# Patient Record
Sex: MALE | Race: BLACK OR AFRICAN AMERICAN | NOT HISPANIC OR LATINO | Employment: OTHER | URBAN - METROPOLITAN AREA
[De-identification: names, ages, dates, MRNs, and addresses within clinical notes are randomized per-mention and may not be internally consistent; named-entity substitution may affect disease eponyms.]

---

## 2017-01-03 ENCOUNTER — TELEPHONE (OUTPATIENT)
Dept: PULMONOLOGY | Facility: CLINIC | Age: 82
End: 2017-01-03

## 2017-01-03 NOTE — TELEPHONE ENCOUNTER
Spoke with patient's daughter Luz Maria and spoke with pharmacy.  Form to be filled out being faxed from [facility].  Will have filled out and faxed back today.

## 2017-01-03 NOTE — TELEPHONE ENCOUNTER
----- Message from Marianna Sanchez sent at 12/30/2016  9:56 AM CST -----  Contact: daughter-Luz Maria  Pt daughter Luz Maria would like the nurse to call regarding pt Rx-nebulizer,the pharmacy is waiting on the office to call them cause the insurance is not going to pay,prior authorization was needed.....[phone]

## 2017-01-04 ENCOUNTER — ANTI-COAG VISIT (OUTPATIENT)
Dept: CARDIOLOGY | Facility: CLINIC | Age: 82
End: 2017-01-04
Payer: MEDICARE

## 2017-01-04 DIAGNOSIS — I48.91 ATRIAL FIBRILLATION, UNSPECIFIED TYPE: ICD-10-CM

## 2017-01-04 DIAGNOSIS — Z79.01 LONG TERM (CURRENT) USE OF ANTICOAGULANTS: Primary | ICD-10-CM

## 2017-01-04 LAB
CTP QC/QA: ABNORMAL
INR PPP: 3.7 (ref 2–3)

## 2017-01-04 PROCEDURE — 99999 PR PBB SHADOW E&M-EST. PATIENT-LVL I: CPT | Mod: PBBFAC,,,

## 2017-01-04 PROCEDURE — 85610 PROTHROMBIN TIME: CPT | Mod: PBBFAC,PO

## 2017-01-04 PROCEDURE — 99211 OFF/OP EST MAY X REQ PHY/QHP: CPT | Mod: PBBFAC,PO

## 2017-01-04 NOTE — PROGRESS NOTES
Patient confirms dose and compliance. Reports that he had greens over the holiday. Was also taking azithromycin and on a prednisone taper which started on 12/22. He has 2 days left of low dose prednisone. Will have patient hold warfarin for 2 days and resume. Recheck INR in 3 weeks.

## 2017-01-04 NOTE — MR AVS SNAPSHOT
[facility]  [address] [place] [address]  Phone: [phone]  Fax: [phone]                  Bola Figueroa .   2017 9:15 AM   Anti-coag visit    Description:  Male : 10/6/1934   Provider:  Floridalma Almodovar PharmD   Department:  [facility] - Coumadin           Diagnoses this Visit        Comments    Long term (current) use of anticoagulants    -  Primary     Atrial fibrillation, unspecified type                To Do List           Future Appointments        Provider Department Dept Phone    2017 9:15 AM Floridalma Almodovar PharmD [facility] - Coumadin [phone]    2017 9:00 AM Hedy Corona PharmD [facility] - Coumadin [phone]    3/23/2017 11:40 AM PULMONARY LAB, [facility]- Pulmonary Function [facility] [phone]    3/23/2017 12:00 PM SPIROMETRY, [facility]- Pulmonary Function [facility] [phone]    3/23/2017 1:00 PM Juanjo Wallace MD [facility] Pulmonary Services [phone]      Goals (5 Years of Data)     None      [facility]     [facility] -  Assistance  Registered nurses in the [facility] provide clinical advisement, health education, appointment booking, and other advisory services.  Call for this free service at [phone].             Medications           Message regarding Medications     Verify the changes and/or additions to your medication regime listed below are the same as discussed with your clinician today.  If any of these changes or additions are incorrect, please notify your healthcare provider.             Verify that the below list of medications is an accurate representation of the medications you are currently taking.  If none reported, the list may be blank. If incorrect, please contact your healthcare provider. Carry this list with you in case of emergency.           Current Medications     albuterol (PROAIR HFA) 90 mcg/actuation inhaler Inhale 2 puffs into the lungs every 6 (six) hours.    albuterol-ipratropium  2.5mg-0.5mg/3mL (DUO-NEB) 0.5 mg-3 mg(2.5 mg base)/3 mL nebulizer solution Take 3 mLs by nebulization every 6 (six) hours.    AMITIZA 8 mcg Cap     amlodipine-benazepril (LOTREL) 10-40 mg per capsule Take 1 capsule by mouth every morning.    aspirin (ECOTRIN) 81 MG EC tablet Take 81 mg by mouth once daily.    atenolol (TENORMIN) 50 MG tablet Take 50 mg by mouth once daily.    azithromycin (ZITHROMAX Z-ROGELIO) 250 MG tablet Take 1 tablet (250 mg total) by mouth once daily. 500 mg on day 1 (two tablets) followed by 250 mg once daily on days 2-5    budesonide (PULMICORT) 0.5 mg/2 mL nebulizer solution Take 2 mLs (0.5 mg total) by nebulization 2 (two) times daily.    cloNIDine (CATAPRES) 0.2 MG tablet Take 0.2 mg by mouth 2 (two) times daily.    dexlansoprazole (DEXILANT) 60 mg capsule 60 mg.    FLUZONE HIGH-DOSE 2016-17, PF, 180 mcg/0.5 mL Syrg inject 0.5 milliliter intramuscularly    furosemide (LASIX) 40 MG tablet Take 40 mg by mouth 2 (two) times daily.     gabapentin (NEURONTIN) 100 MG capsule Take 100 mg by mouth 3 (three) times daily.    hydrALAZINE (APRESOLINE) 50 MG tablet take 1 tablet by mouth every 12 hours (TAKE WITH ISOSORBIDE)    hydrocodone-acetaminophen 5-325mg (NORCO) 5-325 mg per tablet Take 1 tablet by mouth every 4 (four) hours as needed.    ipratropium (ATROVENT) 0.06 % nasal spray 2 sprays by Nasal route 3 (three) times daily.    isosorbide dinitrate (ISORDIL) 20 MG tablet take 1 tablet by mouth every 12 hours **TAKE WITH HYDRALAZINE    levetiracetam (KEPPRA) 500 MG Tab Take 1 tablet by mouth Twice daily.    nitroGLYCERIN (NITROBID) 6.5 MG CpSR take 1 capsule by mouth twice a day    predniSONE (DELTASONE) 20 MG tablet Prednisone 60 mg/ day for 3 days, 40 mg/day for 3 days,20 mg/ day for 3 days, (1/2 tablet )10 mg a day for 3 days.    simvastatin (ZOCOR) 40 MG tablet Take 1 tablet by mouth Daily.    tamsulosin (FLOMAX) 0.4 mg Cp24 0.4 mg.    warfarin (COUMADIN) 5 MG tablet Take 1 1/2 on Saturday and  Sunday and 1 tablet all remaining days as directed by the Coumadin Clinic.           Clinical Reference Information           Allergies as of 1/4/2017     No Known Drug Allergies      Immunizations Administered on Date of Encounter - 1/4/2017     None      Orders Placed During Today's Visit      Normal Orders This Visit    ISTAT INR          1/4/2017  8:39 AM - Floridalma Almodovar PharmD      Component Results     Component Value Flag Ref Range Units Status    INR 3.7 (A) 2.0 - 3.0  Final     Acceptable           December 2016 Details    Sun Mon Tue Wed Thu Fri Sat         1               2               3                 4               5      5 mg         6      5 mg         7   2.3   5 mg   See details      8      5 mg         9      5 mg         10      7.5 mg           11      7.5 mg         12      5 mg         13      5 mg         14      5 mg         15      5 mg         16      5 mg         17      7.5 mg           18      7.5 mg         19      5 mg         20      5 mg         21      5 mg         22      5 mg         23      5 mg         24      7.5 mg           25      7.5 mg         26      5 mg         27      5 mg         28      5 mg         29      5 mg         30      5 mg         31      7.5 mg          Date Details   12/07 Last INR check   INR: 2.3                 January 2017 Details    Sun Mon Tue Wed Thu Fri Sat     1      7.5 mg         2      5 mg         3      5 mg         4   3.7   Hold   See details      5      Hold         6      5 mg         7      7.5 mg           8      7.5 mg         9      5 mg         10      5 mg         11      5 mg         12      5 mg         13      5 mg         14      7.5 mg           15      7.5 mg         16      5 mg         17      5 mg         18      5 mg         19      5 mg         20      5 mg         21      7.5 mg           22      7.5 mg         23      5 mg         24      5 mg         25      5 mg         26      5 mg         27       5 mg         28      7.5 mg           29      7.5 mg         30      5 mg         31      5 mg              Date Details   01/04 This INR check   INR: 3.7      Date of next INR: No date specified               How to take your warfarin dose     To take:  5 mg Take 1 of the 5 mg tablets.    To take:  7.5 mg Take 1.5 of the 5 mg tablets.    Hold Do not take your warfarin dose. See the Details table to the right for additional instructions.                Anticoagulation Summary as of 1/4/2017     Maintenance plan 7.5 mg (5 mg x 1.5) on Sun, Sat; 5 mg (5 mg x 1) all other days    Full instructions 1/4: Hold; 1/5: Hold; Otherwise 7.5 mg on Sun, Sat; 5 mg all other days    Next INR check       Anticoagulation Episode Summary     Comments       [facility] Sign-Up     Activating your [facility] account is as easy as 1-2-3!     1) Visit [email or web address], select Sign Up Now, enter this activation code and your date of birth, then select Next.  [email or web address]  Expires: 2/5/2017  3:41 PM      2) Create a username and password to use when you visit [facility] in the future and select a security question in case you lose your password and select Next.    3) Enter your e-mail address and click Sign Up!    Additional Information  If you have questions, please e-mail [email or web address] or call [phone] to talk to our [facility] staff. Remember, [facility] is NOT to be used for urgent needs. For medical emergencies, dial 911.

## 2017-01-09 ENCOUNTER — TELEPHONE (OUTPATIENT)
Dept: PULMONOLOGY | Facility: CLINIC | Age: 82
End: 2017-01-09

## 2017-01-09 NOTE — TELEPHONE ENCOUNTER
----- Message from Brianna Oliveira sent at 1/9/2017 11:01 AM CST -----  Contact: pt  States she's returning Soledad's call. Please call Luz Maria Alexis at [phone]. Thank you

## 2017-01-09 NOTE — TELEPHONE ENCOUNTER
----- Message from Comfort Looney sent at 1/9/2017  2:11 PM CST -----  Contact: daughter -Luz Maria Alexis  [phone]  Soledad - Ms Loera is returning your missed call and wants you to go ahead and call [facility] on [address] if you have the authorization.

## 2017-01-09 NOTE — TELEPHONE ENCOUNTER
Spoke with patient's Daughter, Luz Maria,  Paperwork has been faxed to pharmacy for nebulizer medication.

## 2017-01-25 ENCOUNTER — ANTI-COAG VISIT (OUTPATIENT)
Dept: CARDIOLOGY | Facility: CLINIC | Age: 82
End: 2017-01-25
Payer: MEDICARE

## 2017-01-25 DIAGNOSIS — Z79.01 LONG TERM (CURRENT) USE OF ANTICOAGULANTS: Primary | ICD-10-CM

## 2017-01-25 LAB
CTP QC/QA: YES
INR PPP: 3.5 (ref 2–3)

## 2017-01-25 PROCEDURE — 85610 PROTHROMBIN TIME: CPT | Mod: PBBFAC,PO

## 2017-01-25 PROCEDURE — 99999 PR PBB SHADOW E&M-EST. PATIENT-LVL I: CPT | Mod: PBBFAC,,,

## 2017-01-25 PROCEDURE — 99211 OFF/OP EST MAY X REQ PHY/QHP: CPT | Mod: PBBFAC,PO

## 2017-01-25 NOTE — PROGRESS NOTES
INR remains supra-therapeutic. No bleeding noted. Patient completed a z-pack regimen since last visit but no other changes. Will hold x1 dose then lower total weekly dose to 5mg daily.

## 2017-01-25 NOTE — MR AVS SNAPSHOT
[facility]  [address] [place] [address]  Phone: [phone]  Fax: [phone]                  Bola Figueroa .   2017 9:00 AM   Anti-coag visit    Description:  Male : 10/6/1934   Provider:  Hedy Corona PharmD   Department:  [facility] - Coumadin           Diagnoses this Visit        Comments    Long term (current) use of anticoagulants    -  Primary            To Do List           Future Appointments        Provider Department Dept Phone    2017 9:00 AM Hedy Corona PharmD [facility] - Coumadin [phone]    2/3/2017 2:30 PM Caroline Mcdonough MD [facility] Cardiology [phone]    2/15/2017 9:00 AM Hedy Corona PharmD [facility] - Coumadin [phone]    3/23/2017 11:40 AM PULMONARY LAB, [facility]- Pulmonary Function [facility] [phone]    3/23/2017 12:00 PM SPIROMETRY, [facility] Pulmonary Function [facility] [phone]      Goals (5 Years of Data)     None      [facility]     [facility] On Call Nurse Care Line -  Assistance  Registered nurses in the [facility] On Call Center provide clinical advisement, health education, appointment booking, and other advisory services.  Call for this free service at [phone].             Medications           Message regarding Medications     Verify the changes and/or additions to your medication regime listed below are the same as discussed with your clinician today.  If any of these changes or additions are incorrect, please notify your healthcare provider.             Verify that the below list of medications is an accurate representation of the medications you are currently taking.  If none reported, the list may be blank. If incorrect, please contact your healthcare provider. Carry this list with you in case of emergency.           Current Medications     albuterol (PROAIR HFA) 90 mcg/actuation inhaler Inhale 2 puffs into the lungs every 6 (six) hours.    albuterol-ipratropium 2.5mg-0.5mg/3mL (DUO-NEB) 0.5 mg-3 mg(2.5 mg  base)/3 mL nebulizer solution Take 3 mLs by nebulization every 6 (six) hours.    AMITIZA 8 mcg Cap     amlodipine-benazepril (LOTREL) 10-40 mg per capsule Take 1 capsule by mouth every morning.    aspirin (ECOTRIN) 81 MG EC tablet Take 81 mg by mouth once daily.    atenolol (TENORMIN) 50 MG tablet Take 50 mg by mouth once daily.    azithromycin (ZITHROMAX Z-ROGELIO) 250 MG tablet Take 1 tablet (250 mg total) by mouth once daily. 500 mg on day 1 (two tablets) followed by 250 mg once daily on days 2-5    budesonide (PULMICORT) 0.5 mg/2 mL nebulizer solution Take 2 mLs (0.5 mg total) by nebulization 2 (two) times daily.    cloNIDine (CATAPRES) 0.2 MG tablet Take 0.2 mg by mouth 2 (two) times daily.    dexlansoprazole (DEXILANT) 60 mg capsule 60 mg.    FLUZONE HIGH-DOSE 2016-17, PF, 180 mcg/0.5 mL Syrg inject 0.5 milliliter intramuscularly    furosemide (LASIX) 40 MG tablet Take 40 mg by mouth 2 (two) times daily.     gabapentin (NEURONTIN) 100 MG capsule Take 100 mg by mouth 3 (three) times daily.    hydrALAZINE (APRESOLINE) 50 MG tablet take 1 tablet by mouth every 12 hours (TAKE WITH ISOSORBIDE)    hydrocodone-acetaminophen 5-325mg (NORCO) 5-325 mg per tablet Take 1 tablet by mouth every 4 (four) hours as needed.    ipratropium (ATROVENT) 0.06 % nasal spray 2 sprays by Nasal route 3 (three) times daily.    isosorbide dinitrate (ISORDIL) 20 MG tablet take 1 tablet by mouth every 12 hours **TAKE WITH HYDRALAZINE    levetiracetam (KEPPRA) 500 MG Tab Take 1 tablet by mouth Twice daily.    nitroGLYCERIN (NITROBID) 6.5 MG CpSR take 1 capsule by mouth twice a day    predniSONE (DELTASONE) 20 MG tablet Prednisone 60 mg/ day for 3 days, 40 mg/day for 3 days,20 mg/ day for 3 days, (1/2 tablet )10 mg a day for 3 days.    simvastatin (ZOCOR) 40 MG tablet Take 1 tablet by mouth Daily.    tamsulosin (FLOMAX) 0.4 mg Cp24 0.4 mg.    warfarin (COUMADIN) 5 MG tablet Take 1 1/2 on Saturday and Sunday and 1 tablet all remaining days as  directed by the Coumadin Clinic.           Clinical Reference Information           Allergies as of 1/25/2017     No Known Drug Allergies      Immunizations Administered on Date of Encounter - 1/25/2017     None      Orders Placed During Today's Visit      Normal Orders This Visit    POCT INR          1/25/2017  8:57 AM - Hedy Corona, PharmD      Component Results     Component Value Flag Ref Range Units Status    INR 3.5 (A) 2.0 - 3.0  Final     Acceptable Yes    Final      December 2016 Details    Sun Mon Tue Wed Thu Fri Sat         1               2               3                 4               5               6               7               8               9               10                 11               12               13               14               15               16               17                 18               19               20               21               22               23               24                 25               26      5 mg         27      5 mg         28      5 mg         29      5 mg         30      5 mg         31      7.5 mg          Date Details   No additional details              January 2017 Details    Sun Mon Tue Wed Thu Fri Sat     1      7.5 mg         2      5 mg         3      5 mg         4   3.7   Hold   See details      5      Hold         6      5 mg         7      7.5 mg           8      7.5 mg         9      5 mg         10      5 mg         11      5 mg         12      5 mg         13      5 mg         14      7.5 mg           15      7.5 mg         16      5 mg         17      5 mg         18      5 mg         19      5 mg         20      5 mg         21      7.5 mg           22      7.5 mg         23      5 mg         24      5 mg         25   3.5   Hold   See details      26      5 mg         27      5 mg         28      5 mg           29      5 mg         30      5 mg         31      5 mg              Date Details   01/04 Last INR  check   INR: 3.7      01/25 This INR check   INR: 3.5                     How to take your warfarin dose     To take:  5 mg Take 1 of the 5 mg tablets.    Hold Do not take your warfarin dose. See the Details table to the right for additional instructions.                February 2017 Details    Sun Mon Tue Wed Thu Fri Sat        1      5 mg         2      5 mg         3      5 mg         4      5 mg           5      5 mg         6      5 mg         7      5 mg         8      5 mg         9      5 mg         10      5 mg         11      5 mg           12      5 mg         13      5 mg         14      5 mg         15            16               17               18                 19               20               21               22               23               24               25                 26               27               28                    Date Details   No additional details    Date of next INR:  2/15/2017         How to take your warfarin dose     To take:  5 mg Take 1 of the 5 mg tablets.           Anticoagulation Summary as of 1/25/2017     Maintenance plan 5 mg (5 mg x 1) every day    Full instructions 1/25: Hold; Otherwise 5 mg every day    Next INR check 2/15/2017      Anticoagulation Episode Summary     Comments       Patient Findings     Negatives Signs/symptoms of thrombosis, Signs/symptoms of bleeding, Laboratory test error suspected, Change in health, Change in alcohol use, Change in activity, Upcoming invasive procedure, Emergency department visit, Upcoming dental procedure, Missed doses, Extra doses, Change in medications, Change in diet/appetite, Hospital admission, Bruising, Other complaints      [facility] Sign-Up     Activating your [facility] account is as easy as 1-2-3!     1) Visit [email or web address], select Sign Up Now, enter this activation code and your date of birth, then select Next.  [email or web address]  Expires: 2/5/2017  3:41 PM      2) Create a username and password to use when you  visit [facility] in the future and select a security question in case you lose your password and select Next.    3) Enter your e-mail address and click Sign Up!    Additional Information  If you have questions, please e-mail [email or web address] or call [phone] to talk to our [facility] staff. Remember, [facility] is NOT to be used for urgent needs. For medical emergencies, dial 911.

## 2017-01-27 DIAGNOSIS — I25.10 CORONARY ARTERY DISEASE INVOLVING NATIVE CORONARY ARTERY WITHOUT ANGINA PECTORIS, UNSPECIFIED WHETHER NATIVE OR TRANSPLANTED HEART: ICD-10-CM

## 2017-01-27 DIAGNOSIS — I10 ESSENTIAL HYPERTENSION: ICD-10-CM

## 2017-01-27 RX ORDER — AMLODIPINE AND BENAZEPRIL HYDROCHLORIDE 10; 40 MG/1; MG/1
1 CAPSULE ORAL EVERY MORNING
Qty: 90 CAPSULE | Refills: 3 | Status: SHIPPED | OUTPATIENT
Start: 2017-01-27 | End: 2017-08-02 | Stop reason: ALTCHOICE

## 2017-02-03 ENCOUNTER — OFFICE VISIT (OUTPATIENT)
Dept: CARDIOLOGY | Facility: CLINIC | Age: 82
End: 2017-02-03
Payer: MEDICARE

## 2017-02-03 VITALS
SYSTOLIC BLOOD PRESSURE: 150 MMHG | WEIGHT: 202.19 LBS | HEIGHT: 71 IN | DIASTOLIC BLOOD PRESSURE: 76 MMHG | BODY MASS INDEX: 28.31 KG/M2 | HEART RATE: 75 BPM

## 2017-02-03 DIAGNOSIS — Z98.61 S/P PTCA (PERCUTANEOUS TRANSLUMINAL CORONARY ANGIOPLASTY): ICD-10-CM

## 2017-02-03 DIAGNOSIS — I48.91 ATRIAL FIBRILLATION, UNSPECIFIED TYPE: ICD-10-CM

## 2017-02-03 DIAGNOSIS — Z79.01 LONG-TERM (CURRENT) USE OF ANTICOAGULANTS: ICD-10-CM

## 2017-02-03 DIAGNOSIS — I73.9 PVD (PERIPHERAL VASCULAR DISEASE): ICD-10-CM

## 2017-02-03 DIAGNOSIS — Z95.0 CARDIAC PACEMAKER: ICD-10-CM

## 2017-02-03 DIAGNOSIS — I10 ESSENTIAL HYPERTENSION: ICD-10-CM

## 2017-02-03 DIAGNOSIS — J98.6 ELEVATED DIAPHRAGM: ICD-10-CM

## 2017-02-03 DIAGNOSIS — I25.10 CORONARY ARTERY DISEASE INVOLVING NATIVE CORONARY ARTERY OF NATIVE HEART WITHOUT ANGINA PECTORIS: ICD-10-CM

## 2017-02-03 DIAGNOSIS — I65.23 BILATERAL CAROTID ARTERY STENOSIS: ICD-10-CM

## 2017-02-03 DIAGNOSIS — J44.9 CHRONIC OBSTRUCTIVE PULMONARY DISEASE, UNSPECIFIED COPD TYPE: Primary | ICD-10-CM

## 2017-02-03 DIAGNOSIS — G47.30 SLEEP APNEA, UNSPECIFIED TYPE: ICD-10-CM

## 2017-02-03 PROCEDURE — 99999 PR PBB SHADOW E&M-EST. PATIENT-LVL III: CPT | Mod: PBBFAC,,, | Performed by: INTERNAL MEDICINE

## 2017-02-03 PROCEDURE — 99213 OFFICE O/P EST LOW 20 MIN: CPT | Mod: PBBFAC,PO | Performed by: INTERNAL MEDICINE

## 2017-02-03 PROCEDURE — 99212 OFFICE O/P EST SF 10 MIN: CPT | Mod: S$PBB,,, | Performed by: INTERNAL MEDICINE

## 2017-02-03 NOTE — MR AVS SNAPSHOT
[facility] Cardiology  [address] [place] [address]  Phone: [phone]  Fax: [phone]                  [facility].   2/3/2017 2:30 PM   Office Visit    Description:  Male : 10/6/1934   Provider:  Caroline Mcdonough MD   Department:  [facility] - Cardiology           Diagnoses this Visit        Comments    Chronic obstructive pulmonary disease, unspecified COPD type    -  Primary     S/P PTCA (percutaneous transluminal coronary angioplasty)         Sleep apnea, unspecified type         Cardiac pacemaker         Elevated diaphragm         Long-term (current) use of anticoagulants         Essential hypertension         PVD (peripheral vascular disease)         Coronary artery disease involving native coronary artery of native heart without angina pectoris         Bilateral carotid artery stenosis         Atrial fibrillation, unspecified type                To Do List           Future Appointments        Provider Department Dept Phone    2/3/2017 2:30 PM Caroline Mcdonough MD [facility] Cardiology [phone]    2/15/2017 9:00 AM Hedy Corona, PharmD [facility] Coumadin [phone]    3/23/2017 11:40 AM PULMONARY LAB, [facility]- Pulmonary Function [facility] [phone]    3/23/2017 12:00 PM SPIROMETRY, [facility] Pulmonary Function [facility] [phone]    3/23/2017 1:00 PM Juanjo Wallace MD [facility] Pulmonary Services [phone]      Goals (5 Years of Data)     None      Follow-Up and Disposition     Return in about 6 months (around 8/3/2017).      [facility]     [facility] -  Assistance  Registered nurses in the [facility] provide clinical advisement, health education, appointment booking, and other advisory services.  Call for this free service at [phone].             Medications           Message regarding Medications     Verify the changes and/or additions to your medication regime listed below are the same as discussed with your clinician today.  If  any of these changes or additions are incorrect, please notify your healthcare provider.        STOP taking these medications     predniSONE (DELTASONE) 20 MG tablet Prednisone 60 mg/ day for 3 days, 40 mg/day for 3 days,20 mg/ day for 3 days, (1/2 tablet )10 mg a day for 3 days.           Verify that the below list of medications is an accurate representation of the medications you are currently taking.  If none reported, the list may be blank. If incorrect, please contact your healthcare provider. Carry this list with you in case of emergency.           Current Medications     albuterol (PROAIR HFA) 90 mcg/actuation inhaler Inhale 2 puffs into the lungs every 6 (six) hours.    albuterol-ipratropium 2.5mg-0.5mg/3mL (DUO-NEB) 0.5 mg-3 mg(2.5 mg base)/3 mL nebulizer solution Take 3 mLs by nebulization every 6 (six) hours.    AMITIZA 8 mcg Cap     amlodipine-benazepril (LOTREL) 10-40 mg per capsule Take 1 capsule by mouth every morning.    aspirin (ECOTRIN) 81 MG EC tablet Take 81 mg by mouth once daily.    atenolol (TENORMIN) 50 MG tablet Take 50 mg by mouth once daily.    azithromycin (ZITHROMAX Z-ROGELIO) 250 MG tablet Take 1 tablet (250 mg total) by mouth once daily. 500 mg on day 1 (two tablets) followed by 250 mg once daily on days 2-5    budesonide (PULMICORT) 0.5 mg/2 mL nebulizer solution Take 2 mLs (0.5 mg total) by nebulization 2 (two) times daily.    cloNIDine (CATAPRES) 0.2 MG tablet Take 0.2 mg by mouth 2 (two) times daily.    dexlansoprazole (DEXILANT) 60 mg capsule 60 mg.    FLUZONE HIGH-DOSE 2016-17, PF, 180 mcg/0.5 mL Syrg inject 0.5 milliliter intramuscularly    furosemide (LASIX) 40 MG tablet Take 40 mg by mouth 2 (two) times daily.     gabapentin (NEURONTIN) 100 MG capsule Take 100 mg by mouth 3 (three) times daily.    hydrALAZINE (APRESOLINE) 50 MG tablet take 1 tablet by mouth every 12 hours (TAKE WITH ISOSORBIDE)    hydrocodone-acetaminophen 5-325mg (NORCO) 5-325 mg per tablet Take 1 tablet by  "mouth every 4 (four) hours as needed.    ipratropium (ATROVENT) 0.06 % nasal spray 2 sprays by Nasal route 3 (three) times daily.    isosorbide dinitrate (ISORDIL) 20 MG tablet take 1 tablet by mouth every 12 hours **TAKE WITH HYDRALAZINE    levetiracetam (KEPPRA) 500 MG Tab Take 1 tablet by mouth Twice daily.    nitroGLYCERIN (NITROBID) 6.5 MG CpSR take 1 capsule by mouth twice a day    simvastatin (ZOCOR) 40 MG tablet Take 1 tablet by mouth Daily.    tamsulosin (FLOMAX) 0.4 mg Cp24 0.4 mg.    warfarin (COUMADIN) 5 MG tablet Take 1 1/2 on Saturday and Sunday and 1 tablet all remaining days as directed by the Coumadin Clinic.           Clinical Reference Information           Your Vitals Were     BP Pulse Height Weight BMI    150/76 (BP Location: Right arm, Patient Position: Sitting, BP Method: Manual) 75 5' 11" (1.803 m) 91.7 kg (202 lb 2.6 oz) 28.2 kg/m2      Blood Pressure          Most Recent Value    Right Arm BP - Sitting  150/76    Left Arm BP - Sitting  152/72    BP  (!)  150/76      Allergies as of 2/3/2017     No Known Drug Allergies      Immunizations Administered on Date of Encounter - 2/3/2017     None      [facility] Sign-Up     Activating your [facility] account is as easy as 1-2-3!     1) Visit [email or web address], select Sign Up Now, enter this activation code and your date of birth, then select Next.  [email or web address]  Expires: 2/5/2017  3:41 PM      2) Create a username and password to use when you visit [facility] in the future and select a security question in case you lose your password and select Next.    3) Enter your e-mail address and click Sign Up!    Additional Information  If you have questions, please e-mail [email or web address] or call [phone] to talk to our [facility] staff. Remember, [facility] is NOT to be used for urgent needs. For medical emergencies, dial 911.         Language Assistance Services     ATTENTION: Language assistance services are available, free of charge. Please call " [phone].      ATENCIÓN: Si habla español, tiene a paz disposición servicios gratuitos de asistencia lingüística. Llame al [phone].     CHÚ Ý: N?u b?n nói Ti?ng Vi?t, có các d?ch v? h? tr? ngôn ng? mi?n phí dành cho b?n. G?i s? [phone].         [facility] - Cardiology complies with applicable Federal civil rights laws and does not discriminate on the basis of race, color, national origin, age, disability, or sex.

## 2017-02-03 NOTE — PROGRESS NOTES
Subjective:   Patient ID:  Bola Figueroa Sr. is a 82 y.o. male who presents for follow up of No chief complaint on file.      HPI  A 81 yo male with pacer afib carotid disease pvd carotid disease is here for f/u he had cea w/o any issues. Clinically no change in symptoms no change in ros since last visit,  Past Medical History   Diagnosis Date    *Atrial fibrillation     Atrial fibrillation     Bilateral carotid artery stenosis 1/15/2016    Cardiac pacemaker 8/7/2013    Congestive heart failure 4/6/2015    COPD (chronic obstructive pulmonary disease) 8/7/2013    Coronary artery disease     Hyperlipidemia     Hypertension     Long-term (current) use of anticoagulants 8/7/2013    PVD (peripheral vascular disease)     S/P PTCA (percutaneous transluminal coronary angioplasty) 8/7/2013    Sleep apnea 8/7/2013    Stroke        Past Surgical History   Procedure Laterality Date    Insert / replace / remove pacemaker  2005    Cataract extraction w/  intraocular lens implant         Social History   Substance Use Topics    Smoking status: Former Smoker     Packs/day: 1.50     Years: 35.00     Types: Cigarettes     Quit date: 11/13/1979    Smokeless tobacco: Never Used    Alcohol use No      Comment: QUIT 08/13/77       Family History   Problem Relation Age of Onset    Diabetes Sister     Fainting Sister     Heart disease Paternal Uncle     Heart attack Paternal Uncle     Hypertension Maternal Grandmother     Diabetes Maternal Grandfather        Current Outpatient Prescriptions   Medication Sig    albuterol (PROAIR HFA) 90 mcg/actuation inhaler Inhale 2 puffs into the lungs every 6 (six) hours.    albuterol-ipratropium 2.5mg-0.5mg/3mL (DUO-NEB) 0.5 mg-3 mg(2.5 mg base)/3 mL nebulizer solution Take 3 mLs by nebulization every 6 (six) hours.    AMITIZA 8 mcg Cap     amlodipine-benazepril (LOTREL) 10-40 mg per capsule Take 1 capsule by mouth every morning.    aspirin (ECOTRIN) 81 MG EC tablet Take 81  mg by mouth once daily.    atenolol (TENORMIN) 50 MG tablet Take 50 mg by mouth once daily.    azithromycin (ZITHROMAX Z-ROGELIO) 250 MG tablet Take 1 tablet (250 mg total) by mouth once daily. 500 mg on day 1 (two tablets) followed by 250 mg once daily on days 2-5    budesonide (PULMICORT) 0.5 mg/2 mL nebulizer solution Take 2 mLs (0.5 mg total) by nebulization 2 (two) times daily.    cloNIDine (CATAPRES) 0.2 MG tablet Take 0.2 mg by mouth 2 (two) times daily.    dexlansoprazole (DEXILANT) 60 mg capsule 60 mg.    FLUZONE HIGH-DOSE 2016-17, PF, 180 mcg/0.5 mL Syrg inject 0.5 milliliter intramuscularly    furosemide (LASIX) 40 MG tablet Take 40 mg by mouth 2 (two) times daily.     gabapentin (NEURONTIN) 100 MG capsule Take 100 mg by mouth 3 (three) times daily.    hydrALAZINE (APRESOLINE) 50 MG tablet take 1 tablet by mouth every 12 hours (TAKE WITH ISOSORBIDE)    hydrocodone-acetaminophen 5-325mg (NORCO) 5-325 mg per tablet Take 1 tablet by mouth every 4 (four) hours as needed.    ipratropium (ATROVENT) 0.06 % nasal spray 2 sprays by Nasal route 3 (three) times daily.    isosorbide dinitrate (ISORDIL) 20 MG tablet take 1 tablet by mouth every 12 hours **TAKE WITH HYDRALAZINE    levetiracetam (KEPPRA) 500 MG Tab Take 1 tablet by mouth Twice daily.    nitroGLYCERIN (NITROBID) 6.5 MG CpSR take 1 capsule by mouth twice a day    simvastatin (ZOCOR) 40 MG tablet Take 1 tablet by mouth Daily.    tamsulosin (FLOMAX) 0.4 mg Cp24 0.4 mg.    warfarin (COUMADIN) 5 MG tablet Take 1 1/2 on Saturday and Sunday and 1 tablet all remaining days as directed by the Coumadin Clinic.     No current facility-administered medications for this visit.      Current Outpatient Prescriptions on File Prior to Visit   Medication Sig    albuterol (PROAIR HFA) 90 mcg/actuation inhaler Inhale 2 puffs into the lungs every 6 (six) hours.    albuterol-ipratropium 2.5mg-0.5mg/3mL (DUO-NEB) 0.5 mg-3 mg(2.5 mg base)/3 mL nebulizer solution  Take 3 mLs by nebulization every 6 (six) hours.    AMITIZA 8 mcg Cap     amlodipine-benazepril (LOTREL) 10-40 mg per capsule Take 1 capsule by mouth every morning.    aspirin (ECOTRIN) 81 MG EC tablet Take 81 mg by mouth once daily.    atenolol (TENORMIN) 50 MG tablet Take 50 mg by mouth once daily.    azithromycin (ZITHROMAX Z-ROGELIO) 250 MG tablet Take 1 tablet (250 mg total) by mouth once daily. 500 mg on day 1 (two tablets) followed by 250 mg once daily on days 2-5    budesonide (PULMICORT) 0.5 mg/2 mL nebulizer solution Take 2 mLs (0.5 mg total) by nebulization 2 (two) times daily.    cloNIDine (CATAPRES) 0.2 MG tablet Take 0.2 mg by mouth 2 (two) times daily.    dexlansoprazole (DEXILANT) 60 mg capsule 60 mg.    FLUZONE HIGH-DOSE 2016-17, PF, 180 mcg/0.5 mL Syrg inject 0.5 milliliter intramuscularly    furosemide (LASIX) 40 MG tablet Take 40 mg by mouth 2 (two) times daily.     gabapentin (NEURONTIN) 100 MG capsule Take 100 mg by mouth 3 (three) times daily.    hydrALAZINE (APRESOLINE) 50 MG tablet take 1 tablet by mouth every 12 hours (TAKE WITH ISOSORBIDE)    hydrocodone-acetaminophen 5-325mg (NORCO) 5-325 mg per tablet Take 1 tablet by mouth every 4 (four) hours as needed.    ipratropium (ATROVENT) 0.06 % nasal spray 2 sprays by Nasal route 3 (three) times daily.    isosorbide dinitrate (ISORDIL) 20 MG tablet take 1 tablet by mouth every 12 hours **TAKE WITH HYDRALAZINE    levetiracetam (KEPPRA) 500 MG Tab Take 1 tablet by mouth Twice daily.    nitroGLYCERIN (NITROBID) 6.5 MG CpSR take 1 capsule by mouth twice a day    simvastatin (ZOCOR) 40 MG tablet Take 1 tablet by mouth Daily.    tamsulosin (FLOMAX) 0.4 mg Cp24 0.4 mg.    warfarin (COUMADIN) 5 MG tablet Take 1 1/2 on Saturday and Sunday and 1 tablet all remaining days as directed by the Coumadin Clinic.    [DISCONTINUED] predniSONE (DELTASONE) 20 MG tablet Prednisone 60 mg/ day for 3 days, 40 mg/day for 3 days,20 mg/ day for 3 days,  "(1/2 tablet )10 mg a day for 3 days.     No current facility-administered medications on file prior to visit.      Review of patient's allergies indicates:   Allergen Reactions    No known drug allergies        ROS  Constitution: Positive for malaise/fatigue. Negative for decreased appetite, weakness, weight gain and weight loss.    HENT: Negative for headaches.    Eyes: Negative for blurred vision.    Cardiovascular: Positive for dyspnea on exertion, leg swelling RESOLVED . Negative for chest pain, claudication, cyanosis, irregular heartbeat, near-syncope, palpitations, paroxysmal nocturnal dyspnia and syncope.    Respiratory: Positive for cough, snoring and wheezing. Negative for chest tightness, hemoptysis, shortness of breath and sleep disturbances due to breathing.    Endocrine: Negative for heat intolerance.    Hematologic/Lymphatic: Negative for bleeding problem. Does not bruise/bleed easily.    Skin: Negative for dry skin and itching.    Musculoskeletal: Negative for falls, muscle weakness and myalgias.    Gastrointestinal: Negative for abdominal pain, diarrhea, heartburn, hematemesis, hematochezia, melena, nausea and vomiting.    Genitourinary: Negative for flank pain, hematuria and nocturia.    Neurological: Positive for excessive daytime sleepiness. Negative for dizziness, focal weakness, light-headedness, numbness, paresthesias and seizures.    Psychiatric/Behavioral: Positive for memory loss. Negative for altered mental status, depression and substance abuse. The patient does not have insomnia and is not nervous/anxious.    Allergic/Immunologic: Negative for hives  Objective:   Physical Exam  Vitals:    02/03/17 1409   BP: (!) 150/76   BP Location: Right arm   Patient Position: Sitting   BP Method: Manual   Pulse: 75   Weight: 91.7 kg (202 lb 2.6 oz)   Height: 5' 11" (1.803 m)   Constitutional: He is oriented to person, place, and time. He appears well-developed and well-nourished. No distress.   HENT: "   Head: Normocephalic and atraumatic.   Eyes: EOM are normal. Pupils are equal, round, and reactive to light. Right eye exhibits no discharge. Left eye exhibits no discharge.   Neck: Neck supple. No JVD present. No thyromegaly present.   Cardiovascular: Normal rate, regular rhythm and intact distal pulses. Exam reveals no gallop and no friction rub.   Murmur heard.  Early systolic murmur is present with a grade of 2/6 at the lower left sternal border Abdominal bruits noted   abdominal aorta is not enlarged,  Pulses:  Carotid pulses are 3+ on the right side with bruit, and 3+ on the left side with bruit.  Radial pulses are 3+ on the right side, and 3+ on the left side.   Femoral pulses are 2+ on the right side with bruit, and 3+ on the left side with bruit.  Popliteal pulses are 2+ on the right side, and 3+ on the left side.   Dorsalis pedis pulses are 0 on the right side, and 0 on the left side.   Posterior tibial pulses are 1+ on the right side, and 3+ on the left side.   Pulmonary/Chest: Effort normal and breath sounds normal. No respiratory distress. He has no wheezes. He has no rales. He exhibits no tenderness.   Abdominal: Soft. Bowel sounds are normal. He exhibits no distension. There is no tenderness.   Musculoskeletal: Normal range of motion. He exhibits no edema.   Scar of distal bypass surgery well healed.   Neurological: He is alert and oriented to person, place, and time. No cranial nerve deficit.   Skin: Skin is warm and dry. No rash noted. He is not diaphoretic. No erythema.   Psychiatric: He has a normal mood and affect. His behavior is normal.   Nursing note and vitals reviewed.  Lab Results   Component Value Date    CHOL 182 08/28/2015    CHOL 157 02/13/2015    CHOL 174 08/08/2014     Lab Results   Component Value Date    HDL 50 08/28/2015    HDL 45 02/13/2015    HDL 43 08/08/2014     Lab Results   Component Value Date    LDLCALC 115.6 08/28/2015    LDLCALC 99.0 02/13/2015    LDLCALC 113.4  08/08/2014     Lab Results   Component Value Date    TRIG 82 08/28/2015    TRIG 65 02/13/2015    TRIG 88 08/08/2014     Lab Results   Component Value Date    CHOLHDL 27.5 08/28/2015    CHOLHDL 28.7 02/13/2015    CHOLHDL 24.7 08/08/2014       Chemistry        Component Value Date/Time     10/25/2016 0900    K 4.5 10/25/2016 0900     10/25/2016 0900    CO2 25 10/25/2016 0900    BUN 16 10/25/2016 0900    CREATININE 1.2 10/25/2016 0900     10/25/2016 0900        Component Value Date/Time    CALCIUM 9.1 10/25/2016 0900    ALKPHOS 46 (L) 10/25/2016 0900    AST 33 10/25/2016 0900    ALT 16 10/25/2016 0900    BILITOT 1.2 (H) 10/25/2016 0900          Lab Results   Component Value Date    TSH 2.9 10/18/2007     Lab Results   Component Value Date    INR 3.5 (A) 01/25/2017    INR 3.7 (A) 01/04/2017    INR 2.3 12/07/2016     Lab Results   Component Value Date    WBC 5.74 10/25/2016    HGB 12.3 (L) 10/25/2016    HCT 37.7 (L) 10/25/2016    MCV 91 10/25/2016     10/25/2016     BMP  Sodium   Date Value Ref Range Status   10/25/2016 140 136 - 145 mmol/L Final     Potassium   Date Value Ref Range Status   10/25/2016 4.5 3.5 - 5.1 mmol/L Final     Chloride   Date Value Ref Range Status   10/25/2016 107 95 - 110 mmol/L Final     CO2   Date Value Ref Range Status   10/25/2016 25 23 - 29 mmol/L Final     BUN, Bld   Date Value Ref Range Status   10/25/2016 16 8 - 23 mg/dL Final     Creatinine   Date Value Ref Range Status   10/25/2016 1.2 0.5 - 1.4 mg/dL Final     Calcium   Date Value Ref Range Status   10/25/2016 9.1 8.7 - 10.5 mg/dL Final     Anion Gap   Date Value Ref Range Status   10/25/2016 8 8 - 16 mmol/L Final     eGFR if    Date Value Ref Range Status   10/25/2016 >60 >60 mL/min/1.73 m^2 Final     eGFR if non    Date Value Ref Range Status   10/25/2016 56 (A) >60 mL/min/1.73 m^2 Final     Comment:     Calculation used to obtain the estimated glomerular filtration  rate  (eGFR) is the CKD-EPI equation. Since race is unknown   in our information system, the eGFR values for   -American and Non--American patients are given   for each creatinine result.       CrCl cannot be calculated (Patient has no serum creatinine result on file.).    Assessment:     1. Chronic obstructive pulmonary disease, unspecified COPD type    2. S/P PTCA (percutaneous transluminal coronary angioplasty)    3. Sleep apnea, unspecified type    4. Cardiac pacemaker    5. Elevated diaphragm    6. Long-term (current) use of anticoagulants    7. Essential hypertension    8. PVD (peripheral vascular disease)    9. Coronary artery disease involving native coronary artery of native heart without angina pectoris    10. Bilateral carotid artery stenosis    11. Atrial fibrillation, unspecified type      No new issues .stable clinically pcp monitoring lab work  Plan:     Continue current therapy  Cardiac low salt diet.  Risk factor modification and excercise program.  F/u in 6 months

## 2017-02-15 ENCOUNTER — ANTI-COAG VISIT (OUTPATIENT)
Dept: CARDIOLOGY | Facility: CLINIC | Age: 82
End: 2017-02-15
Payer: MEDICARE

## 2017-02-15 DIAGNOSIS — Z79.01 LONG TERM (CURRENT) USE OF ANTICOAGULANTS: Primary | ICD-10-CM

## 2017-02-15 DIAGNOSIS — I48.91 ATRIAL FIBRILLATION, UNSPECIFIED TYPE: ICD-10-CM

## 2017-02-15 LAB — INR PPP: 2 (ref 2–3)

## 2017-02-15 PROCEDURE — 85610 PROTHROMBIN TIME: CPT | Mod: PBBFAC,PO

## 2017-02-15 PROCEDURE — 99999 PR PBB SHADOW E&M-EST. PATIENT-LVL I: CPT | Mod: PBBFAC,,,

## 2017-02-15 PROCEDURE — 99211 OFF/OP EST MAY X REQ PHY/QHP: CPT | Mod: PBBFAC,PO

## 2017-02-15 RX ORDER — WARFARIN SODIUM 5 MG/1
TABLET ORAL
Qty: 30 TABLET | Refills: 3 | Status: SHIPPED | OUTPATIENT
Start: 2017-02-15 | End: 2017-05-25 | Stop reason: SDUPTHER

## 2017-02-15 NOTE — MR AVS SNAPSHOT
[facility]  [address] [facility] [place] [address]  Phone: [phone]  Fax: [phone]                  Bola Figueroa .   2/15/2017 9:00 AM   Anti-coag visit    Description:  Male : 10/6/1934   Provider:  Hedy Corona PharmD   Department:  [facility]           Diagnoses this Visit        Comments    Long term (current) use of anticoagulants    -  Primary     Atrial fibrillation, unspecified type                To Do List           Future Appointments        Provider Department Dept Phone    3/15/2017 9:15 AM Hedy Corona, [facility] Cou[facility]in [phone]    3/23/2017 11:40 AM PULMONARY LAB, [facility]- Pulmonary Function [facility] [phone]    3/23/2017 12:00 PM SPIROMETRY, [facility] Pulmonary Function [facility] [phone]    3/23/2017 1:00 PM Juanjo Wallace MD [facility] - Pulmonary Services [phone]    2017 9:15 AM [facility] XR2 [facility] [phone]      Goals (5 Years of Data)     None      [facility]     [facility] -  Assistance  Registered nurses in the [facility] provide clinical advisement, health education, appointment booking, and other advisory services.  Call for this free service at [phone].             Medications           Message regarding Medications     Verify the changes and/or additions to your medication regime listed below are the same as discussed with your clinician today.  If any of these changes or additions are incorrect, please notify your healthcare provider.             Verify that the below list of medications is an accurate representation of the medications you are currently taking.  If none reported, the list may be blank. If incorrect, please contact your healthcare provider. Carry this list with you in case of emergency.           Current Medications     albuterol (PROAIR HFA) 90 mcg/actuation inhaler Inhale 2 puffs into the lungs every 6 (six) hours.    albuterol-ipratropium  2.5mg-0.5mg/3mL (DUO-NEB) 0.5 mg-3 mg(2.5 mg base)/3 mL nebulizer solution Take 3 mLs by nebulization every 6 (six) hours.    AMITIZA 8 mcg Cap     amlodipine-benazepril (LOTREL) 10-40 mg per capsule Take 1 capsule by mouth every morning.    aspirin (ECOTRIN) 81 MG EC tablet Take 81 mg by mouth once daily.    atenolol (TENORMIN) 50 MG tablet Take 50 mg by mouth once daily.    azithromycin (ZITHROMAX Z-ROGELIO) 250 MG tablet Take 1 tablet (250 mg total) by mouth once daily. 500 mg on day 1 (two tablets) followed by 250 mg once daily on days 2-5    budesonide (PULMICORT) 0.5 mg/2 mL nebulizer solution Take 2 mLs (0.5 mg total) by nebulization 2 (two) times daily.    cloNIDine (CATAPRES) 0.2 MG tablet Take 0.2 mg by mouth 2 (two) times daily.    dexlansoprazole (DEXILANT) 60 mg capsule 60 mg.    FLUZONE HIGH-DOSE 2016-17, PF, 180 mcg/0.5 mL Syrg inject 0.5 milliliter intramuscularly    furosemide (LASIX) 40 MG tablet Take 40 mg by mouth 2 (two) times daily.     gabapentin (NEURONTIN) 100 MG capsule Take 100 mg by mouth 3 (three) times daily.    hydrALAZINE (APRESOLINE) 50 MG tablet take 1 tablet by mouth every 12 hours (TAKE WITH ISOSORBIDE)    hydrocodone-acetaminophen 5-325mg (NORCO) 5-325 mg per tablet Take 1 tablet by mouth every 4 (four) hours as needed.    ipratropium (ATROVENT) 0.06 % nasal spray 2 sprays by Nasal route 3 (three) times daily.    isosorbide dinitrate (ISORDIL) 20 MG tablet take 1 tablet by mouth every 12 hours **TAKE WITH HYDRALAZINE    levetiracetam (KEPPRA) 500 MG Tab Take 1 tablet by mouth Twice daily.    nitroGLYCERIN (NITROBID) 6.5 MG CpSR take 1 capsule by mouth twice a day    simvastatin (ZOCOR) 40 MG tablet Take 1 tablet by mouth Daily.    tamsulosin (FLOMAX) 0.4 mg Cp24 0.4 mg.    warfarin (COUMADIN) 5 MG tablet Take 1 1/2 on Saturday and Sunday and 1 tablet all remaining days as directed by the Coumadin Clinic.           Clinical Reference Information           Allergies as of 2/15/2017      No Known Drug Allergies      Immunizations Administered on Date of Encounter - 2/15/2017     None      Orders Placed During Today's Visit      Normal Orders This Visit    POCT INR          2/15/2017  9:26 AM - Hedy Corona, PharmD      Component Results     Component Value Flag Ref Range Units Status    INR 2.0  2.0 - 3.0  Final      January 2017 Details    Sun Mon Tue Wed u Fri Sat     1               2               3               4               5               6               7                 8               9               10               11               12               13               14                 15               16      5 mg         17      5 mg         18      5 mg         19      5 mg         20      5 mg         21      7.5 mg           22      7.5 mg         23      5 mg         24      5 mg         25   3.5   Hold   See details      26      5 mg         27      5 mg         28      5 mg           29      5 mg         30      5 mg         31      5 mg              Date Details   01/25 Last INR check   INR: 3.5                 February 2017 Details    Sun Mon Tue Wed u Fri Sat        1      5 mg         2      5 mg         3      5 mg         4      5 mg           5      5 mg         6      5 mg         7      5 mg         8      5 mg         9      5 mg         10      5 mg         11      5 mg           12      5 mg         13      5 mg         14      5 mg         15   2.0   5 mg   See details      16      5 mg         17      5 mg         18      5 mg           19      5 mg         20      5 mg         21      5 mg         22      5 mg         23      5 mg         24      5 mg         25      5 mg           26      5 mg         27      5 mg         28      5 mg              Date Details   02/15 This INR check   INR: 2.0                     How to take your warfarin dose     To take:  5 mg Take 1 of the 5 mg tablets.           March 2017 Details    Sun Mon Tue Wed u Fri Sat         1      5 mg         2      5 mg         3      5 mg         4      5 mg           5      5 mg         6      5 mg         7      5 mg         8      5 mg         9      5 mg         10      5 mg         11      5 mg           12      5 mg         13      5 mg         14      5 mg         15            16               17               18                 19               20               21               22               23               24               25                 26               27               28               29               30               31                 Date Details   No additional details    Date of next INR:  3/15/2017         How to take your warfarin dose     To take:  5 mg Take 1 of the 5 mg tablets.           Anticoagulation Summary as of 2/15/2017     Maintenance plan 5 mg (5 mg x 1) every day    Full instructions 5 mg every day    Next INR check 3/15/2017      Anticoagulation Episode Summary     Comments       [facility] Sign-Up     Activating your [facility] account is as easy as 1-2-3!     1) Visit [email or web address], select Sign Up Now, enter this activation code and your date of birth, then select Next.  [email or web address]  Expires: 4/1/2017  9:27 AM      2) Create a username and password to use when you visit [facility] in the future and select a security question in case you lose your password and select Next.    3) Enter your e-mail address and click Sign Up!    Additional Information  If you have questions, please e-mail [email or web address] or call [phone] to talk to our [facility] staff. Remember, [facility] is NOT to be used for urgent needs. For medical emergencies, dial 911.         Language Assistance Services     ATTENTION: Language assistance services are available, free of charge. Please call [phone].      ATENCIÓN: Si habla español, tiene a paz disposición servicios gratuitos de asistencia lingüística. Llame al [phone].     [facility] Ý: N?u b?n nói Ti?ng Vi?t, có  các d?ch v? h? tr? ngôn ng? mi?n phí dành cho b?n. G?i s? [phone].         [facility] complies with applicable Federal civil rights laws and does not discriminate on the basis of race, color, national origin, age, disability, or sex.

## 2017-02-15 NOTE — PROGRESS NOTES
INR is now therapeutic. Patient reports high vitamin k in diet on yesterday. No other changes noted. Continue dose and diet until follow-up.

## 2017-03-03 ENCOUNTER — TELEPHONE (OUTPATIENT)
Dept: PULMONOLOGY | Facility: CLINIC | Age: 82
End: 2017-03-03

## 2017-03-03 DIAGNOSIS — R06.02 SOB (SHORTNESS OF BREATH): Primary | ICD-10-CM

## 2017-03-03 NOTE — TELEPHONE ENCOUNTER
----- Message from Aubrie Hutchinson sent at 3/3/2017  2:01 PM CST -----  Contact: Luz Maria Alexis - daughter  She would like for you to call her regarding patient's breathing.  Call her at [phone].                        mccall

## 2017-03-03 NOTE — TELEPHONE ENCOUNTER
Spoke with pt's daughter, states pt is sob with the slightest exertion. Pt would like to qualify for oxygen. Explained that pt would have to come in to do stress test to qualify for oxygen. Scheduled pt for Monday with a walk. She verbalized understanding. Please sign order.

## 2017-03-06 ENCOUNTER — PROCEDURE VISIT (OUTPATIENT)
Dept: PULMONOLOGY | Facility: CLINIC | Age: 82
End: 2017-03-06
Payer: MEDICARE

## 2017-03-06 ENCOUNTER — OFFICE VISIT (OUTPATIENT)
Dept: PULMONOLOGY | Facility: CLINIC | Age: 82
End: 2017-03-06
Payer: MEDICARE

## 2017-03-06 VITALS
SYSTOLIC BLOOD PRESSURE: 161 MMHG | HEART RATE: 83 BPM | WEIGHT: 202 LBS | BODY MASS INDEX: 28.28 KG/M2 | HEIGHT: 71 IN | OXYGEN SATURATION: 97 % | BODY MASS INDEX: 28.28 KG/M2 | WEIGHT: 202 LBS | HEIGHT: 71 IN | DIASTOLIC BLOOD PRESSURE: 86 MMHG

## 2017-03-06 DIAGNOSIS — J44.1 COPD EXACERBATION: ICD-10-CM

## 2017-03-06 DIAGNOSIS — R06.02 SOB (SHORTNESS OF BREATH): ICD-10-CM

## 2017-03-06 DIAGNOSIS — J44.9 CHRONIC OBSTRUCTIVE PULMONARY DISEASE, UNSPECIFIED COPD TYPE: ICD-10-CM

## 2017-03-06 PROCEDURE — 99999 PR PBB SHADOW E&M-EST. PATIENT-LVL IV: CPT | Mod: PBBFAC,,, | Performed by: INTERNAL MEDICINE

## 2017-03-06 PROCEDURE — 94620 PR PULMONARY STRESS TESTING,SIMPLE: CPT | Mod: 26,S$PBB,, | Performed by: INTERNAL MEDICINE

## 2017-03-06 PROCEDURE — 96372 THER/PROPH/DIAG INJ SC/IM: CPT | Mod: PBBFAC

## 2017-03-06 PROCEDURE — 99215 OFFICE O/P EST HI 40 MIN: CPT | Mod: 25,S$PBB,, | Performed by: INTERNAL MEDICINE

## 2017-03-06 PROCEDURE — 99214 OFFICE O/P EST MOD 30 MIN: CPT | Mod: PBBFAC | Performed by: INTERNAL MEDICINE

## 2017-03-06 RX ORDER — IPRATROPIUM BROMIDE AND ALBUTEROL SULFATE 2.5; .5 MG/3ML; MG/3ML
3 SOLUTION RESPIRATORY (INHALATION) EVERY 6 HOURS
Qty: 120 VIAL | Refills: 12 | Status: SHIPPED | OUTPATIENT
Start: 2017-03-06 | End: 2017-07-05

## 2017-03-06 RX ORDER — TRIAMCINOLONE ACETONIDE 40 MG/ML
80 INJECTION, SUSPENSION INTRA-ARTICULAR; INTRAMUSCULAR
Status: COMPLETED | OUTPATIENT
Start: 2017-03-06 | End: 2017-03-06

## 2017-03-06 RX ORDER — BUDESONIDE 0.5 MG/2ML
0.5 INHALANT ORAL 2 TIMES DAILY
Qty: 120 ML | Refills: 11 | Status: SHIPPED | OUTPATIENT
Start: 2017-03-06 | End: 2018-10-31 | Stop reason: SDUPTHER

## 2017-03-06 RX ORDER — AZITHROMYCIN 250 MG/1
250 TABLET, FILM COATED ORAL DAILY
Qty: 6 TABLET | Refills: 1 | Status: SHIPPED | OUTPATIENT
Start: 2017-03-06 | End: 2017-05-26

## 2017-03-06 RX ORDER — PREDNISONE 20 MG/1
TABLET ORAL
Qty: 20 TABLET | Refills: 1 | Status: SHIPPED | OUTPATIENT
Start: 2017-03-06 | End: 2017-05-26 | Stop reason: ALTCHOICE

## 2017-03-06 RX ORDER — IPRATROPIUM BROMIDE AND ALBUTEROL SULFATE 2.5; .5 MG/3ML; MG/3ML
3 SOLUTION RESPIRATORY (INHALATION) EVERY 6 HOURS
Qty: 120 VIAL | Refills: 12 | Status: SHIPPED | OUTPATIENT
Start: 2017-03-06 | End: 2017-03-06 | Stop reason: SDUPTHER

## 2017-03-06 RX ORDER — LATANOPROST 50 UG/ML
1 SOLUTION/ DROPS OPHTHALMIC NIGHTLY
Refills: 0 | COMMUNITY
Start: 2017-02-02

## 2017-03-06 RX ORDER — IPRATROPIUM BROMIDE AND ALBUTEROL 20; 100 UG/1; UG/1
SPRAY, METERED RESPIRATORY (INHALATION)
Refills: 1 | COMMUNITY
Start: 2017-03-02 | End: 2018-04-16

## 2017-03-06 RX ORDER — BUDESONIDE 0.5 MG/2ML
0.5 INHALANT ORAL 2 TIMES DAILY
Qty: 120 ML | Refills: 11 | Status: SHIPPED | OUTPATIENT
Start: 2017-03-06 | End: 2017-03-06 | Stop reason: SDUPTHER

## 2017-03-06 RX ADMIN — TRIAMCINOLONE ACETONIDE 80 MG: 40 INJECTION, SUSPENSION INTRA-ARTICULAR; INTRAMUSCULAR at 11:03

## 2017-03-06 NOTE — PROCEDURES
"[facility]  Six Minute Walk     SUMMARY     Ordering Provider: Rodrigo      Performing nurse/tech/RT: dorothy  Diagnosis: Qualify for Oxygen  Height: 5' 11" (180.3 cm)  Weight: 91.6 kg (202 lb)  BMI (Calculated): 28.2   Patient Race:             Phase Oxygen Assessment Supplemental O2 Heart   Rate Blood Pressure Ramya Dyspnea Scale Rating   Resting 97 % Room Air 83 bpm 161/86 1   Exercise        Minute        1 97 % Room Air 91 bpm     2 96 % Room Air 94 bpm     3 95 % Room Air 96 bpm     4 95 % Room Air 94 bpm     5 96 % Room Air 92 bpm     6  96 % Room Air 95 bpm 170/88 4   Recovery        Minute        1 99 % Room Air 87 bpm     2 99 % Room Air 85 bpm     3 99 % Room Air 82 bpm     4 99 % Room Air 81 bpm 136/80 2     Six Minute Walk Summary  6MWT Status: completed without stopping  Patient Reported: No complaints     Interpretation:  Did the patient stop or pause?: No                                         Total Time Walked (Calculated): 360 seconds  Final Partial Lap Distance (feet): 100 feet  Total Distance Meters (Calculated): 213.36 meters  Predicted Distance Meters (Calculated): 482.97 meters  Percentage of Predicted (Calculated): 44.18  Peak VO2 (Calculated): 10.38  Mets: 2.97             Interpretation:  Total distance walked in six minutes is moderately reduced indicating a reduction in overall  functional capacity. There was no significant oxygen desaturation. Clinical correlation suggested.    Juanjo Wallace MD      "

## 2017-03-06 NOTE — PROGRESS NOTES
Subjective:       Patient ID: Bola Figueroa Sr. is a 82 y.o. male.    Chief Complaint: COPD  He wants oxygen   HPI COPD  He presents for evaluation and treatment of COPD. The patient is currently having symptoms / an exacerbation. Current symptoms include chronic dyspnea, cough productive of white sputum in small amounts and wheezing. Symptoms have been present since several months ago and have been gradually worsening. He denies chills and fever. Associated symptoms include poor exercise tolerance.  This episode appears to have been triggered by cold air and pollens. Treatments tried for the current exacerbation: albuterol nebulizer. The patient has been having similar episodes for approximately 10 years. He uses 1 pillows at night. Patient currently is not on home oxygen therapy.. The patient is having no constitutional symptoms, denying fever, chills, anorexia, or weight loss. The patient has been hospitalized for this condition before. He quit smoking approximately many years ago. The patient is experiencing exercise intolerance (difficulty walking 1 blocks on flat ground).  Past Medical History:   Diagnosis Date    *Atrial fibrillation     Atrial fibrillation     Bilateral carotid artery stenosis 1/15/2016    Cardiac pacemaker 8/7/2013    Congestive heart failure 4/6/2015    COPD (chronic obstructive pulmonary disease) 8/7/2013    Coronary artery disease     Hyperlipidemia     Hypertension     Long-term (current) use of anticoagulants 8/7/2013    PVD (peripheral vascular disease)     S/P PTCA (percutaneous transluminal coronary angioplasty) 8/7/2013    Sleep apnea 8/7/2013    Stroke      Past Surgical History:   Procedure Laterality Date    CATARACT EXTRACTION W/  INTRAOCULAR LENS IMPLANT      INSERT / REPLACE / REMOVE PACEMAKER  2005     Social History     Social History    Marital status:      Spouse name: N/A    Number of children: N/A    Years of education: N/A     Occupational  History    Not on file.     Social History Main Topics    Smoking status: Former Smoker     Packs/day: 1.50     Years: 35.00     Types: Cigarettes     Quit date: 11/13/1979    Smokeless tobacco: Never Used    Alcohol use No      Comment: QUIT 08/13/77    Drug use: No    Sexual activity: Not on file     Other Topics Concern    Not on file     Social History Narrative     Review of Systems   Constitutional: Positive for fatigue. Negative for fever.   HENT: Positive for postnasal drip and rhinorrhea. Negative for congestion.    Respiratory: Positive for cough, sputum production, shortness of breath, dyspnea on extertion, use of rescue inhaler and Paroxysmal Nocturnal Dyspnea.    Cardiovascular: Negative for chest pain, palpitations and leg swelling.   Musculoskeletal: Positive for arthralgias and gait problem.   Skin: Negative for rash.   Gastrointestinal: Negative for nausea and abdominal pain.   Neurological: Negative for dizziness, syncope, weakness and light-headedness.   Hematological: Negative for adenopathy. Does not bruise/bleed easily.   Psychiatric/Behavioral: Negative for sleep disturbance. The patient is not nervous/anxious.        Objective:      Physical Exam   Constitutional: He is oriented to person, place, and time. He appears well-developed and well-nourished.   HENT:   Head: Normocephalic and atraumatic.   Mouth/Throat: Oropharyngeal exudate present.   Eyes: Conjunctivae are normal. Pupils are equal, round, and reactive to light.   Neck: Neck supple. No JVD present. No tracheal deviation present. No thyromegaly present.   Cardiovascular: Normal rate.  A regularly irregular rhythm present. PMI is displaced.    Murmur heard.   Systolic murmur is present with a grade of 2/6   Pulmonary/Chest: Effort normal. He has decreased breath sounds. He has wheezes in the right lower field and the left lower field. He has no rhonchi. He has no rales.   Abdominal: Soft. Bowel sounds are normal.    Musculoskeletal: Normal range of motion. He exhibits no edema or tenderness.   Lymphadenopathy:     He has no cervical adenopathy.   Neurological: He is alert and oriented to person, place, and time.   Skin: Skin is warm and dry.   Nursing note and vitals reviewed.    Personal Diagnostic Review  Chest X-Ray: I personally reviewed the films and findings are:, air trapping/emphysema, cardiomegaly  Pulmonary function tests:  Mod obstruction  No flowsheet data found.      Assessment:       1. Chronic obstructive pulmonary disease, unspecified COPD type    2. COPD exacerbation        Outpatient Encounter Prescriptions as of 3/6/2017   Medication Sig Dispense Refill    albuterol (PROAIR HFA) 90 mcg/actuation inhaler Inhale 2 puffs into the lungs every 6 (six) hours. 3 Inhaler 4    albuterol-ipratropium 2.5mg-0.5mg/3mL (DUO-NEB) 0.5 mg-3 mg(2.5 mg base)/3 mL nebulizer solution Take 3 mLs by nebulization every 6 (six) hours. 120 vial 12    AMITIZA 8 mcg Cap   1    amlodipine-benazepril (LOTREL) 10-40 mg per capsule Take 1 capsule by mouth every morning. 90 capsule 3    aspirin (ECOTRIN) 81 MG EC tablet Take 81 mg by mouth once daily.      atenolol (TENORMIN) 50 MG tablet Take 50 mg by mouth once daily.      budesonide (PULMICORT) 0.5 mg/2 mL nebulizer solution Take 2 mLs (0.5 mg total) by nebulization 2 (two) times daily. 120 mL 11    cloNIDine (CATAPRES) 0.2 MG tablet Take 0.2 mg by mouth 2 (two) times daily.  0    COMBIVENT RESPIMAT  mcg/actuation inhaler   1    dexlansoprazole (DEXILANT) 60 mg capsule 60 mg.      FLUZONE HIGH-DOSE 2016-17, PF, 180 mcg/0.5 mL Syrg inject 0.5 milliliter intramuscularly  0    furosemide (LASIX) 40 MG tablet Take 40 mg by mouth 2 (two) times daily.       gabapentin (NEURONTIN) 100 MG capsule Take 100 mg by mouth 3 (three) times daily.  0    hydrALAZINE (APRESOLINE) 50 MG tablet take 1 tablet by mouth every 12 hours (TAKE WITH ISOSORBIDE) 60 tablet 3    ipratropium  (ATROVENT) 0.06 % nasal spray 2 sprays by Nasal route 3 (three) times daily. 45 mL 4    isosorbide dinitrate (ISORDIL) 20 MG tablet take 1 tablet by mouth every 12 hours **TAKE WITH HYDRALAZINE 60 tablet 6    latanoprost 0.005 % ophthalmic solution Place 1 drop into both eyes nightly.  0    levetiracetam (KEPPRA) 500 MG Tab Take 1 tablet by mouth Twice daily.      nitroGLYCERIN (NITROBID) 6.5 MG CpSR take 1 capsule by mouth twice a day      simvastatin (ZOCOR) 40 MG tablet Take 1 tablet by mouth Daily.      tamsulosin (FLOMAX) 0.4 mg Cp24 0.4 mg.      warfarin (COUMADIN) 5 MG tablet Take 1 tablet every evening  as directed by the [facility] Clinic. 30 tablet 3    [DISCONTINUED] albuterol-ipratropium 2.5mg-0.5mg/3mL (DUO-NEB) 0.5 mg-3 mg(2.5 mg base)/3 mL nebulizer solution Take 3 mLs by nebulization every 6 (six) hours. 120 vial 12    [DISCONTINUED] albuterol-ipratropium 2.5mg-0.5mg/3mL (DUO-NEB) 0.5 mg-3 mg(2.5 mg base)/3 mL nebulizer solution Take 3 mLs by nebulization every 6 (six) hours. 120 vial 12    [DISCONTINUED] azithromycin (ZITHROMAX Z-ROGELIO) 250 MG tablet Take 1 tablet (250 mg total) by mouth once daily. 500 mg on day 1 (two tablets) followed by 250 mg once daily on days 2-5 6 tablet 1    [DISCONTINUED] budesonide (PULMICORT) 0.5 mg/2 mL nebulizer solution Take 2 mLs (0.5 mg total) by nebulization 2 (two) times daily. 120 mL 11    [DISCONTINUED] budesonide (PULMICORT) 0.5 mg/2 mL nebulizer solution Take 2 mLs (0.5 mg total) by nebulization 2 (two) times daily. 120 mL 11    [DISCONTINUED] hydrocodone-acetaminophen 5-325mg (NORCO) 5-325 mg per tablet Take 1 tablet by mouth every 4 (four) hours as needed.  0    azithromycin (ZITHROMAX Z-ROGELIO) 250 MG tablet Take 1 tablet (250 mg total) by mouth once daily. 500 mg on day 1 (two tablets) followed by 250 mg once daily on days 2-5 6 tablet 1    predniSONE (DELTASONE) 20 MG tablet Prednisone 60 mg/ day for 3 days, 40 mg/day for 3 days,20 mg/ day for 3  "days, (1/2 tablet )10 mg a day for 3 days. 20 tablet 1     Facility-Administered Encounter Medications as of 3/6/2017   Medication Dose Route Frequency Provider Last Rate Last Dose    [COMPLETED] triamcinolone acetonide injection 80 mg  80 mg Intramuscular 1 time in Clinic/HOD Juanjo Wallace MD   80 mg at 03/06/17 1145     Orders Placed This Encounter   Procedures    NEBULIZER KIT (SUPPLIES) FOR HOME USE     Order Specific Question:   Height:     Answer:   5' 11" (1.803 m)     Order Specific Question:   Weight:     Answer:   91.6 kg (202 lb)     Order Specific Question:   Length of need (1-99 months):     Answer:   99     Order Specific Question:   Mask or Mouthpiece?     Answer:   Mouthpiece    X-Ray Chest PA And Lateral     Standing Status:   Future     Standing Expiration Date:   3/6/2019     Order Specific Question:   Reason for Exam:     Answer:   SOB    Spirometry with/without bronchodilator     Standing Status:   Future     Standing Expiration Date:   3/6/2018     Plan:       Requested Prescriptions     Signed Prescriptions Disp Refills    predniSONE (DELTASONE) 20 MG tablet 20 tablet 1     Sig: Prednisone 60 mg/ day for 3 days, 40 mg/day for 3 days,20 mg/ day for 3 days, (1/2 tablet )10 mg a day for 3 days.    azithromycin (ZITHROMAX Z-ROGELIO) 250 MG tablet 6 tablet 1     Sig: Take 1 tablet (250 mg total) by mouth once daily. 500 mg on day 1 (two tablets) followed by 250 mg once daily on days 2-5    albuterol-ipratropium 2.5mg-0.5mg/3mL (DUO-NEB) 0.5 mg-3 mg(2.5 mg base)/3 mL nebulizer solution 120 vial 12     Sig: Take 3 mLs by nebulization every 6 (six) hours.    budesonide (PULMICORT) 0.5 mg/2 mL nebulizer solution 120 mL 11     Sig: Take 2 mLs (0.5 mg total) by nebulization 2 (two) times daily.     Chronic obstructive pulmonary disease, unspecified COPD type  -     Discontinue: budesonide (PULMICORT) 0.5 mg/2 mL nebulizer solution; Take 2 mLs (0.5 mg total) by nebulization 2 (two) times daily.  " Dispense: 120 mL; Refill: 11  -     NEBULIZER KIT (SUPPLIES) FOR HOME USE  -     budesonide (PULMICORT) 0.5 mg/2 mL nebulizer solution; Take 2 mLs (0.5 mg total) by nebulization 2 (two) times daily.  Dispense: 120 mL; Refill: 11  -     Spirometry with/without bronchodilator; Future; Expected date: 9/6/17  -     X-Ray Chest PA And Lateral; Future    COPD exacerbation  -     Discontinue: albuterol-ipratropium 2.5mg-0.5mg/3mL (DUO-NEB) 0.5 mg-3 mg(2.5 mg base)/3 mL nebulizer solution; Take 3 mLs by nebulization every 6 (six) hours.  Dispense: 120 vial; Refill: 12  -     triamcinolone acetonide injection 80 mg; Inject 2 mLs (80 mg total) into the muscle one time.  -     predniSONE (DELTASONE) 20 MG tablet; Prednisone 60 mg/ day for 3 days, 40 mg/day for 3 days,20 mg/ day for 3 days, (1/2 tablet )10 mg a day for 3 days.  Dispense: 20 tablet; Refill: 1  -     azithromycin (ZITHROMAX Z-ROGELIO) 250 MG tablet; Take 1 tablet (250 mg total) by mouth once daily. 500 mg on day 1 (two tablets) followed by 250 mg once daily on days 2-5  Dispense: 6 tablet; Refill: 1  -     albuterol-ipratropium 2.5mg-0.5mg/3mL (DUO-NEB) 0.5 mg-3 mg(2.5 mg base)/3 mL nebulizer solution; Take 3 mLs by nebulization every 6 (six) hours.  Dispense: 120 vial; Refill: 12      Return in about 6 months (around 9/6/2017) for amie and cxr.    MEDICAL DECISION MAKING: Moderate to high complexity.  Overall, the multiple problems listed are of moderate to high severity that may impact quality of life and activities of daily living. Side effects of medications, treatment plan as well as options and alternatives reviewed and discussed with patient. There was counseling of patient concerning these issues.    Total time spent in face to face counseling and coordination of care - 40 minutes over 50% of time was used in discussion of prognosis, risks, benefits of treatment, instructions and compliance with regimen . Discussion with other physicians or health care  providers (homehealth, durable medical equipment providers).

## 2017-03-06 NOTE — MR AVS SNAPSHOT
[facility]  [address] [address] [address] [address]  Phone: [phone]  Fax: [phone]                  [facility]   3/6/2017 10:40 AM   Office Visit    Description:  Male : 10/6/1934   Provider:  Juanjo Wallace MD   Department:  [facility]           Reason for Visit     COPD           Diagnoses this Visit        Comments    Chronic obstructive pulmonary disease, unspecified COPD type         COPD exacerbation                To Do List           Future Appointments        Provider Department Dept Phone    3/15/2017 9:15 AM Hedy Corona, PharmD [facility] [phone]    2017 10:15 AM ONLH XR1- [facility] [phone]    2017 10:40 AM PULMONARY LAB, [facility] [phone]    2017 11:00 AM Kamala Garcia NP [facility] [phone]      Goals (5 Years of Data)     None      Follow-Up and Disposition     Return in about 6 months (around 2017) for amie and cxr.       These Medications        Disp Refills Start End    predniSONE (DELTASONE) 20 MG tablet 20 tablet 1 3/6/2017     Prednisone 60 mg/ day for 3 days, 40 mg/day for 3 days,20 mg/ day for 3 days, (1/2 tablet )10 mg a day for 3 days.    Pharmacy: [address] Ph #: [phone]       azithromycin (ZITHROMAX Z-ROGELIO) 250 MG tablet 6 tablet 1 3/6/2017     Take 1 tablet (250 mg total) by mouth once daily. 500 mg on day 1 (two tablets) followed by 250 mg once daily on days 2-5 - Oral    Pharmacy: [address] Ph #: [phone]       albuterol-ipratropium 2.5mg-0.5mg/3mL (DUO-NEB) 0.5 mg-3 mg(2.5 mg base)/3 mL nebulizer solution 120 vial 12 3/6/2017 3/6/2018    Take 3 mLs by nebulization every 6 (six) hours. - Nebulization    Pharmacy: [facility] [place] -  [address] Ph #: [phone]       budesonide (PULMICORT) 0.5 mg/2 mL nebulizer solution 120 mL 11 3/6/2017     Take 2 mLs (0.5 mg total) by nebulization 2 (two) times daily. - Nebulization    Pharmacy: [facility] [place] - [address] [facility] Ph #: [phone]         [facility]     [facility] Nurse Care Line - 24/7 Assistance  Registered nurses in the [facility] provide clinical advisement, health education, appointment booking, and other advisory services.  Call for this free service at [phone].             Medications           Message regarding Medications     Verify the changes and/or additions to your medication regime listed below are the same as discussed with your clinician today.  If any of these changes or additions are incorrect, please notify your healthcare provider.        START taking these NEW medications        Refills    predniSONE (DELTASONE) 20 MG tablet 1    Sig: Prednisone 60 mg/ day for 3 days, 40 mg/day for 3 days,20 mg/ day for 3 days, (1/2 tablet )10 mg a day for 3 days.    Class: Normal    azithromycin (ZITHROMAX Z-ROGELIO) 250 MG tablet 1    Sig: Take 1 tablet (250 mg total) by mouth once daily. 500 mg on day 1 (two tablets) followed by 250 mg once daily on days 2-5    Class: Normal    Route: Oral      These medications were administered today        Dose Freq    triamcinolone acetonide injection 80 mg 80 mg Clinic/HOD 1 time    Sig: Inject 2 mLs (80 mg total) into the muscle one time.    Class: Normal    Route: Intramuscular      STOP taking these medications     hydrocodone-acetaminophen 5-325mg (NORCO) 5-325 mg per tablet Take 1 tablet by mouth every 4 (four) hours as needed.           Verify that the below list of medications is an accurate representation of the medications you are currently taking.  If none reported, the list may be blank. If incorrect, please contact your healthcare provider. Carry this list with you in  case of emergency.           Current Medications     albuterol (PROAIR HFA) 90 mcg/actuation inhaler Inhale 2 puffs into the lungs every 6 (six) hours.    albuterol-ipratropium 2.5mg-0.5mg/3mL (DUO-NEB) 0.5 mg-3 mg(2.5 mg base)/3 mL nebulizer solution Take 3 mLs by nebulization every 6 (six) hours.    AMITIZA 8 mcg Cap     amlodipine-benazepril (LOTREL) 10-40 mg per capsule Take 1 capsule by mouth every morning.    aspirin (ECOTRIN) 81 MG EC tablet Take 81 mg by mouth once daily.    atenolol (TENORMIN) 50 MG tablet Take 50 mg by mouth once daily.    azithromycin (ZITHROMAX Z-ROGELIO) 250 MG tablet Take 1 tablet (250 mg total) by mouth once daily. 500 mg on day 1 (two tablets) followed by 250 mg once daily on days 2-5    budesonide (PULMICORT) 0.5 mg/2 mL nebulizer solution Take 2 mLs (0.5 mg total) by nebulization 2 (two) times daily.    cloNIDine (CATAPRES) 0.2 MG tablet Take 0.2 mg by mouth 2 (two) times daily.    COMBIVENT RESPIMAT  mcg/actuation inhaler     dexlansoprazole (DEXILANT) 60 mg capsule 60 mg.    FLUZONE HIGH-DOSE 2016-17, PF, 180 mcg/0.5 mL Syrg inject 0.5 milliliter intramuscularly    furosemide (LASIX) 40 MG tablet Take 40 mg by mouth 2 (two) times daily.     gabapentin (NEURONTIN) 100 MG capsule Take 100 mg by mouth 3 (three) times daily.    hydrALAZINE (APRESOLINE) 50 MG tablet take 1 tablet by mouth every 12 hours (TAKE WITH ISOSORBIDE)    ipratropium (ATROVENT) 0.06 % nasal spray 2 sprays by Nasal route 3 (three) times daily.    isosorbide dinitrate (ISORDIL) 20 MG tablet take 1 tablet by mouth every 12 hours **TAKE WITH HYDRALAZINE    latanoprost 0.005 % ophthalmic solution Place 1 drop into both eyes nightly.    levetiracetam (KEPPRA) 500 MG Tab Take 1 tablet by mouth Twice daily.    nitroGLYCERIN (NITROBID) 6.5 MG CpSR take 1 capsule by mouth twice a day    predniSONE (DELTASONE) 20 MG tablet Prednisone 60 mg/ day for 3 days, 40 mg/day for 3 days,20 mg/ day for 3 days, (1/2 tablet )10 mg a  "day for 3 days.    simvastatin (ZOCOR) 40 MG tablet Take 1 tablet by mouth Daily.    tamsulosin (FLOMAX) 0.4 mg Cp24 0.4 mg.    warfarin (COUMADIN) 5 MG tablet Take 1 tablet every evening  as directed by the Coumadin Clinic.           Clinical Reference Information           Your Vitals Were     BP Pulse Height Weight SpO2 BMI    161/86 83 5' 11" (1.803 m) 91.6 kg (202 lb) 97% 28.17 kg/m2      Blood Pressure          Most Recent Value    BP  (!)  161/86      Allergies as of 3/6/2017     No Known Drug Allergies      Immunizations Administered on Date of Encounter - 3/6/2017     None      Orders Placed During Today's Visit      Normal Orders This Visit    NEBULIZER KIT (SUPPLIES) FOR HOME USE     Future Labs/Procedures Expected by Expires    Spirometry with/without bronchodilator  9/6/2017 (Approximate) 3/6/2018    X-Ray Chest PA And Lateral  As directed 3/6/2019      [facility] Sign-Up     Activating your [facility] account is as easy as 1-2-3!     1) Visit [email or web address], select Sign Up Now, enter this activation code and your date of birth, then select Next.  [email or web address]  Expires: 4/1/2017  9:27 AM      2) Create a username and password to use when you visit [facility] in the future and select a security question in case you lose your password and select Next.    3) Enter your e-mail address and click Sign Up!    Additional Information  If you have questions, please e-mail [email or web address] or call [phone] to talk to our [facility] staff. Remember, [facility] is NOT to be used for urgent needs. For medical emergencies, dial 911.         Language Assistance Services     ATTENTION: Language assistance services are available, free of charge. Please call [phone].      ATENCIÓN: Si habla español, tiene a paz disposición servicios gratuitos de asistencia lingüística. Llame al [phone].     CHÚ Ý: N?u b?n nói Ti?ng Vi?t, có các d?ch v? h? tr? ngôn ng? mi?n phí dành cho b?n. G?i s? [phone].   "       [facility] complies with applicable Federal civil rights laws and does not discriminate on the basis of race, color, national origin, age, disability, or sex.

## 2017-03-15 ENCOUNTER — ANTI-COAG VISIT (OUTPATIENT)
Dept: CARDIOLOGY | Facility: CLINIC | Age: 82
End: 2017-03-15
Payer: MEDICARE

## 2017-03-15 DIAGNOSIS — Z79.01 LONG TERM (CURRENT) USE OF ANTICOAGULANTS: Primary | ICD-10-CM

## 2017-03-15 LAB — INR PPP: 3.9 (ref 2–3)

## 2017-03-15 PROCEDURE — 99999 PR PBB SHADOW E&M-EST. PATIENT-LVL I: CPT | Mod: PBBFAC,,,

## 2017-03-15 PROCEDURE — 85610 PROTHROMBIN TIME: CPT | Mod: PBBFAC,PO

## 2017-03-15 PROCEDURE — 99211 OFF/OP EST MAY X REQ PHY/QHP: CPT | Mod: PBBFAC,PO

## 2017-03-15 NOTE — MR AVS SNAPSHOT
[facility]  [address] [place] [address]  Phone: [phone]  Fax: [phone]                  Bola Figueroa Roro   3/15/2017 9:15 AM   Anti-coag visit    Description:  Male : 10/6/1934   Provider:  Hedy Corona PharmD   Department:  [facility]           Diagnoses this Visit        Comments    Long term (current) use of anticoagulants    -  Primary            To Do List           Future Appointments        Provider Department Dept Phone    3/22/2017 9:15 AM Hedy Corona, Erin [facility] [phone]    2017 10:15 AM ONLH XR1- [facility] [phone]    2017 10:40 AM PULMONARY LAB, [facility] [phone]    2017 11:00 AM Kamala Garcia NP [facility] [phone]      Goals (5 Years of Data)     None      [facility]     [facility] -  Assistance  Registered nurses in the [facility] provide clinical advisement, health education, appointment booking, and other advisory services.  Call for this free service at [phone].             Medications           Message regarding Medications     Verify the changes and/or additions to your medication regime listed below are the same as discussed with your clinician today.  If any of these changes or additions are incorrect, please notify your healthcare provider.             Verify that the below list of medications is an accurate representation of the medications you are currently taking.  If none reported, the list may be blank. If incorrect, please contact your healthcare provider. Carry this list with you in case of emergency.           Current Medications     albuterol (PROAIR HFA) 90 mcg/actuation inhaler Inhale 2 puffs into the lungs every 6 (six) hours.    albuterol-ipratropium 2.5mg-0.5mg/3mL (DUO-NEB) 0.5 mg-3 mg(2.5 mg base)/3 mL nebulizer solution Take 3 mLs by nebulization every 6 (six)  hours.    AMITIZA 8 mcg Cap     amlodipine-benazepril (LOTREL) 10-40 mg per capsule Take 1 capsule by mouth every morning.    aspirin (ECOTRIN) 81 MG EC tablet Take 81 mg by mouth once daily.    atenolol (TENORMIN) 50 MG tablet Take 50 mg by mouth once daily.    azithromycin (ZITHROMAX Z-ROGELIO) 250 MG tablet Take 1 tablet (250 mg total) by mouth once daily. 500 mg on day 1 (two tablets) followed by 250 mg once daily on days 2-5    budesonide (PULMICORT) 0.5 mg/2 mL nebulizer solution Take 2 mLs (0.5 mg total) by nebulization 2 (two) times daily.    cloNIDine (CATAPRES) 0.2 MG tablet Take 0.2 mg by mouth 2 (two) times daily.    COMBIVENT RESPIMAT  mcg/actuation inhaler     dexlansoprazole (DEXILANT) 60 mg capsule 60 mg.    FLUZONE HIGH-DOSE 2016-17, PF, 180 mcg/0.5 mL Syrg inject 0.5 milliliter intramuscularly    furosemide (LASIX) 40 MG tablet Take 40 mg by mouth 2 (two) times daily.     gabapentin (NEURONTIN) 100 MG capsule Take 100 mg by mouth 3 (three) times daily.    hydrALAZINE (APRESOLINE) 50 MG tablet take 1 tablet by mouth every 12 hours (TAKE WITH ISOSORBIDE)    ipratropium (ATROVENT) 0.06 % nasal spray 2 sprays by Nasal route 3 (three) times daily.    isosorbide dinitrate (ISORDIL) 20 MG tablet take 1 tablet by mouth every 12 hours **TAKE WITH HYDRALAZINE    latanoprost 0.005 % ophthalmic solution Place 1 drop into both eyes nightly.    levetiracetam (KEPPRA) 500 MG Tab Take 1 tablet by mouth Twice daily.    nitroGLYCERIN (NITROBID) 6.5 MG CpSR take 1 capsule by mouth twice a day    predniSONE (DELTASONE) 20 MG tablet Prednisone 60 mg/ day for 3 days, 40 mg/day for 3 days,20 mg/ day for 3 days, (1/2 tablet )10 mg a day for 3 days.    simvastatin (ZOCOR) 40 MG tablet Take 1 tablet by mouth Daily.    tamsulosin (FLOMAX) 0.4 mg Cp24 0.4 mg.    warfarin (COUMADIN) 5 MG tablet Take 1 tablet every evening  as directed by the Coumadin Clinic.           Clinical Reference Information           Allergies as of  3/15/2017     No Known Drug Allergies      Immunizations Administered on Date of Encounter - 3/15/2017     None      Orders Placed During Today's Visit      Normal Orders This Visit    POCT INR          3/15/2017  9:16 AM - Joanne SalazarD      Component Results     Component Value Flag Ref Range Units Status    INR 3.9 (A) 2.0 - 3.0  Final      February 2017 Details    Sun Mon Tue Wed Thu Fri Sat        1               2               3               4                 5               6               7               8               9               10               11                 12               13      5 mg         14      5 mg         15   2.0   5 mg   See details      16      5 mg         17      5 mg         18      5 mg           19      5 mg         20      5 mg         21      5 mg         22      5 mg         23      5 mg         24      5 mg         25      5 mg           26      5 mg         27      5 mg         28      5 mg              Date Details   02/15 Last INR check   INR: 2.0                 March 2017 Details    Sun Mon Tue Wed Thu Fri Sat        1      5 mg         2      5 mg         3      5 mg         4      5 mg           5      5 mg         6      5 mg         7      5 mg         8      5 mg         9      5 mg         10      5 mg         11      5 mg           12      5 mg         13      5 mg         14      5 mg         15   3.9   Hold   See details      16      5 mg         17      5 mg         18      5 mg           19      5 mg         20      5 mg         21      5 mg         22            23               24               25                 26               27               28               29               30               31                 Date Details   03/15 This INR check   INR: 3.9       Date of next INR:  3/22/2017               How to take your warfarin dose     To take:  5 mg Take 1 of the 5 mg tablets.    Hold Do not take your warfarin dose. See the Details  table to the right for additional instructions.                Anticoagulation Summary as of 3/15/2017     Maintenance plan 5 mg (5 mg x 1) every day    Full instructions 3/15: Hold; Otherwise 5 mg every day    Next INR check 3/22/2017      Anticoagulation Episode Summary     Comments       Patient Findings     Positives Change in health, Change in medications    Negatives Signs/symptoms of thrombosis, Signs/symptoms of bleeding, Laboratory test error suspected, Change in alcohol use, Change in activity, Upcoming invasive procedure, Emergency department visit, Upcoming dental procedure, Missed doses, Extra doses, Change in diet/appetite, Hospital admission, Bruising, Other complaints      [facility] Sign-Up     Activating your [facility] account is as easy as 1-2-3!     1) Visit [email or web address], select Sign Up Now, enter this activation code and your date of birth, then select Next.  [email or web address]  Expires: 4/1/2017 10:27 AM      2) Create a username and password to use when you visit [facility] in the future and select a security question in case you lose your password and select Next.    3) Enter your e-mail address and click Sign Up!    Additional Information  If you have questions, please e-mail [email or web address] or call [phone] to talk to our [facility] staff. Remember, [facility] is NOT to be used for urgent needs. For medical emergencies, dial 911.         Language Assistance Services     ATTENTION: Language assistance services are available, free of charge. Please call [phone].      ATENCIÓN: Si habla español, tiene a paz disposición servicios gratuitos de asistencia lingüística. Llame al [phone].     CHÚ Ý: N?u b?n nói Ti?ng Vi?t, có các d?ch v? h? tr? ngôn ng? mi?n phí dành cho b?n. G?i s? [phone].         [facility] complies with applicable Federal civil rights laws and does not discriminate on the basis of race, color, national origin, age, disability, or sex.

## 2017-03-15 NOTE — PROGRESS NOTES
INR is supra-therapeutic. No bleeding issues. Currently on z-pack and prednisone for COPD excaerbation. Will hold x1 dose then resume scheduled dose. Repeat INR in 1 week.

## 2017-03-22 ENCOUNTER — ANTI-COAG VISIT (OUTPATIENT)
Dept: CARDIOLOGY | Facility: CLINIC | Age: 82
End: 2017-03-22
Payer: MEDICARE

## 2017-03-22 DIAGNOSIS — Z79.01 LONG TERM (CURRENT) USE OF ANTICOAGULANTS: Primary | ICD-10-CM

## 2017-03-22 LAB — INR PPP: 3.5 (ref 2–3)

## 2017-03-22 PROCEDURE — 99999 PR PBB SHADOW E&M-EST. PATIENT-LVL I: CPT | Mod: PBBFAC,,,

## 2017-03-22 PROCEDURE — 85610 PROTHROMBIN TIME: CPT | Mod: PBBFAC,PO

## 2017-03-22 PROCEDURE — 99211 OFF/OP EST MAY X REQ PHY/QHP: CPT | Mod: PBBFAC,PO

## 2017-03-22 NOTE — PROGRESS NOTES
INR remains supra-therapeutic. No bleeding issues. Antibiotic now complete. Patient reports prednisone taper not complete. Will hold x2 doses then resume schedule dose.

## 2017-03-22 NOTE — MR AVS SNAPSHOT
[facility]  [address] [place] [address]  Phone: [phone]  Fax: [phone]                  [facility]   3/22/2017 9:15 AM   Anti-coag visit    Description:  Male : 10/6/1934   Provider:  Hedy Corona PharmD   Department:  [facility] - Coumadin           Diagnoses this Visit        Comments    Long term (current) use of anticoagulants    -  Primary            To Do List           Future Appointments        Provider Department Dept Phone    3/22/2017 9:15 AM [facility] - Coumadin [phone]    2017 9:30 AM Erin Salazara - Coumadin [phone]    2017 10:15 AM ONLH XR1-DR [facility] [phone]    2017 10:40 AM PULMONARY LAB, [facility] - Pulm Function cs [phone]    2017 11:00 AM Kamala Garcia NP [facility] [phone]      Goals (5 Years of Data)     None      [facility]     [facility] Nurse Care [facility] -  Assistance  Registered nurses in the [facility] provide clinical advisement, health education, appointment booking, and other advisory services.  Call for this free service at [phone].             Medications           Message regarding Medications     Verify the changes and/or additions to your medication regime listed below are the same as discussed with your clinician today.  If any of these changes or additions are incorrect, please notify your healthcare provider.             Verify that the below list of medications is an accurate representation of the medications you are currently taking.  If none reported, the list may be blank. If incorrect, please contact your healthcare provider. Carry this list with you in case of emergency.           Current Medications     albuterol (PROAIR HFA) 90 mcg/actuation inhaler Inhale 2 puffs into the lungs every 6 (six) hours.    albuterol-ipratropium 2.5mg-0.5mg/3mL (DUO-NEB) 0.5 mg-3 mg(2.5  mg base)/3 mL nebulizer solution Take 3 mLs by nebulization every 6 (six) hours.    AMITIZA 8 mcg Cap     amlodipine-benazepril (LOTREL) 10-40 mg per capsule Take 1 capsule by mouth every morning.    aspirin (ECOTRIN) 81 MG EC tablet Take 81 mg by mouth once daily.    atenolol (TENORMIN) 50 MG tablet Take 50 mg by mouth once daily.    azithromycin (ZITHROMAX Z-ROGELIO) 250 MG tablet Take 1 tablet (250 mg total) by mouth once daily. 500 mg on day 1 (two tablets) followed by 250 mg once daily on days 2-5    budesonide (PULMICORT) 0.5 mg/2 mL nebulizer solution Take 2 mLs (0.5 mg total) by nebulization 2 (two) times daily.    cloNIDine (CATAPRES) 0.2 MG tablet Take 0.2 mg by mouth 2 (two) times daily.    COMBIVENT RESPIMAT  mcg/actuation inhaler     dexlansoprazole (DEXILANT) 60 mg capsule 60 mg.    FLUZONE HIGH-DOSE 2016-17, PF, 180 mcg/0.5 mL Syrg inject 0.5 milliliter intramuscularly    furosemide (LASIX) 40 MG tablet Take 40 mg by mouth 2 (two) times daily.     gabapentin (NEURONTIN) 100 MG capsule Take 100 mg by mouth 3 (three) times daily.    hydrALAZINE (APRESOLINE) 50 MG tablet take 1 tablet by mouth every 12 hours (TAKE WITH ISOSORBIDE)    ipratropium (ATROVENT) 0.06 % nasal spray 2 sprays by Nasal route 3 (three) times daily.    isosorbide dinitrate (ISORDIL) 20 MG tablet take 1 tablet by mouth every 12 hours **TAKE WITH HYDRALAZINE    latanoprost 0.005 % ophthalmic solution Place 1 drop into both eyes nightly.    levetiracetam (KEPPRA) 500 MG Tab Take 1 tablet by mouth Twice daily.    nitroGLYCERIN (NITROBID) 6.5 MG CpSR take 1 capsule by mouth twice a day    predniSONE (DELTASONE) 20 MG tablet Prednisone 60 mg/ day for 3 days, 40 mg/day for 3 days,20 mg/ day for 3 days, (1/2 tablet )10 mg a day for 3 days.    simvastatin (ZOCOR) 40 MG tablet Take 1 tablet by mouth Daily.    tamsulosin (FLOMAX) 0.4 mg Cp24 0.4 mg.    warfarin (COUMADIN) 5 MG tablet Take 1 tablet every evening  as directed by the Coumadin  Clinic.           Clinical Reference Information           Allergies as of 3/22/2017     No Known Drug Allergies      Immunizations Administered on Date of Encounter - 3/22/2017     None      Orders Placed During Today's Visit      Normal Orders This Visit    POCT INR          3/22/2017  9:10 AM - Hedy Corona, PharmD      Component Results     Component Value Flag Ref Range Units Status    INR 3.5 (A) 2.0 - 3.0  Final      February 2017 Details    Sun Mon Tue Wed Thu Fri Sat        1               2               3               4                 5               6               7               8               9               10               11                 12               13               14               15               16               17               18                 19               20      5 mg         21      5 mg         22      5 mg         23      5 mg         24      5 mg         25      5 mg           26      5 mg         27      5 mg         28      5 mg              Date Details   No additional details              March 2017 Details    Sun Mon Tue Wed Thu Fri Sat        1      5 mg         2      5 mg         3      5 mg         4      5 mg           5      5 mg         6      5 mg         7      5 mg         8      5 mg         9      5 mg         10      5 mg         11      5 mg           12      5 mg         13      5 mg         14      5 mg         15   3.9   Hold   See details      16      5 mg         17      5 mg         18      5 mg           19      5 mg         20      5 mg         21      5 mg         22   3.5   Hold   See details      23      Hold         24      5 mg         25      5 mg           26      5 mg         27      5 mg         28      5 mg         29      5 mg         30      5 mg         31      5 mg           Date Details   03/15 Last INR check   INR: 3.9      03/22 This INR check   INR: 3.5                     How to take your warfarin dose     To take:  5 mg  Take 1 of the 5 mg tablets.    Hold Do not take your warfarin dose. See the Details table to the right for additional instructions.                April 2017 Details    Sun Mon Tue Wed Thu Fri Sat           1      5 mg           2      5 mg         3      5 mg         4      5 mg         5      5 mg         6      5 mg         7      5 mg         8      5 mg           9      5 mg         10      5 mg         11      5 mg         12            13               14               15                 16               17               18               19               20               21               22                 23               24               25               26               27               28               29                 30                      Date Details   No additional details    Date of next INR:  4/12/2017         How to take your warfarin dose     To take:  5 mg Take 1 of the 5 mg tablets.           Anticoagulation Summary as of 3/22/2017     Maintenance plan 5 mg (5 mg x 1) every day    Full instructions 3/22: Hold; 3/23: Hold; Otherwise 5 mg every day    Next INR check 4/12/2017      Anticoagulation Episode Summary     Comments       Patient Findings     Negatives Signs/symptoms of thrombosis, Signs/symptoms of bleeding, Laboratory test error suspected, Change in health, Change in alcohol use, Change in activity, Upcoming invasive procedure, Emergency department visit, Upcoming dental procedure, Missed doses, Extra doses, Change in medications, Change in diet/appetite, Hospital admission, Bruising, Other complaints      [facility] Sign-Up     Activating your [facility] account is as easy as 1-2-3!     1) Visit [email or web address], select Sign Up Now, enter this activation code and your date of birth, then select Next.  [email or web address]  Expires: 4/1/2017 10:27 AM      2) Create a username and password to use when you visit [facility] in the future and select a security question in case you lose your  password and select Next.    3) Enter your e-mail address and click Sign Up!    Additional Information  If you have questions, please e-mail [email or web address] or call [phone] to talk to our [facility] staff. Remember, [facility] is NOT to be used for urgent needs. For medical emergencies, dial 911.         Language Assistance Services     ATTENTION: Language assistance services are available, free of charge. Please call [phone].      ATENCIÓN: Si habla español, tiene a paz disposición servicios gratuitos de asistencia lingüística. Llame al [phone].     CHÚ Ý: N?u b?n nói Ti?ng Vi?t, có các d?ch v? h? tr? ngôn ng? mi?n phí dành cho b?n. G?i s? [phone].         [facility] complies with applicable Federal civil rights laws and does not discriminate on the basis of race, color, national origin, age, disability, or sex.

## 2017-03-27 ENCOUNTER — TELEPHONE (OUTPATIENT)
Dept: PHARMACY | Facility: CLINIC | Age: 82
End: 2017-03-27

## 2017-03-27 NOTE — TELEPHONE ENCOUNTER
Called and spoke with patient's wife, Adri.  She confirmed patient picked up Emily at [email or web address].  This medication requires billing under Medicare Part B.

## 2017-04-09 DIAGNOSIS — I25.10 CORONARY ARTERY DISEASE WITHOUT ANGINA PECTORIS, UNSPECIFIED VESSEL OR LESION TYPE, UNSPECIFIED WHETHER NATIVE OR TRANSPLANTED HEART: Primary | ICD-10-CM

## 2017-04-09 RX ORDER — HYDRALAZINE HYDROCHLORIDE 50 MG/1
TABLET, FILM COATED ORAL
Qty: 60 TABLET | Refills: 3 | Status: SHIPPED | OUTPATIENT
Start: 2017-04-09 | End: 2017-05-26

## 2017-04-10 RX ORDER — ISOSORBIDE DINITRATE 20 MG/1
TABLET ORAL
Qty: 60 TABLET | Refills: 6 | Status: SHIPPED | OUTPATIENT
Start: 2017-04-10 | End: 2017-12-07 | Stop reason: SDUPTHER

## 2017-04-12 ENCOUNTER — ANTI-COAG VISIT (OUTPATIENT)
Dept: CARDIOLOGY | Facility: CLINIC | Age: 82
End: 2017-04-12
Payer: MEDICARE

## 2017-04-12 DIAGNOSIS — Z79.01 LONG TERM (CURRENT) USE OF ANTICOAGULANTS: Primary | ICD-10-CM

## 2017-04-12 LAB — INR PPP: 1.9 (ref 2–3)

## 2017-04-12 PROCEDURE — 99211 OFF/OP EST MAY X REQ PHY/QHP: CPT | Mod: PBBFAC,PO

## 2017-04-12 PROCEDURE — 85610 PROTHROMBIN TIME: CPT | Mod: PBBFAC,PO

## 2017-04-12 PROCEDURE — 99999 PR PBB SHADOW E&M-EST. PATIENT-LVL I: CPT | Mod: PBBFAC,,,

## 2017-04-12 NOTE — PROGRESS NOTES
INR is now slightly sub-therapeutic. Patient reports high vitamin k this week. Will re-challenge dose and diet until follow-up.

## 2017-04-12 NOTE — MR AVS SNAPSHOT
[facility]  [address] [place] [address]  Phone: [phone]  Fax: [phone]                  [facility] Figueroa Roro   2017 9:30 AM   Anti-coag visit    Description:  Male : 10/6/1934   Provider:  Hedy Corona PharmD   Department:  [facility]           Diagnoses this Visit        Comments    Long term (current) use of anticoagulants    -  Primary            To Do List           Future Appointments        Provider Department Dept Phone    5/10/2017 9:30 AM [facility] [phone]    2017 10:15 AM [facility] - Arrhythmia Procedures [phone]    2017 10:15 AM ONLH XR1- [facility] [phone]    2017 10:40 AM PULMONARY LAB, [facility] - Pulm Function Svcs [phone]    2017 11:00 AM Kamala Garcia NP [facility] Pulmonary Services [phone]      Goals (5 Years of Data)     None      [facility]     [facility] Nurse Care [facility] -  Assistance  Unless otherwise directed by your provider, please contact [facility], our nurse care line that is available for  assistance.     Registered nurses in the [facility] provide: appointment scheduling, clinical advisement, health education, and other advisory services.  Call: [phone] (toll free)               Medications           Message regarding Medications     Verify the changes and/or additions to your medication regime listed below are the same as discussed with your clinician today.  If any of these changes or additions are incorrect, please notify your healthcare provider.             Verify that the below list of medications is an accurate representation of the medications you are currently taking.  If none reported, the list may be blank. If incorrect, please contact your healthcare provider. Carry this list with you in case of emergency.           Current Medications     albuterol (PROAIR HFA) 90  mcg/actuation inhaler Inhale 2 puffs into the lungs every 6 (six) hours.    albuterol-ipratropium 2.5mg-0.5mg/3mL (DUO-NEB) 0.5 mg-3 mg(2.5 mg base)/3 mL nebulizer solution Take 3 mLs by nebulization every 6 (six) hours.    AMITIZA 8 mcg Cap     amlodipine-benazepril (LOTREL) 10-40 mg per capsule Take 1 capsule by mouth every morning.    aspirin (ECOTRIN) 81 MG EC tablet Take 81 mg by mouth once daily.    atenolol (TENORMIN) 50 MG tablet Take 50 mg by mouth once daily.    azithromycin (ZITHROMAX Z-ROGELIO) 250 MG tablet Take 1 tablet (250 mg total) by mouth once daily. 500 mg on day 1 (two tablets) followed by 250 mg once daily on days 2-5    budesonide (PULMICORT) 0.5 mg/2 mL nebulizer solution Take 2 mLs (0.5 mg total) by nebulization 2 (two) times daily.    cloNIDine (CATAPRES) 0.2 MG tablet Take 0.2 mg by mouth 2 (two) times daily.    COMBIVENT RESPIMAT  mcg/actuation inhaler     dexlansoprazole (DEXILANT) 60 mg capsule 60 mg.    FLUZONE HIGH-DOSE 2016-17, PF, 180 mcg/0.5 mL Syrg inject 0.5 milliliter intramuscularly    furosemide (LASIX) 40 MG tablet Take 40 mg by mouth 2 (two) times daily.     gabapentin (NEURONTIN) 100 MG capsule Take 100 mg by mouth 3 (three) times daily.    hydrALAZINE (APRESOLINE) 50 MG tablet take 1 tablet by mouth every 12 hours TAKE WITH ISOSORBIDE    ipratropium (ATROVENT) 0.06 % nasal spray 2 sprays by Nasal route 3 (three) times daily.    isosorbide dinitrate (ISORDIL) 20 MG tablet take 1 tablet by mouth every 12 hours **TAKE WITH HYDRALAZINE    latanoprost 0.005 % ophthalmic solution Place 1 drop into both eyes nightly.    levetiracetam (KEPPRA) 500 MG Tab Take 1 tablet by mouth Twice daily.    nitroGLYCERIN (NITROBID) 6.5 MG CpSR take 1 capsule by mouth twice a day    predniSONE (DELTASONE) 20 MG tablet Prednisone 60 mg/ day for 3 days, 40 mg/day for 3 days,20 mg/ day for 3 days, (1/2 tablet )10 mg a day for 3 days.    simvastatin (ZOCOR) 40 MG tablet Take 1 tablet by mouth  Daily.    tamsulosin (FLOMAX) 0.4 mg Cp24 0.4 mg.    warfarin (COUMADIN) 5 MG tablet Take 1 tablet every evening  as directed by the Coumadin Clinic.           Clinical Reference Information           Allergies as of 4/12/2017     No Known Drug Allergies      Immunizations Administered on Date of Encounter - 4/12/2017     None      Orders Placed During Today's Visit      Normal Orders This Visit    POCT INR          4/12/2017  9:36 AM - Joanne SalazarD      Component Results     Component Value Flag Ref Range Units Status    INR 1.9 (A) 2.0 - 3.0  Final      March 2017 Details    Sun Mon Tue Wed Thu Fri Sat        1               2               3               4                 5               6               7               8               9               10               11                 12               13      5 mg         14      5 mg         15   3.9   Hold   See details      16      5 mg         17      5 mg         18      5 mg           19      5 mg         20      5 mg         21      5 mg         22   3.5   Hold   See details      23      Hold         24      5 mg         25      5 mg           26      5 mg         27      5 mg         28      5 mg         29      5 mg         30      5 mg         31      5 mg           Date Details   03/15 INR: 3.9      03/22 Last INR check   INR: 3.5                 April 2017 Details    Sun Mon Tue Wed Thu Fri Sat           1      5 mg           2      5 mg         3      5 mg         4      5 mg         5      5 mg         6      5 mg         7      5 mg         8      5 mg           9      5 mg         10      5 mg         11      5 mg         12   1.9   5 mg   See details      13      5 mg         14      5 mg         15      5 mg           16      5 mg         17      5 mg         18      5 mg         19      5 mg         20      5 mg         21      5 mg         22      5 mg           23      5 mg         24      5 mg         25      5 mg         26       5 mg         27      5 mg         28      5 mg         29      5 mg           30      5 mg                Date Details   04/12 This INR check   INR: 1.9                     How to take your warfarin dose     To take:  5 mg Take 1 of the 5 mg tablets.           May 2017 Details    Sun Mon Tue Wed Thu Fri Sat      1      5 mg         2      5 mg         3      5 mg         4      5 mg         5      5 mg         6      5 mg           7      5 mg         8      5 mg         9      5 mg         10            11               12               13                 14               15               16               17               18               19               20                 21               22               23               24               25               26               27                 28               29               30               31                   Date Details   No additional details    Date of next INR:  5/10/2017         How to take your warfarin dose     To take:  5 mg Take 1 of the 5 mg tablets.           Anticoagulation Summary as of 4/12/2017     Maintenance plan 5 mg (5 mg x 1) every day    Full instructions 5 mg every day    Next INR check 5/10/2017      Anticoagulation Episode Summary     Comments       Patient Findings     Positives Change in diet/appetite    Negatives Signs/symptoms of thrombosis, Signs/symptoms of bleeding, Laboratory test error suspected, Change in health, Change in alcohol use, Change in activity, Upcoming invasive procedure, Emergency department visit, Upcoming dental procedure, Missed doses, Extra doses, Change in medications, Hospital admission, Bruising, Other complaints      [facility] Sign-Up     Activating your [facility] account is as easy as 1-2-3!     1) Visit [email or web address], select Sign Up Now, enter this activation code and your date of birth, then select Next.  [email or web address]  Expires: 5/27/2017  9:37 AM      2) Create a username and password to use  when you visit [facility] in the future and select a security question in case you lose your password and select Next.    3) Enter your e-mail address and click Sign Up!    Additional Information  If you have questions, please e-mail [email or web address] or call [phone] to talk to our [facility] staff. Remember, [facility] is NOT to be used for urgent needs. For medical emergencies, dial 911.         Language Assistance Services     ATTENTION: Language assistance services are available, free of charge. Please call [phone].      ATENCIÓN: Si habla español, tiene a paz disposición servicios gratuitos de asistencia lingüística. Llame al [phone].     CHÚ Ý: N?u b?n nói Ti?ng Vi?t, có các d?ch v? h? tr? ngôn ng? mi?n phí dành cho b?n. G?i s? [phone].         [facility] complies with applicable Federal civil rights laws and does not discriminate on the basis of race, color, national origin, age, disability, or sex.

## 2017-05-01 ENCOUNTER — TELEPHONE (OUTPATIENT)
Dept: PULMONOLOGY | Facility: CLINIC | Age: 82
End: 2017-05-01

## 2017-05-01 ENCOUNTER — TELEPHONE (OUTPATIENT)
Dept: CARDIOLOGY | Facility: CLINIC | Age: 82
End: 2017-05-01

## 2017-05-01 NOTE — TELEPHONE ENCOUNTER
----- Message from Gem Guzman sent at 5/1/2017  8:16 AM CDT -----  Contact: Luz Maria daughter  Calling to cancel appointment. States patient is in the hospital. Please call Luz Maria @ [phone]. Thanks, guille

## 2017-05-01 NOTE — TELEPHONE ENCOUNTER
"Daughter reports patient had a stroke, "blood vessel clot on right side of brain, left side affected." x2 weeks ago. Now in rehab.   "

## 2017-05-01 NOTE — TELEPHONE ENCOUNTER
----- Message from Gem Guzman sent at 5/1/2017  8:12 AM CDT -----  Contact: daughter Luz Maria  Calling to cancel appointment. States patient is in the hospital. Please call Luz Maria @ [phone]. Thanks, guille

## 2017-05-01 NOTE — TELEPHONE ENCOUNTER
Pt is currently hospitalized at [facility]. Confirmed with pt's daughter that appts have been canceled at they she can call back once pt has been discharged to set up another appt. She verbalized understanding.

## 2017-05-15 ENCOUNTER — TELEPHONE (OUTPATIENT)
Dept: CARDIOLOGY | Facility: CLINIC | Age: 82
End: 2017-05-15

## 2017-05-15 NOTE — TELEPHONE ENCOUNTER
Informed [place] of MD recommendations for patient to alternate between coumadin 5mg and 7.5mg every other day and to repeat labs on Thursday, 5/18/17.  Nurse voiced understanding.

## 2017-05-15 NOTE — TELEPHONE ENCOUNTER
----- Message from Phyllis Valencia sent at 5/15/2017 12:25 PM CDT -----  Contact: [place] [facility]  Ms Ho is calling in PT/INR results;  19.5 pt, 1.6 INR. Please call Ms Ho back if needed at [phone]. Thank you

## 2017-05-18 ENCOUNTER — TELEPHONE (OUTPATIENT)
Dept: CARDIOLOGY | Facility: CLINIC | Age: 82
End: 2017-05-18

## 2017-05-18 NOTE — TELEPHONE ENCOUNTER
----- Message from Nikolai Tracy sent at 5/18/2017  1:48 PM CDT -----  Contact: [place]/[facility]  [place] called in and wanted to report a PT INR.  31.3, 2.6.  Patient has been taking 7.5 mg and alternating with 5 mgs.  Patient has already taken 5 mgs today 5/18/17.    Kassy's call back number is: [phone])

## 2017-05-18 NOTE — TELEPHONE ENCOUNTER
Informed Kassy with HH for patient to continue the same dosage and repeat labs again on Monday.  She voiced understanding.

## 2017-05-22 ENCOUNTER — TELEPHONE (OUTPATIENT)
Dept: ADMINISTRATIVE | Facility: CLINIC | Age: 82
End: 2017-05-22

## 2017-05-22 ENCOUNTER — TELEPHONE (OUTPATIENT)
Dept: CARDIOLOGY | Facility: CLINIC | Age: 82
End: 2017-05-22

## 2017-05-22 LAB — INR PPP: 1.8

## 2017-05-22 NOTE — TELEPHONE ENCOUNTER
Informed Libra with HH that per Dr. Mcdonough, for pt to take 7.5 mg coumadin daily except Sundays take 5mg coumadin and to repeat PT/INR in 1 week.  Nurse gave readback correctly and voiced understanding.

## 2017-05-22 NOTE — TELEPHONE ENCOUNTER
----- Message from Dawna Sepulveda sent at 5/22/2017 11:33 AM CDT -----  Libra with [employer] at [phone]//states is calling in the PT and INR Results//P T is 21.4 and INR is 1.8 and pt is taking Warifrin 7.5mg alternating with 5 mg//last night he took 7.5mg//please call//thanks/[facility]

## 2017-05-23 ENCOUNTER — ANTI-COAG VISIT (OUTPATIENT)
Dept: CARDIOLOGY | Facility: CLINIC | Age: 82
End: 2017-05-23

## 2017-05-23 NOTE — PROGRESS NOTES
Verbal result taken from Gretchen/Brian _______. PT/INR __21.4/1.8_____ Date drawn___5/22____ recently discharged after TIA per patient daughter. New dose, given yesterday by  is 7.5mg daily except 5mg on Sunday. INR repeat on Thursday 5/25. Libra verbalized understanding.

## 2017-05-25 ENCOUNTER — ANTI-COAG VISIT (OUTPATIENT)
Dept: CARDIOLOGY | Facility: CLINIC | Age: 82
End: 2017-05-25

## 2017-05-25 DIAGNOSIS — Z79.01 LONG TERM (CURRENT) USE OF ANTICOAGULANTS: Primary | ICD-10-CM

## 2017-05-25 LAB — INR PPP: 2.9

## 2017-05-25 RX ORDER — WARFARIN SODIUM 5 MG/1
TABLET ORAL
Qty: 36 TABLET | Refills: 3 | Status: SHIPPED | OUTPATIENT
Start: 2017-05-25 | End: 2017-08-30 | Stop reason: SDUPTHER

## 2017-05-25 NOTE — PROGRESS NOTES
Verbal result taken from Gretchen/Brian [facility]_______. PT/INR __34.6/2.9___ Date drawn___5/25____ Begin 7.5mg daily except 5mg Friday, Saturday, Sunday. Repeat INR in 1 week. Libra verbalized understanding.

## 2017-05-26 ENCOUNTER — OFFICE VISIT (OUTPATIENT)
Dept: CARDIOLOGY | Facility: CLINIC | Age: 82
End: 2017-05-26
Payer: MEDICARE

## 2017-05-26 VITALS
DIASTOLIC BLOOD PRESSURE: 62 MMHG | HEIGHT: 71 IN | WEIGHT: 197.75 LBS | SYSTOLIC BLOOD PRESSURE: 134 MMHG | HEART RATE: 76 BPM | BODY MASS INDEX: 27.69 KG/M2

## 2017-05-26 DIAGNOSIS — Z79.01 LONG-TERM (CURRENT) USE OF ANTICOAGULANTS: ICD-10-CM

## 2017-05-26 DIAGNOSIS — I65.23 BILATERAL CAROTID ARTERY STENOSIS: ICD-10-CM

## 2017-05-26 DIAGNOSIS — I25.10 CORONARY ARTERY DISEASE INVOLVING NATIVE CORONARY ARTERY OF NATIVE HEART WITHOUT ANGINA PECTORIS: ICD-10-CM

## 2017-05-26 DIAGNOSIS — G47.30 SLEEP APNEA, UNSPECIFIED TYPE: ICD-10-CM

## 2017-05-26 DIAGNOSIS — I73.9 PVD (PERIPHERAL VASCULAR DISEASE): ICD-10-CM

## 2017-05-26 DIAGNOSIS — I63.9 CEREBROVASCULAR ACCIDENT (CVA), UNSPECIFIED MECHANISM: ICD-10-CM

## 2017-05-26 DIAGNOSIS — I10 ESSENTIAL HYPERTENSION: ICD-10-CM

## 2017-05-26 DIAGNOSIS — Z95.0 CARDIAC PACEMAKER: ICD-10-CM

## 2017-05-26 DIAGNOSIS — I48.91 ATRIAL FIBRILLATION, UNSPECIFIED TYPE: Primary | ICD-10-CM

## 2017-05-26 DIAGNOSIS — Z98.61 S/P PTCA (PERCUTANEOUS TRANSLUMINAL CORONARY ANGIOPLASTY): ICD-10-CM

## 2017-05-26 DIAGNOSIS — J44.9 CHRONIC OBSTRUCTIVE PULMONARY DISEASE, UNSPECIFIED COPD TYPE: ICD-10-CM

## 2017-05-26 PROCEDURE — 99999 PR PBB SHADOW E&M-EST. PATIENT-LVL III: CPT | Mod: PBBFAC,,, | Performed by: INTERNAL MEDICINE

## 2017-05-26 PROCEDURE — 99214 OFFICE O/P EST MOD 30 MIN: CPT | Mod: S$PBB,,, | Performed by: INTERNAL MEDICINE

## 2017-05-26 PROCEDURE — 99213 OFFICE O/P EST LOW 20 MIN: CPT | Mod: PBBFAC,PO | Performed by: INTERNAL MEDICINE

## 2017-05-26 RX ORDER — ATORVASTATIN CALCIUM 40 MG/1
40 TABLET, FILM COATED ORAL
COMMUNITY
Start: 2017-05-11 | End: 2017-07-05

## 2017-05-26 RX ORDER — CARVEDILOL 6.25 MG/1
6.25 TABLET ORAL
COMMUNITY
Start: 2017-05-11 | End: 2017-07-05

## 2017-05-26 RX ORDER — FLUTICASONE PROPIONATE 50 MCG
2 SPRAY, SUSPENSION (ML) NASAL
COMMUNITY
Start: 2017-05-11 | End: 2018-05-11

## 2017-05-26 NOTE — PROGRESS NOTES
Subjective:   Patient ID:  Bola Figueroa Sr. is a 82 y.o. male who presents for follow up of Cerebrovascular Accident; Peripheral Vascular Disease; Coronary Artery Disease; and Atrial Fibrillation      HPI  An 83 yo male with h/o afib pacer  Carotid disease cad s/p ptca pvd copd  Sleep apnea sustained a stroke necessitation  Intervention with catheter directed intervention with recovery. He is back on anticoagulation with asa and coumadin. He is ambulating with a walker he has residual weakness has physical therapy no worsening in his  Swallowing. He is followed by home health maintaining his anticoagulation.he had  Rt mca thrombotic occlusion that w as successfully recanalized.he is weak his dysarthria resolved regained most strength back. His inr at presentation was 1.8 (subtherapeutic).has no palpitation shortness of breath or chest pain  Past Medical History:   Diagnosis Date    *Atrial fibrillation     Atrial fibrillation     Bilateral carotid artery stenosis 1/15/2016    Cardiac pacemaker 8/7/2013    Congestive heart failure 4/6/2015    COPD (chronic obstructive pulmonary disease) 8/7/2013    Coronary artery disease     Hyperlipidemia     Hypertension     Long-term (current) use of anticoagulants 8/7/2013    PVD (peripheral vascular disease)     S/P PTCA (percutaneous transluminal coronary angioplasty) 8/7/2013    Sleep apnea 8/7/2013    Stroke        Past Surgical History:   Procedure Laterality Date    CATARACT EXTRACTION W/  INTRAOCULAR LENS IMPLANT      INSERT / REPLACE / REMOVE PACEMAKER  2005       Social History   Substance Use Topics    Smoking status: Former Smoker     Packs/day: 1.50     Years: 35.00     Types: Cigarettes     Quit date: 11/13/1979    Smokeless tobacco: Never Used    Alcohol use No      Comment: QUIT 08/13/77       Family History   Problem Relation Age of Onset    Diabetes Sister     Fainting Sister     Heart disease Paternal Uncle     Heart attack Paternal Uncle      Hypertension Maternal Grandmother     Diabetes Maternal Grandfather        Current Outpatient Prescriptions   Medication Sig    albuterol (PROAIR HFA) 90 mcg/actuation inhaler Inhale 2 puffs into the lungs every 6 (six) hours.    albuterol-ipratropium 2.5mg-0.5mg/3mL (DUO-NEB) 0.5 mg-3 mg(2.5 mg base)/3 mL nebulizer solution Take 3 mLs by nebulization every 6 (six) hours.    AMITIZA 8 mcg Cap     amlodipine-benazepril (LOTREL) 10-40 mg per capsule Take 1 capsule by mouth every morning.    aspirin (ECOTRIN) 81 MG EC tablet Take 81 mg by mouth once daily.    atorvastatin (LIPITOR) 40 MG tablet Take 40 mg by mouth.    budesonide (PULMICORT) 0.5 mg/2 mL nebulizer solution Take 2 mLs (0.5 mg total) by nebulization 2 (two) times daily.    carvedilol (COREG) 6.25 MG tablet Take 6.25 mg by mouth.    cloNIDine (CATAPRES) 0.2 MG tablet Take 0.2 mg by mouth 2 (two) times daily.    COMBIVENT RESPIMAT  mcg/actuation inhaler     dexlansoprazole (DEXILANT) 60 mg capsule 60 mg.    fluticasone (FLONASE) 50 mcg/actuation nasal spray 2 sprays.    FLUZONE HIGH-DOSE 2016-17, PF, 180 mcg/0.5 mL Syrg inject 0.5 milliliter intramuscularly    furosemide (LASIX) 40 MG tablet Take 40 mg by mouth 2 (two) times daily.     gabapentin (NEURONTIN) 100 MG capsule Take 100 mg by mouth 3 (three) times daily.    ipratropium (ATROVENT) 0.06 % nasal spray 2 sprays by Nasal route 3 (three) times daily.    isosorbide dinitrate (ISORDIL) 20 MG tablet take 1 tablet by mouth every 12 hours **TAKE WITH HYDRALAZINE    latanoprost 0.005 % ophthalmic solution Place 1 drop into both eyes nightly.    levetiracetam (KEPPRA) 500 MG Tab Take 1 tablet by mouth Twice daily.    nitroGLYCERIN (NITROBID) 6.5 MG CpSR take 1 capsule by mouth twice a day    tamsulosin (FLOMAX) 0.4 mg Cp24 0.4 mg.    warfarin (COUMADIN) 5 MG tablet Take 1.5 tablets every evening except 1 tablet Friday, Saturday, Sunday  as directed by the [facility].      No current facility-administered medications for this visit.      Current Outpatient Prescriptions on File Prior to Visit   Medication Sig    albuterol (PROAIR HFA) 90 mcg/actuation inhaler Inhale 2 puffs into the lungs every 6 (six) hours.    albuterol-ipratropium 2.5mg-0.5mg/3mL (DUO-NEB) 0.5 mg-3 mg(2.5 mg base)/3 mL nebulizer solution Take 3 mLs by nebulization every 6 (six) hours.    AMITIZA 8 mcg Cap     amlodipine-benazepril (LOTREL) 10-40 mg per capsule Take 1 capsule by mouth every morning.    aspirin (ECOTRIN) 81 MG EC tablet Take 81 mg by mouth once daily.    budesonide (PULMICORT) 0.5 mg/2 mL nebulizer solution Take 2 mLs (0.5 mg total) by nebulization 2 (two) times daily.    cloNIDine (CATAPRES) 0.2 MG tablet Take 0.2 mg by mouth 2 (two) times daily.    COMBIVENT RESPIMAT  mcg/actuation inhaler     dexlansoprazole (DEXILANT) 60 mg capsule 60 mg.    FLUZONE HIGH-DOSE 2016-17, PF, 180 mcg/0.5 mL Syrg inject 0.5 milliliter intramuscularly    furosemide (LASIX) 40 MG tablet Take 40 mg by mouth 2 (two) times daily.     gabapentin (NEURONTIN) 100 MG capsule Take 100 mg by mouth 3 (three) times daily.    ipratropium (ATROVENT) 0.06 % nasal spray 2 sprays by Nasal route 3 (three) times daily.    isosorbide dinitrate (ISORDIL) 20 MG tablet take 1 tablet by mouth every 12 hours **TAKE WITH HYDRALAZINE    latanoprost 0.005 % ophthalmic solution Place 1 drop into both eyes nightly.    levetiracetam (KEPPRA) 500 MG Tab Take 1 tablet by mouth Twice daily.    nitroGLYCERIN (NITROBID) 6.5 MG CpSR take 1 capsule by mouth twice a day    tamsulosin (FLOMAX) 0.4 mg Cp24 0.4 mg.    warfarin (COUMADIN) 5 MG tablet Take 1.5 tablets every evening except 1 tablet Friday, Saturday, Sunday  as directed by the [facility].    [DISCONTINUED] atenolol (TENORMIN) 50 MG tablet Take 50 mg by mouth once daily.    [DISCONTINUED] azithromycin (ZITHROMAX Z-ROGELIO) 250 MG tablet Take 1 tablet (250 mg total)  by mouth once daily. 500 mg on day 1 (two tablets) followed by 250 mg once daily on days 2-5    [DISCONTINUED] hydrALAZINE (APRESOLINE) 50 MG tablet take 1 tablet by mouth every 12 hours TAKE WITH ISOSORBIDE    [DISCONTINUED] predniSONE (DELTASONE) 20 MG tablet Prednisone 60 mg/ day for 3 days, 40 mg/day for 3 days,20 mg/ day for 3 days, (1/2 tablet )10 mg a day for 3 days.    [DISCONTINUED] simvastatin (ZOCOR) 40 MG tablet Take 1 tablet by mouth Daily.     No current facility-administered medications on file prior to visit.      Review of patient's allergies indicates:   Allergen Reactions    No known drug allergies          ROS  Constitution: Positive for malaise/fatigue weakness . Negative for decreased appetite, weakness, weight gain and weight loss.    HENT: Negative for headaches.    Eyes: Negative for blurred vision.    Cardiovascular: Positive for dyspnea on exertion, leg swelling RESOLVED . Negative for chest pain, claudication, cyanosis, irregular heartbeat, near-syncope, palpitations, paroxysmal nocturnal dyspnia and syncope.    Respiratory: Positive for cough, snoring and wheezing. Negative for chest tightness, hemoptysis, shortness of breath and sleep disturbances due to breathing.    Endocrine: Negative for heat intolerance.    Hematologic/Lymphatic: Negative for bleeding problem. Does not bruise/bleed easily.    Skin: Negative for dry skin and itching.    Musculoskeletal: Negative for falls, muscle weakness and myalgias.    Gastrointestinal: Negative for abdominal pain, diarrhea, heartburn, hematemesis, hematochezia, melena, nausea and vomiting.    Genitourinary: Negative for flank pain, hematuria and nocturia.    Neurological: Positive for excessive daytime sleepiness focal weakness sleep disturbance. Negative for dizziness, focal weakness, light-headedness, numbness, paresthesias and seizures.    Psychiatric/Behavioral: Positive for memory loss. Negative for altered mental status, depression and  "substance abuse. The patient does not have insomnia and is not nervous/anxious.    Allergic/Immunologic: Negative for hives  Objective:   Physical Exam  Vitals:    05/26/17 1546   BP: 134/62   Pulse: 76   Weight: 89.7 kg (197 lb 12 oz)   Height: 5' 11" (1.803 m)   Constitutional: He is oriented to person, place, and time. He appears well-developed and well-nourished. No distress.   HENT:   Head: Normocephalic and atraumatic.   Eyes: EOM are normal. Pupils are equal, round, and reactive to light. Right eye exhibits no discharge. Left eye exhibits no discharge.   Neck: Neck supple. No JVD present. No thyromegaly present.   Cardiovascular: Normal rate, regular rhythm and intact distal pulses. Exam reveals no gallop and no friction rub.   Murmur heard.  Early systolic murmur is present with a grade of 2/6 at the lower left sternal border Abdominal bruits noted   abdominal aorta is not enlarged,  Pulses:  Carotid pulses are 3+ on the right side with bruit, and 3+ on the left side with bruit.  Radial pulses are 3+ on the right side, and 3+ on the left side.   Femoral pulses are 2+ on the right side with bruit, and 3+ on the left side with bruit.  Popliteal pulses are 2+ on the right side, and 3+ on the left side.   Dorsalis pedis pulses are 0 on the right side, and 0 on the left side.   Posterior tibial pulses are 1+ on the right side, and 3+ on the left side.   Pulmonary/Chest: Effort normal and breath sounds normal. No respiratory distress. He has no wheezes. He has no rales. He exhibits no tenderness.   Abdominal: Soft. Bowel sounds are normal. He exhibits no distension. There is no tenderness.   Musculoskeletal: Normal range of motion. He exhibits no edema.   Scar of distal bypass surgery well healed.   Neurological: He is alert and oriented to person, place, and time. No cranial nerve deficit.   Skin: Skin is warm and dry. No rash noted. He is not diaphoretic. No erythema.   Psychiatric: He has a normal mood and " affect. His behavior is normal.   Nursing note and vitals reviewed.  Lab Results   Component Value Date    CHOL 182 08/28/2015    CHOL 157 02/13/2015    CHOL 174 08/08/2014     Lab Results   Component Value Date    HDL 50 08/28/2015    HDL 45 02/13/2015    HDL 43 08/08/2014     Lab Results   Component Value Date    LDLCALC 115.6 08/28/2015    LDLCALC 99.0 02/13/2015    LDLCALC 113.4 08/08/2014     Lab Results   Component Value Date    TRIG 82 08/28/2015    TRIG 65 02/13/2015    TRIG 88 08/08/2014     Lab Results   Component Value Date    CHOLHDL 27.5 08/28/2015    CHOLHDL 28.7 02/13/2015    CHOLHDL 24.7 08/08/2014       Chemistry        Component Value Date/Time     10/25/2016 0900    K 4.5 10/25/2016 0900     10/25/2016 0900    CO2 25 10/25/2016 0900    BUN 16 10/25/2016 0900    CREATININE 1.2 10/25/2016 0900     10/25/2016 0900        Component Value Date/Time    CALCIUM 9.1 10/25/2016 0900    ALKPHOS 46 (L) 10/25/2016 0900    AST 33 10/25/2016 0900    ALT 16 10/25/2016 0900    BILITOT 1.2 (H) 10/25/2016 0900          Lab Results   Component Value Date    TSH 2.9 10/18/2007     Lab Results   Component Value Date    INR 2.9 05/25/2017    INR 1.8 05/22/2017    INR 1.9 (A) 04/12/2017     Lab Results   Component Value Date    WBC 5.74 10/25/2016    HGB 12.3 (L) 10/25/2016    HCT 37.7 (L) 10/25/2016    MCV 91 10/25/2016     10/25/2016     BMP  Sodium   Date Value Ref Range Status   10/25/2016 140 136 - 145 mmol/L Final     Potassium   Date Value Ref Range Status   10/25/2016 4.5 3.5 - 5.1 mmol/L Final     Chloride   Date Value Ref Range Status   10/25/2016 107 95 - 110 mmol/L Final     CO2   Date Value Ref Range Status   10/25/2016 25 23 - 29 mmol/L Final     BUN, Bld   Date Value Ref Range Status   10/25/2016 16 8 - 23 mg/dL Final     Creatinine   Date Value Ref Range Status   10/25/2016 1.2 0.5 - 1.4 mg/dL Final     Calcium   Date Value Ref Range Status   10/25/2016 9.1 8.7 - 10.5 mg/dL  Final     Anion Gap   Date Value Ref Range Status   10/25/2016 8 8 - 16 mmol/L Final     eGFR if    Date Value Ref Range Status   10/25/2016 >60 >60 mL/min/1.73 m^2 Final     eGFR if non    Date Value Ref Range Status   10/25/2016 56 (A) >60 mL/min/1.73 m^2 Final     Comment:     Calculation used to obtain the estimated glomerular filtration  rate (eGFR) is the CKD-EPI equation. Since race is unknown   in our information system, the eGFR values for   -American and Non--American patients are given   for each creatinine result.       CrCl cannot be calculated (Patient's most recent sCr result is older than the maximum 14 days allowed.).    Assessment:     1. Atrial fibrillation, unspecified type    2. Coronary artery disease involving native coronary artery of native heart without angina pectoris    3. PVD (peripheral vascular disease)    4. Essential hypertension    5. Cardiac pacemaker    6. Long-term (current) use of anticoagulants    7. Sleep apnea, unspecified type    8. S/P PTCA (percutaneous transluminal coronary angioplasty)    9. Chronic obstructive pulmonary disease, unspecified COPD type    10. Bilateral carotid artery stenosis    11. Cerebrovascular accident (CVA), unspecified mechanism    recovering from cva emboilic s/p recanalization percutaneously . Has no arrythmias clinically has no falls or swallowing issues. He is progressing well at the time of cva inr was subtherapeutic.    Plan:   Continue current therapy  Cardiac low salt diet.  Risk factor modification and excercise program.  F/u as scheduled.

## 2017-06-01 ENCOUNTER — ANTI-COAG VISIT (OUTPATIENT)
Dept: CARDIOLOGY | Facility: CLINIC | Age: 82
End: 2017-06-01

## 2017-06-01 LAB — INR PPP: 5.9

## 2017-06-01 NOTE — PROGRESS NOTES
Verbal result taken from __[facility]_______. PT/INR __65.9/5.9____ Date drawn__6/1____ No bleeding issues, no changes noted. Will hold x2 doses then begin 5mg daily. Repeat INR on Monday. Libra Verbalized understanding.

## 2017-06-02 ENCOUNTER — TELEPHONE (OUTPATIENT)
Dept: CARDIOLOGY | Facility: CLINIC | Age: 82
End: 2017-06-02

## 2017-06-02 NOTE — TELEPHONE ENCOUNTER
I have attempted without success to contact this patient's daughter, Luz Maria, by phone to reschedule his routine [employer] pacemaker check.  No answer, left message for daughter to contact cardiology  at [phone].

## 2017-06-02 NOTE — TELEPHONE ENCOUNTER
----- Message from Kaila Saenz sent at 6/2/2017 10:48 AM CDT -----  Luz Maria, daughter, #[phone] is calling to reschedule the pt's missed pacemaker appt./

## 2017-06-05 ENCOUNTER — ANTI-COAG VISIT (OUTPATIENT)
Dept: CARDIOLOGY | Facility: CLINIC | Age: 82
End: 2017-06-05

## 2017-06-05 DIAGNOSIS — I10 ESSENTIAL HYPERTENSION: Primary | ICD-10-CM

## 2017-06-05 LAB — INR PPP: 2.3

## 2017-06-05 RX ORDER — HYDRALAZINE HYDROCHLORIDE 25 MG/1
25 TABLET, FILM COATED ORAL 3 TIMES DAILY
Qty: 90 TABLET | Refills: 5 | Status: SHIPPED | OUTPATIENT
Start: 2017-06-05 | End: 2017-07-05

## 2017-06-05 NOTE — TELEPHONE ENCOUNTER
Donna with home health called to report patient's recent BP readings:    172/80  219/105  193/97  165/88    She states she is unsure if patient is taking coreg.  She will call daughter and let us know what blood pressure meds he is taking.  He is asymptomatic.

## 2017-06-05 NOTE — TELEPHONE ENCOUNTER
Spoke to Donna,  with .  She said she spoke to patient's daughter, and he is currently taking all his listed medications appropriately.  He has been experiencing fluctuations in BP readings for some time now.  Please review note below and advise further.  Thanks.

## 2017-06-05 NOTE — PROGRESS NOTES
Verbal result taken from __Donna/Brian _______. PT/INR _28.1/2.3____ Date drawn___6/5____ deviation from instructed dose, see calendar.  Continue current dose, repeat INR in 1 week.

## 2017-06-05 NOTE — TELEPHONE ENCOUNTER
----- Message from Nessa Flores sent at 6/5/2017  8:41 AM CDT -----  Contact: Brian Bruno  She's calling in regards to pt's BP being high, please advise, [phone]

## 2017-06-08 ENCOUNTER — CLINICAL SUPPORT (OUTPATIENT)
Dept: CARDIOLOGY | Facility: CLINIC | Age: 82
End: 2017-06-08
Payer: MEDICARE

## 2017-06-08 ENCOUNTER — TELEPHONE (OUTPATIENT)
Dept: CARDIOLOGY | Facility: CLINIC | Age: 82
End: 2017-06-08

## 2017-06-08 DIAGNOSIS — I48.20 CHRONIC ATRIAL FIBRILLATION: ICD-10-CM

## 2017-06-08 PROCEDURE — 93279 PRGRMG DEV EVAL PM/LDLS PM: CPT | Mod: PBBFAC | Performed by: INTERNAL MEDICINE

## 2017-06-08 NOTE — TELEPHONE ENCOUNTER
Patient will be coming in today for pacemaker check. NurseDonna with [facility] requests that we also check BP since patient added hydralazine to his medication regimen 2 days ago.  I advised we would and would call her with results.

## 2017-06-08 NOTE — TELEPHONE ENCOUNTER
----- Message from Arely Saenz sent at 6/8/2017  8:49 AM CDT -----  [facility]  Donna states she need to speak to the nurse before he comes in for his appt today. Please adv/call [phone].//thanks. cw

## 2017-06-12 ENCOUNTER — TELEPHONE (OUTPATIENT)
Dept: CARDIOLOGY | Facility: CLINIC | Age: 82
End: 2017-06-12

## 2017-06-12 ENCOUNTER — ANTI-COAG VISIT (OUTPATIENT)
Dept: CARDIOLOGY | Facility: CLINIC | Age: 82
End: 2017-06-12

## 2017-06-12 LAB — INR PPP: 5.9

## 2017-06-12 NOTE — TELEPHONE ENCOUNTER
Returned phone call and is trying to get refill for Keppra advised not a drug prescribed by Dr. Mcdonough.  Will need to contact his Neurologist but she thinks it was discontinued a month ago advised to check with their Home Health Nurse Donna Sexton [phone]

## 2017-06-12 NOTE — PROGRESS NOTES
Verbal result taken from __Donna/Brian _______. PT/INR _70.9/5.9_____ Date drawn___6/12___ hold warfarin, repeat INR on Wednesday. Order faxed. Daughter verbalized understanding.

## 2017-06-12 NOTE — TELEPHONE ENCOUNTER
----- Message from Pearl Dhaliwalite sent at 6/12/2017  4:16 PM CDT -----  Contact: Adri Henry (Spouse)  Adri called and stated she needed to speak to the nurse. She stated she is checking the status of prescription for Cathera.     [facility] [address] [address]  Phone: [phone] Fax: [phone]    She can be reached at [phone].  Thanks,  TF

## 2017-06-12 NOTE — TELEPHONE ENCOUNTER
----- Message from Addy Lindquist sent at 6/12/2017  1:16 PM CDT -----  Contact: Luz Maria Alexis pt's daughter [phone]  The pt's daughter is seeking a call back regarding the pt's coumadin dosage and change of medication.

## 2017-06-14 ENCOUNTER — ANTI-COAG VISIT (OUTPATIENT)
Dept: CARDIOLOGY | Facility: CLINIC | Age: 82
End: 2017-06-14

## 2017-06-14 LAB — INR PPP: 3.2

## 2017-06-14 NOTE — PROGRESS NOTES
Verbal result taken from __Donna/Brian _______. PT/INR _38.5/3.2_____ Date drawn__6/14____Begin 5mg daily. Daughter verbalized understanding. Order faxed.

## 2017-06-19 ENCOUNTER — ANTI-COAG VISIT (OUTPATIENT)
Dept: CARDIOLOGY | Facility: CLINIC | Age: 82
End: 2017-06-19

## 2017-06-19 LAB — INR PPP: 2.9

## 2017-06-26 LAB — INR PPP: 3.2

## 2017-06-27 ENCOUNTER — ANTI-COAG VISIT (OUTPATIENT)
Dept: CARDIOLOGY | Facility: CLINIC | Age: 82
End: 2017-06-27

## 2017-06-27 NOTE — PROGRESS NOTES
Verbal result taken from __Libra/Brian _______. PT/INR __38.5/3.2____ Date drawn__6/26____ Begin 2.5mg on Tuesdays and 5mg all other days. Repeat INR in 2 weeks.  Daughter verbalized understanding.

## 2017-07-05 ENCOUNTER — OFFICE VISIT (OUTPATIENT)
Dept: PODIATRY | Facility: CLINIC | Age: 82
End: 2017-07-05
Payer: MEDICARE

## 2017-07-05 VITALS
WEIGHT: 197.56 LBS | DIASTOLIC BLOOD PRESSURE: 75 MMHG | HEART RATE: 73 BPM | SYSTOLIC BLOOD PRESSURE: 137 MMHG | HEIGHT: 71 IN | BODY MASS INDEX: 27.66 KG/M2

## 2017-07-05 DIAGNOSIS — I73.9 PERIPHERAL VASCULAR DISEASE: ICD-10-CM

## 2017-07-05 DIAGNOSIS — B35.1 DERMATOPHYTOSIS OF NAIL: Primary | ICD-10-CM

## 2017-07-05 PROCEDURE — 1159F MED LIST DOCD IN RCRD: CPT | Mod: ,,, | Performed by: PODIATRIST

## 2017-07-05 PROCEDURE — 99214 OFFICE O/P EST MOD 30 MIN: CPT | Mod: PBBFAC,PO | Performed by: PODIATRIST

## 2017-07-05 PROCEDURE — 1126F AMNT PAIN NOTED NONE PRSNT: CPT | Mod: ,,, | Performed by: PODIATRIST

## 2017-07-05 PROCEDURE — 11721 DEBRIDE NAIL 6 OR MORE: CPT | Mod: Q8,PBBFAC,PO | Performed by: PODIATRIST

## 2017-07-05 PROCEDURE — 99999 PR PBB SHADOW E&M-EST. PATIENT-LVL IV: CPT | Mod: PBBFAC,,, | Performed by: PODIATRIST

## 2017-07-05 PROCEDURE — 11721 DEBRIDE NAIL 6 OR MORE: CPT | Mod: Q8,S$PBB,, | Performed by: PODIATRIST

## 2017-07-05 PROCEDURE — 99203 OFFICE O/P NEW LOW 30 MIN: CPT | Mod: 25,S$PBB,, | Performed by: PODIATRIST

## 2017-07-05 RX ORDER — AMLODIPINE BESYLATE AND ATORVASTATIN CALCIUM 10; 20 MG/1; MG/1
1 TABLET, FILM COATED ORAL DAILY
COMMUNITY
End: 2019-05-14

## 2017-07-05 NOTE — PROGRESS NOTES
[facility]  PODIATRIC MEDICINE AND SURGERY  PROGRESS NOTE         CHIEF COMPLAINT  Chief Complaint   Patient presents with    Routine Foot Care     Last visit with PCP Dr. Bhatti 5/2017         HPI    SUBJECTIVE: Bola Figueroa Sr. is a 82 y.o. male who  has a past medical history of *Atrial fibrillation; Atrial fibrillation; Bilateral carotid artery stenosis (1/15/2016); Cardiac pacemaker (8/7/2013); Congestive heart failure (4/6/2015); COPD (chronic obstructive pulmonary disease) (8/7/2013); Coronary artery disease; Hyperlipidemia; Hypertension; Long-term (current) use of anticoagulants (8/7/2013); PVD (peripheral vascular disease); S/P PTCA (percutaneous transluminal coronary angioplasty) (8/7/2013); Sleep apnea (8/7/2013); and Stroke. Bolapresents to clinic for high risk foot exam and care.  Bola denies numbness, burning, and/or tingling sensations in their feet. Patient admits to painful toenails aggravated by increased weight bearing, shoe gear, and pressure. States pain is relieved with routine debridements. Patient has no other pedal complaints at this time.    This patient has documented high risk feet requiring routine maintenance secondary to PVD and those secondary complications of PVD,  as mentioned.      HgA1c: No results found for: HGBA1C      PMH  Past Medical History:   Diagnosis Date    *Atrial fibrillation     Atrial fibrillation     Bilateral carotid artery stenosis 1/15/2016    Cardiac pacemaker 8/7/2013    Congestive heart failure 4/6/2015    COPD (chronic obstructive pulmonary disease) 8/7/2013    Coronary artery disease     Hyperlipidemia     Hypertension     Long-term (current) use of anticoagulants 8/7/2013    PVD (peripheral vascular disease)     S/P PTCA (percutaneous transluminal coronary angioplasty) 8/7/2013    Sleep apnea 8/7/2013    Stroke      Patient Active Problem List   Diagnosis    A-fib    Coronary artery disease    PVD (peripheral vascular  disease)    Hypertension    Long-term (current) use of anticoagulants    Cardiac pacemaker    Sleep apnea    S/P PTCA (percutaneous transluminal coronary angioplasty)    COPD (chronic obstructive pulmonary disease)    Elevated diaphragm    Arteriosclerotic vascular disease    Still's murmur    Unspecified asthma    Benign prostatic hyperplasia    CCF (congestive cardiac failure)    Status post coronary artery balloon dilation    Chronic obstructive pulmonary disease    Presence of cardiac pacemaker    Bilateral carotid artery stenosis    Atherosclerotic PVD with intermittent claudication    ERRONEOUS ENCOUNTER--DISREGARD    Coronary angioplasty status    Preop cardiovascular exam    Cerebrovascular accident (CVA)       MEDS  Current Outpatient Prescriptions on File Prior to Visit   Medication Sig Dispense Refill    albuterol (PROAIR HFA) 90 mcg/actuation inhaler Inhale 2 puffs into the lungs every 6 (six) hours. 3 Inhaler 4    amlodipine-benazepril (LOTREL) 10-40 mg per capsule Take 1 capsule by mouth every morning. 90 capsule 3    aspirin (ECOTRIN) 81 MG EC tablet Take 81 mg by mouth once daily.      budesonide (PULMICORT) 0.5 mg/2 mL nebulizer solution Take 2 mLs (0.5 mg total) by nebulization 2 (two) times daily. 120 mL 11    cloNIDine (CATAPRES) 0.2 MG tablet Take 0.2 mg by mouth 2 (two) times daily.  0    COMBIVENT RESPIMAT  mcg/actuation inhaler   1    dexlansoprazole (DEXILANT) 60 mg capsule 60 mg.      fluticasone (FLONASE) 50 mcg/actuation nasal spray 2 sprays.      furosemide (LASIX) 40 MG tablet Take 40 mg by mouth 2 (two) times daily.       gabapentin (NEURONTIN) 100 MG capsule Take 100 mg by mouth 3 (three) times daily.  0    ipratropium (ATROVENT) 0.06 % nasal spray 2 sprays by Nasal route 3 (three) times daily. 45 mL 4    isosorbide dinitrate (ISORDIL) 20 MG tablet take 1 tablet by mouth every 12 hours **TAKE WITH HYDRALAZINE 60 tablet 6    latanoprost 0.005 %  ophthalmic solution Place 1 drop into both eyes nightly.  0    levetiracetam (KEPPRA) 500 MG Tab Take 1 tablet by mouth Twice daily.      nitroGLYCERIN (NITROBID) 6.5 MG CpSR take 1 capsule by mouth twice a day      tamsulosin (FLOMAX) 0.4 mg Cp24 0.4 mg.      warfarin (COUMADIN) 5 MG tablet Take 1.5 tablets every evening except 1 tablet Friday, Saturday, Sunday  as directed by the Coumadin Clinic. 36 tablet 3    [DISCONTINUED] albuterol-ipratropium 2.5mg-0.5mg/3mL (DUO-NEB) 0.5 mg-3 mg(2.5 mg base)/3 mL nebulizer solution Take 3 mLs by nebulization every 6 (six) hours. 120 vial 12    [DISCONTINUED] AMITIZA 8 mcg Cap   1    [DISCONTINUED] atorvastatin (LIPITOR) 40 MG tablet Take 40 mg by mouth.      [DISCONTINUED] carvedilol (COREG) 6.25 MG tablet Take 6.25 mg by mouth.      [DISCONTINUED] FLUZONE HIGH-DOSE 2016-17, PF, 180 mcg/0.5 mL Syrg inject 0.5 milliliter intramuscularly  0    [DISCONTINUED] hydrALAZINE (APRESOLINE) 25 MG tablet Take 1 tablet (25 mg total) by mouth 3 (three) times daily. 90 tablet 5     No current facility-administered medications on file prior to visit.        PSH     Past Surgical History:   Procedure Laterality Date    CATARACT EXTRACTION W/  INTRAOCULAR LENS IMPLANT      INSERT / REPLACE / REMOVE PACEMAKER  2005        ALL  Review of patient's allergies indicates:   Allergen Reactions    No known drug allergies        SOC     Social History   Substance Use Topics    Smoking status: Former Smoker     Packs/day: 1.50     Years: 35.00     Types: Cigarettes     Quit date: 11/13/1979    Smokeless tobacco: Never Used    Alcohol use No      Comment: QUIT 08/13/77         Family HX    Family History   Problem Relation Age of Onset    Diabetes Sister     Fainting Sister     Heart disease Paternal Uncle     Heart attack Paternal Uncle     Hypertension Maternal Grandmother     Diabetes Maternal Grandfather             REVIEW OF SYSTEMS  General: Denies any fever or  "chills  Chest: Denies shortness of breath, wheezing, coughing, or sputum production  Heart: Denies chest pain.  As noted above and per history of current illness above, otherwise negative in the remainder of the 14 systems.     PHYSICAL EXAM  Vitals:    07/05/17 0957   BP: 137/75   Pulse: 73   Weight: 89.6 kg (197 lb 8.5 oz)   Height: 5' 11" (1.803 m)   PainSc: 0-No pain       GEN:  This patient is well-developed, well-nourished and appears stated age, well-oriented to person, place and time, and cooperative and pleasant on today's visit.      LOWER EXTREMITY  Vascular:   · DP pedal pulse 0/4 b/l, PT pedal pulse 0/4 b/l  · Skin temperature warm to warm from prox to distally  · CFT <5 secs b/l  · There is mild  edema noted b/l.     Dermatologic:   · Thickened, dystrophic, elongated toenails with subungal debris 1-5 b/l.   · No open skin lesions noted  · No erythema or drainage noted b/l.  · Webspaces are C/D/I B/L.  · There is no hyperkeratotic tissue noted.  · Skin texture and turgor WNL  · There is no pedal hair growth noted    Neurologic:  · Protective sensation absent at 0/10 sites upon examination with [place] Weinsten 5.07 g monofilament.   · Propioception intact at 1st MTPJ b/l.   · Babinski reflex absent b/l. Light touch and sharp/dull sensation intact b/l.    Musculoskeletal/Orthopedic:  · No symptomatic structural abnormalities noted.   · Muscle strength is 5/5 for foot inverters, everters, plantarflexors, and dorsiflexors. Muscle tone is normal.  · Pain free range of motion in all four quadrants with stiffness and limitation b/l      ASSESSMENT  Dermatophytosis of nail    Peripheral vascular disease        PLAN  -patient was examined and evaulated  -Discuss presenting problems, etiology, pathologic processes and management options with patient today.   -I counseled the patient on their conditions, their implications and medical management. at.  -With patient's permission, nails were aggressively reduced and " debrided x 10 to their soft tissue attachment mechanically and with electric , removing all offending nail and debris. Patient relates relief following the procedure. Patient will continue to monitor the areas daily, inspect feet, wear protective shoe gear when ambulatory, moisturizer to maintain skin integrity.        Future Appointments  Date Time Provider Department Center   8/2/2017 9:00 AM Caroline Mcdonough MD [facility] CARDIO [place]   9/11/2017 10:15 AM ON XR1- ON XRAY [place]   9/11/2017 10:40 AM PULMONARY LAB, [facility] PULMFS [facility]   9/11/2017 11:00 AM Kamala Garcia NP ON PULMSVC [facility]   10/5/2017 11:20 AM Roma Gutierres DPM [facility] POD [place]         Report Electronically Signed By:  Roma Gutierres DPM   Podiatric Medicine & Surgery  [facility]  7/5/2017

## 2017-07-10 ENCOUNTER — TELEPHONE (OUTPATIENT)
Dept: CARDIOLOGY | Facility: CLINIC | Age: 82
End: 2017-07-10

## 2017-07-10 ENCOUNTER — ANTI-COAG VISIT (OUTPATIENT)
Dept: CARDIOLOGY | Facility: CLINIC | Age: 82
End: 2017-07-10

## 2017-07-10 LAB — INR PPP: 4.1

## 2017-07-10 NOTE — PROGRESS NOTES
Verbal result taken from __Donna/Brian _______. PT/INR _49.6/4.1____ Date drawn__7/10____ patient took extra dose. Will hold x1 dose then lower total weekly dose. Repeat INR in 1 week. Left message for daughter to return call to clinic to discuss.

## 2017-07-10 NOTE — TELEPHONE ENCOUNTER
----- Message from Curtis Wilkins MD sent at 7/10/2017 10:30 AM CDT -----  Contact: Kiana from [employer]   Ok to skip Coumadin for one day and recheck INR on this Wed.  ----- Message -----  From: Radha Brizuela LPN  Sent: 7/10/2017  10:23 AM  To: Curtis Wilkins MD        ----- Message -----  From: Juan David Fernandes  Sent: 7/10/2017   9:55 AM  To: Sharonda SARKAR Staff    Is calling to give PT - 4.1 / INR- 49.6 and currently 5mg daily of the coumadin and also need a re- certification on pt is needed and can be reached at [phone] ( that's the office number and the name is Libra )//[email or web address]

## 2017-07-10 NOTE — TELEPHONE ENCOUNTER
Spoke to Libra.  Coumadin clinic following patient's INR levels.  Gave verbal okay to continue checking patient's lab.

## 2017-07-11 ENCOUNTER — ANTI-COAG VISIT (OUTPATIENT)
Dept: CARDIOLOGY | Facility: CLINIC | Age: 82
End: 2017-07-11

## 2017-07-12 ENCOUNTER — ANTI-COAG VISIT (OUTPATIENT)
Dept: CARDIOLOGY | Facility: CLINIC | Age: 82
End: 2017-07-12

## 2017-07-12 LAB — INR PPP: 3.4

## 2017-07-12 NOTE — PROGRESS NOTES
Verbal result taken from __Donna/Brian _______. PT/INR _41.2/3.4____ Date drawn___7/12____ hold then 5mg daily.  Follow-up with coumadin clinic on Monday.

## 2017-07-17 ENCOUNTER — ANTI-COAG VISIT (OUTPATIENT)
Dept: CARDIOLOGY | Facility: CLINIC | Age: 82
End: 2017-07-17
Payer: MEDICARE

## 2017-07-17 DIAGNOSIS — Z79.01 LONG TERM (CURRENT) USE OF ANTICOAGULANTS: Primary | ICD-10-CM

## 2017-07-17 LAB — INR PPP: 1.8 (ref 2–3)

## 2017-07-17 PROCEDURE — 99211 OFF/OP EST MAY X REQ PHY/QHP: CPT | Mod: PBBFAC,PO

## 2017-07-17 PROCEDURE — 85610 PROTHROMBIN TIME: CPT | Mod: PBBFAC,PO

## 2017-07-17 PROCEDURE — 99999 PR PBB SHADOW E&M-EST. PATIENT-LVL I: CPT | Mod: PBBFAC,,,

## 2017-07-17 NOTE — PROGRESS NOTES
INR is now 1.8. Will begin 2.5mg Tuesday, Thursday, Saturdays and 5mg all other days. Repeat INR in 2 weeks. Patient and daughter verbalized understanding.

## 2017-07-31 DIAGNOSIS — I48.91 ATRIAL FIBRILLATION, UNSPECIFIED TYPE: Primary | ICD-10-CM

## 2017-08-02 ENCOUNTER — ANTI-COAG VISIT (OUTPATIENT)
Dept: CARDIOLOGY | Facility: CLINIC | Age: 82
End: 2017-08-02
Payer: MEDICARE

## 2017-08-02 ENCOUNTER — CLINICAL SUPPORT (OUTPATIENT)
Dept: CARDIOLOGY | Facility: CLINIC | Age: 82
End: 2017-08-02
Payer: MEDICARE

## 2017-08-02 ENCOUNTER — OFFICE VISIT (OUTPATIENT)
Dept: CARDIOLOGY | Facility: CLINIC | Age: 82
End: 2017-08-02
Payer: MEDICARE

## 2017-08-02 VITALS
WEIGHT: 198 LBS | BODY MASS INDEX: 27.72 KG/M2 | HEART RATE: 73 BPM | SYSTOLIC BLOOD PRESSURE: 170 MMHG | HEIGHT: 71 IN | DIASTOLIC BLOOD PRESSURE: 80 MMHG

## 2017-08-02 DIAGNOSIS — I65.23 BILATERAL CAROTID ARTERY STENOSIS: ICD-10-CM

## 2017-08-02 DIAGNOSIS — Z98.61 S/P PTCA (PERCUTANEOUS TRANSLUMINAL CORONARY ANGIOPLASTY): ICD-10-CM

## 2017-08-02 DIAGNOSIS — I10 ESSENTIAL HYPERTENSION: ICD-10-CM

## 2017-08-02 DIAGNOSIS — I25.10 CORONARY ARTERY DISEASE INVOLVING NATIVE CORONARY ARTERY OF NATIVE HEART WITHOUT ANGINA PECTORIS: ICD-10-CM

## 2017-08-02 DIAGNOSIS — I48.91 ATRIAL FIBRILLATION, UNSPECIFIED TYPE: Primary | ICD-10-CM

## 2017-08-02 DIAGNOSIS — Z95.0 CARDIAC PACEMAKER: ICD-10-CM

## 2017-08-02 DIAGNOSIS — I63.9 CEREBROVASCULAR ACCIDENT (CVA), UNSPECIFIED MECHANISM: ICD-10-CM

## 2017-08-02 DIAGNOSIS — J45.909 UNSPECIFIED ASTHMA(493.90): ICD-10-CM

## 2017-08-02 DIAGNOSIS — I48.91 ATRIAL FIBRILLATION, UNSPECIFIED TYPE: ICD-10-CM

## 2017-08-02 DIAGNOSIS — I73.9 PVD (PERIPHERAL VASCULAR DISEASE): ICD-10-CM

## 2017-08-02 DIAGNOSIS — J44.9 CHRONIC OBSTRUCTIVE PULMONARY DISEASE, UNSPECIFIED COPD TYPE: ICD-10-CM

## 2017-08-02 DIAGNOSIS — Z79.01 LONG-TERM (CURRENT) USE OF ANTICOAGULANTS: ICD-10-CM

## 2017-08-02 DIAGNOSIS — Z95.0 PRESENCE OF CARDIAC PACEMAKER: ICD-10-CM

## 2017-08-02 DIAGNOSIS — G47.30 SLEEP APNEA, UNSPECIFIED TYPE: ICD-10-CM

## 2017-08-02 DIAGNOSIS — Z79.01 LONG TERM (CURRENT) USE OF ANTICOAGULANTS: Primary | ICD-10-CM

## 2017-08-02 LAB — INR PPP: 2.2 (ref 2–3)

## 2017-08-02 PROCEDURE — 93005 ELECTROCARDIOGRAM TRACING: CPT | Mod: PBBFAC,PO | Performed by: INTERNAL MEDICINE

## 2017-08-02 PROCEDURE — 99214 OFFICE O/P EST MOD 30 MIN: CPT | Mod: S$PBB,,, | Performed by: INTERNAL MEDICINE

## 2017-08-02 PROCEDURE — 1159F MED LIST DOCD IN RCRD: CPT | Mod: ,,, | Performed by: INTERNAL MEDICINE

## 2017-08-02 PROCEDURE — 99999 PR PBB SHADOW E&M-EST. PATIENT-LVL III: CPT | Mod: PBBFAC,,, | Performed by: INTERNAL MEDICINE

## 2017-08-02 PROCEDURE — 93010 ELECTROCARDIOGRAM REPORT: CPT | Mod: S$PBB,,, | Performed by: INTERNAL MEDICINE

## 2017-08-02 RX ORDER — CARVEDILOL 6.25 MG/1
TABLET ORAL
COMMUNITY
Start: 2017-07-17 | End: 2018-11-09 | Stop reason: SDUPTHER

## 2017-08-02 RX ORDER — HYDRALAZINE HYDROCHLORIDE 25 MG/1
25 TABLET, FILM COATED ORAL 3 TIMES DAILY
Refills: 0 | COMMUNITY
Start: 2017-07-08 | End: 2018-05-09 | Stop reason: SDUPTHER

## 2017-08-02 RX ORDER — LUBIPROSTONE 8 UG/1
8 CAPSULE, GELATIN COATED ORAL 2 TIMES DAILY WITH MEALS
COMMUNITY

## 2017-08-02 NOTE — PROGRESS NOTES
Subjective:   Patient ID:  Bola Figueroa Sr. is a 82 y.o. male who presents for follow up of Atrial Fibrillation (Patient presents to clinic today for 6 month follow up.); Peripheral Vascular Disease; Coronary Artery Disease; and Hypertension      HPI  An 82 yoi male with h/o htn cad s/pptca pacer cad on oral anticoagulation is her efor f/u. eh ahs had a cva his bp is fluctuating he is very symptomatic if his bp is in the 120 range. He is back on hydralazine 25 mg po bid. He has not taken his meds today . That is why his bp is elevated. He is not on clonidine anymore.he is trying to be complaint with diet  He is doing physical therapy. He is trying to improve his walking and muscle strength. No change in status since last visit,. has no new chf symptoms or claudication no new neuro symptoms no angina palpitation. ekg showed paced rhythm.reviewed all his meds.  Past Medical History:   Diagnosis Date    *Atrial fibrillation     Atrial fibrillation     Bilateral carotid artery stenosis 1/15/2016    Cardiac pacemaker 8/7/2013    Congestive heart failure 4/6/2015    COPD (chronic obstructive pulmonary disease) 8/7/2013    Coronary artery disease     Hyperlipidemia     Hypertension     Long-term (current) use of anticoagulants 8/7/2013    PVD (peripheral vascular disease)     S/P PTCA (percutaneous transluminal coronary angioplasty) 8/7/2013    Sleep apnea 8/7/2013    Stroke        Past Surgical History:   Procedure Laterality Date    CATARACT EXTRACTION W/  INTRAOCULAR LENS IMPLANT      INSERT / REPLACE / REMOVE PACEMAKER  2005       Social History   Substance Use Topics    Smoking status: Former Smoker     Packs/day: 1.50     Years: 35.00     Types: Cigarettes     Quit date: 11/13/1979    Smokeless tobacco: Never Used    Alcohol use No      Comment: QUIT 08/13/77       Family History   Problem Relation Age of Onset    Diabetes Sister     Fainting Sister     Heart disease Paternal Uncle     Heart  attack Paternal Uncle     Hypertension Maternal Grandmother     Diabetes Maternal Grandfather        Current Outpatient Prescriptions   Medication Sig    albuterol (PROAIR HFA) 90 mcg/actuation inhaler Inhale 2 puffs into the lungs every 6 (six) hours.    amlodipine-atorvastatin (CADUET) 10-20 mg per tablet Take 1 tablet by mouth once daily.    aspirin (ECOTRIN) 81 MG EC tablet Take 81 mg by mouth once daily.    budesonide (PULMICORT) 0.5 mg/2 mL nebulizer solution Take 2 mLs (0.5 mg total) by nebulization 2 (two) times daily.    carvedilol (COREG) 6.25 MG tablet take 1 tablet by mouth twice a day with meals    COMBIVENT RESPIMAT  mcg/actuation inhaler     dexlansoprazole (DEXILANT) 60 mg capsule 60 mg.    fluticasone (FLONASE) 50 mcg/actuation nasal spray 2 sprays.    furosemide (LASIX) 40 MG tablet Take 40 mg by mouth once daily.     gabapentin (NEURONTIN) 100 MG capsule Take 100 mg by mouth 3 (three) times daily.    hydrALAZINE (APRESOLINE) 25 MG tablet 25 mg 3 (three) times daily.     ipratropium (ATROVENT) 0.06 % nasal spray 2 sprays by Nasal route 3 (three) times daily.    isosorbide dinitrate (ISORDIL) 20 MG tablet take 1 tablet by mouth every 12 hours **TAKE WITH HYDRALAZINE    latanoprost 0.005 % ophthalmic solution Place 1 drop into both eyes nightly.    levetiracetam (KEPPRA) 500 MG Tab Take 1 tablet by mouth Twice daily.    lubiprostone (AMITIZA) 8 MCG Cap Take 8 mcg by mouth 2 (two) times daily with meals.    nitroGLYCERIN (NITROBID) 6.5 MG CpSR take 1 capsule by mouth twice a day    tamsulosin (FLOMAX) 0.4 mg Cp24 0.4 mg.    warfarin (COUMADIN) 5 MG tablet Take 1.5 tablets every evening except 1 tablet Friday, Saturday, Sunday  as directed by the Coumadin Clinic. (Patient taking differently: 5 mg. Take 1 tablet on Sunday, Monday, Wednesday, and Friday.  Take 1/2 tablet on Tuesday, Thursday, and Saturday.)     No current facility-administered medications for this visit.       Current Outpatient Prescriptions on File Prior to Visit   Medication Sig    albuterol (PROAIR HFA) 90 mcg/actuation inhaler Inhale 2 puffs into the lungs every 6 (six) hours.    amlodipine-atorvastatin (CADUET) 10-20 mg per tablet Take 1 tablet by mouth once daily.    aspirin (ECOTRIN) 81 MG EC tablet Take 81 mg by mouth once daily.    budesonide (PULMICORT) 0.5 mg/2 mL nebulizer solution Take 2 mLs (0.5 mg total) by nebulization 2 (two) times daily.    COMBIVENT RESPIMAT  mcg/actuation inhaler     dexlansoprazole (DEXILANT) 60 mg capsule 60 mg.    fluticasone (FLONASE) 50 mcg/actuation nasal spray 2 sprays.    furosemide (LASIX) 40 MG tablet Take 40 mg by mouth once daily.     gabapentin (NEURONTIN) 100 MG capsule Take 100 mg by mouth 3 (three) times daily.    ipratropium (ATROVENT) 0.06 % nasal spray 2 sprays by Nasal route 3 (three) times daily.    isosorbide dinitrate (ISORDIL) 20 MG tablet take 1 tablet by mouth every 12 hours **TAKE WITH HYDRALAZINE    latanoprost 0.005 % ophthalmic solution Place 1 drop into both eyes nightly.    levetiracetam (KEPPRA) 500 MG Tab Take 1 tablet by mouth Twice daily.    nitroGLYCERIN (NITROBID) 6.5 MG CpSR take 1 capsule by mouth twice a day    tamsulosin (FLOMAX) 0.4 mg Cp24 0.4 mg.    warfarin (COUMADIN) 5 MG tablet Take 1.5 tablets every evening except 1 tablet Friday, Saturday, Sunday  as directed by the Coumadin Clinic. (Patient taking differently: 5 mg. Take 1 tablet on Sunday, Monday, Wednesday, and Friday.  Take 1/2 tablet on Tuesday, Thursday, and Saturday.)    [DISCONTINUED] amlodipine-benazepril (LOTREL) 10-40 mg per capsule Take 1 capsule by mouth every morning.    [DISCONTINUED] cloNIDine (CATAPRES) 0.2 MG tablet Take 0.2 mg by mouth 2 (two) times daily.     No current facility-administered medications on file prior to visit.      Review of patient's allergies indicates:   Allergen Reactions    No known drug allergies   "        ROS  Constitution: Positive for malaise/fatigue weakness . Negative for decreased appetite, weakness, weight gain and weight loss.    HENT: Negative for headaches.    Eyes: Negative for blurred vision.    Cardiovascular: Positive for dyspnea on exertion, leg swelling RESOLVED . Negative for chest pain, claudication, cyanosis, irregular heartbeat, near-syncope, palpitations, paroxysmal nocturnal dyspnia and syncope.    Respiratory: Positive for cough, snoring and wheezing. Negative for chest tightness, hemoptysis, shortness of breath and sleep disturbances due to breathing.    Endocrine: Negative for heat intolerance.    Hematologic/Lymphatic: Negative for bleeding problem. Does not bruise/bleed easily.    Skin: Negative for dry skin and itching.    Musculoskeletal: Negative for falls, muscle weakness and myalgias.    Gastrointestinal: Negative for abdominal pain, diarrhea, heartburn, hematemesis, hematochezia, melena, nausea and vomiting.    Genitourinary: Negative for flank pain, hematuria and nocturia.    Neurological: Positive for excessive daytime sleepiness focal weakness sleep disturbance. Negative for dizziness, focal weakness, light-headedness, numbness, paresthesias and seizures.    Psychiatric/Behavioral: Positive for memory loss. Negative for altered mental status, depression and substance abuse. The patient does not have insomnia and is not nervous/anxious.    Allergic/Immunologic: Negative for hives  Objective:   Physical Exam  Vitals:    08/02/17 0917   BP: (!) 170/80   Pulse: 73   Weight: 89.8 kg (198 lb)   Height: 5' 11" (1.803 m)   Constitutional: He is oriented to person, place, and time. He appears well-developed and well-nourished. No distress.   HENT:   Head: Normocephalic and atraumatic.   Eyes: EOM are normal. Pupils are equal, round, and reactive to light. Right eye exhibits no discharge. Left eye exhibits no discharge.   Neck: Neck supple. No JVD present. No thyromegaly present. "   Cardiovascular: Normal rate, regular rhythm and intact distal pulses. Exam reveals no gallop and no friction rub.   Murmur heard.  Early systolic murmur is present with a grade of 2/6 at the lower left sternal border Abdominal bruits noted   abdominal aorta is not enlarged,  Pulses:  Carotid pulses are 3+ on the right side with bruit, and 3+ on the left side with bruit.  Radial pulses are 3+ on the right side, and 3+ on the left side.   Femoral pulses are 2+ on the right side with bruit, and 3+ on the left side with bruit.  Popliteal pulses are 2+ on the right side, and 3+ on the left side.   Dorsalis pedis pulses are 0 on the right side, and 0 on the left side.   Posterior tibial pulses are 1+ on the right side, and 3+ on the left side.   Pulmonary/Chest: Effort normal and breath sounds normal. No respiratory distress. He has no wheezes. He has no rales. He exhibits no tenderness.   Abdominal: Soft. Bowel sounds are normal. He exhibits no distension. There is no tenderness.   Musculoskeletal: Normal range of motion. He exhibits no edema.   Scar of distal bypass surgery well healed.   Neurological: He is alert and oriented to person, place, and time. No cranial nerve deficit.   Skin: Skin is warm and dry. No rash noted. He is not diaphoretic. No erythema.   Psychiatric: He has a normal mood and affect. His behavior is normal.   Nursing note and vitals reviewed  Lab Results   Component Value Date    CHOL 182 08/28/2015    CHOL 157 02/13/2015    CHOL 174 08/08/2014     Lab Results   Component Value Date    HDL 50 08/28/2015    HDL 45 02/13/2015    HDL 43 08/08/2014     Lab Results   Component Value Date    LDLCALC 115.6 08/28/2015    LDLCALC 99.0 02/13/2015    LDLCALC 113.4 08/08/2014     Lab Results   Component Value Date    TRIG 82 08/28/2015    TRIG 65 02/13/2015    TRIG 88 08/08/2014     Lab Results   Component Value Date    CHOLHDL 27.5 08/28/2015    CHOLHDL 28.7 02/13/2015    CHOLHDL 24.7 08/08/2014        Chemistry        Component Value Date/Time     10/25/2016 0900    K 4.5 10/25/2016 0900     10/25/2016 0900    CO2 25 10/25/2016 0900    BUN 16 10/25/2016 0900    CREATININE 1.2 10/25/2016 0900     10/25/2016 0900        Component Value Date/Time    CALCIUM 9.1 10/25/2016 0900    ALKPHOS 46 (L) 10/25/2016 0900    AST 33 10/25/2016 0900    ALT 16 10/25/2016 0900    BILITOT 1.2 (H) 10/25/2016 0900    ESTGFRAFRICA >60 10/25/2016 0900    EGFRNONAA 56 (A) 10/25/2016 0900          Lab Results   Component Value Date    TSH 2.9 10/18/2007     Lab Results   Component Value Date    INR 2.2 08/02/2017    INR 1.8 (A) 07/17/2017    INR 3.4 07/12/2017     Lab Results   Component Value Date    WBC 5.74 10/25/2016    HGB 12.3 (L) 10/25/2016    HCT 37.7 (L) 10/25/2016    MCV 91 10/25/2016     10/25/2016     BMP  Sodium   Date Value Ref Range Status   10/25/2016 140 136 - 145 mmol/L Final     Potassium   Date Value Ref Range Status   10/25/2016 4.5 3.5 - 5.1 mmol/L Final     Chloride   Date Value Ref Range Status   10/25/2016 107 95 - 110 mmol/L Final     CO2   Date Value Ref Range Status   10/25/2016 25 23 - 29 mmol/L Final     BUN, Bld   Date Value Ref Range Status   10/25/2016 16 8 - 23 mg/dL Final     Creatinine   Date Value Ref Range Status   10/25/2016 1.2 0.5 - 1.4 mg/dL Final     Calcium   Date Value Ref Range Status   10/25/2016 9.1 8.7 - 10.5 mg/dL Final     Anion Gap   Date Value Ref Range Status   10/25/2016 8 8 - 16 mmol/L Final     eGFR if    Date Value Ref Range Status   10/25/2016 >60 >60 mL/min/1.73 m^2 Final     eGFR if non    Date Value Ref Range Status   10/25/2016 56 (A) >60 mL/min/1.73 m^2 Final     Comment:     Calculation used to obtain the estimated glomerular filtration  rate (eGFR) is the CKD-EPI equation. Since race is unknown   in our information system, the eGFR values for   -American and Non--American patients are given   for each  creatinine result.       CrCl cannot be calculated (Patient's most recent sCr result is older than the maximum 7 days allowed.).    Assessment:     1. Atrial fibrillation, unspecified type    2. Coronary artery disease involving native coronary artery of native heart without angina pectoris    3. PVD (peripheral vascular disease)    4. Essential hypertension    5. Long-term (current) use of anticoagulants    6. Cardiac pacemaker    7. Sleep apnea, unspecified type    8. S/P PTCA (percutaneous transluminal coronary angioplasty)    9. Chronic obstructive pulmonary disease, unspecified COPD type    10. Unspecified asthma    11. Presence of cardiac pacemaker    12. Bilateral carotid artery stenosis    13. Cerebrovascular accident (CVA), unspecified mechanism      Stable clinically on appropriate meds. He needs to keep bp 130-150 systolic otherwise he will feel symptomatic. He ahs no new issues clinically. He was counsled about compliancve continued diet and meds intake as well as physical therapy.  Plan:     Continue current therapy  Cardiac low salt diet.  Risk factor modification and excercise program.  F/u in 6 months with lipid cmp

## 2017-08-02 NOTE — PROGRESS NOTES
INR is now therapeutic. Continue dose and diet until follow-up. Patient reports no bleeding or bruising, no new medications and no diet changes.  I reminded the patient to call with any problems, changes or questions before the next visit.

## 2017-08-30 ENCOUNTER — ANTI-COAG VISIT (OUTPATIENT)
Dept: CARDIOLOGY | Facility: CLINIC | Age: 82
End: 2017-08-30
Payer: MEDICARE

## 2017-08-30 DIAGNOSIS — Z79.01 LONG TERM (CURRENT) USE OF ANTICOAGULANTS: Primary | ICD-10-CM

## 2017-08-30 LAB — INR PPP: 1.8 (ref 2–3)

## 2017-08-30 PROCEDURE — 99999 PR PBB SHADOW E&M-EST. PATIENT-LVL I: CPT | Mod: PBBFAC,,,

## 2017-08-30 PROCEDURE — 99211 OFF/OP EST MAY X REQ PHY/QHP: CPT | Mod: PBBFAC,PO

## 2017-08-30 PROCEDURE — 85610 PROTHROMBIN TIME: CPT | Mod: PBBFAC,PO

## 2017-08-30 RX ORDER — WARFARIN SODIUM 5 MG/1
TABLET ORAL
Qty: 25 TABLET | Refills: 3 | Status: SHIPPED | OUTPATIENT
Start: 2017-08-30 | End: 2017-10-18 | Stop reason: SDUPTHER

## 2017-08-30 NOTE — PROGRESS NOTES
INR is slightly sub-therapeutic. Patient's daughter confirms dose and compliance. Will increase this week's dose only then re-challenge dose and diet until follow-up.

## 2017-09-07 DIAGNOSIS — J30.0 VASOMOTOR RHINITIS: ICD-10-CM

## 2017-09-07 RX ORDER — IPRATROPIUM BROMIDE 42 UG/1
SPRAY, METERED NASAL
Qty: 45 ML | Refills: 4 | Status: SHIPPED | OUTPATIENT
Start: 2017-09-07 | End: 2018-06-21 | Stop reason: SDUPTHER

## 2017-09-11 ENCOUNTER — HOSPITAL ENCOUNTER (OUTPATIENT)
Dept: RADIOLOGY | Facility: HOSPITAL | Age: 82
Discharge: HOME | End: 2017-09-11
Attending: INTERNAL MEDICINE
Payer: MEDICARE

## 2017-09-11 ENCOUNTER — PROCEDURE VISIT (OUTPATIENT)
Dept: PULMONOLOGY | Facility: CLINIC | Age: 82
End: 2017-09-11
Payer: MEDICARE

## 2017-09-11 ENCOUNTER — OFFICE VISIT (OUTPATIENT)
Dept: PULMONOLOGY | Facility: CLINIC | Age: 82
End: 2017-09-11
Payer: MEDICARE

## 2017-09-11 VITALS
HEART RATE: 72 BPM | SYSTOLIC BLOOD PRESSURE: 150 MMHG | DIASTOLIC BLOOD PRESSURE: 82 MMHG | HEIGHT: 71 IN | WEIGHT: 195 LBS | RESPIRATION RATE: 20 BRPM | BODY MASS INDEX: 27.3 KG/M2 | OXYGEN SATURATION: 96 %

## 2017-09-11 DIAGNOSIS — J44.9 COPD, SEVERE: Primary | ICD-10-CM

## 2017-09-11 DIAGNOSIS — J44.9 CHRONIC OBSTRUCTIVE PULMONARY DISEASE, UNSPECIFIED COPD TYPE: ICD-10-CM

## 2017-09-11 DIAGNOSIS — J44.9 COPD WITHOUT EXACERBATION: ICD-10-CM

## 2017-09-11 PROCEDURE — 94010 BREATHING CAPACITY TEST: CPT | Mod: PBBFAC

## 2017-09-11 PROCEDURE — 99214 OFFICE O/P EST MOD 30 MIN: CPT | Mod: PBBFAC,25 | Performed by: NURSE PRACTITIONER

## 2017-09-11 PROCEDURE — 71020 XR CHEST PA AND LATERAL: CPT | Mod: TC

## 2017-09-11 PROCEDURE — 99214 OFFICE O/P EST MOD 30 MIN: CPT | Mod: 25,S$PBB,, | Performed by: NURSE PRACTITIONER

## 2017-09-11 PROCEDURE — 94010 BREATHING CAPACITY TEST: CPT | Mod: 26,S$PBB,, | Performed by: INTERNAL MEDICINE

## 2017-09-11 PROCEDURE — 71020 XR CHEST PA AND LATERAL: CPT | Mod: 26,,, | Performed by: RADIOLOGY

## 2017-09-11 PROCEDURE — 1159F MED LIST DOCD IN RCRD: CPT | Mod: ,,, | Performed by: NURSE PRACTITIONER

## 2017-09-11 PROCEDURE — 3079F DIAST BP 80-89 MM HG: CPT | Mod: ,,, | Performed by: NURSE PRACTITIONER

## 2017-09-11 PROCEDURE — 3077F SYST BP >= 140 MM HG: CPT | Mod: ,,, | Performed by: NURSE PRACTITIONER

## 2017-09-11 PROCEDURE — 99999 PR PBB SHADOW E&M-EST. PATIENT-LVL IV: CPT | Mod: PBBFAC,,, | Performed by: NURSE PRACTITIONER

## 2017-09-11 RX ORDER — ALBUTEROL SULFATE 0.83 MG/ML
2.5 SOLUTION RESPIRATORY (INHALATION) EVERY 4 HOURS PRN
Qty: 320 ML | Refills: 4 | Status: SHIPPED | OUTPATIENT
Start: 2017-09-11 | End: 2019-04-16 | Stop reason: ALTCHOICE

## 2017-09-11 RX ORDER — AZITHROMYCIN 250 MG/1
TABLET, FILM COATED ORAL
Qty: 6 TABLET | Refills: 0 | Status: SHIPPED | OUTPATIENT
Start: 2017-09-11 | End: 2018-02-28 | Stop reason: ALTCHOICE

## 2017-09-11 RX ORDER — METHYLPREDNISOLONE 4 MG/1
TABLET ORAL
Qty: 1 PACKAGE | Refills: 0 | Status: SHIPPED | OUTPATIENT
Start: 2017-09-11 | End: 2017-12-01

## 2017-09-11 NOTE — ASSESSMENT & PLAN NOTE
With acute mild flare begin zpack and medrol dosepak.  Add: albuterol nebs every 4 hrs if needed for shortness of breath or wheezing.  Continue pulmicort nebs take twice a day instead of once daily   Continue combivent inhaler 2-3 times daily if needed.   May continue mucinex 1200 mg twice a day.   Follow up in 6 months with pulmonary stress

## 2017-09-11 NOTE — LETTER
September 11, 2017      Juanjo Wallace MD  [address] [facility] [place]  [facility] [address]           [facility]  [address] [place] [address]  Phone: [phone]  Fax: [phone]          Patient: Bola Figueroa Sr.   MR Number: [id number]   YOB: 1934   Date of Visit: 9/11/2017       Dear Dr. Juanjo Wallace:    Thank you for referring Bola Figueroa to me for evaluation. Attached you will find relevant portions of my assessment and plan of care.    If you have questions, please do not hesitate to call me. I look forward to following Bola Figueroa along with you.    Sincerely,    Kamala Gacria, NP    Enclosure  CC:  No Recipients    If you would like to receive this communication electronically, please contact [email or web address] or [phone] to request more information on [facility] Link access.    For providers and/or their staff who would like to refer a patient to [facility], please contact us through our one-stop-shop provider referral line, [facility], at [phone].    If you feel you have received this communication in error or would no longer like to receive these types of communications, please e-mail [email or web address]

## 2017-09-11 NOTE — PROGRESS NOTES
"Subjective:      Chief Complaint   Patient presents with    COPD        Bola Figueroa Sr. is a 82 y.o. male presents for reevaluation of COPD.   The patient is currently having symptoms of increased cough productive of white mucous with increased sensation of shortness of breath with coughing episodes.   Symptoms have been present since several weeks ago and have been gradually worsening over the past week with increase sensation of shortness of breath, productive cough and need for combivent inhaler.   He denies fatigue, weight loss, chills, fever and colored sputum.   Associated symptoms include shortness of breath.    This episode appears to have been triggered by no identifiable factor.   Treatments tried for the current exacerbation: pulmicort neb once in morning. combivent inhaler 2-3 times a day.    He uses use wedge at night.   Patient currently is not on home oxygen therapy..   The patient is having no constitutional symptoms, denying fever, chills, anorexia, or weight loss.   He has a history of smoking quit in 1972.    The patient is experiencing exercise intolerance walking any distance with his walk.     Previous Report Reviewed: lab reports, office notes and radiology reports     The following portions of the patient's history were reviewed and updated as appropriate: allergies, current medications, past family history, past medical history, past social history, past surgical history and problem list.    Review of Systems  A comprehensive review of systems was negative except for: Respiratory: positive for cough, dyspnea on exertion and sputum      Objective:      BP (!) 150/82 (BP Location: Right arm, Patient Position: Sitting)   Pulse 72   Resp 20   Ht 5' 11" (1.803 m)   Wt 88.5 kg (195 lb)   SpO2 96%   BMI 27.20 kg/m²   General appearance: alert, cooperative and no distress  Head: Normocephalic, without obvious abnormality, atraumatic  Eyes: negative  Ears: normal TM's and external ear canals " both ears  Nose: Nares normal. Septum midline. Mucosa normal. No drainage or sinus tenderness.  Throat: lips, mucosa, and tongue normal; teeth and gums normal  Neck: no adenopathy, no carotid bruit and supple, symmetrical, trachea midline  Lungs: clear to auscultation bilaterally  Heart: regular rate and rhythm, S1, S2 normal, no murmur, click, rub or gallop  Abdomen: normal findings: bowel sounds normal and soft, non-tender  Extremities: extremities normal, atraumatic, no cyanosis or edema  Pulses: 2+ and symmetric  Skin: Skin color, texture, turgor normal. No rashes or lesions  Lymph nodes: Cervical, supraclavicular, and axillary nodes normal.  Neurologic: Grossly normal    Diagnostic Review  Pulmonary function tests: 9/11/2017 FEV1: 1.14  (42 % predicted), FVC:  1.89 (50 % predicted), FEV1/FVC:  60 (81% predicted)  Poor effort on testing. Patient took bronchodilator pta     Severe obstructive defect     Chest xray 9/11/2017   Findings: The cardiac and mediastinal silhouettes are within normal limits. Cardiac pacing device again noted.    Elevation of the right hemidiaphragm is unchanged.   Lungs are otherwise clear bilaterally with no parenchymal consolidations or pleural effusions demonstrated.   Visualized osseous structures demonstrate no acute abnormality.  IMPRESSION:   Unchanged appearance of the chest as above.  No acute findings  Assessment:         1. COPD, severe  albuterol (PROVENTIL) 2.5 mg /3 mL (0.083 %) nebulizer solution    Stress test, pulmonary   2. COPD without exacerbation  azithromycin (Z-ROGELIO) 250 MG tablet    methylPREDNISolone (MEDROL DOSEPACK) 4 mg tablet        Plan:     Problem List Items Addressed This Visit     COPD, severe - Primary     With acute mild flare begin zpack and medrol dosepak.  Add: albuterol nebs every 4 hrs if needed for shortness of breath or wheezing.  Continue pulmicort nebs take twice a day instead of once daily   Continue combivent inhaler 2-3 times daily if  needed.   May continue mucinex 1200 mg twice a day.   Follow up in 6 months with pulmonary stress          Relevant Medications    albuterol (PROVENTIL) 2.5 mg /3 mL (0.083 %) nebulizer solution    Other Relevant Orders    Stress test, pulmonary      Other Visit Diagnoses     COPD without exacerbation        Relevant Medications    azithromycin (Z-ROGELIO) 250 MG tablet    methylPREDNISolone (MEDROL DOSEPACK) 4 mg tablet           Discussed diagnosis, its evaluation, treatment and usual course.  All questions answered.  Steroid burst per orders.  Antibiotics per orders.      Return in about 6 months (around 3/11/2018) for COPD follow up w review pul stress.

## 2017-09-14 LAB
PRE FEF 25 75: 0.71 L/S (ref 1.15–2.98)
PRE FET 100: 10.22 S
PRE FEV1 FVC: 60 %
PRE FEV1: 1.14 L (ref 2.3–3.15)
PRE FIF 50: 1.14 L/S
PRE FVC: 1.89 L (ref 3.27–4.23)
PRE PEF: 1.43 L/S (ref 6.37–9.09)
PREDICTED FEV1 FVC: 74.25 % (ref 69.04–79.46)
PREDICTED FEV1: 2.72 L (ref 2.3–3.15)
PREDICTED FVC: 3.75 L (ref 3.27–4.23)
PROVOCATION PROTOCOL: ABNORMAL

## 2017-09-27 ENCOUNTER — ANTI-COAG VISIT (OUTPATIENT)
Dept: CARDIOLOGY | Facility: CLINIC | Age: 82
End: 2017-09-27
Payer: MEDICARE

## 2017-09-27 DIAGNOSIS — Z79.01 LONG TERM (CURRENT) USE OF ANTICOAGULANTS: Primary | ICD-10-CM

## 2017-09-27 LAB — INR PPP: 1.8 (ref 2–3)

## 2017-09-27 PROCEDURE — 85610 PROTHROMBIN TIME: CPT | Mod: PBBFAC,PO

## 2017-09-27 PROCEDURE — 99211 OFF/OP EST MAY X REQ PHY/QHP: CPT | Mod: PBBFAC,PO

## 2017-09-27 PROCEDURE — 99999 PR PBB SHADOW E&M-EST. PATIENT-LVL I: CPT | Mod: PBBFAC,,,

## 2017-09-27 NOTE — PROGRESS NOTES
INR remains sub-therapeutic. Confirms dose and compliance. Recently completed z-pack and medrol dose pack. No other changes noted. Will increase total weekly dose until follow-up.

## 2017-10-05 ENCOUNTER — OFFICE VISIT (OUTPATIENT)
Dept: PODIATRY | Facility: CLINIC | Age: 82
End: 2017-10-05
Payer: MEDICARE

## 2017-10-05 VITALS — HEIGHT: 71 IN | WEIGHT: 195.13 LBS | BODY MASS INDEX: 27.32 KG/M2

## 2017-10-05 DIAGNOSIS — B35.1 DERMATOPHYTOSIS OF NAIL: Primary | ICD-10-CM

## 2017-10-05 DIAGNOSIS — I73.9 PERIPHERAL VASCULAR DISEASE: ICD-10-CM

## 2017-10-05 PROCEDURE — 11721 DEBRIDE NAIL 6 OR MORE: CPT | Mod: Q8,PBBFAC,PO | Performed by: PODIATRIST

## 2017-10-05 PROCEDURE — 99999 PR PBB SHADOW E&M-EST. PATIENT-LVL III: CPT | Mod: PBBFAC,,, | Performed by: PODIATRIST

## 2017-10-05 PROCEDURE — 99213 OFFICE O/P EST LOW 20 MIN: CPT | Mod: PBBFAC,PO,25 | Performed by: PODIATRIST

## 2017-10-05 PROCEDURE — 99499 UNLISTED E&M SERVICE: CPT | Mod: S$PBB,,, | Performed by: PODIATRIST

## 2017-10-05 PROCEDURE — 11721 DEBRIDE NAIL 6 OR MORE: CPT | Mod: Q8,S$PBB,, | Performed by: PODIATRIST

## 2017-10-05 NOTE — PROGRESS NOTES
"[facility] -   PODIATRIC MEDICINE AND SURGERY  PROGRESS NOTE         CHIEF COMPLAINT  Chief Complaint   Patient presents with    Routine Foot Care     at risk foot/nail care PCP Dr. Bhatti 5/2017         HPI    SUBJECTIVE: Bola Figueroa Sr. is a 82 y.o. male who  has a past medical history of *Atrial fibrillation; Atrial fibrillation; Bilateral carotid artery stenosis (1/15/2016); Cardiac pacemaker (8/7/2013); Congestive heart failure (4/6/2015); COPD (chronic obstructive pulmonary disease) (8/7/2013); Coronary artery disease; Hyperlipidemia; Hypertension; Long-term (current) use of anticoagulants (8/7/2013); PVD (peripheral vascular disease); S/P PTCA (percutaneous transluminal coronary angioplasty) (8/7/2013); Sleep apnea (8/7/2013); and Stroke. Bolapresents to clinic for high risk foot exam and care.  Bola denies numbness, burning, and/or tingling sensations in their feet. Patient admits to painful toenails aggravated by increased weight bearing, shoe gear, and pressure. States pain is relieved with routine debridements. Patient has no other pedal complaints at this time.    This patient has documented high risk feet requiring routine maintenance secondary to PVD and those secondary complications of PVD,  as mentioned.      HgA1c: No results found for: HGBA1C      REVIEW OF SYSTEMS  General: Denies any fever or chills  Chest: Denies shortness of breath, wheezing, coughing, or sputum production  Heart: Denies chest pain.  As noted above and per history of current illness above, otherwise negative in the remainder of the 14 systems.     PHYSICAL EXAM  Vitals:    10/05/17 1157   Weight: 88.5 kg (195 lb 1.7 oz)   Height: 5' 11" (1.803 m)       GEN:  This patient is well-developed, well-nourished and appears stated age, well-oriented to person, place and time, and cooperative and pleasant on today's visit.      LOWER EXTREMITY  Vascular:   · DP pedal pulse 0/4 b/l, PT pedal pulse 0/4 b/l  · Skin " temperature warm to warm from prox to distally  · CFT <5 secs b/l  · There is mild  edema noted b/l.     Dermatologic:   · Thickened, dystrophic, elongated toenails with subungal debris 1-5 b/l.   · No open skin lesions noted  · No erythema or drainage noted b/l.  · Webspaces are C/D/I B/L.  · There is no hyperkeratotic tissue noted.  · Skin texture and turgor WNL  · There is no pedal hair growth noted    Neurologic:  · Protective sensation absent at 0/10 sites upon examination with [place] Weinsten 5.07 g monofilament.   · Propioception intact at 1st MTPJ b/l.   · Babinski reflex absent b/l. Light touch and sharp/dull sensation intact b/l.    Musculoskeletal/Orthopedic:  · No symptomatic structural abnormalities noted.   · Muscle strength is 5/5 for foot inverters, everters, plantarflexors, and dorsiflexors. Muscle tone is normal.  · Pain free range of motion in all four quadrants with stiffness and limitation b/l      ASSESSMENT  Dermatophytosis of nail    Peripheral vascular disease        PLAN  -patient was examined and evaulated  -Discuss presenting problems, etiology, pathologic processes and management options with patient today.   -I counseled the patient on their conditions, their implications and medical management. at.  -With patient's permission, nails were aggressively reduced and debrided x 10 to their soft tissue attachment mechanically and with electric , removing all offending nail and debris. Patient relates relief following the procedure. Patient will continue to monitor the areas daily, inspect feet, wear protective shoe gear when ambulatory, moisturizer to maintain skin integrity.        Future Appointments  Date Time Provider Department Center   10/18/2017 8:00 AM Hedy Corona PharmD [facility] COUMAD [place]   1/11/2018 11:00 AM Roma Gutierres DPM [facility] POD [facility]   3/12/2018 9:20 AM PULMONARY LAB, [facility]   3/12/2018 9:40 AM Kamala Garcia NP ON PULMS[facility]         Report Electronically Signed By:  Roma Gutierres DPM   Podiatric Medicine & Surgery  [facility]  10/5/2017

## 2017-10-18 ENCOUNTER — ANTI-COAG VISIT (OUTPATIENT)
Dept: CARDIOLOGY | Facility: CLINIC | Age: 82
End: 2017-10-18
Payer: MEDICARE

## 2017-10-18 DIAGNOSIS — Z79.01 LONG-TERM (CURRENT) USE OF ANTICOAGULANTS: Primary | ICD-10-CM

## 2017-10-18 LAB — INR PPP: 1.8 (ref 2–3)

## 2017-10-18 PROCEDURE — 99211 OFF/OP EST MAY X REQ PHY/QHP: CPT | Mod: PBBFAC,PO

## 2017-10-18 PROCEDURE — 99999 PR PBB SHADOW E&M-EST. PATIENT-LVL I: CPT | Mod: PBBFAC,,,

## 2017-10-18 PROCEDURE — 85610 PROTHROMBIN TIME: CPT | Mod: PBBFAC,PO

## 2017-10-18 RX ORDER — WARFARIN SODIUM 5 MG/1
TABLET ORAL
Qty: 30 TABLET | Refills: 3 | Status: SHIPPED | OUTPATIENT
Start: 2017-10-18 | End: 2017-12-27 | Stop reason: SDUPTHER

## 2017-10-18 NOTE — PROGRESS NOTES
INR remains sub-therapeutic despite dose adjustment. Caregiver confirms dose and compliance. Will begin 5mg daily until follow-up.

## 2017-11-08 ENCOUNTER — ANTI-COAG VISIT (OUTPATIENT)
Dept: CARDIOLOGY | Facility: CLINIC | Age: 82
End: 2017-11-08
Payer: MEDICARE

## 2017-11-08 DIAGNOSIS — Z79.01 LONG-TERM (CURRENT) USE OF ANTICOAGULANTS: Primary | ICD-10-CM

## 2017-11-08 LAB — INR PPP: 3.2 (ref 2–3)

## 2017-11-08 PROCEDURE — 85610 PROTHROMBIN TIME: CPT | Mod: PBBFAC,PO

## 2017-11-08 PROCEDURE — 99211 OFF/OP EST MAY X REQ PHY/QHP: CPT | Mod: PBBFAC,PO

## 2017-11-08 PROCEDURE — 99999 PR PBB SHADOW E&M-EST. PATIENT-LVL I: CPT | Mod: PBBFAC,,,

## 2017-11-08 NOTE — PROGRESS NOTES
INR is now supra-therapeutic. No bleeding issues. Will gently lower total weekly dose until follow-up. Patient reports no bleeding or bruising, no new medications and no diet changes.  I reminded the patient to call with any problems, changes or questions before the next visit.

## 2017-11-27 ENCOUNTER — LAB VISIT (OUTPATIENT)
Dept: LAB | Facility: HOSPITAL | Age: 82
End: 2017-11-27
Attending: INTERNAL MEDICINE
Payer: MEDICARE

## 2017-11-27 ENCOUNTER — ANTI-COAG VISIT (OUTPATIENT)
Dept: CARDIOLOGY | Facility: CLINIC | Age: 82
End: 2017-11-27
Payer: MEDICARE

## 2017-11-27 DIAGNOSIS — I25.10 CORONARY ARTERY DISEASE INVOLVING NATIVE CORONARY ARTERY OF NATIVE HEART WITHOUT ANGINA PECTORIS: ICD-10-CM

## 2017-11-27 DIAGNOSIS — Z79.01 LONG-TERM (CURRENT) USE OF ANTICOAGULANTS: Primary | ICD-10-CM

## 2017-11-27 LAB
ALBUMIN SERPL BCP-MCNC: 3.5 G/DL
ALP SERPL-CCNC: 63 U/L
ALT SERPL W/O P-5'-P-CCNC: 12 U/L
ANION GAP SERPL CALC-SCNC: 7 MMOL/L
AST SERPL-CCNC: 18 U/L
BILIRUB SERPL-MCNC: 1 MG/DL
BUN SERPL-MCNC: 18 MG/DL
CALCIUM SERPL-MCNC: 9.3 MG/DL
CHLORIDE SERPL-SCNC: 106 MMOL/L
CHOLEST SERPL-MCNC: 150 MG/DL
CHOLEST/HDLC SERPL: 2.9 {RATIO}
CO2 SERPL-SCNC: 29 MMOL/L
CREAT SERPL-MCNC: 1.5 MG/DL
EST. GFR  (AFRICAN AMERICAN): 49.1 ML/MIN/1.73 M^2
EST. GFR  (NON AFRICAN AMERICAN): 42.4 ML/MIN/1.73 M^2
GLUCOSE SERPL-MCNC: 118 MG/DL
HDLC SERPL-MCNC: 52 MG/DL
HDLC SERPL: 34.7 %
INR PPP: 2.1 (ref 2–3)
LDLC SERPL CALC-MCNC: 81.6 MG/DL
NONHDLC SERPL-MCNC: 98 MG/DL
POTASSIUM SERPL-SCNC: 3.9 MMOL/L
PROT SERPL-MCNC: 7.1 G/DL
SODIUM SERPL-SCNC: 142 MMOL/L
TRIGL SERPL-MCNC: 82 MG/DL

## 2017-11-27 PROCEDURE — 80053 COMPREHEN METABOLIC PANEL: CPT

## 2017-11-27 PROCEDURE — 80061 LIPID PANEL: CPT

## 2017-11-27 PROCEDURE — 36415 COLL VENOUS BLD VENIPUNCTURE: CPT | Mod: PO

## 2017-11-27 PROCEDURE — 85610 PROTHROMBIN TIME: CPT | Mod: PBBFAC,PO

## 2017-11-27 NOTE — PROGRESS NOTES
INR is now therapeutic. Patient reports no bleeding or bruising, no new medications and no diet changes. Continue dose.  I reminded the patient to call with any problems, changes or questions before the next visit.

## 2017-12-01 ENCOUNTER — OFFICE VISIT (OUTPATIENT)
Dept: CARDIOLOGY | Facility: CLINIC | Age: 82
End: 2017-12-01
Payer: MEDICARE

## 2017-12-01 VITALS
BODY MASS INDEX: 28.31 KG/M2 | SYSTOLIC BLOOD PRESSURE: 144 MMHG | HEART RATE: 73 BPM | OXYGEN SATURATION: 98 % | HEIGHT: 71 IN | DIASTOLIC BLOOD PRESSURE: 80 MMHG | WEIGHT: 202.19 LBS

## 2017-12-01 DIAGNOSIS — J44.9 COPD, SEVERE: ICD-10-CM

## 2017-12-01 DIAGNOSIS — I63.9 CEREBROVASCULAR ACCIDENT (CVA), UNSPECIFIED MECHANISM: ICD-10-CM

## 2017-12-01 DIAGNOSIS — I70.219 ATHEROSCLEROTIC PVD WITH INTERMITTENT CLAUDICATION: ICD-10-CM

## 2017-12-01 DIAGNOSIS — J44.9 CHRONIC OBSTRUCTIVE PULMONARY DISEASE, UNSPECIFIED COPD TYPE: ICD-10-CM

## 2017-12-01 DIAGNOSIS — Z98.61 S/P PTCA (PERCUTANEOUS TRANSLUMINAL CORONARY ANGIOPLASTY): ICD-10-CM

## 2017-12-01 DIAGNOSIS — Z95.0 CARDIAC PACEMAKER: ICD-10-CM

## 2017-12-01 DIAGNOSIS — I25.10 CORONARY ARTERY DISEASE INVOLVING NATIVE CORONARY ARTERY OF NATIVE HEART WITHOUT ANGINA PECTORIS: ICD-10-CM

## 2017-12-01 DIAGNOSIS — G47.30 SLEEP APNEA, UNSPECIFIED TYPE: ICD-10-CM

## 2017-12-01 DIAGNOSIS — I73.9 PVD (PERIPHERAL VASCULAR DISEASE): ICD-10-CM

## 2017-12-01 DIAGNOSIS — I65.23 BILATERAL CAROTID ARTERY STENOSIS: ICD-10-CM

## 2017-12-01 DIAGNOSIS — Z79.01 LONG-TERM (CURRENT) USE OF ANTICOAGULANTS: ICD-10-CM

## 2017-12-01 DIAGNOSIS — I10 ESSENTIAL HYPERTENSION: ICD-10-CM

## 2017-12-01 DIAGNOSIS — I48.91 ATRIAL FIBRILLATION, UNSPECIFIED TYPE: Primary | ICD-10-CM

## 2017-12-01 PROCEDURE — 99999 PR PBB SHADOW E&M-EST. PATIENT-LVL III: CPT | Mod: PBBFAC,,, | Performed by: INTERNAL MEDICINE

## 2017-12-01 PROCEDURE — 99214 OFFICE O/P EST MOD 30 MIN: CPT | Mod: S$PBB,,, | Performed by: INTERNAL MEDICINE

## 2017-12-01 PROCEDURE — 99213 OFFICE O/P EST LOW 20 MIN: CPT | Mod: PBBFAC,PO | Performed by: INTERNAL MEDICINE

## 2017-12-01 RX ORDER — TRAMADOL HYDROCHLORIDE 50 MG/1
TABLET ORAL
Refills: 0 | COMMUNITY
Start: 2017-11-14

## 2017-12-01 RX ORDER — INDOMETHACIN 25 MG/1
25 CAPSULE ORAL
COMMUNITY
Start: 2017-11-14 | End: 2018-02-12

## 2017-12-01 NOTE — PROGRESS NOTES
Subjective:   Patient ID:  Bola Figueroa Sr. is a 83 y.o. male who presents for follow up of Atrial Fibrillation      HPI  An 82 yo male with h/o afib cad pacer carotid disease  Copd is here for  f/u . He ahs no syncope near syncope no palpitation no chest pain  Or shortness of breath . No leg swelling he is compliant with meds. He tries to be compliant with diet. His ldl is improved not on target. Ambulates with cane and walker to avoid falls.   Past Medical History:   Diagnosis Date    *Atrial fibrillation     Atrial fibrillation     Bilateral carotid artery stenosis 1/15/2016    Cardiac pacemaker 8/7/2013    Congestive heart failure 4/6/2015    COPD (chronic obstructive pulmonary disease) 8/7/2013    Coronary artery disease     Hyperlipidemia     Hypertension     Long-term (current) use of anticoagulants 8/7/2013    PVD (peripheral vascular disease)     S/P PTCA (percutaneous transluminal coronary angioplasty) 8/7/2013    Sleep apnea 8/7/2013    Stroke        Past Surgical History:   Procedure Laterality Date    CATARACT EXTRACTION W/  INTRAOCULAR LENS IMPLANT      INSERT / REPLACE / REMOVE PACEMAKER  2005       Social History   Substance Use Topics    Smoking status: Former Smoker     Packs/day: 1.50     Years: 35.00     Types: Cigarettes     Quit date: 11/13/1979    Smokeless tobacco: Never Used    Alcohol use No      Comment: QUIT 08/13/77       Family History   Problem Relation Age of Onset    Diabetes Sister     Fainting Sister     Heart disease Paternal Uncle     Heart attack Paternal Uncle     Hypertension Maternal Grandmother     Diabetes Maternal Grandfather        Current Outpatient Prescriptions   Medication Sig    albuterol (PROVENTIL) 2.5 mg /3 mL (0.083 %) nebulizer solution Take 3 mLs (2.5 mg total) by nebulization every 4 (four) hours as needed for Wheezing or Shortness of Breath.    amlodipine-atorvastatin (CADUET) 10-20 mg per tablet Take 1 tablet by mouth once daily.     aspirin (ECOTRIN) 81 MG EC tablet Take 81 mg by mouth once daily.    azithromycin (Z-ROGELIO) 250 MG tablet As per packet instructions    budesonide (PULMICORT) 0.5 mg/2 mL nebulizer solution Take 2 mLs (0.5 mg total) by nebulization 2 (two) times daily.    carvedilol (COREG) 6.25 MG tablet take 1 tablet by mouth twice a day with meals    COMBIVENT RESPIMAT  mcg/actuation inhaler     dexlansoprazole (DEXILANT) 60 mg capsule 60 mg.    fluticasone (FLONASE) 50 mcg/actuation nasal spray 2 sprays.    furosemide (LASIX) 40 MG tablet Take 40 mg by mouth once daily.     gabapentin (NEURONTIN) 100 MG capsule Take 100 mg by mouth 3 (three) times daily.    hydrALAZINE (APRESOLINE) 25 MG tablet 25 mg 3 (three) times daily.     indomethacin (INDOCIN) 25 MG capsule Take 25 mg by mouth.    ipratropium (ATROVENT) 0.06 % nasal spray instill 2 sprays into each nostril three times a day    isosorbide dinitrate (ISORDIL) 20 MG tablet take 1 tablet by mouth every 12 hours **TAKE WITH HYDRALAZINE    latanoprost 0.005 % ophthalmic solution Place 1 drop into both eyes nightly.    levetiracetam (KEPPRA) 500 MG Tab Take 1 tablet by mouth Twice daily.    lubiprostone (AMITIZA) 8 MCG Cap Take 8 mcg by mouth 2 (two) times daily with meals.    nitroGLYCERIN (NITROBID) 6.5 MG CpSR take 1 capsule by mouth twice a day    tamsulosin (FLOMAX) 0.4 mg Cp24 0.4 mg.    traMADol (ULTRAM) 50 mg tablet take 1 tablet by mouth every 6 hours if needed for UP TO 10 days    warfarin (COUMADIN) 5 MG tablet Take 1 tablet every evening as directed by the [facility]. (Patient taking differently: Take 1/2 tablet on Tuesdays and 1  tablet all other days as directed by the [facility].)     No current facility-administered medications for this visit.      Current Outpatient Prescriptions on File Prior to Visit   Medication Sig    albuterol (PROVENTIL) 2.5 mg /3 mL (0.083 %) nebulizer solution Take 3 mLs (2.5 mg total) by nebulization  every 4 (four) hours as needed for Wheezing or Shortness of Breath.    amlodipine-atorvastatin (CADUET) 10-20 mg per tablet Take 1 tablet by mouth once daily.    aspirin (ECOTRIN) 81 MG EC tablet Take 81 mg by mouth once daily.    azithromycin (Z-ROGELIO) 250 MG tablet As per packet instructions    budesonide (PULMICORT) 0.5 mg/2 mL nebulizer solution Take 2 mLs (0.5 mg total) by nebulization 2 (two) times daily.    carvedilol (COREG) 6.25 MG tablet take 1 tablet by mouth twice a day with meals    COMBIVENT RESPIMAT  mcg/actuation inhaler     dexlansoprazole (DEXILANT) 60 mg capsule 60 mg.    fluticasone (FLONASE) 50 mcg/actuation nasal spray 2 sprays.    furosemide (LASIX) 40 MG tablet Take 40 mg by mouth once daily.     gabapentin (NEURONTIN) 100 MG capsule Take 100 mg by mouth 3 (three) times daily.    hydrALAZINE (APRESOLINE) 25 MG tablet 25 mg 3 (three) times daily.     ipratropium (ATROVENT) 0.06 % nasal spray instill 2 sprays into each nostril three times a day    isosorbide dinitrate (ISORDIL) 20 MG tablet take 1 tablet by mouth every 12 hours **TAKE WITH HYDRALAZINE    latanoprost 0.005 % ophthalmic solution Place 1 drop into both eyes nightly.    levetiracetam (KEPPRA) 500 MG Tab Take 1 tablet by mouth Twice daily.    lubiprostone (AMITIZA) 8 MCG Cap Take 8 mcg by mouth 2 (two) times daily with meals.    nitroGLYCERIN (NITROBID) 6.5 MG CpSR take 1 capsule by mouth twice a day    tamsulosin (FLOMAX) 0.4 mg Cp24 0.4 mg.    warfarin (COUMADIN) 5 MG tablet Take 1 tablet every evening as directed by the [facility]. (Patient taking differently: Take 1/2 tablet on Tuesdays and 1  tablet all other days as directed by the [facility].)    [DISCONTINUED] methylPREDNISolone (MEDROL DOSEPACK) 4 mg tablet use as directed     No current facility-administered medications on file prior to visit.        Review of Systems   Constitution: Negative for weakness and malaise/fatigue.   Eyes:  "Negative for blurred vision.   Cardiovascular: Negative for chest pain, claudication, cyanosis, dyspnea on exertion, irregular heartbeat, leg swelling, near-syncope, orthopnea, palpitations and paroxysmal nocturnal dyspnea.   Respiratory: Positive for shortness of breath, snoring and wheezing. Negative for cough and hemoptysis.    Hematologic/Lymphatic: Negative for bleeding problem. Does not bruise/bleed easily.   Skin: Negative for dry skin and itching.   Musculoskeletal: Negative for falls, muscle weakness and myalgias.   Gastrointestinal: Negative for abdominal pain, diarrhea, heartburn, hematemesis, hematochezia and melena.   Genitourinary: Negative for flank pain and hematuria.   Neurological: Positive for disturbances in coordination, excessive daytime sleepiness, focal weakness and loss of balance. Negative for dizziness, headaches, light-headedness, numbness, paresthesias and seizures.   Psychiatric/Behavioral: Positive for memory loss. Negative for altered mental status. The patient is not nervous/anxious.    Allergic/Immunologic: Negative for hives.       Objective:   Physical Exam  Vitals:    12/01/17 0958 12/01/17 1006   BP: (!) 144/80 (!) 144/80   BP Location: Right arm Left arm   Patient Position: Sitting    BP Method: Large (Manual)    Pulse: 73    SpO2: 98%    Weight: 91.7 kg (202 lb 2.6 oz)    Height: 5' 11" (1.803 m)    Constitutional: He is oriented to person, place, and time. He appears well-developed and well-nourished. No distress.   HENT:   Head: Normocephalic and atraumatic.   Eyes: EOM are normal. Pupils are equal, round, and reactive to light. Right eye exhibits no discharge. Left eye exhibits no discharge.   Neck: Neck supple. No JVD present. No thyromegaly present.   Cardiovascular: Normal rate, regular rhythm and intact distal pulses. Exam reveals no gallop and no friction rub.   Murmur heard.  Early systolic murmur is present with a grade of 2/6 at the lower left sternal " border Abdominal bruits noted   abdominal aorta is not enlarged,  Pulses:  Carotid pulses are 3+ on the right side with bruit, and 3+ on the left side with bruit.  Radial pulses are 3+ on the right side, and 3+ on the left side.   Femoral pulses are 2+ on the right side with bruit, and 3+ on the left side with bruit.  Popliteal pulses are 2+ on the right side, and 3+ on the left side.   Dorsalis pedis pulses are 0 on the right side, and 0 on the left side.   Posterior tibial pulses are 1+ on the right side, and 3+ on the left side.   Pulmonary/Chest: Effort normal and breath sounds normal. No respiratory distress. He has no wheezes. He has no rales. He exhibits no tenderness.   Abdominal: Soft. Bowel sounds are normal. He exhibits no distension. There is no tenderness.   Musculoskeletal: Normal range of motion. He exhibits no edema.   Scar of distal bypass surgery well healed.   Neurological: He is alert and oriented to person, place, and time. No cranial nerve deficit.   Skin: Skin is warm and dry. No rash noted. He is not diaphoretic. No erythema.   Psychiatric: He has a normal mood and affect. His behavior is normal.   Nursing note and vitals reviewed  Lab Results   Component Value Date    CHOL 150 11/27/2017    CHOL 182 08/28/2015    CHOL 157 02/13/2015     Lab Results   Component Value Date    HDL 52 11/27/2017    HDL 50 08/28/2015    HDL 45 02/13/2015     Lab Results   Component Value Date    LDLCALC 81.6 11/27/2017    LDLCALC 115.6 08/28/2015    LDLCALC 99.0 02/13/2015     Lab Results   Component Value Date    TRIG 82 11/27/2017    TRIG 82 08/28/2015    TRIG 65 02/13/2015     Lab Results   Component Value Date    CHOLHDL 34.7 11/27/2017    CHOLHDL 27.5 08/28/2015    CHOLHDL 28.7 02/13/2015       Chemistry        Component Value Date/Time     11/27/2017 0903    K 3.9 11/27/2017 0903     11/27/2017 0903    CO2 29 11/27/2017 0903    BUN 18 11/27/2017 0903    CREATININE 1.5 (H) 11/27/2017 0903    GLU  118 (H) 11/27/2017 0903        Component Value Date/Time    CALCIUM 9.3 11/27/2017 0903    ALKPHOS 63 11/27/2017 0903    AST 18 11/27/2017 0903    ALT 12 11/27/2017 0903    BILITOT 1.0 11/27/2017 0903    ESTGFRAFRICA 49.1 (A) 11/27/2017 0903    EGFRNONAA 42.4 (A) 11/27/2017 0903            Lab Results   Component Value Date    TSH 2.9 10/18/2007     Lab Results   Component Value Date    INR 2.1 11/27/2017    INR 3.2 (A) 11/08/2017    INR 1.8 (A) 10/18/2017     Lab Results   Component Value Date    WBC 5.74 10/25/2016    HGB 12.3 (L) 10/25/2016    HCT 37.7 (L) 10/25/2016    MCV 91 10/25/2016     10/25/2016     BMP  Sodium   Date Value Ref Range Status   11/27/2017 142 136 - 145 mmol/L Final     Potassium   Date Value Ref Range Status   11/27/2017 3.9 3.5 - 5.1 mmol/L Final     Chloride   Date Value Ref Range Status   11/27/2017 106 95 - 110 mmol/L Final     CO2   Date Value Ref Range Status   11/27/2017 29 23 - 29 mmol/L Final     BUN, Bld   Date Value Ref Range Status   11/27/2017 18 8 - 23 mg/dL Final     Creatinine   Date Value Ref Range Status   11/27/2017 1.5 (H) 0.5 - 1.4 mg/dL Final     Calcium   Date Value Ref Range Status   11/27/2017 9.3 8.7 - 10.5 mg/dL Final     Anion Gap   Date Value Ref Range Status   11/27/2017 7 (L) 8 - 16 mmol/L Final     eGFR if    Date Value Ref Range Status   11/27/2017 49.1 (A) >60 mL/min/1.73 m^2 Final     eGFR if non    Date Value Ref Range Status   11/27/2017 42.4 (A) >60 mL/min/1.73 m^2 Final     Comment:     Calculation used to obtain the estimated glomerular filtration  rate (eGFR) is the CKD-EPI equation.        Estimated Creatinine Clearance: 43.2 mL/min (based on SCr of 1.5 mg/dL (H)).    Assessment:     1. Atrial fibrillation, unspecified type    2. Coronary artery disease involving native coronary artery of native heart without angina pectoris    3. PVD (peripheral vascular disease)    4. Essential hypertension    5. Long-term  (current) use of anticoagulants    6. Cardiac pacemaker    7. Sleep apnea, unspecified type    8. S/P PTCA (percutaneous transluminal coronary angioplasty)    9. Chronic obstructive pulmonary disease, unspecified COPD type    10. COPD, severe    11. Atherosclerotic PVD with intermittent claudication    12. Bilateral carotid artery stenosis    13. Cerebrovascular accident (CVA), unspecified mechanism      counseled about compliance and risk factor modification.  Plan:     Continue current therapy  Cardiac low salt diet.  Risk factor modification and excercise program.  F/u in 6 months with lipid cmp

## 2017-12-07 DIAGNOSIS — I10 ESSENTIAL HYPERTENSION: ICD-10-CM

## 2017-12-07 DIAGNOSIS — I25.10 CORONARY ARTERY DISEASE WITHOUT ANGINA PECTORIS, UNSPECIFIED VESSEL OR LESION TYPE, UNSPECIFIED WHETHER NATIVE OR TRANSPLANTED HEART: ICD-10-CM

## 2017-12-07 RX ORDER — ISOSORBIDE DINITRATE 20 MG/1
TABLET ORAL
Qty: 60 TABLET | Refills: 6 | Status: SHIPPED | OUTPATIENT
Start: 2017-12-07

## 2017-12-07 RX ORDER — HYDRALAZINE HYDROCHLORIDE 25 MG/1
TABLET, FILM COATED ORAL
Qty: 90 TABLET | Refills: 5 | Status: SHIPPED | OUTPATIENT
Start: 2017-12-07 | End: 2018-06-28 | Stop reason: SDUPTHER

## 2017-12-27 ENCOUNTER — ANTI-COAG VISIT (OUTPATIENT)
Dept: CARDIOLOGY | Facility: CLINIC | Age: 82
End: 2017-12-27
Payer: MEDICARE

## 2017-12-27 DIAGNOSIS — Z79.01 LONG-TERM (CURRENT) USE OF ANTICOAGULANTS: Primary | ICD-10-CM

## 2017-12-27 LAB — INR PPP: 2.7 (ref 2–3)

## 2017-12-27 PROCEDURE — 85610 PROTHROMBIN TIME: CPT | Mod: PBBFAC,PO

## 2017-12-27 RX ORDER — WARFARIN SODIUM 5 MG/1
TABLET ORAL
Qty: 30 TABLET | Refills: 3 | Status: SHIPPED | OUTPATIENT
Start: 2017-12-27 | End: 2018-03-28 | Stop reason: SDUPTHER

## 2018-01-11 ENCOUNTER — OFFICE VISIT (OUTPATIENT)
Dept: PODIATRY | Facility: CLINIC | Age: 83
End: 2018-01-11
Payer: MEDICARE

## 2018-01-11 VITALS
DIASTOLIC BLOOD PRESSURE: 85 MMHG | HEIGHT: 71 IN | WEIGHT: 202.19 LBS | HEART RATE: 79 BPM | BODY MASS INDEX: 28.31 KG/M2 | SYSTOLIC BLOOD PRESSURE: 149 MMHG

## 2018-01-11 DIAGNOSIS — I73.9 PERIPHERAL VASCULAR DISEASE: ICD-10-CM

## 2018-01-11 DIAGNOSIS — B35.1 DERMATOPHYTOSIS OF NAIL: Primary | ICD-10-CM

## 2018-01-11 PROCEDURE — 11721 DEBRIDE NAIL 6 OR MORE: CPT | Mod: Q8,PBBFAC,PO | Performed by: PODIATRIST

## 2018-01-11 PROCEDURE — 99215 OFFICE O/P EST HI 40 MIN: CPT | Mod: PBBFAC,PO,25 | Performed by: PODIATRIST

## 2018-01-11 PROCEDURE — 99499 UNLISTED E&M SERVICE: CPT | Mod: S$PBB,,, | Performed by: PODIATRIST

## 2018-01-11 PROCEDURE — 99999 PR PBB SHADOW E&M-EST. PATIENT-LVL V: CPT | Mod: PBBFAC,,, | Performed by: PODIATRIST

## 2018-01-11 PROCEDURE — 11721 DEBRIDE NAIL 6 OR MORE: CPT | Mod: Q8,S$PBB,, | Performed by: PODIATRIST

## 2018-01-11 NOTE — PROGRESS NOTES
"[facility] -   PODIATRIC MEDICINE AND SURGERY  PROGRESS NOTE         CHIEF COMPLAINT  Chief Complaint   Patient presents with    Routine Foot Care     Last visit with PCP Dr. Bhatti 12/2017       HPI  SUBJECTIVE: Bola Figueroa Sr. is a 83 y.o. male who  has a past medical history of *Atrial fibrillation; Atrial fibrillation; Bilateral carotid artery stenosis (1/15/2016); Cardiac pacemaker (8/7/2013); Congestive heart failure (4/6/2015); COPD (chronic obstructive pulmonary disease) (8/7/2013); Coronary artery disease; Hyperlipidemia; Hypertension; Long-term (current) use of anticoagulants (8/7/2013); PVD (peripheral vascular disease); S/P PTCA (percutaneous transluminal coronary angioplasty) (8/7/2013); Sleep apnea (8/7/2013); and Stroke. Bolapresents to clinic for high risk foot exam and care.  Bola denies numbness, burning, and/or tingling sensations in their feet. Patient admits to painful toenails aggravated by increased weight bearing, shoe gear, and pressure. States pain is relieved with routine debridements. Patient has no other pedal complaints at this time.    This patient has documented high risk feet requiring routine maintenance secondary to PVD and those secondary complications of PVD,  as mentioned.      HgA1c: No results found for: HGBA1C      REVIEW OF SYSTEMS  General: Denies any fever or chills  Chest: Denies shortness of breath, wheezing, coughing, or sputum production  Heart: Denies chest pain.  As noted above and per history of current illness above, otherwise negative in the remainder of the 14 systems.     PHYSICAL EXAM  Vitals:    01/11/18 1123   BP: (!) 149/85   Pulse: 79   Weight: 91.7 kg (202 lb 2.6 oz)   Height: 5' 11" (1.803 m)   PainSc: 0-No pain       GEN:  This patient is well-developed, well-nourished and appears stated age, well-oriented to person, place and time, and cooperative and pleasant on today's visit.      LOWER EXTREMITY  Vascular:   · DP pedal pulse 0/4 " b/l, PT pedal pulse 0/4 b/l  · Skin temperature warm to warm from prox to distally  · CFT <5 secs b/l  · There is mild  edema noted b/l.     Dermatologic:   · Thickened, dystrophic, elongated toenails with subungal debris 1-5 b/l.   · No open skin lesions noted  · No erythema or drainage noted b/l.  · Webspaces are C/D/I B/L.  · There is no hyperkeratotic tissue noted.  · Skin texture and turgor WNL  · There is no pedal hair growth noted    Neurologic:  · Protective sensation absent at 0/10 sites upon examination with Kalida Weinsten 5.07 g monofilament.   · Propioception intact at 1st MTPJ b/l.   · Babinski reflex absent b/l. Light touch and sharp/dull sensation intact b/l.    Musculoskeletal/Orthopedic:  · No symptomatic structural abnormalities noted.   · Muscle strength is 5/5 for foot inverters, everters, plantarflexors, and dorsiflexors. Muscle tone is normal.  · Pain free range of motion in all four quadrants with stiffness and limitation b/l      ASSESSMENT  Dermatophytosis of nail    Peripheral vascular disease      PLAN  -patient was examined and evaulated  -Discuss presenting problems, etiology, pathologic processes and management options with patient today.   -I counseled the patient on their conditions, their implications and medical management. at.  -With patient's permission, nails were aggressively reduced and debrided x 10 to their soft tissue attachment mechanically and with electric , removing all offending nail and debris. Patient relates relief following the procedure. Patient will continue to monitor the areas daily, inspect feet, wear protective shoe gear when ambulatory, moisturizer to maintain skin integrity.    Future Appointments  Date Time Provider Department Center   1/24/2018 9:15 AM Hedy Corona PharmD [facility]   3/12/2018 9:20 AM PULMONARY LAB, [facility] ON PULMFS  [facility]   3/12/2018 9:40 AM Kamala Garcia NP ON PULMSVC  Medical C   4/12/2018 10:20 AM  Roma Cueto DPM [facility] POD [place]   5/2/2018 8:30 AM LABORATORY, [place] [facility] LAB [place]   5/9/2018 9:15 AM Caroline Mcdonough MD [facility] CARDIO [place]         Report Electronically Signed By:  Roma Gutierres DPM   Podiatric Medicine & Surgery  [facility]  1/11/2018

## 2018-01-19 ENCOUNTER — HOSPITAL ENCOUNTER (EMERGENCY)
Facility: HOSPITAL | Age: 83
Discharge: HOME | End: 2018-01-19
Attending: EMERGENCY MEDICINE
Payer: MEDICARE

## 2018-01-19 VITALS
BODY MASS INDEX: 28.39 KG/M2 | RESPIRATION RATE: 20 BRPM | OXYGEN SATURATION: 100 % | HEART RATE: 69 BPM | HEIGHT: 71 IN | SYSTOLIC BLOOD PRESSURE: 168 MMHG | WEIGHT: 202.81 LBS | TEMPERATURE: 98 F | DIASTOLIC BLOOD PRESSURE: 78 MMHG

## 2018-01-19 DIAGNOSIS — R06.02 SOB (SHORTNESS OF BREATH): ICD-10-CM

## 2018-01-19 DIAGNOSIS — R05.9 COUGH: ICD-10-CM

## 2018-01-19 DIAGNOSIS — J44.1 COPD WITH ACUTE EXACERBATION: Primary | ICD-10-CM

## 2018-01-19 LAB
ALBUMIN SERPL BCP-MCNC: 4.1 G/DL
ALP SERPL-CCNC: 61 U/L
ALT SERPL W/O P-5'-P-CCNC: 13 U/L
ANION GAP SERPL CALC-SCNC: 7 MMOL/L
AST SERPL-CCNC: 22 U/L
BASOPHILS # BLD AUTO: 0.03 K/UL
BASOPHILS NFR BLD: 0.5 %
BILIRUB SERPL-MCNC: 1.1 MG/DL
BNP SERPL-MCNC: 137 PG/ML
BUN SERPL-MCNC: 16 MG/DL
CALCIUM SERPL-MCNC: 9.3 MG/DL
CHLORIDE SERPL-SCNC: 105 MMOL/L
CK SERPL-CCNC: 112 U/L
CO2 SERPL-SCNC: 29 MMOL/L
CREAT SERPL-MCNC: 1.1 MG/DL
DIFFERENTIAL METHOD: ABNORMAL
EOSINOPHIL # BLD AUTO: 0.2 K/UL
EOSINOPHIL NFR BLD: 3.7 %
ERYTHROCYTE [DISTWIDTH] IN BLOOD BY AUTOMATED COUNT: 14.1 %
EST. GFR  (AFRICAN AMERICAN): >60 ML/MIN/1.73 M^2
EST. GFR  (NON AFRICAN AMERICAN): >60 ML/MIN/1.73 M^2
GLUCOSE SERPL-MCNC: 114 MG/DL
HCT VFR BLD AUTO: 40.1 %
HGB BLD-MCNC: 13.2 G/DL
LYMPHOCYTES # BLD AUTO: 1.6 K/UL
LYMPHOCYTES NFR BLD: 26.1 %
MCH RBC QN AUTO: 30.3 PG
MCHC RBC AUTO-ENTMCNC: 32.9 G/DL
MCV RBC AUTO: 92 FL
MONOCYTES # BLD AUTO: 0.4 K/UL
MONOCYTES NFR BLD: 6.8 %
NEUTROPHILS # BLD AUTO: 3.9 K/UL
NEUTROPHILS NFR BLD: 62.9 %
PLATELET # BLD AUTO: 202 K/UL
PMV BLD AUTO: 11 FL
POTASSIUM SERPL-SCNC: 4.3 MMOL/L
PROT SERPL-MCNC: 7.6 G/DL
RBC # BLD AUTO: 4.36 M/UL
SODIUM SERPL-SCNC: 141 MMOL/L
TROPONIN I SERPL DL<=0.01 NG/ML-MCNC: 0.02 NG/ML
WBC # BLD AUTO: 6.21 K/UL

## 2018-01-19 PROCEDURE — 84484 ASSAY OF TROPONIN QUANT: CPT

## 2018-01-19 PROCEDURE — 94640 AIRWAY INHALATION TREATMENT: CPT

## 2018-01-19 PROCEDURE — 25000242 PHARM REV CODE 250 ALT 637 W/ HCPCS: Performed by: EMERGENCY MEDICINE

## 2018-01-19 PROCEDURE — 85025 COMPLETE CBC W/AUTO DIFF WBC: CPT

## 2018-01-19 PROCEDURE — 82550 ASSAY OF CK (CPK): CPT

## 2018-01-19 PROCEDURE — 80053 COMPREHEN METABOLIC PANEL: CPT

## 2018-01-19 PROCEDURE — 99284 EMERGENCY DEPT VISIT MOD MDM: CPT | Mod: 25

## 2018-01-19 PROCEDURE — 93010 ELECTROCARDIOGRAM REPORT: CPT | Mod: ,,, | Performed by: INTERNAL MEDICINE

## 2018-01-19 PROCEDURE — 83880 ASSAY OF NATRIURETIC PEPTIDE: CPT

## 2018-01-19 PROCEDURE — 96374 THER/PROPH/DIAG INJ IV PUSH: CPT

## 2018-01-19 PROCEDURE — 63600175 PHARM REV CODE 636 W HCPCS: Performed by: EMERGENCY MEDICINE

## 2018-01-19 RX ORDER — METHYLPREDNISOLONE SOD SUCC 125 MG
125 VIAL (EA) INJECTION
Status: COMPLETED | OUTPATIENT
Start: 2018-01-19 | End: 2018-01-19

## 2018-01-19 RX ORDER — GUAIFENESIN 100 MG/5ML
200 SOLUTION ORAL EVERY 4 HOURS PRN
Qty: 60 ML | Refills: 0 | Status: SHIPPED | OUTPATIENT
Start: 2018-01-19 | End: 2018-01-29

## 2018-01-19 RX ORDER — MOXIFLOXACIN HYDROCHLORIDE 400 MG/1
400 TABLET ORAL DAILY
Qty: 10 TABLET | Refills: 0 | Status: SHIPPED | OUTPATIENT
Start: 2018-01-19 | End: 2018-01-29

## 2018-01-19 RX ORDER — IPRATROPIUM BROMIDE AND ALBUTEROL SULFATE 2.5; .5 MG/3ML; MG/3ML
3 SOLUTION RESPIRATORY (INHALATION)
Status: COMPLETED | OUTPATIENT
Start: 2018-01-19 | End: 2018-01-19

## 2018-01-19 RX ORDER — PREDNISONE 50 MG/1
50 TABLET ORAL DAILY
Qty: 5 TABLET | Refills: 0 | Status: SHIPPED | OUTPATIENT
Start: 2018-01-19 | End: 2018-01-24

## 2018-01-19 RX ADMIN — METHYLPREDNISOLONE SODIUM SUCCINATE 125 MG: 125 INJECTION, POWDER, FOR SOLUTION INTRAMUSCULAR; INTRAVENOUS at 08:01

## 2018-01-19 RX ADMIN — IPRATROPIUM BROMIDE AND ALBUTEROL SULFATE 3 ML: .5; 3 SOLUTION RESPIRATORY (INHALATION) at 08:01

## 2018-01-19 NOTE — ED PROVIDER NOTES
SCRIBE #1 NOTE: I, Simba Smith, am scribing for, and in the presence of, Alejandro Salcido MD. I have scribed the entire note.      History      Chief Complaint   Patient presents with    Shortness of Breath       Review of patient's allergies indicates:   Allergen Reactions    No known drug allergies         HPI   HPI    1/19/2018, 8:39 AM   History obtained from the patient and wife      History of Present Illness: Bola Figueroa Sr. is a 83 y.o. male patient with a PMHx of COPD, AFIB, who presents to the Emergency Department for SOB which onset gradually last night. Sxs are constant and moderate in severity. There are no mitigating or exacerbating factors noted.  Pt denies any fever, N/V/D, CP, HA, numbness, dizziness, ABD pain, and all other sxs at this time. Pt took HTN medication this morning. No further complaints or concerns at this time.       Arrival mode: Personal vehicle    PCP: Naga Bhatti MD       Past Medical History:  Past Medical History:   Diagnosis Date    *Atrial fibrillation     Atrial fibrillation     Bilateral carotid artery stenosis 1/15/2016    Cardiac pacemaker 8/7/2013    Congestive heart failure 4/6/2015    COPD (chronic obstructive pulmonary disease) 8/7/2013    Coronary artery disease     Hyperlipidemia     Hypertension     Long-term (current) use of anticoagulants 8/7/2013    PVD (peripheral vascular disease)     S/P PTCA (percutaneous transluminal coronary angioplasty) 8/7/2013    Sleep apnea 8/7/2013    Stroke        Past Surgical History:  Past Surgical History:   Procedure Laterality Date    CATARACT EXTRACTION W/  INTRAOCULAR LENS IMPLANT      INSERT / REPLACE / REMOVE PACEMAKER  2005         Family History:  Family History   Problem Relation Age of Onset    Diabetes Sister     Fainting Sister     Heart disease Paternal Uncle     Heart attack Paternal Uncle     Hypertension Maternal Grandmother     Diabetes Maternal Grandfather         Social History:  Social History     Social History Main Topics    Smoking status: Former Smoker     Packs/day: 1.50     Years: 35.00     Types: Cigarettes     Quit date: 11/13/1979    Smokeless tobacco: Never Used    Alcohol use No      Comment: QUIT 08/13/77    Drug use: No    Sexual activity: unknown       ROS   Review of Systems   Constitutional: Negative for fever.   HENT: Negative for sore throat.    Respiratory: Positive for shortness of breath.    Cardiovascular: Negative for chest pain.   Gastrointestinal: Negative for nausea.   Genitourinary: Negative for dysuria.   Musculoskeletal: Negative for back pain.   Skin: Negative for rash.   Neurological: Negative for weakness.   Hematological: Does not bruise/bleed easily.       Physical Exam      Initial Vitals [01/19/18 0830]   BP Pulse Resp Temp SpO2   (!) 202/93 75 20 97.9 °F (36.6 °C) 99 %      MAP       129.33          Physical Exam  Nursing Notes and Vital Signs Reviewed.  Constitutional: Patient is in no acute distress. Well-developed and well-nourished.  Head: Atraumatic. Normocephalic.  Eyes: PERRL. EOM intact. Conjunctivae are not pale. No scleral icterus.  ENT: Mucous membranes are moist. Oropharynx is clear and symmetric.    Neck: Supple. Full ROM. No lymphadenopathy.  Cardiovascular: Regular rate. Regular rhythm. No murmurs, rubs, or gallops. Distal pulses are 2+ and symmetric.  Pulmonary/Chest: No respiratory distress. Positive wheezing.  Abdominal: Soft and non-distended.  There is no tenderness.  No rebound, guarding, or rigidity. Good bowel sounds.  Genitourinary: No CVA tenderness  Musculoskeletal: Moves all extremities. No obvious deformities. No edema. No calf tenderness.  Skin: Warm and dry.  Neurological:  Alert, awake, and appropriate.  Normal speech.  No acute focal neurological deficits are appreciated.  Psychiatric: Normal affect. Good eye contact. Appropriate in content.    ED Course    Procedures  ED Vital Signs:  Vitals:  "   01/19/18 0830 01/19/18 0854 01/19/18 0902 01/19/18 0905   BP: (!) 202/93  (!) 166/72    Pulse: 75 69 72 69   Resp: 20 20 19    Temp: 97.9 °F (36.6 °C)      TempSrc: Oral      SpO2: 99% 100% 100%    Weight: 92 kg (202 lb 13.2 oz)      Height: 5' 11" (1.803 m)       01/19/18 1000   BP: (!) 168/78   Pulse: 69   Resp: 20   Temp:    TempSrc:    SpO2: 100%   Weight:    Height:        Abnormal Lab Results:  Labs Reviewed   CBC W/ AUTO DIFFERENTIAL - Abnormal; Notable for the following:        Result Value    RBC 4.36 (*)     Hemoglobin 13.2 (*)     All other components within normal limits   COMPREHENSIVE METABOLIC PANEL - Abnormal; Notable for the following:     Glucose 114 (*)     Total Bilirubin 1.1 (*)     Anion Gap 7 (*)     All other components within normal limits   B-TYPE NATRIURETIC PEPTIDE - Abnormal; Notable for the following:      (*)     All other components within normal limits   CK   TROPONIN I   URINALYSIS        All Lab Results:  Results for orders placed or performed during the hospital encounter of 01/19/18   CBC auto differential   Result Value Ref Range    WBC 6.21 3.90 - 12.70 K/uL    RBC 4.36 (L) 4.60 - 6.20 M/uL    Hemoglobin 13.2 (L) 14.0 - 18.0 g/dL    Hematocrit 40.1 40.0 - 54.0 %    MCV 92 82 - 98 fL    MCH 30.3 27.0 - 31.0 pg    MCHC 32.9 32.0 - 36.0 g/dL    RDW 14.1 11.5 - 14.5 %    Platelets 202 150 - 350 K/uL    MPV 11.0 9.2 - 12.9 fL    Gran # 3.9 1.8 - 7.7 K/uL    Lymph # 1.6 1.0 - 4.8 K/uL    Mono # 0.4 0.3 - 1.0 K/uL    Eos # 0.2 0.0 - 0.5 K/uL    Baso # 0.03 0.00 - 0.20 K/uL    Gran% 62.9 38.0 - 73.0 %    Lymph% 26.1 18.0 - 48.0 %    Mono% 6.8 4.0 - 15.0 %    Eosinophil% 3.7 0.0 - 8.0 %    Basophil% 0.5 0.0 - 1.9 %    Differential Method Automated    Comprehensive metabolic panel   Result Value Ref Range    Sodium 141 136 - 145 mmol/L    Potassium 4.3 3.5 - 5.1 mmol/L    Chloride 105 95 - 110 mmol/L    CO2 29 23 - 29 mmol/L    Glucose 114 (H) 70 - 110 mg/dL    BUN, Bld 16 8 - " 23 mg/dL    Creatinine 1.1 0.5 - 1.4 mg/dL    Calcium 9.3 8.7 - 10.5 mg/dL    Total Protein 7.6 6.0 - 8.4 g/dL    Albumin 4.1 3.5 - 5.2 g/dL    Total Bilirubin 1.1 (H) 0.1 - 1.0 mg/dL    Alkaline Phosphatase 61 55 - 135 U/L    AST 22 10 - 40 U/L    ALT 13 10 - 44 U/L    Anion Gap 7 (L) 8 - 16 mmol/L    eGFR if African American >60 >60 mL/min/1.73 m^2    eGFR if non African American >60 >60 mL/min/1.73 m^2   Brain natriuretic peptide   Result Value Ref Range     (H) 0 - 99 pg/mL   CK   Result Value Ref Range     20 - 200 U/L   Troponin I   Result Value Ref Range    Troponin I 0.023 0.000 - 0.026 ng/mL         Imaging Results:  Imaging Results          X-Ray Chest AP Portable (Final result)  Result time 01/19/18 09:24:10    Final result by Maynor Dunn MD (01/19/18 09:24:10)                 Impression:      No acute findings.  Please see above.      Electronically signed by: MAYNOR DUNN MD  Date:     01/19/18  Time:    09:24              Narrative:    Exam: XR CHEST AP PORTABLE,    Date:  01/19/18 09:18:26    History: sob    Comparison:  09/11/2017    Findings: Left dual-lead pacemaker.  Borderline cardiomegaly.  Aortic atherosclerotic type calcification.    Mild nonspecific elevation of the right diaphragm.  No definite acute pulmonary infiltrate.                                      The EKG was ordered, reviewed, and independently interpreted by the ED provider.  Interpretation time: 8:51  Rate: 70 BPM  Rhythm: Wide QRS  Interpretation: LAD. LVH. No STEMI.      The Emergency Provider reviewed the vital signs and test results, which are outlined above.    ED Discussion     9:59 AM: Reassessed pt. Pt states their condition has improved at this time.  Discussed with pt all pertinent ED information and results. Discussed plan of treatment with pt. Gave pt all f/u and return to the ED instructions. All questions and concerns were addressed at this time. Pt understands and agrees to plan as  discussed. Pt is stable for discharge.       ED Medication(s):  Medications   albuterol-ipratropium 2.5mg-0.5mg/3mL nebulizer solution 3 mL (3 mLs Nebulization Given 1/19/18 0854)   methylPREDNISolone sodium succinate injection 125 mg (125 mg Intravenous Given 1/19/18 0856)       Discharge Medication List as of 1/19/2018 10:00 AM      START taking these medications    Details   guaifenesin 100 mg/5 ml (ROBITUSSIN) 100 mg/5 mL syrup Take 10 mLs (200 mg total) by mouth every 4 (four) hours as needed for Cough or Congestion., Starting Fri 1/19/2018, Until Mon 1/29/2018, Print      moxifloxacin (AVELOX) 400 mg tablet Take 1 tablet (400 mg total) by mouth once daily., Starting Fri 1/19/2018, Until Mon 1/29/2018, Print      predniSONE (DELTASONE) 50 MG Tab Take 1 tablet (50 mg total) by mouth once daily., Starting Fri 1/19/2018, Until Wed 1/24/2018, Print             Follow-up Information     Naga Bhatti MD In 2 days.    Specialty:  Cardiology  Contact information:  [address]  [address]  [place] [address] [phone]                     Medical Decision Making    Medical Decision Making:   Clinical Tests:   Lab Tests: Reviewed and Ordered  Radiological Study: Reviewed and Ordered  Medical Tests: Reviewed and Ordered           Scribe Attestation:   Scribe #1: I performed the above scribed service and the documentation accurately describes the services I performed. I attest to the accuracy of the note.    Attending:   Physician Attestation Statement for Scribe #1: I, Alejandro Salcido MD, personally performed the services described in this documentation, as scribed by Simba Smith, in my presence, and it is both accurate and complete.          Clinical Impression       ICD-10-CM ICD-9-CM   1. COPD with acute exacerbation J44.1 491.21   2. Cough R05 786.2   3. SOB (shortness of breath) R06.02 786.05       Disposition:   Disposition: Discharged  Condition: Stable         Alejandro Salcido  MD  01/19/18 1043

## 2018-01-24 ENCOUNTER — ANTI-COAG VISIT (OUTPATIENT)
Dept: CARDIOLOGY | Facility: CLINIC | Age: 83
End: 2018-01-24
Payer: MEDICARE

## 2018-01-24 DIAGNOSIS — Z79.01 LONG TERM (CURRENT) USE OF ANTICOAGULANTS: Primary | ICD-10-CM

## 2018-01-24 LAB — INR PPP: 3.4 (ref 2–3)

## 2018-01-24 PROCEDURE — 85610 PROTHROMBIN TIME: CPT | Mod: PBBFAC,PO

## 2018-01-24 PROCEDURE — 99999 PR PBB SHADOW E&M-EST. PATIENT-LVL I: CPT | Mod: PBBFAC,,,

## 2018-01-24 PROCEDURE — 99211 OFF/OP EST MAY X REQ PHY/QHP: CPT | Mod: PBBFAC,PO

## 2018-01-24 NOTE — PROGRESS NOTES
INR is supra-therapeutic. No bleeding issues noted. Recent ER visit for COPD exacerbation. Currently completing avelox and recently completed high dose prednisone. Will hold x1 dose then re-challenge current dosage until follow-up.

## 2018-02-14 ENCOUNTER — ANTI-COAG VISIT (OUTPATIENT)
Dept: CARDIOLOGY | Facility: CLINIC | Age: 83
End: 2018-02-14
Payer: MEDICARE

## 2018-02-14 DIAGNOSIS — Z79.01 LONG TERM (CURRENT) USE OF ANTICOAGULANTS: Primary | ICD-10-CM

## 2018-02-14 LAB — INR PPP: 1.7 (ref 2–3)

## 2018-02-14 PROCEDURE — 99999 PR PBB SHADOW E&M-EST. PATIENT-LVL I: CPT | Mod: PBBFAC,,,

## 2018-02-14 PROCEDURE — 85610 PROTHROMBIN TIME: CPT | Mod: PBBFAC,PO

## 2018-02-14 PROCEDURE — 99211 OFF/OP EST MAY X REQ PHY/QHP: CPT | Mod: PBBFAC,PO

## 2018-02-14 NOTE — PROGRESS NOTES
INR is slightly sub-therapeutic. Daughter is not with patient today to discuss compliance or diet changes. Will boost tonight's dose only then re-challenge scheduled dose. Repeat INR in 2 weeks.

## 2018-02-21 ENCOUNTER — TELEPHONE (OUTPATIENT)
Dept: PULMONOLOGY | Facility: CLINIC | Age: 83
End: 2018-02-21

## 2018-02-21 RX ORDER — FLUTICASONE PROPIONATE AND SALMETEROL 250; 50 UG/1; UG/1
1 POWDER RESPIRATORY (INHALATION) 2 TIMES DAILY
Qty: 60 EACH | Refills: 12 | Status: SHIPPED | OUTPATIENT
Start: 2018-02-21 | End: 2018-08-09 | Stop reason: SDUPTHER

## 2018-02-21 NOTE — TELEPHONE ENCOUNTER
----- Message from Maggie Benedict LPN sent at 2/21/2018 11:43 AM CST -----  Contact: pt daughter- Luz Maria      ----- Message -----  From: Arely Saenz  Sent: 2/21/2018  11:14 AM  To: Rodrigo CARLOS Staff    State patient primary care doctor gave him to samples of Advair. State it really helped. Luz Maria is requesting a Rx for Advair 250/50.    Pt use..    [facility] [address] [address]  Phone: [phone] Fax: [phone]    Please adv/call [phone].//thanks. cw

## 2018-02-22 ENCOUNTER — TELEPHONE (OUTPATIENT)
Dept: PULMONOLOGY | Facility: CLINIC | Age: 83
End: 2018-02-22

## 2018-02-22 NOTE — TELEPHONE ENCOUNTER
----- Message from Skip Chaudhary sent at 2/22/2018  8:30 AM CST -----  Contact: [facility]   [facility] called to get a verbal on pt ...[phone]

## 2018-02-22 NOTE — TELEPHONE ENCOUNTER
Spoke to Renate at [facility] to verify sript sent on advair 250/50

## 2018-02-28 ENCOUNTER — ANTI-COAG VISIT (OUTPATIENT)
Dept: CARDIOLOGY | Facility: CLINIC | Age: 83
End: 2018-02-28
Payer: MEDICARE

## 2018-02-28 DIAGNOSIS — Z79.01 LONG TERM (CURRENT) USE OF ANTICOAGULANTS: Primary | ICD-10-CM

## 2018-02-28 LAB — INR PPP: 2.6 (ref 2–3)

## 2018-02-28 PROCEDURE — 85610 PROTHROMBIN TIME: CPT | Mod: PBBFAC,PO

## 2018-02-28 PROCEDURE — 99999 PR PBB SHADOW E&M-EST. PATIENT-LVL I: CPT | Mod: PBBFAC,,,

## 2018-02-28 PROCEDURE — 99211 OFF/OP EST MAY X REQ PHY/QHP: CPT | Mod: PBBFAC,PO

## 2018-02-28 NOTE — PROGRESS NOTES
INR is now therapeutic. This is a quick follow-up after a sub-therapeutic INR on 2/14. Continue dose and diet until follow-up. Repeat INR in 4 weeks.

## 2018-03-12 ENCOUNTER — OFFICE VISIT (OUTPATIENT)
Dept: PULMONOLOGY | Facility: CLINIC | Age: 83
End: 2018-03-12
Payer: MEDICARE

## 2018-03-12 ENCOUNTER — PROCEDURE VISIT (OUTPATIENT)
Dept: PULMONOLOGY | Facility: CLINIC | Age: 83
End: 2018-03-12
Payer: MEDICARE

## 2018-03-12 VITALS
BODY MASS INDEX: 30.14 KG/M2 | HEIGHT: 70 IN | WEIGHT: 203.38 LBS | HEIGHT: 69 IN | WEIGHT: 203.5 LBS | DIASTOLIC BLOOD PRESSURE: 82 MMHG | SYSTOLIC BLOOD PRESSURE: 142 MMHG | RESPIRATION RATE: 20 BRPM | OXYGEN SATURATION: 99 % | HEART RATE: 82 BPM | BODY MASS INDEX: 29.12 KG/M2

## 2018-03-12 DIAGNOSIS — J44.9 COPD, SEVERE: ICD-10-CM

## 2018-03-12 DIAGNOSIS — G47.30 SLEEP-DISORDERED BREATHING: ICD-10-CM

## 2018-03-12 DIAGNOSIS — R06.83 SNORING: ICD-10-CM

## 2018-03-12 DIAGNOSIS — R06.02 SHORTNESS OF BREATH: ICD-10-CM

## 2018-03-12 DIAGNOSIS — J44.9 COPD, SEVERE: Primary | ICD-10-CM

## 2018-03-12 PROCEDURE — 99999 PR PBB SHADOW E&M-EST. PATIENT-LVL V: CPT | Mod: PBBFAC,,, | Performed by: NURSE PRACTITIONER

## 2018-03-12 PROCEDURE — 94618 PULMONARY STRESS TESTING: CPT | Mod: PBBFAC

## 2018-03-12 PROCEDURE — 94618 PULMONARY STRESS TESTING: CPT | Mod: 26,S$PBB,, | Performed by: INTERNAL MEDICINE

## 2018-03-12 PROCEDURE — 99214 OFFICE O/P EST MOD 30 MIN: CPT | Mod: S$PBB,,, | Performed by: NURSE PRACTITIONER

## 2018-03-12 PROCEDURE — 99215 OFFICE O/P EST HI 40 MIN: CPT | Mod: PBBFAC,25 | Performed by: NURSE PRACTITIONER

## 2018-03-12 NOTE — PROCEDURES
"[facility] - Pulm Function [facility]  Six Minute Walk     SUMMARY     Ordering Provider: Kamala Garcia NP   Interpreting Provider: Dr Apodaca  Performing nurse/tech/RT: CESAR Junior RRT  Diagnosis: COPD  Height: 5' 9.5" (176.5 cm)  Weight: 92.3 kg (203 lb 6 oz)  BMI (Calculated): 29.7   Patient Race:             Phase Oxygen Assessment Supplemental O2 Heart   Rate Blood Pressure Ramya Dyspnea Scale Rating   Resting 98 % Room Air 83 bpm 154/89 1   Exercise        Minute        1 97 % Room Air 78 bpm     2 98 % Room Air 77 bpm     3 97 % Room Air 80 bpm     4 95 % Room Air 78 bpm     5 98 % Room Air 78 bpm     6  98 % Room Air 86 bpm 167/88 5-6   Recovery        Minute        1 98 % Room Air 85 bpm     2 99 % Room Air 85 bpm     3 99 % Room Air 84 bpm     4 99 % Room Air 82 bpm 160/82 1     Six Minute Walk Summary  6MWT Status: completed without stopping  Patient Reported: Dyspnea     Interpretation:  Did the patient stop or pause?: No                                         Total Time Walked (Calculated): 360 seconds  Final Partial Lap Distance (feet): 100 feet  Total Distance Meters (Calculated): 213.36 meters  Predicted Distance Meters (Calculated): 448.09 meters  Percentage of Predicted (Calculated): 47.62  Peak VO2 (Calculated): 10.38  Mets: 2.97  Has The Patient Had a Previous Six Minute Walk Test?: Yes       Previous 6MWT Results  Has The Patient Had a Previous Six Minute Walk Test?: Yes  Date of Previous Test: 03/06/17  Total Time Walked: 360 seconds  Total Distance (meters): 213.36  Predicted Distance (meters): 482.97 meters  Percentage of Predicted: 44.18  Percent Change (Calculated): 0    Interpretation:  Total distance walked in six minutes is severly reduced indicating a reduction in overall  functional capacity. There was mild exercise induced hypoxemia with significant oxygen desaturation.    Oxygen desaturation did not meet criteria for supplemental oxygen prescription.    Clinical correlation " suggested.    Maren Apodaca MD    Mild exercise-induced hypoxemia described as an arterial oxygen saturation of 93-95% (or 3-4% less than at rest), moderate exercise-induced hypoxemia as 89-93%, and severe exercise induced hypoxemia as < 89% O2 saturation.  Medicare Criteria Comments:   When arterial oxygen saturation is at or below 88% during exercise (severe exercise induced hypoxemia) then the patient falls under Medicare Group 1 criteria for supplemental oxygen.  Details about Medicare Group Criteria coverage can be found at http://www.cms.hhs.gov/manuals/downloads/

## 2018-03-12 NOTE — PROGRESS NOTES
"Subjective:      Chief Complaint   Patient presents with    COPD      Bola Figueroa Sr. is a 83 y.o. male presents for re-evaluation of COPD.   He had ER visit 1/19/2018 and released same day after treatment for COPD flare.  He had follow up in Feb 2018 with his Primary Care Provider and provided sample of Advair which patient prefers Advair over Pulmicort with improved sensation of breathing.    He denies fatigue, weight loss, chills, fever and colored sputum.   Associated symptoms include shortness of breath with rare wheezing, pulmonary stress today revealed no desats requiring supplemental oxygen.  This episode appears to have been triggered by no identifiable factor.   Treatments tried for the current exacerbation: albuterol nebulizer and advair 250 mcg, proventil inhaler.  patient is not using advair 250 mcg daily. Using about twice a week, he states using "as needed". He is using Proventil inhaler typically about 3 times a day reports "as needed".  Instruction with patient and wife to use Advair 250 mcg twice daily.   He stopped pulmicort in feb 2018 when Dr. Wallace ordered Advair. Patient had 2 samples of Advair from his Primary Care Provider's office in January.   advair and proventil and albuterol nebs relieve sensation shortness of breath when used.    He uses use wedge at night.   Patient currently is not on home oxygen therapy. Pulmonary stress/six minute walk done 3/12/2018: No desaturations requiring supplemental oxygen.  The patient is having no constitutional symptoms, denying fever, chills, anorexia, or weight loss.   He has a history of smoking quit in 1972.       Evaluation for sleep apnea in 2013, patient abandoned study, never reevaluated, he is agreeable today to evaluate for sleep apnea as a cause for daytime fatigue and sleepiness.  Patient has been observed snoring with restless sleep, frequent awakening.   Patient reports non restful' sleep.  He reports day time napping.  Day time napping " "duration  Leverett sleepiness score was 10.  Neck circumference is 42 cm.  He denies recent weight gain.  Mallampati score 3  Bed time is 0930 -1010  Wake time is 0400 - 0500  Sleep onset is within  15 Minutes.  Wake after sleep onset occurs two - three times a night.  Nocturia occurs two - three times a night,   Sleep aids : No  Dry mouth : Yes,   Sleep walking: No,   Sleep talking : No,   Sleep eating:No  Vivid Dreams : No,   Cataplexy : No,   Hypnogogic hallucinations:  No      STOP - BANG Questionnaire:     1. Snoring : Do you snore loudly ?    Yes  2. Tired : Do you often feel tired, fatigued, or sleepy during daytime?   Yes  3. Observed: Has anyone observed you stop breathing during your sleep?   No   4. Blood pressure : Do you have or are you being treated for high blood pressure?   Yes  5. BMI :BMI more than 35 kg/m2?   No  6. Age : Age over 50 yr old?   Yes  7. Neck circumference: Neck circumference greater than 40 cm?   Yes  8. Gender: Gender male?   Yes    Score: 6    High risk of RUTH: Yes 5 - 8  Intermediate risk of RUTH: Yes 3 - 4  Low risk of RUTH: Yes 0 - 2    Previous Report Reviewed: lab reports, office notes and radiology reports     The following portions of the patient's history were reviewed and updated as appropriate: allergies, current medications, past family history, past medical history, past social history, past surgical history and problem list.    Review of Systems  A comprehensive review of systems was negative except for: Respiratory: positive for cough, dyspnea on exertion and sputum      Objective:      BP (!) 142/82 (BP Location: Right arm, Patient Position: Sitting)   Pulse 82   Resp 20   Ht 5' 9.49" (1.765 m)   Wt 92.3 kg (203 lb 7.8 oz)   SpO2 99%   BMI 29.63 kg/m²   General appearance: alert, cooperative and no distress  Head: Normocephalic, without obvious abnormality, atraumatic  Eyes: negative  Ears: normal TM's and external ear canals both ears  Nose: Nares normal. Septum " midline. Mucosa normal. No drainage or sinus tenderness.  Throat: lips, mucosa, and tongue normal; teeth and gums normal  Neck: no adenopathy, no carotid bruit and supple, symmetrical, trachea midline  Lungs: clear to auscultation bilaterally  Heart: regular rate and rhythm, S1, S2 normal, no murmur, click, rub or gallop  Abdomen: normal findings: bowel sounds normal and soft, non-tender  Extremities: extremities normal, atraumatic, no cyanosis or edema  Pulses: 2+ and symmetric  Skin: Skin color, texture, turgor normal. No rashes or lesions  Lymph nodes: Cervical, supraclavicular, and axillary nodes normal.  Neurologic: Grossly normal    Diagnostic Review  Pulmonary stress/six minute walk done 3/12/2018: No desaturations requiring supplemental oxygen.  Phase Oxygen Assessment Supplemental O2 Heart   Rate Blood Pressure Ramya Dyspnea Scale Rating   Resting 98 % Room Air 83 bpm 154/89 1   Exercise        Minute        1 97 % Room Air 78 bpm     2 98 % Room Air 77 bpm     3 97 % Room Air 80 bpm     4 95 % Room Air 78 bpm     5 98 % Room Air 78 bpm     6  98 % Room Air 86 bpm 167/88 5-6   Recovery        Minute        1 98 % Room Air 85 bpm     2 99 % Room Air 85 bpm     3 99 % Room Air 84 bpm     4 99 % Room Air 82 bpm 160/82 1     Pulmonary function tests: 9/11/2017 FEV1: 1.14  (42 % predicted), FVC:  1.89 (50 % predicted), FEV1/FVC:  60 (81% predicted)  Poor effort on testing. Patient took bronchodilator pta     Severe obstructive defect     Chest xray 9/11/2017   Findings: The cardiac and mediastinal silhouettes are within normal limits. Cardiac pacing device again noted.    Elevation of the right hemidiaphragm is unchanged.   Lungs are otherwise clear bilaterally with no parenchymal consolidations or pleural effusions demonstrated.   Visualized osseous structures demonstrate no acute abnormality.  IMPRESSION:   Unchanged appearance of the chest as above.  No acute findings    Assessment:         1. COPD, severe   Spirometry with/without bronchodilator    X-Ray Chest PA And Lateral   2. Shortness of breath  Polysomnogram (CPAP will be added if patient meets diagnostic criteria.)   3. Snoring  Polysomnogram (CPAP will be added if patient meets diagnostic criteria.)   4. Sleep-disordered breathing  Polysomnogram (CPAP will be added if patient meets diagnostic criteria.)    snoring, fatigue, daytime sleepiness, high risk for sleep apnea. attempted sleep eval in 2003 abandoned study.      Plan:     Problem List Items Addressed This Visit     COPD, severe - Primary     Stable.  Changed from pulmicort to Advair 250mcg.  Not compliant with Advair daily, thought to be used as needed like his proventil inhaler.   Education provided for twice daily Advair 250 mcg  Has albuterol nebs, proair inhaler on hand if needed.            Relevant Orders    Spirometry with/without bronchodilator    X-Ray Chest PA And Lateral    Shortness of breath     Pulmonary stress/six minute walk done 3/12/2018: No desaturations requiring supplemental oxygen.  Proceed with PSG to determine if sleep apnea and/or over night desaturations             Relevant Orders    Polysomnogram (CPAP will be added if patient meets diagnostic criteria.)      Other Visit Diagnoses     Snoring        Relevant Orders    Polysomnogram (CPAP will be added if patient meets diagnostic criteria.)    Sleep-disordered breathing        snoring, fatigue, daytime sleepiness, high risk for sleep apnea. attempted sleep eval in 2003 abandoned study.     Relevant Orders    Polysomnogram (CPAP will be added if patient meets diagnostic criteria.)         Discussed diagnosis, its evaluation, treatment and usual course.  All questions answered.  Steroid burst per orders.  Antibiotics per orders.      Follow-up for Sleep Study Follow Up. copd fu in 6 months w/review cxr/amie with dr Wallace.

## 2018-03-12 NOTE — PATIENT INSTRUCTIONS
Visiting a Sleep Clinic    Your provider has scheduled you for a sleep study.   You should be receiving a phone call from the sleep lab shortly after your study has been approved by your insurance. Please make sure you have your current phone numbers in the [facility] system. If you do not hear from anyone in the next 10 -14 business days, please call the sleep lab at [phone] to schedule your sleep study. The sleep studies are performed at [facility] seven nights a week.  When you are scheduling your sleep study, they will also make you a follow up appointment with your provider. This follow up appointment will be 10-14 days after your sleep study to review the results. If it is noted that you do have sleep apnea on your initial sleep study, you may receive a call back for a second night study with the CPAP before you come back to the office.   Are you worried about having a sleep study? Talk to your healthcare provider if you have any concerns. Learn what to expect at the sleep clinic and try to relax before you come.  Before your study  Your healthcare provider will tell you how to prepare. Ask if you should take your usual medicines. Also:  · Avoid napping.  · Avoid caffeine and alcohol.  · Take a shower and wash your hair (dont use hair conditioner, hair spray, and skin lotions).  · Eat dinner before you come to the sleep clinic. Pack a snack if you need one before bedtime.  · Bring what will make you comfortable, such as your pajamas, robe, slippers, hygiene items, and even your own pillow.  What you can expect  When you arrive at the sleep clinic, you will usually be checked in by a . A specialized sleep technologist will meet you in your room. Then you may change into your nightclothes. Small sensors are placed on your head and body with tape and gel. The sensors are then plugged into a machine that will monitor your sleep. If you need to use a restroom, the sensors can  be unplugged. A camera in your room will record your body movements. The technologist will stay in a nearby room. If you need to talk to him or her, use the intercom.  What a sleep study does  A sleep study monitors all the stages of your sleep. To do this, the following are recorded:  · Eye movements  · Heart rate, brain waves, and muscle activity  · Level of oxygen in your blood  · Breathing and snoring  · Sudden leg or body movements  If you have breathing problems, Continuous Positive Airway Pressure (CPAP) may be used. CPAP is a device that can help you breathe by adding mild air pressure to your airway passages and improve your sleep. It may be used during the second half of your study or on another night.  Getting your results  The technologist can answer some of your questions about the sleep study. But only your healthcare provider can explain the results. He or she will have the report of your sleep study within 1 to 2 weeks. Then your treatment options can be discussed with your healthcare provider, or you may be referred to a sleep disorders specialist.  Date Last Reviewed: 8/9/2015 © 2000-2017 [employer]. [address] [place] [id number]. All rights reserved. This information is not intended as a substitute for professional medical care. Always follow your healthcare professional's instructions.

## 2018-03-12 NOTE — ASSESSMENT & PLAN NOTE
Pulmonary stress/six minute walk done 3/12/2018: No desaturations requiring supplemental oxygen.  Proceed with PSG to determine if sleep apnea and/or over night desaturations

## 2018-03-12 NOTE — ASSESSMENT & PLAN NOTE
Stable.  Changed from pulmicort to Advair 250mcg.  Not compliant with Advair daily, thought to be used as needed like his proventil inhaler.   Education provided for twice daily Advair 250 mcg  Has albuterol nebs, proair inhaler on hand if needed.

## 2018-03-28 ENCOUNTER — ANTI-COAG VISIT (OUTPATIENT)
Dept: CARDIOLOGY | Facility: CLINIC | Age: 83
End: 2018-03-28
Payer: MEDICARE

## 2018-03-28 ENCOUNTER — TELEPHONE (OUTPATIENT)
Dept: CARDIOLOGY | Facility: CLINIC | Age: 83
End: 2018-03-28

## 2018-03-28 DIAGNOSIS — Z95.0 CARDIAC PACEMAKER: Primary | ICD-10-CM

## 2018-03-28 DIAGNOSIS — Z79.01 LONG TERM (CURRENT) USE OF ANTICOAGULANTS: Primary | ICD-10-CM

## 2018-03-28 DIAGNOSIS — I48.91 ATRIAL FIBRILLATION WITH SLOW VENTRICULAR RESPONSE: ICD-10-CM

## 2018-03-28 LAB — INR PPP: 1.2 (ref 2–3)

## 2018-03-28 PROCEDURE — 99211 OFF/OP EST MAY X REQ PHY/QHP: CPT | Mod: PBBFAC,PO

## 2018-03-28 PROCEDURE — 99999 PR PBB SHADOW E&M-EST. PATIENT-LVL I: CPT | Mod: PBBFAC,,,

## 2018-03-28 PROCEDURE — 85610 PROTHROMBIN TIME: CPT | Mod: PBBFAC,PO

## 2018-03-28 RX ORDER — WARFARIN SODIUM 5 MG/1
TABLET ORAL
Qty: 30 TABLET | Refills: 3 | Status: SHIPPED | OUTPATIENT
Start: 2018-03-28 | End: 2018-11-12 | Stop reason: SDUPTHER

## 2018-03-28 NOTE — PROGRESS NOTES
Patient's INR is sub therapeutic today.  Reports discontinuing his Warfarin on Friday, 3/23/2018 for a gastro procedure scheduled today (as instructed).  Patient will resume his current dosage of Warfarin tonight or per discharged orders.  Recheck next week on Wednesday, 4/04/2018.

## 2018-03-28 NOTE — TELEPHONE ENCOUNTER
Attempted to call Shari at [facility] regarding pt's [employer] pacemaker.  No answer, unable to leave message.

## 2018-03-28 NOTE — TELEPHONE ENCOUNTER
----- Message from Radha Brizuela LPN sent at 3/28/2018 11:55 AM CDT -----  Contact: Shari/GUILLERMINA Shah       ----- Message -----  From: Suzi Balbuena  Sent: 3/28/2018  11:34 AM  To: Sharonda SARKAR Staff    Shari called to speak with the nurse about his Pacemaker. She can be contacted at  [phone] ext [phone].    Thanks,  Suzi

## 2018-03-28 NOTE — TELEPHONE ENCOUNTER
Spoke with Edel, states family was able to answer questions about patient's device.  No further information needed at this time.

## 2018-04-04 ENCOUNTER — HOSPITAL ENCOUNTER (OUTPATIENT)
Dept: SLEEP MEDICINE | Facility: HOSPITAL | Age: 83
Discharge: HOME | End: 2018-04-04
Attending: INTERNAL MEDICINE
Payer: MEDICARE

## 2018-04-04 ENCOUNTER — ANTI-COAG VISIT (OUTPATIENT)
Dept: CARDIOLOGY | Facility: CLINIC | Age: 83
End: 2018-04-04
Payer: MEDICARE

## 2018-04-04 DIAGNOSIS — Z72.821 INADEQUATE SLEEP HYGIENE: ICD-10-CM

## 2018-04-04 DIAGNOSIS — G47.33 OBSTRUCTIVE SLEEP APNEA: Primary | ICD-10-CM

## 2018-04-04 DIAGNOSIS — G25.81 RESTLESS LEGS SYNDROME (RLS): ICD-10-CM

## 2018-04-04 DIAGNOSIS — Z79.01 LONG TERM (CURRENT) USE OF ANTICOAGULANTS: Primary | ICD-10-CM

## 2018-04-04 DIAGNOSIS — G47.61 PERIODIC LIMB MOVEMENT DISORDER (PLMD): ICD-10-CM

## 2018-04-04 DIAGNOSIS — R06.02 SHORTNESS OF BREATH: ICD-10-CM

## 2018-04-04 LAB — INR PPP: 1.4 (ref 2–3)

## 2018-04-04 PROCEDURE — 99211 OFF/OP EST MAY X REQ PHY/QHP: CPT | Mod: PBBFAC,25,PO

## 2018-04-04 PROCEDURE — 95810 POLYSOM 6/> YRS 4/> PARAM: CPT

## 2018-04-04 PROCEDURE — 99999 PR PBB SHADOW E&M-EST. PATIENT-LVL I: CPT | Mod: PBBFAC,,,

## 2018-04-04 PROCEDURE — 95810 POLYSOM 6/> YRS 4/> PARAM: CPT | Mod: 26,,, | Performed by: PSYCHOLOGIST

## 2018-04-04 PROCEDURE — 85610 PROTHROMBIN TIME: CPT | Mod: PBBFAC,PO

## 2018-04-04 NOTE — PROGRESS NOTES
Patient's INR is subtherapeutic at 1.4.  Recent procedure last week.  Instructed to take 10 mg today - only, then resume on regular scheduled dose.  Recheck on Monday, 4/09/2018.

## 2018-04-06 DIAGNOSIS — G47.33 MILD OBSTRUCTIVE SLEEP APNEA: Primary | ICD-10-CM

## 2018-04-06 NOTE — PROGRESS NOTES
PSG 4/4/2018   The diagnostic polysomnography revealed a borderline obstructive sleep apnea / hypopnea syndrome (A + H Index = 5.7  events / hr asleep with 3.4 respiratory event - related arousals / hr asleep for the study, plus an additional 4.0 RERAs / hr  asleep (respiratory effort - related arousals). The mean SpO2 value was 92.5 %, moderate, minimum oxygen saturation  during sleep was 85.0 %, and waking baseline SpO2 was 96 %. Sporadic,, mild snoring was noted. A CPAP titration  polysomnography is recommended.    Orders Placed This Encounter   Procedures    CPAP Titration (Must have dx of RUTH from previous sleep study.)     Standing Status:   Future     Standing Expiration Date:   4/6/2019

## 2018-04-06 NOTE — Clinical Note
Please call and advise patient of mild obstructive sleep apnea on 4/4/2018 PSG. Proceed with in lab CPAP titration, then follow up.

## 2018-04-09 ENCOUNTER — ANTI-COAG VISIT (OUTPATIENT)
Dept: CARDIOLOGY | Facility: CLINIC | Age: 83
End: 2018-04-09
Payer: MEDICARE

## 2018-04-09 ENCOUNTER — TELEPHONE (OUTPATIENT)
Dept: PULMONOLOGY | Facility: CLINIC | Age: 83
End: 2018-04-09

## 2018-04-09 DIAGNOSIS — Z79.01 LONG TERM (CURRENT) USE OF ANTICOAGULANTS: Primary | ICD-10-CM

## 2018-04-09 LAB — INR PPP: 2.8 (ref 2–3)

## 2018-04-09 PROCEDURE — 85610 PROTHROMBIN TIME: CPT | Mod: PBBFAC,PO

## 2018-04-09 NOTE — PROGRESS NOTES
Patient's INR is therapeutic at 2.8.  No changes in dosage.  Recheck in 2 weeks.  Please call should you have any questions or concerns at [phone] or [phone].

## 2018-04-09 NOTE — TELEPHONE ENCOUNTER
Patient contacted and advised sleep study results and order for cpap titration, patient stated understanding

## 2018-04-12 ENCOUNTER — OFFICE VISIT (OUTPATIENT)
Dept: PODIATRY | Facility: CLINIC | Age: 83
End: 2018-04-12
Payer: MEDICARE

## 2018-04-12 VITALS
WEIGHT: 203.5 LBS | DIASTOLIC BLOOD PRESSURE: 82 MMHG | SYSTOLIC BLOOD PRESSURE: 155 MMHG | HEIGHT: 69 IN | BODY MASS INDEX: 30.14 KG/M2 | HEART RATE: 76 BPM

## 2018-04-12 DIAGNOSIS — I73.9 PERIPHERAL VASCULAR DISEASE: ICD-10-CM

## 2018-04-12 DIAGNOSIS — B35.1 DERMATOPHYTOSIS OF NAIL: Primary | ICD-10-CM

## 2018-04-12 PROCEDURE — 99499 UNLISTED E&M SERVICE: CPT | Mod: S$PBB,,, | Performed by: PODIATRIST

## 2018-04-12 PROCEDURE — 99214 OFFICE O/P EST MOD 30 MIN: CPT | Mod: PBBFAC,PO,25 | Performed by: PODIATRIST

## 2018-04-12 PROCEDURE — 99999 PR PBB SHADOW E&M-EST. PATIENT-LVL IV: CPT | Mod: PBBFAC,,, | Performed by: PODIATRIST

## 2018-04-12 PROCEDURE — 11721 DEBRIDE NAIL 6 OR MORE: CPT | Mod: Q8,PBBFAC,PO | Performed by: PODIATRIST

## 2018-04-12 PROCEDURE — 11721 DEBRIDE NAIL 6 OR MORE: CPT | Mod: Q8,S$PBB,, | Performed by: PODIATRIST

## 2018-04-12 RX ORDER — LUBIPROSTONE 24 UG/1
CAPSULE, GELATIN COATED ORAL
Refills: 1 | COMMUNITY
Start: 2018-03-20 | End: 2018-04-12 | Stop reason: DRUGHIGH

## 2018-04-12 RX ORDER — NITROGLYCERIN 6.5 MG/1
CAPSULE ORAL
COMMUNITY
Start: 2018-04-04 | End: 2019-04-15 | Stop reason: SDUPTHER

## 2018-04-12 RX ORDER — GUAIFENESIN 200 MG/10ML
SOLUTION ORAL
Refills: 0 | COMMUNITY
Start: 2018-04-05 | End: 2018-06-21

## 2018-04-12 NOTE — PROGRESS NOTES
"[facility]  PODIATRIC MEDICINE AND SURGERY  PROGRESS NOTE         CHIEF COMPLAINT  Chief Complaint   Patient presents with    Routine Foot Care     Last visit with PCP Dr. Bhatti ([place]) 1/22/18       HPI  SUBJECTIVE: Bola Figueroa Sr. is a 83 y.o. male who  has a past medical history of *Atrial fibrillation; Atrial fibrillation; Bilateral carotid artery stenosis (1/15/2016); Cardiac pacemaker (8/7/2013); Congestive heart failure (4/6/2015); COPD (chronic obstructive pulmonary disease) (8/7/2013); Coronary artery disease; Hyperlipidemia; Hypertension; Long-term (current) use of anticoagulants (8/7/2013); PVD (peripheral vascular disease); S/P PTCA (percutaneous transluminal coronary angioplasty) (8/7/2013); Sleep apnea (8/7/2013); and Stroke. Bolapresents to clinic for high risk foot exam and care.  Bola denies numbness, burning, and/or tingling sensations in their feet. Patient admits to painful toenails aggravated by increased weight bearing, shoe gear, and pressure. States pain is relieved with routine debridements. Patient has no other pedal complaints at this time.    This patient has documented high risk feet requiring routine maintenance secondary to PVD and those secondary complications of PVD,  as mentioned.      HgA1c: No results found for: HGBA1C      REVIEW OF SYSTEMS  General: Denies any fever or chills  Chest: Denies shortness of breath, wheezing, coughing, or sputum production  Heart: Denies chest pain.  As noted above and per history of current illness above, otherwise negative in the remainder of the 14 systems.     PHYSICAL EXAM  Vitals:    04/12/18 1042   BP: (!) 155/82   Pulse: 76   Weight: 92.3 kg (203 lb 7.8 oz)   Height: 5' 9" (1.753 m)   PainSc: 0-No pain       GEN:  This patient is well-developed, well-nourished and appears stated age, well-oriented to person, place and time, and cooperative and pleasant on today's visit.      LOWER EXTREMITY  Vascular:   · DP pedal " pulse 0/4 b/l, PT pedal pulse 0/4 b/l  · Skin temperature warm to warm from prox to distally  · CFT <5 secs b/l  · There is mild  edema noted b/l.     Dermatologic:   · Thickened, dystrophic, elongated toenails with subungal debris 1-5 b/l.   · No open skin lesions noted  · No erythema or drainage noted b/l.  · Webspaces are C/D/I B/L.  · There is no hyperkeratotic tissue noted.  · Skin texture and turgor WNL  · There is no pedal hair growth noted    Neurologic:  · Protective sensation absent at 0/10 sites upon examination with [place] Weinsten 5.07 g monofilament.   · Propioception intact at 1st MTPJ b/l.   · Babinski reflex absent b/l. Light touch and sharp/dull sensation intact b/l.    Musculoskeletal/Orthopedic:  · No symptomatic structural abnormalities noted.   · Muscle strength is 5/5 for foot inverters, everters, plantarflexors, and dorsiflexors. Muscle tone is normal.  · Pain free range of motion in all four quadrants with stiffness and limitation b/l      ASSESSMENT  Dermatophytosis of nail    Peripheral vascular disease         PLAN  -patient was examined and evaulated  -Discuss presenting problems, etiology, pathologic processes and management options with patient today.   -I counseled the patient on their conditions, their implications and medical management. at.  -With patient's permission, nails were aggressively reduced and debrided x 10 to their soft tissue attachment mechanically and with electric , removing all offending nail and debris. Patient relates relief following the procedure. Patient will continue to monitor the areas daily, inspect feet, wear protective shoe gear when ambulatory, moisturizer to maintain skin integrity.    Future Appointments  Date Time Provider Department Center   4/16/2018 8:40 AM Kamala Garcia NP [facility] PULMSVC  Medical C   4/23/2018 9:15 AM [facility]D [facility]   5/2/2018 8:30 AM LABORATORY, DAR[facility]US [facility]   5/3/2018 7:30 PM  SLEEP STUDY, [facility] SLEEPO [place]   5/9/2018 9:15 AM Caroline Mcdonough MD [facility] CARDIO [place]   5/9/2018 9:30 AM PACEMAKER CLINIC [facility] ARR PRO [place]   5/10/2018 1:40 PM Juanjo Wallace MD ON PULMSVC [facility]   7/12/2018 10:00 AM [facility] [place]   9/11/2018 9:15 AM ONLKENNY [facility]- ON XRAY [place]   9/11/2018 9:40 AM SPIROMETRY, ON ON PULMFS [facility] Medical C   9/11/2018 10:00 AM [facility]

## 2018-04-16 ENCOUNTER — OFFICE VISIT (OUTPATIENT)
Dept: PULMONOLOGY | Facility: CLINIC | Age: 83
End: 2018-04-16
Payer: MEDICARE

## 2018-04-16 VITALS
OXYGEN SATURATION: 96 % | HEIGHT: 70 IN | HEART RATE: 74 BPM | WEIGHT: 200.94 LBS | RESPIRATION RATE: 20 BRPM | SYSTOLIC BLOOD PRESSURE: 130 MMHG | BODY MASS INDEX: 28.77 KG/M2 | DIASTOLIC BLOOD PRESSURE: 80 MMHG

## 2018-04-16 DIAGNOSIS — G47.33 MILD OBSTRUCTIVE SLEEP APNEA: Primary | ICD-10-CM

## 2018-04-16 DIAGNOSIS — J44.9 COPD, SEVERE: ICD-10-CM

## 2018-04-16 DIAGNOSIS — J44.1 COPD WITH EXACERBATION: ICD-10-CM

## 2018-04-16 PROCEDURE — 94640 AIRWAY INHALATION TREATMENT: CPT | Mod: PBBFAC

## 2018-04-16 PROCEDURE — 99999 PR PBB SHADOW E&M-EST. PATIENT-LVL IV: CPT | Mod: PBBFAC,,, | Performed by: NURSE PRACTITIONER

## 2018-04-16 PROCEDURE — 99214 OFFICE O/P EST MOD 30 MIN: CPT | Mod: PBBFAC,25 | Performed by: NURSE PRACTITIONER

## 2018-04-16 PROCEDURE — 99214 OFFICE O/P EST MOD 30 MIN: CPT | Mod: 25,S$PBB,, | Performed by: NURSE PRACTITIONER

## 2018-04-16 RX ORDER — IPRATROPIUM BROMIDE AND ALBUTEROL SULFATE 2.5; .5 MG/3ML; MG/3ML
3 SOLUTION RESPIRATORY (INHALATION)
Status: COMPLETED | OUTPATIENT
Start: 2018-04-16 | End: 2018-04-16

## 2018-04-16 RX ORDER — IPRATROPIUM BROMIDE AND ALBUTEROL SULFATE 2.5; .5 MG/3ML; MG/3ML
3 SOLUTION RESPIRATORY (INHALATION) EVERY 6 HOURS PRN
Qty: 360 ML | Refills: 11 | Status: SHIPPED | OUTPATIENT
Start: 2018-04-16 | End: 2018-08-09 | Stop reason: SDUPTHER

## 2018-04-16 RX ORDER — DOXYCYCLINE 100 MG/1
100 CAPSULE ORAL EVERY 12 HOURS
Qty: 20 CAPSULE | Refills: 0 | Status: SHIPPED | OUTPATIENT
Start: 2018-04-16 | End: 2018-04-26

## 2018-04-16 RX ORDER — PREDNISONE 20 MG/1
TABLET ORAL
Qty: 20 TABLET | Refills: 0 | Status: SHIPPED | OUTPATIENT
Start: 2018-04-16 | End: 2018-05-09

## 2018-04-16 RX ADMIN — IPRATROPIUM BROMIDE AND ALBUTEROL SULFATE 3 ML: .5; 2.5 SOLUTION RESPIRATORY (INHALATION) at 09:04

## 2018-04-16 NOTE — ASSESSMENT & PLAN NOTE
Acute exacerbation  Unable to use combivent, too difficult for him to understand with reinstruction.   Change to duo nebs every 6 hour.s  Compliant with advair 250 mg twice daily, prefers advair over pulmicort.  Begin doxy, prednisone taper.

## 2018-04-16 NOTE — PROGRESS NOTES
Subjective:      Patient ID: Bola Figueroa Sr. is a 83 y.o. male.    Chief Complaint: Sleep Apnea (review PSG 4/4/2018)    HPI: Bola Figueroa Sr. is here for follow up for RUTH after sleep study 4/4/2018 that revealed mild obstructive sleep apnea with AHI 5.7 a CPAP titration was recommended and ordered on 4/6/2018.  Proceed with CPAP titration.      COPD  The patient is currently having symptoms / an exacerbation. Current symptoms include wheezing. Symptoms have been present since a week ago and have been somewhat worse. He reports cough, wheezing, increased sputum and colored sputum. He denies anorexia, chest pain, chills, fatigue, fevers, myalgias, nausea, sweats and weakness. This episode appears to have been triggered by no identifiable factor. Treatments tried for the current exacerbation: albuterol nebulizer and advair. Patient currently is not on home oxygen therapy.  The patient is having no constitutional symptoms, denying fever, chills, anorexia, or weight loss.   He has a history of 52.5 pack years.  Some exercise limits, walks with walker for balance.    Previous Report Reviewed: lab reports and office notes     Past Medical History: The following portions of the patient's history were reviewed and updated as appropriate:   He  has a past surgical history that includes Insert / replace / remove pacemaker (2005) and Cataract extraction w/  intraocular lens implant.  His family history includes Diabetes in his maternal grandfather and sister; Fainting in his sister; Heart attack in his paternal uncle; Heart disease in his paternal uncle; Hypertension in his maternal grandmother.  He  reports that he quit smoking about 38 years ago. His smoking use included Cigarettes. He has a 52.50 pack-year smoking history. He has never used smokeless tobacco. He reports that he does not drink alcohol or use drugs.  He has a current medication list which includes the following prescription(s): albuterol,  "amlodipine-atorvastatin, aspirin, carvedilol, dexlansoprazole, fluticasone, fluticasone-salmeterol 250-50 mcg/dose, furosemide, gabapentin, hydralazine, hydralazine, ipratropium, isosorbide dinitrate, latanoprost, levetiracetam, lubiprostone, nitroglycerin, robafen, tamsulosin, tramadol, warfarin, albuterol-ipratropium 2.5mg-0.5mg/3ml, budesonide, doxycycline, and prednisone.  He is allergic to no known drug allergies.     The following portions of the patient's history were reviewed and updated as appropriate: allergies, current medications, past family history, past medical history, past social history, past surgical history and problem list.    Review of Systems   Constitutional: Negative for fever, chills, weight loss, weight gain, activity change, appetite change, fatigue and night sweats.   HENT: Negative for postnasal drip, rhinorrhea, sinus pressure, voice change and congestion.    Eyes: Negative for redness and itching.   Respiratory: Negative for snoring, cough, sputum production, chest tightness, shortness of breath, wheezing, orthopnea, asthma nighttime symptoms, dyspnea on extertion, use of rescue inhaler and somnolence.    Cardiovascular: Negative.  Negative for chest pain, palpitations and leg swelling.   Genitourinary: Negative for difficulty urinating and hematuria.   Endocrine: Negative for cold intolerance and heat intolerance.    Musculoskeletal: Negative for arthralgias, gait problem, joint swelling and myalgias.   Skin: Negative.    Gastrointestinal: Negative for nausea, vomiting, abdominal pain and acid reflux.   Neurological: Negative for dizziness, weakness, light-headedness and headaches.   Hematological: Negative for adenopathy. No excessive bruising.   All other systems reviewed and are negative.     Objective:   /80 (BP Location: Right arm, Patient Position: Sitting)   Pulse 74   Resp 20   Ht 5' 9.6" (1.768 m)   Wt 91.2 kg (200 lb 15.2 oz)   SpO2 96%   BMI 29.17 kg/m² "   Physical Exam   Constitutional: He is oriented to person, place, and time. He appears well-developed and well-nourished. He is active and cooperative.  Non-toxic appearance. He does not appear ill. No distress.   HENT:   Head: Normocephalic and atraumatic.   Right Ear: External ear normal.   Left Ear: External ear normal.   Nose: Nose normal.   Mouth/Throat: Oropharynx is clear and moist. No oropharyngeal exudate.   Eyes: Conjunctivae are normal.   Neck: Normal range of motion. Neck supple.   Cardiovascular: Normal rate, regular rhythm, normal heart sounds and intact distal pulses.    Pulmonary/Chest: Effort normal. He has wheezes (audible fine expiratory, minimal expiratory upper lobes. resolved with in clinic duo neb with stated improved sensation of breathing).   Abdominal: Soft.   Musculoskeletal: He exhibits no edema.   Neurological: He is alert and oriented to person, place, and time.   Skin: Skin is warm and dry.   Psychiatric: He has a normal mood and affect. His behavior is normal. Judgment and thought content normal.   Vitals reviewed.    Personal Diagnostic Review  PSG 4/4/2018  The diagnostic polysomnography revealed a borderline obstructive sleep apnea / hypopnea syndrome (A + H Index = 5.7  events / hr asleep with 3.4 respiratory event - related arousals / hr asleep for the study, plus an additional 4.0 RERAs / hr  asleep (respiratory effort - related arousals). The mean SpO2 value was 92.5 %, moderate, minimum oxygen saturation  during sleep was 85.0 %, and waking baseline SpO2 was 96 %. Sporadic,, mild snoring was noted. A CPAP titration  polysomnography is recommended.    Assessment:     1. Mild obstructive sleep apnea    2. COPD with exacerbation    3. COPD, severe      No orders of the defined types were placed in this encounter.    Plan:     Problem List Items Addressed This Visit     COPD, severe     Acute exacerbation  Unable to use combivent, too difficult for him to understand with  reinstruction.   Change to duo nebs every 6 hour.s  Compliant with advair 250 mg twice daily, prefers advair over pulmicort.  Begin doxy, prednisone taper.          Relevant Medications    albuterol-ipratropium 2.5mg-0.5mg/3mL (DUO-NEB) 0.5 mg-3 mg(2.5 mg base)/3 mL nebulizer solution    albuterol-ipratropium 2.5mg-0.5mg/3mL nebulizer solution 3 mL (Completed)    Mild obstructive sleep apnea - Primary     PSG 4/4/2018 that revealed mild obstructive sleep apnea with AHI 5.7 a CPAP titration was recommended and ordered on 4/6/2018.  Proceed with CPAP titration.               Other Visit Diagnoses     COPD with exacerbation        complete prednisone/doxy. begin duo nebs every 6 hrs. continue advair 250 mcg 1 puff twice daily    Relevant Medications    predniSONE (DELTASONE) 20 MG tablet    doxycycline (VIBRAMYCIN) 100 MG Cap        Follow-up for Sleep Study cpap titration results review. reevaluate post copd flare, has copd fu in 9/2018 w/teri.

## 2018-04-16 NOTE — ASSESSMENT & PLAN NOTE
PSG 4/4/2018 that revealed mild obstructive sleep apnea with AHI 5.7 a CPAP titration was recommended and ordered on 4/6/2018.  Proceed with CPAP titration.

## 2018-04-23 ENCOUNTER — ANTI-COAG VISIT (OUTPATIENT)
Dept: CARDIOLOGY | Facility: CLINIC | Age: 83
End: 2018-04-23
Payer: MEDICARE

## 2018-04-23 DIAGNOSIS — Z79.01 LONG TERM (CURRENT) USE OF ANTICOAGULANTS: Primary | ICD-10-CM

## 2018-04-23 LAB — INR PPP: 2.8 (ref 2–3)

## 2018-04-23 PROCEDURE — 85610 PROTHROMBIN TIME: CPT | Mod: PBBFAC,PO

## 2018-04-23 PROCEDURE — 99999 PR PBB SHADOW E&M-EST. PATIENT-LVL I: CPT | Mod: PBBFAC,,,

## 2018-04-23 PROCEDURE — 99211 OFF/OP EST MAY X REQ PHY/QHP: CPT | Mod: PBBFAC,PO

## 2018-04-23 NOTE — PROGRESS NOTES
Patient's INR remains therapeutic at 2.8.  Started Doxycycline/Prednisone on 4/16/2018.  Reduced dosage to 2.5 mg on Tues, Thurs, and Sat and 5 mg on other days.  Recheck in 1 week.

## 2018-04-30 ENCOUNTER — ANTI-COAG VISIT (OUTPATIENT)
Dept: CARDIOLOGY | Facility: CLINIC | Age: 83
End: 2018-04-30
Payer: MEDICARE

## 2018-04-30 DIAGNOSIS — Z79.01 LONG TERM (CURRENT) USE OF ANTICOAGULANTS: Primary | ICD-10-CM

## 2018-04-30 LAB — INR PPP: 3.3 (ref 2–3)

## 2018-04-30 PROCEDURE — 85610 PROTHROMBIN TIME: CPT | Mod: PBBFAC,PO

## 2018-04-30 PROCEDURE — 99211 OFF/OP EST MAY X REQ PHY/QHP: CPT | Mod: PBBFAC,PO

## 2018-04-30 PROCEDURE — 99999 PR PBB SHADOW E&M-EST. PATIENT-LVL I: CPT | Mod: PBBFAC,,,

## 2018-04-30 NOTE — PROGRESS NOTES
"Patient's INR is slightly elevated at 3.3.  Remains on Doxycycline - 4 days remaining.   Patient's daughter stated, "I'd started off giving the antibiotic once a day instead of twice a day.  I didn't read the directions clearly".  Instructed to hold dose today, then resume on regular scheduled dose.  Recheck in 1 week.  "

## 2018-05-02 ENCOUNTER — LAB VISIT (OUTPATIENT)
Dept: LAB | Facility: HOSPITAL | Age: 83
End: 2018-05-02
Attending: INTERNAL MEDICINE
Payer: MEDICARE

## 2018-05-02 DIAGNOSIS — I25.10 CORONARY ARTERY DISEASE INVOLVING NATIVE CORONARY ARTERY OF NATIVE HEART WITHOUT ANGINA PECTORIS: ICD-10-CM

## 2018-05-02 LAB
ALBUMIN SERPL BCP-MCNC: 3.5 G/DL
ALP SERPL-CCNC: 70 U/L
ALT SERPL W/O P-5'-P-CCNC: 11 U/L
ANION GAP SERPL CALC-SCNC: 8 MMOL/L
AST SERPL-CCNC: 16 U/L
BILIRUB SERPL-MCNC: 1.7 MG/DL
BUN SERPL-MCNC: 15 MG/DL
CALCIUM SERPL-MCNC: 9 MG/DL
CHLORIDE SERPL-SCNC: 100 MMOL/L
CHOLEST SERPL-MCNC: 116 MG/DL
CHOLEST/HDLC SERPL: 2.2 {RATIO}
CO2 SERPL-SCNC: 32 MMOL/L
CREAT SERPL-MCNC: 1.2 MG/DL
EST. GFR  (AFRICAN AMERICAN): >60 ML/MIN/1.73 M^2
EST. GFR  (NON AFRICAN AMERICAN): 55.6 ML/MIN/1.73 M^2
GLUCOSE SERPL-MCNC: 122 MG/DL
HDLC SERPL-MCNC: 52 MG/DL
HDLC SERPL: 44.8 %
LDLC SERPL CALC-MCNC: 46.4 MG/DL
NONHDLC SERPL-MCNC: 64 MG/DL
POTASSIUM SERPL-SCNC: 3.5 MMOL/L
PROT SERPL-MCNC: 6.7 G/DL
SODIUM SERPL-SCNC: 140 MMOL/L
TRIGL SERPL-MCNC: 88 MG/DL

## 2018-05-02 PROCEDURE — 36415 COLL VENOUS BLD VENIPUNCTURE: CPT | Mod: PO

## 2018-05-02 PROCEDURE — 80061 LIPID PANEL: CPT

## 2018-05-02 PROCEDURE — 80053 COMPREHEN METABOLIC PANEL: CPT

## 2018-05-03 ENCOUNTER — HOSPITAL ENCOUNTER (OUTPATIENT)
Dept: SLEEP MEDICINE | Facility: HOSPITAL | Age: 83
Discharge: HOME | End: 2018-05-03
Attending: INTERNAL MEDICINE
Payer: MEDICARE

## 2018-05-03 DIAGNOSIS — Z72.821 INADEQUATE SLEEP HYGIENE: ICD-10-CM

## 2018-05-03 DIAGNOSIS — G47.33 MILD OBSTRUCTIVE SLEEP APNEA: Primary | ICD-10-CM

## 2018-05-03 DIAGNOSIS — G47.61 PERIODIC LIMB MOVEMENT DISORDER (PLMD): ICD-10-CM

## 2018-05-03 DIAGNOSIS — G25.81 RESTLESS LEGS SYNDROME (RLS): ICD-10-CM

## 2018-05-03 PROCEDURE — 95811 POLYSOM 6/>YRS CPAP 4/> PARM: CPT | Mod: 26,,, | Performed by: PSYCHOLOGIST

## 2018-05-03 PROCEDURE — 95811 POLYSOM 6/>YRS CPAP 4/> PARM: CPT | Performed by: PSYCHOLOGIST

## 2018-05-07 ENCOUNTER — TELEPHONE (OUTPATIENT)
Dept: PULMONOLOGY | Facility: CLINIC | Age: 83
End: 2018-05-07

## 2018-05-07 NOTE — TELEPHONE ENCOUNTER
Contacted patient to confirm appointment, wife requested to follow up with Dr Wallace on  05/10/18.

## 2018-05-09 ENCOUNTER — ANTI-COAG VISIT (OUTPATIENT)
Dept: CARDIOLOGY | Facility: CLINIC | Age: 83
End: 2018-05-09
Payer: MEDICARE

## 2018-05-09 ENCOUNTER — OFFICE VISIT (OUTPATIENT)
Dept: CARDIOLOGY | Facility: CLINIC | Age: 83
End: 2018-05-09
Payer: MEDICARE

## 2018-05-09 VITALS
DIASTOLIC BLOOD PRESSURE: 70 MMHG | HEART RATE: 76 BPM | SYSTOLIC BLOOD PRESSURE: 130 MMHG | BODY MASS INDEX: 28.21 KG/M2 | HEIGHT: 70 IN | WEIGHT: 197.06 LBS

## 2018-05-09 DIAGNOSIS — R01.0 STILL'S MURMUR: ICD-10-CM

## 2018-05-09 DIAGNOSIS — G47.33 MILD OBSTRUCTIVE SLEEP APNEA: ICD-10-CM

## 2018-05-09 DIAGNOSIS — J44.9 CHRONIC OBSTRUCTIVE PULMONARY DISEASE, UNSPECIFIED COPD TYPE: ICD-10-CM

## 2018-05-09 DIAGNOSIS — I73.9 PVD (PERIPHERAL VASCULAR DISEASE): ICD-10-CM

## 2018-05-09 DIAGNOSIS — Z79.01 LONG TERM (CURRENT) USE OF ANTICOAGULANTS: Primary | ICD-10-CM

## 2018-05-09 DIAGNOSIS — Z79.01 LONG TERM CURRENT USE OF ANTICOAGULANT THERAPY: ICD-10-CM

## 2018-05-09 DIAGNOSIS — I70.90 ARTERIOSCLEROTIC VASCULAR DISEASE: ICD-10-CM

## 2018-05-09 DIAGNOSIS — Z95.0 PRESENCE OF CARDIAC PACEMAKER: ICD-10-CM

## 2018-05-09 DIAGNOSIS — I48.91 ATRIAL FIBRILLATION, UNSPECIFIED TYPE: ICD-10-CM

## 2018-05-09 DIAGNOSIS — I65.23 BILATERAL CAROTID ARTERY STENOSIS: ICD-10-CM

## 2018-05-09 DIAGNOSIS — Z95.0 CARDIAC PACEMAKER: ICD-10-CM

## 2018-05-09 DIAGNOSIS — J44.9 COPD, SEVERE: ICD-10-CM

## 2018-05-09 DIAGNOSIS — Z98.61 CORONARY ANGIOPLASTY STATUS: ICD-10-CM

## 2018-05-09 DIAGNOSIS — I25.10 CORONARY ARTERY DISEASE INVOLVING NATIVE CORONARY ARTERY OF NATIVE HEART WITHOUT ANGINA PECTORIS: Primary | ICD-10-CM

## 2018-05-09 DIAGNOSIS — Z98.61 S/P PTCA (PERCUTANEOUS TRANSLUMINAL CORONARY ANGIOPLASTY): ICD-10-CM

## 2018-05-09 DIAGNOSIS — I63.9 CEREBROVASCULAR ACCIDENT (CVA), UNSPECIFIED MECHANISM: ICD-10-CM

## 2018-05-09 DIAGNOSIS — I10 ESSENTIAL HYPERTENSION: ICD-10-CM

## 2018-05-09 DIAGNOSIS — I70.219 ATHEROSCLEROTIC PVD WITH INTERMITTENT CLAUDICATION: ICD-10-CM

## 2018-05-09 DIAGNOSIS — G25.81 RESTLESS LEGS SYNDROME (RLS): ICD-10-CM

## 2018-05-09 LAB — INR PPP: 2.4 (ref 2–3)

## 2018-05-09 PROCEDURE — 99213 OFFICE O/P EST LOW 20 MIN: CPT | Mod: PBBFAC,PO | Performed by: INTERNAL MEDICINE

## 2018-05-09 PROCEDURE — 85610 PROTHROMBIN TIME: CPT | Mod: PBBFAC,PO

## 2018-05-09 PROCEDURE — 99999 PR PBB SHADOW E&M-EST. PATIENT-LVL III: CPT | Mod: PBBFAC,,, | Performed by: INTERNAL MEDICINE

## 2018-05-09 PROCEDURE — 99214 OFFICE O/P EST MOD 30 MIN: CPT | Mod: S$PBB,,, | Performed by: INTERNAL MEDICINE

## 2018-05-09 RX ORDER — PANTOPRAZOLE SODIUM 40 MG/1
40 TABLET, DELAYED RELEASE ORAL
COMMUNITY

## 2018-05-09 NOTE — PROGRESS NOTES
Subjective:   Patient ID:  Bola Figueroa Sr. is a 83 y.o. male who presents for follow up of Atrial Fibrillation (Patient presents to clinic today for 6 month follow up. ); Coronary Artery Disease; Peripheral Vascular Disease; and Hypertension      HPI  An 84 yo male with h/o afib anticoagulation pvd afib htn hlp cad s/p ptca cva is here for f/u he is doing well clinically has no palpitation no angina no chf symptoms. He is compliant with meds intake. Has no chf symptoms no tia   No leg swelling he is trying to be compliant with diet. He uses his walker to get around no falls.  Past Medical History:   Diagnosis Date    *Atrial fibrillation     Atrial fibrillation     Bilateral carotid artery stenosis 1/15/2016    Cardiac pacemaker 8/7/2013    Congestive heart failure 4/6/2015    COPD (chronic obstructive pulmonary disease) 8/7/2013    Coronary artery disease     Hyperlipidemia     Hypertension     Long-term (current) use of anticoagulants 8/7/2013    PVD (peripheral vascular disease)     S/P PTCA (percutaneous transluminal coronary angioplasty) 8/7/2013    Sleep apnea 8/7/2013    Stroke        Past Surgical History:   Procedure Laterality Date    CATARACT EXTRACTION W/  INTRAOCULAR LENS IMPLANT      INSERT / REPLACE / REMOVE PACEMAKER  2005       Social History   Substance Use Topics    Smoking status: Former Smoker     Packs/day: 1.50     Years: 35.00     Types: Cigarettes     Quit date: 11/13/1979    Smokeless tobacco: Never Used    Alcohol use No      Comment: QUIT 08/13/77       Family History   Problem Relation Age of Onset    Diabetes Sister     Fainting Sister     Heart disease Paternal Uncle     Heart attack Paternal Uncle     Hypertension Maternal Grandmother     Diabetes Maternal Grandfather        Current Outpatient Prescriptions   Medication Sig    albuterol (PROVENTIL) 2.5 mg /3 mL (0.083 %) nebulizer solution Take 3 mLs (2.5 mg total) by nebulization every 4 (four) hours as  needed for Wheezing or Shortness of Breath.    albuterol-ipratropium 2.5mg-0.5mg/3mL (DUO-NEB) 0.5 mg-3 mg(2.5 mg base)/3 mL nebulizer solution Take 3 mLs by nebulization every 6 (six) hours as needed for Wheezing. Rescue    amlodipine-atorvastatin (CADUET) 10-20 mg per tablet Take 1 tablet by mouth once daily.    aspirin (ECOTRIN) 81 MG EC tablet Take 81 mg by mouth once daily.    budesonide (PULMICORT) 0.5 mg/2 mL nebulizer solution Take 2 mLs (0.5 mg total) by nebulization 2 (two) times daily.    carvedilol (COREG) 6.25 MG tablet take 1 tablet by mouth twice a day with meals    dexlansoprazole (DEXILANT) 60 mg capsule 60 mg.    fluticasone (FLONASE) 50 mcg/actuation nasal spray 2 sprays.    fluticasone-salmeterol 250-50 mcg/dose (ADVAIR DISKUS) 250-50 mcg/dose diskus inhaler Inhale 1 puff into the lungs 2 (two) times daily. Wash out mouth after use.    furosemide (LASIX) 40 MG tablet Take 40 mg by mouth once daily.     gabapentin (NEURONTIN) 100 MG capsule Take 100 mg by mouth 3 (three) times daily.    hydrALAZINE (APRESOLINE) 25 MG tablet take 1 tablet by mouth three times a day    ipratropium (ATROVENT) 0.06 % nasal spray instill 2 sprays into each nostril three times a day    isosorbide dinitrate (ISORDIL) 20 MG tablet take 1 tablet by mouth every 12 hours with HYDRALAZINE    latanoprost 0.005 % ophthalmic solution Place 1 drop into both eyes nightly.    levetiracetam (KEPPRA) 500 MG Tab Take 1 tablet by mouth Twice daily.    lubiprostone (AMITIZA) 8 MCG Cap Take 8 mcg by mouth 2 (two) times daily with meals.    nitroGLYCERIN (NITROBID) 6.5 MG CpSR take 1 capsule by mouth twice a day    pantoprazole (PROTONIX) 40 MG tablet Take 40 mg by mouth.    ROBAFEN 100 mg/5 mL syrup     tamsulosin (FLOMAX) 0.4 mg Cp24 0.4 mg.    traMADol (ULTRAM) 50 mg tablet take 1 tablet by mouth every 6 hours if needed for UP TO 10 days    warfarin (COUMADIN) 5 MG tablet Take 1/2 tablet on Tuesdays and 1 tablet  all other days as directed by the [facility].     No current facility-administered medications for this visit.      Current Outpatient Prescriptions on File Prior to Visit   Medication Sig    albuterol (PROVENTIL) 2.5 mg /3 mL (0.083 %) nebulizer solution Take 3 mLs (2.5 mg total) by nebulization every 4 (four) hours as needed for Wheezing or Shortness of Breath.    albuterol-ipratropium 2.5mg-0.5mg/3mL (DUO-NEB) 0.5 mg-3 mg(2.5 mg base)/3 mL nebulizer solution Take 3 mLs by nebulization every 6 (six) hours as needed for Wheezing. Rescue    amlodipine-atorvastatin (CADUET) 10-20 mg per tablet Take 1 tablet by mouth once daily.    aspirin (ECOTRIN) 81 MG EC tablet Take 81 mg by mouth once daily.    budesonide (PULMICORT) 0.5 mg/2 mL nebulizer solution Take 2 mLs (0.5 mg total) by nebulization 2 (two) times daily.    carvedilol (COREG) 6.25 MG tablet take 1 tablet by mouth twice a day with meals    dexlansoprazole (DEXILANT) 60 mg capsule 60 mg.    fluticasone (FLONASE) 50 mcg/actuation nasal spray 2 sprays.    fluticasone-salmeterol 250-50 mcg/dose (ADVAIR DISKUS) 250-50 mcg/dose diskus inhaler Inhale 1 puff into the lungs 2 (two) times daily. Wash out mouth after use.    furosemide (LASIX) 40 MG tablet Take 40 mg by mouth once daily.     gabapentin (NEURONTIN) 100 MG capsule Take 100 mg by mouth 3 (three) times daily.    hydrALAZINE (APRESOLINE) 25 MG tablet take 1 tablet by mouth three times a day    ipratropium (ATROVENT) 0.06 % nasal spray instill 2 sprays into each nostril three times a day    isosorbide dinitrate (ISORDIL) 20 MG tablet take 1 tablet by mouth every 12 hours with HYDRALAZINE    latanoprost 0.005 % ophthalmic solution Place 1 drop into both eyes nightly.    levetiracetam (KEPPRA) 500 MG Tab Take 1 tablet by mouth Twice daily.    lubiprostone (AMITIZA) 8 MCG Cap Take 8 mcg by mouth 2 (two) times daily with meals.    nitroGLYCERIN (NITROBID) 6.5 MG CpSR take 1 capsule by mouth  twice a day    ROBAFEN 100 mg/5 mL syrup     tamsulosin (FLOMAX) 0.4 mg Cp24 0.4 mg.    traMADol (ULTRAM) 50 mg tablet take 1 tablet by mouth every 6 hours if needed for UP TO 10 days    warfarin (COUMADIN) 5 MG tablet Take 1/2 tablet on Tuesdays and 1 tablet all other days as directed by the [facility].    [DISCONTINUED] hydrALAZINE (APRESOLINE) 25 MG tablet 25 mg 3 (three) times daily.     [DISCONTINUED] predniSONE (DELTASONE) 20 MG tablet 3 daily x 3 days, 2 daily x 3 days, 1 daily x 3 days, 1/2 daily x 4 days.     No current facility-administered medications on file prior to visit.      Review of patient's allergies indicates:   Allergen Reactions    No known drug allergies        Review of Systems   Constitution: Negative for weakness and malaise/fatigue.   Eyes: Negative for blurred vision.   Cardiovascular: Negative for chest pain, claudication, cyanosis, dyspnea on exertion, irregular heartbeat, leg swelling, near-syncope, orthopnea, palpitations and paroxysmal nocturnal dyspnea.   Respiratory: Negative for cough, hemoptysis and shortness of breath.    Hematologic/Lymphatic: Negative for bleeding problem. Does not bruise/bleed easily.   Skin: Negative for dry skin and itching.   Musculoskeletal: Positive for stiffness. Negative for falls, muscle weakness and myalgias.   Gastrointestinal: Negative for abdominal pain, diarrhea, heartburn, hematemesis, hematochezia and melena.   Genitourinary: Negative for flank pain and hematuria.   Neurological: Positive for brief paralysis, disturbances in coordination, focal weakness and loss of balance. Negative for dizziness, headaches, light-headedness, numbness, paresthesias and seizures.   Psychiatric/Behavioral: Positive for memory loss. Negative for altered mental status. The patient is not nervous/anxious.    Allergic/Immunologic: Negative for hives.       Objective:   Physical Exam   Constitutional: He is oriented to person, place, and time. He appears  "well-developed and well-nourished. No distress.   HENT:   Head: Normocephalic and atraumatic.   Eyes: EOM are normal. Pupils are equal, round, and reactive to light. Right eye exhibits no discharge. Left eye exhibits no discharge.   Neck: Neck supple. No JVD present. No thyromegaly present.   Cardiovascular: Normal rate, regular rhythm and intact distal pulses.  Exam reveals no gallop and no friction rub.    Murmur heard.   Harsh midsystolic murmur is present with a grade of 2/6  at the upper right sternal border radiating to the neck Abdominal aorta is not enlarged.  Pulses:       Carotid pulses are 2+ on the right side with bruit, and 2+ on the left side with bruit.       Radial pulses are 2+ on the right side, and 2+ on the left side.        Femoral pulses are 2+ on the right side with bruit, and 2+ on the left side with bruit.       Popliteal pulses are 2+ on the right side, and 2+ on the left side.        Dorsalis pedis pulses are 1+ on the right side, and 0 on the left side.        Posterior tibial pulses are 0 on the right side, and 2+ on the left side.   Pulmonary/Chest: Effort normal and breath sounds normal. No respiratory distress. He has no wheezes. He has no rales. He exhibits no tenderness.   Abdominal: Soft. Bowel sounds are normal. He exhibits no distension. There is no tenderness.   Musculoskeletal: Normal range of motion. He exhibits no edema.   Scar of distal bypass well healed.    Neurological: He is alert and oriented to person, place, and time. No cranial nerve deficit.   Left sided weakness noted    Skin: Skin is warm and dry. No rash noted. He is not diaphoretic. No erythema.   Psychiatric: He has a normal mood and affect. His behavior is normal.   Nursing note and vitals reviewed.    Vitals:    05/09/18 0906   BP: 130/70   Pulse: 76   Weight: 89.4 kg (197 lb 1.5 oz)   Height: 5' 9.6" (1.768 m)     Lab Results   Component Value Date    CHOL 116 (L) 05/02/2018    CHOL 150 11/27/2017    CHOL " 182 08/28/2015     Lab Results   Component Value Date    HDL 52 05/02/2018    HDL 52 11/27/2017    HDL 50 08/28/2015     Lab Results   Component Value Date    LDLCALC 46.4 (L) 05/02/2018    LDLCALC 81.6 11/27/2017    LDLCALC 115.6 08/28/2015     Lab Results   Component Value Date    TRIG 88 05/02/2018    TRIG 82 11/27/2017    TRIG 82 08/28/2015     Lab Results   Component Value Date    CHOLHDL 44.8 05/02/2018    CHOLHDL 34.7 11/27/2017    CHOLHDL 27.5 08/28/2015       Chemistry        Component Value Date/Time     05/02/2018 0835    K 3.5 05/02/2018 0835     05/02/2018 0835    CO2 32 (H) 05/02/2018 0835    BUN 15 05/02/2018 0835    CREATININE 1.2 05/02/2018 0835     (H) 05/02/2018 0835        Component Value Date/Time    CALCIUM 9.0 05/02/2018 0835    ALKPHOS 70 05/02/2018 0835    AST 16 05/02/2018 0835    ALT 11 05/02/2018 0835    BILITOT 1.7 (H) 05/02/2018 0835    ESTGFRAFRICA >60.0 05/02/2018 0835    EGFRNONAA 55.6 (A) 05/02/2018 0835          Lab Results   Component Value Date    TSH 2.9 10/18/2007     Lab Results   Component Value Date    INR 2.4 05/09/2018    INR 3.3 (A) 04/30/2018    INR 2.8 04/23/2018     Lab Results   Component Value Date    WBC 6.21 01/19/2018    HGB 13.2 (L) 01/19/2018    HCT 40.1 01/19/2018    MCV 92 01/19/2018     01/19/2018     BMP  Sodium   Date Value Ref Range Status   05/02/2018 140 136 - 145 mmol/L Final     Potassium   Date Value Ref Range Status   05/02/2018 3.5 3.5 - 5.1 mmol/L Final     Chloride   Date Value Ref Range Status   05/02/2018 100 95 - 110 mmol/L Final     CO2   Date Value Ref Range Status   05/02/2018 32 (H) 23 - 29 mmol/L Final     BUN, Bld   Date Value Ref Range Status   05/02/2018 15 8 - 23 mg/dL Final     Creatinine   Date Value Ref Range Status   05/02/2018 1.2 0.5 - 1.4 mg/dL Final     Calcium   Date Value Ref Range Status   05/02/2018 9.0 8.7 - 10.5 mg/dL Final     Anion Gap   Date Value Ref Range Status   05/02/2018 8 8 - 16 mmol/L  Final     eGFR if    Date Value Ref Range Status   05/02/2018 >60.0 >60 mL/min/1.73 m^2 Final     eGFR if non    Date Value Ref Range Status   05/02/2018 55.6 (A) >60 mL/min/1.73 m^2 Final     Comment:     Calculation used to obtain the estimated glomerular filtration  rate (eGFR) is the CKD-EPI equation.        CrCl cannot be calculated (Patient's most recent lab result is older than the maximum 7 days allowed.).    Assessment:     1. Coronary artery disease involving native coronary artery of native heart without angina pectoris    2. Atrial fibrillation, unspecified type    3. PVD (peripheral vascular disease)    4. Essential hypertension    5. Long term current use of anticoagulant therapy    6. Cardiac pacemaker    7. Mild obstructive sleep apnea    8. S/P PTCA (percutaneous transluminal coronary angioplasty)    9. Chronic obstructive pulmonary disease, unspecified COPD type    10. Still's murmur    11. Arteriosclerotic vascular disease    12. COPD, severe    13. Presence of cardiac pacemaker    14. Bilateral carotid artery stenosis    15. Atherosclerotic PVD with intermittent claudication    16. Coronary angioplasty status    17. Cerebrovascular accident (CVA), unspecified mechanism    18. Restless legs syndrome (RLS)      Stable clinically no new events   Needs pacer check.   lipids on target  Oral anticoagulation followed in coumadin clinc on target.  Plan:   Continue current therapy  Cardiac low salt diet.  Risk factor modification and excercise program.  F/u in 6 months with lipid cmp

## 2018-05-09 NOTE — PROGRESS NOTES
Patient's INR is therapeutic at 2.4.  No changes in dose.  Recheck in 1 month.  Please call should you have any questions or concerns at [phone] or [phone].

## 2018-05-10 ENCOUNTER — CLINICAL SUPPORT (OUTPATIENT)
Dept: CARDIOLOGY | Facility: CLINIC | Age: 83
End: 2018-05-10
Attending: INTERNAL MEDICINE
Payer: MEDICARE

## 2018-05-10 ENCOUNTER — OFFICE VISIT (OUTPATIENT)
Dept: PULMONOLOGY | Facility: CLINIC | Age: 83
End: 2018-05-10
Payer: MEDICARE

## 2018-05-10 VITALS
DIASTOLIC BLOOD PRESSURE: 78 MMHG | WEIGHT: 195.88 LBS | BODY MASS INDEX: 28.04 KG/M2 | RESPIRATION RATE: 26 BRPM | HEIGHT: 70 IN | SYSTOLIC BLOOD PRESSURE: 154 MMHG | HEART RATE: 78 BPM | OXYGEN SATURATION: 96 %

## 2018-05-10 DIAGNOSIS — Z95.0 CARDIAC PACEMAKER: ICD-10-CM

## 2018-05-10 DIAGNOSIS — I48.91 ATRIAL FIBRILLATION WITH SLOW VENTRICULAR RESPONSE: ICD-10-CM

## 2018-05-10 DIAGNOSIS — J44.9 CHRONIC OBSTRUCTIVE PULMONARY DISEASE, UNSPECIFIED COPD TYPE: ICD-10-CM

## 2018-05-10 DIAGNOSIS — G47.33 OSA (OBSTRUCTIVE SLEEP APNEA): Primary | ICD-10-CM

## 2018-05-10 PROBLEM — M1A.0710 CHRONIC GOUT OF RIGHT FOOT: Status: ACTIVE | Noted: 2017-11-14

## 2018-05-10 PROBLEM — F03.A0 MILD DEMENTIA: Status: ACTIVE | Noted: 2018-05-08

## 2018-05-10 PROBLEM — I63.20 CEREBROVASCULAR ACCIDENT (CVA) DUE TO OCCLUSION OF PRECEREBRAL ARTERY: Status: ACTIVE | Noted: 2017-05-26

## 2018-05-10 PROBLEM — G40.909 SEIZURE DISORDER: Status: ACTIVE | Noted: 2017-04-26

## 2018-05-10 PROCEDURE — 99215 OFFICE O/P EST HI 40 MIN: CPT | Mod: S$PBB,,, | Performed by: INTERNAL MEDICINE

## 2018-05-10 PROCEDURE — 99999 PR PBB SHADOW E&M-EST. PATIENT-LVL V: CPT | Mod: PBBFAC,,, | Performed by: INTERNAL MEDICINE

## 2018-05-10 PROCEDURE — 93280 PM DEVICE PROGR EVAL DUAL: CPT | Mod: PBBFAC | Performed by: INTERNAL MEDICINE

## 2018-05-10 PROCEDURE — 99215 OFFICE O/P EST HI 40 MIN: CPT | Mod: PBBFAC,25 | Performed by: INTERNAL MEDICINE

## 2018-05-10 NOTE — PATIENT INSTRUCTIONS
CPAP HABITUATION PROCEDURE    Jean Marie Martin, Ph.D., [facility] and Juanjo Wallace M.D.  [facility], [facility]    Some people have difficulty adjusting to CPAP/BiPAP/AutoCPAP.  This is not unusual or hard to understand: Breathing with CPAP is different from ordinary breathing, and this difference is aversive to some. The problem can be overcome, however, and the benefits CPAP confers are certainly worth the effort.  Below, you will find a simple and gradual way to get used to CPAP before you try to use it all night, every night.  The essence of this procedure is to relax and let breathing with CPAP become a habit.  It may take about 2 weeks, and involves the followin. CPAP while awake and comfortably seated, during the late evening.     2. CPAP in bed while attempting sleep at night.     3. If your discomfort is too great at any time, discontinue and attempt again later the same night, for the same amount of time.   4. You and your physician may alter the times and pressures if necessary.     5. If you find that it is very easy to get used to CPAP, you may start using it every night when you are comfortable enough to do so.  6. IMPORTANT REMINDER: If you have a cold or sinus congestion it is okay to miss a night or two of CPAP. Consider using antihistamines or decongestants to clear up your sinus congestion prior to sleeping.    DAYS  1-3   Start CPAP while awake and comfortably seated during the late evening, after having prepared for bed.  You may do this while watching television, listening to music or reading. Use for 1 hour, then take off CPAP and go directly to bed to sleep    DAYS  4-6   Start CPAP when you go to bed and use for 1 hour, or until you fall asleep.  If your discomfort is too great at any time, discontinue and attempt again later the same night, for the same designated amount of time (1 hour).     DAYS  7-9   Increase time with CPAP to 2 hours a  night.  If your discomfort is too great at any time, discontinue and attempt again later the same night, for the same designated amount of time (2 hours).     DAYS 10-12  Increase time with CPAP to 3 hours a night. If your discomfort is too great at any time, discontinue and attempt again later the same night, for the same designated amount of time (3 hours).     DAYS 13-15   Sleep the entire night with CPAP.     OPTIONAL: You may use Progressive Muscle Relaxation (PMR) to help put you at ease when using CPAP; do PMR twice each day, once in the morning or afternoon, and once in the evening just before using CPAP. You may do PMR prior to any attempt until you are comfortable with CPAP.
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
## 2018-06-01 ENCOUNTER — TELEPHONE (OUTPATIENT)
Dept: PULMONOLOGY | Facility: CLINIC | Age: 83
End: 2018-06-01

## 2018-06-01 DIAGNOSIS — G47.33 OSA (OBSTRUCTIVE SLEEP APNEA): Primary | ICD-10-CM

## 2018-06-01 NOTE — TELEPHONE ENCOUNTER
----- Message from Rogelio Hill sent at 6/1/2018  8:22 AM CDT -----  Contact: daughter /leslie sosa  Pt c-pap machine mask pt is not working and pt almost strangled. Pt would like to try the one that inserts into the nostrils. Pt mucus buildup was severe. pls advise .. [phone] (work)

## 2018-06-06 ENCOUNTER — ANTI-COAG VISIT (OUTPATIENT)
Dept: CARDIOLOGY | Facility: CLINIC | Age: 83
End: 2018-06-06
Payer: MEDICARE

## 2018-06-06 DIAGNOSIS — Z79.01 LONG TERM (CURRENT) USE OF ANTICOAGULANTS: Primary | ICD-10-CM

## 2018-06-06 LAB — INR PPP: 1.6 (ref 2–3)

## 2018-06-06 PROCEDURE — 99211 OFF/OP EST MAY X REQ PHY/QHP: CPT | Mod: PBBFAC,PO

## 2018-06-06 PROCEDURE — 85610 PROTHROMBIN TIME: CPT | Mod: PBBFAC,PO

## 2018-06-06 PROCEDURE — 99999 PR PBB SHADOW E&M-EST. PATIENT-LVL I: CPT | Mod: PBBFAC,,,

## 2018-06-06 NOTE — PROGRESS NOTES
Patient's INR is sub therapeutic at 1.6.  Reports an intake of mustard greens.  Vitamin K education - given.  Instructed to take 7.5 mg (1 1/2 tablet) today - only, then resume on regular scheduled dose.  Recheck in 2 weeks.

## 2018-06-13 ENCOUNTER — HOSPITAL ENCOUNTER (EMERGENCY)
Facility: HOSPITAL | Age: 83
Discharge: HOME | End: 2018-06-13
Attending: EMERGENCY MEDICINE
Payer: MEDICARE

## 2018-06-13 VITALS
WEIGHT: 190 LBS | TEMPERATURE: 99 F | SYSTOLIC BLOOD PRESSURE: 156 MMHG | BODY MASS INDEX: 28.14 KG/M2 | HEART RATE: 69 BPM | OXYGEN SATURATION: 97 % | RESPIRATION RATE: 20 BRPM | HEIGHT: 69 IN | DIASTOLIC BLOOD PRESSURE: 71 MMHG

## 2018-06-13 DIAGNOSIS — J44.9 CHRONIC OBSTRUCTIVE PULMONARY DISEASE, UNSPECIFIED COPD TYPE: Primary | ICD-10-CM

## 2018-06-13 DIAGNOSIS — R06.02 SOB (SHORTNESS OF BREATH): ICD-10-CM

## 2018-06-13 LAB
ALBUMIN SERPL BCP-MCNC: 3.7 G/DL
ALP SERPL-CCNC: 65 U/L
ALT SERPL W/O P-5'-P-CCNC: 11 U/L
ANION GAP SERPL CALC-SCNC: 11 MMOL/L
APTT BLDCRRT: 36.9 SEC
AST SERPL-CCNC: 21 U/L
BASOPHILS # BLD AUTO: 0.01 K/UL
BASOPHILS NFR BLD: 0.1 %
BILIRUB SERPL-MCNC: 2.3 MG/DL
BNP SERPL-MCNC: 156 PG/ML
BUN SERPL-MCNC: 14 MG/DL
CALCIUM SERPL-MCNC: 9.2 MG/DL
CHLORIDE SERPL-SCNC: 104 MMOL/L
CO2 SERPL-SCNC: 25 MMOL/L
CREAT SERPL-MCNC: 1.3 MG/DL
DIFFERENTIAL METHOD: ABNORMAL
EOSINOPHIL # BLD AUTO: 0.2 K/UL
EOSINOPHIL NFR BLD: 1.5 %
ERYTHROCYTE [DISTWIDTH] IN BLOOD BY AUTOMATED COUNT: 13.1 %
EST. GFR  (AFRICAN AMERICAN): 58 ML/MIN/1.73 M^2
EST. GFR  (NON AFRICAN AMERICAN): 50 ML/MIN/1.73 M^2
GLUCOSE SERPL-MCNC: 150 MG/DL
HCT VFR BLD AUTO: 39.6 %
HGB BLD-MCNC: 13.3 G/DL
INR PPP: 1.5
LACTATE SERPL-SCNC: 0.8 MMOL/L
LYMPHOCYTES # BLD AUTO: 1.3 K/UL
LYMPHOCYTES NFR BLD: 11.5 %
MCH RBC QN AUTO: 30.8 PG
MCHC RBC AUTO-ENTMCNC: 33.6 G/DL
MCV RBC AUTO: 92 FL
MONOCYTES # BLD AUTO: 1.1 K/UL
MONOCYTES NFR BLD: 9.7 %
NEUTROPHILS # BLD AUTO: 8.4 K/UL
NEUTROPHILS NFR BLD: 77.2 %
PLATELET # BLD AUTO: 177 K/UL
PMV BLD AUTO: 11 FL
POTASSIUM SERPL-SCNC: 3.6 MMOL/L
PROT SERPL-MCNC: 7.3 G/DL
PROTHROMBIN TIME: 15.4 SEC
RBC # BLD AUTO: 4.32 M/UL
SODIUM SERPL-SCNC: 140 MMOL/L
TROPONIN I SERPL DL<=0.01 NG/ML-MCNC: 0.01 NG/ML
WBC # BLD AUTO: 10.91 K/UL

## 2018-06-13 PROCEDURE — 93005 ELECTROCARDIOGRAM TRACING: CPT

## 2018-06-13 PROCEDURE — 83605 ASSAY OF LACTIC ACID: CPT

## 2018-06-13 PROCEDURE — 99284 EMERGENCY DEPT VISIT MOD MDM: CPT | Mod: 25

## 2018-06-13 PROCEDURE — 93010 ELECTROCARDIOGRAM REPORT: CPT | Mod: ,,, | Performed by: INTERNAL MEDICINE

## 2018-06-13 PROCEDURE — 83880 ASSAY OF NATRIURETIC PEPTIDE: CPT

## 2018-06-13 PROCEDURE — 85610 PROTHROMBIN TIME: CPT

## 2018-06-13 PROCEDURE — 84484 ASSAY OF TROPONIN QUANT: CPT

## 2018-06-13 PROCEDURE — 63600175 PHARM REV CODE 636 W HCPCS: Performed by: EMERGENCY MEDICINE

## 2018-06-13 PROCEDURE — 80053 COMPREHEN METABOLIC PANEL: CPT

## 2018-06-13 PROCEDURE — 85025 COMPLETE CBC W/AUTO DIFF WBC: CPT

## 2018-06-13 PROCEDURE — 85730 THROMBOPLASTIN TIME PARTIAL: CPT

## 2018-06-13 RX ORDER — PREDNISONE 20 MG/1
40 TABLET ORAL DAILY
Qty: 10 TABLET | Refills: 0 | Status: SHIPPED | OUTPATIENT
Start: 2018-06-13 | End: 2018-06-18

## 2018-06-13 RX ORDER — PREDNISONE 20 MG/1
40 TABLET ORAL
Status: COMPLETED | OUTPATIENT
Start: 2018-06-13 | End: 2018-06-13

## 2018-06-13 RX ORDER — DOXYCYCLINE 100 MG/1
100 CAPSULE ORAL 2 TIMES DAILY
Qty: 20 CAPSULE | Refills: 0 | Status: SHIPPED | OUTPATIENT
Start: 2018-06-13 | End: 2018-06-23

## 2018-06-13 RX ADMIN — PREDNISONE 40 MG: 20 TABLET ORAL at 03:06

## 2018-06-13 NOTE — ED PROVIDER NOTES
SCRIBE #1 NOTE: I, Pilar Burton, am scribing for, and in the presence of, Edd Harper MD. I have scribed the entire note.      History      Chief Complaint   Patient presents with    Shortness of Breath     Pt reports SOB worsening tonight, Hx of COPD       Review of patient's allergies indicates:   Allergen Reactions    No known drug allergies         HPI   HPI    6/13/2018, 2:08 AM   History obtained from the patient      History of Present Illness: Bola Figueroa Sr. is a 83 y.o. male patient with PMHx of COPD who presents to the Emergency Department for SOB which onset gradually 2-3 days PTA. Symptoms are constant and moderate in severity. No mitigating or exacerbating factors reported. Associated sxs include non-productive cough. Patient denies any fever, chills, CP, BLE edema/pain, palpitations, n/v, extremity weakness/numbness, dizziness, recent travel/long car rides, and all other sxs, and all other sxs at this time. Prior Tx includes a rescue inhaler with no relief. No further complaints or concerns at this time.       Arrival mode: Personal vehicle      PCP: Naga Bhatti MD       Past Medical History:  Past Medical History:   Diagnosis Date    *Atrial fibrillation     Atrial fibrillation     Bilateral carotid artery stenosis 1/15/2016    Cardiac pacemaker 8/7/2013    Congestive heart failure 4/6/2015    COPD (chronic obstructive pulmonary disease) 8/7/2013    Coronary artery disease     Hyperlipidemia     Hypertension     Long-term (current) use of anticoagulants 8/7/2013    PVD (peripheral vascular disease)     S/P PTCA (percutaneous transluminal coronary angioplasty) 8/7/2013    Sleep apnea 8/7/2013    Stroke        Past Surgical History:  Past Surgical History:   Procedure Laterality Date    CATARACT EXTRACTION W/  INTRAOCULAR LENS IMPLANT      INSERT / REPLACE / REMOVE PACEMAKER  2005         Family History:  Family History   Problem Relation Age of Onset    Diabetes Sister      Fainting Sister     Heart disease Paternal Uncle     Heart attack Paternal Uncle     Hypertension Maternal Grandmother     Diabetes Maternal Grandfather        Social History:  Social History     Social History Main Topics    Smoking status: Former Smoker     Packs/day: 1.50     Years: 35.00     Types: Cigarettes     Quit date: 11/13/1979    Smokeless tobacco: Never Used    Alcohol use No      Comment: QUIT 08/13/77    Drug use: No    Sexual activity: unknown       ROS   Review of Systems   Constitutional: Negative for chills and fever.   HENT: Negative for sore throat.    Respiratory: Positive for cough (non-productive) and shortness of breath.    Cardiovascular: Negative for chest pain, palpitations and leg swelling.   Gastrointestinal: Negative for nausea and vomiting.   Genitourinary: Negative for dysuria.   Musculoskeletal: Negative for back pain and myalgias.   Skin: Negative for rash.   Neurological: Negative for dizziness, weakness and numbness.   Hematological: Does not bruise/bleed easily.   All other systems reviewed and are negative.    Physical Exam      Initial Vitals [06/13/18 0207]   BP Pulse Resp Temp SpO2   (!) 194/74 71 (!) 24 99.2 °F (37.3 °C) 95 %      MAP       --          Physical Exam  Nursing Notes and Vital Signs Reviewed.  Constitutional: Patient is in no acute distress. Well-developed and well-nourished.  Head: Atraumatic. Normocephalic.  Eyes: PERRL. EOM intact. Conjunctivae are not pale. No scleral icterus.  ENT: Mucous membranes are moist. Oropharynx is clear and symmetric.   Neck: Supple. Full ROM. No lymphadenopathy. Scars over carotids.   Cardiovascular: Regular rate. Regular rhythm. No murmurs, rubs, or gallops. Distal pulses are 2+ and symmetric.  Pulmonary/Chest: No respiratory distress. Clear to auscultation bilaterally. No wheezing or rales. Pacemaker.   Abdominal: Soft and non-distended.  There is no tenderness.  No rebound, guarding, or rigidity.  "  Musculoskeletal: Moves all extremities. No obvious deformities. 1+ BLE edema. No calf tenderness.  Skin: Warm and dry.  Neurological:  Alert, awake, and appropriate.  Normal speech.  No acute focal neurological deficits are appreciated.  Psychiatric: Normal affect. Good eye contact. Appropriate in content.    ED Course    Procedures  ED Vital Signs:  Vitals:    06/13/18 0207 06/13/18 0238 06/13/18 0307 06/13/18 0342   BP: (!) 194/74   (!) 156/71   Pulse: 71 72 69 69   Resp: (!) 24   20   Temp: 99.2 °F (37.3 °C)      TempSrc: Oral      SpO2: 95%   97%   Weight: 86.2 kg (190 lb)      Height: 5' 9" (1.753 m)          Abnormal Lab Results:  Labs Reviewed   CBC W/ AUTO DIFFERENTIAL - Abnormal; Notable for the following:        Result Value    RBC 4.32 (*)     Hemoglobin 13.3 (*)     Hematocrit 39.6 (*)     Gran # (ANC) 8.4 (*)     Mono # 1.1 (*)     Gran% 77.2 (*)     Lymph% 11.5 (*)     All other components within normal limits   COMPREHENSIVE METABOLIC PANEL - Abnormal; Notable for the following:     Glucose 150 (*)     Total Bilirubin 2.3 (*)     eGFR if  58 (*)     eGFR if non  50 (*)     All other components within normal limits   PROTIME-INR - Abnormal; Notable for the following:     Prothrombin Time 15.4 (*)     INR 1.5 (*)     All other components within normal limits   APTT - Abnormal; Notable for the following:     aPTT 36.9 (*)     All other components within normal limits   B-TYPE NATRIURETIC PEPTIDE - Abnormal; Notable for the following:      (*)     All other components within normal limits   TROPONIN I   LACTIC ACID, PLASMA        All Lab Results:  Results for orders placed or performed during the hospital encounter of 06/13/18   CBC auto differential   Result Value Ref Range    WBC 10.91 3.90 - 12.70 K/uL    RBC 4.32 (L) 4.60 - 6.20 M/uL    Hemoglobin 13.3 (L) 14.0 - 18.0 g/dL    Hematocrit 39.6 (L) 40.0 - 54.0 %    MCV 92 82 - 98 fL    MCH 30.8 27.0 - 31.0 pg    " MCHC 33.6 32.0 - 36.0 g/dL    RDW 13.1 11.5 - 14.5 %    Platelets 177 150 - 350 K/uL    MPV 11.0 9.2 - 12.9 fL    Gran # (ANC) 8.4 (H) 1.8 - 7.7 K/uL    Lymph # 1.3 1.0 - 4.8 K/uL    Mono # 1.1 (H) 0.3 - 1.0 K/uL    Eos # 0.2 0.0 - 0.5 K/uL    Baso # 0.01 0.00 - 0.20 K/uL    Gran% 77.2 (H) 38.0 - 73.0 %    Lymph% 11.5 (L) 18.0 - 48.0 %    Mono% 9.7 4.0 - 15.0 %    Eosinophil% 1.5 0.0 - 8.0 %    Basophil% 0.1 0.0 - 1.9 %    Differential Method Automated    Comprehensive metabolic panel   Result Value Ref Range    Sodium 140 136 - 145 mmol/L    Potassium 3.6 3.5 - 5.1 mmol/L    Chloride 104 95 - 110 mmol/L    CO2 25 23 - 29 mmol/L    Glucose 150 (H) 70 - 110 mg/dL    BUN, Bld 14 8 - 23 mg/dL    Creatinine 1.3 0.5 - 1.4 mg/dL    Calcium 9.2 8.7 - 10.5 mg/dL    Total Protein 7.3 6.0 - 8.4 g/dL    Albumin 3.7 3.5 - 5.2 g/dL    Total Bilirubin 2.3 (H) 0.1 - 1.0 mg/dL    Alkaline Phosphatase 65 55 - 135 U/L    AST 21 10 - 40 U/L    ALT 11 10 - 44 U/L    Anion Gap 11 8 - 16 mmol/L    eGFR if African American 58 (A) >60 mL/min/1.73 m^2    eGFR if non African American 50 (A) >60 mL/min/1.73 m^2   Protime-INR   Result Value Ref Range    Prothrombin Time 15.4 (H) 9.0 - 12.5 sec    INR 1.5 (H) 0.8 - 1.2   APTT   Result Value Ref Range    aPTT 36.9 (H) 21.0 - 32.0 sec   Brain natriuretic peptide   Result Value Ref Range     (H) 0 - 99 pg/mL   Troponin I   Result Value Ref Range    Troponin I 0.013 0.000 - 0.026 ng/mL   Lactic acid, plasma   Result Value Ref Range    Lactate (Lactic Acid) 0.8 0.5 - 2.2 mmol/L         Imaging Results:  Imaging Results          X-Ray Chest AP Portable (In process)                3:23 AM: Per ED physician, pt's CXR results show: NAF      The EKG was ordered, reviewed, and independently interpreted by the ED provider.  Interpretation time: 0215  Rate: 73 BPM  Rhythm: Ventricular paced rhythm   Interpretation: No acute ST changes. No STEMI.         The Emergency Provider reviewed the vital signs  and test results, which are outlined above.    ED Discussion     3:31 AM: Reassessed pt at this time.  Pt states his condition has improved at this time. Discussed with pt all pertinent ED information and results. Discussed pt dx and plan of tx. Gave pt all f/u and return to the ED instructions. All questions and concerns were addressed at this time. Pt expresses understanding of information and instructions, and is comfortable with plan to discharge. Pt is stable for discharge.    I discussed with patient and/or family/caretaker that evaluation in the ED does not suggest any emergent or life threatening medical conditions requiring immediate intervention beyond what was provided in the ED, and I believe patient is safe for discharge.  Regardless, an unremarkable evaluation in the ED does not preclude the development or presence of a serious of life threatening condition. As such, patient was instructed to return immediately for any worsening or change in current symptoms.    ED Medication(s):  Medications   predniSONE tablet 40 mg (40 mg Oral Given 6/13/18 0341)       Discharge Medication List as of 6/13/2018  3:35 AM      START taking these medications    Details   doxycycline (VIBRAMYCIN) 100 MG Cap Take 1 capsule (100 mg total) by mouth 2 (two) times daily., Starting Wed 6/13/2018, Until Sat 6/23/2018, Print      predniSONE (DELTASONE) 20 MG tablet Take 2 tablets (40 mg total) by mouth once daily., Starting Wed 6/13/2018, Until Mon 6/18/2018, Print             Follow-up Information     Naga Bhatti MD In 2 days.    Specialty:  Cardiology  Contact information:  [address]  [address]  [place] [address] [phone]             [facility] - .    Specialty:  Emergency Medicine  Why:  As needed, If symptoms worsen  Contact information:  [address]  [facility] [address] [phone]                   Medical Decision Making    Medical Decision Making:    Clinical Tests:   Lab Tests: Ordered and Reviewed  Radiological Study: Ordered and Reviewed  Medical Tests: Ordered and Reviewed           Scribe Attestation:   Scribe #1: I performed the above scribed service and the documentation accurately describes the services I performed. I attest to the accuracy of the note.    Attending:   Physician Attestation Statement for Scribe #1: I, Edd Harper MD, personally performed the services described in this documentation, as scribed by Pilar Burton, in my presence, and it is both accurate and complete.          Clinical Impression       ICD-10-CM ICD-9-CM   1. Chronic obstructive pulmonary disease, unspecified COPD type J44.9 496   2. SOB (shortness of breath) R06.02 786.05       Disposition:   Disposition: Discharged  Condition: Stable         Edd Harper MD  06/13/18 0458

## 2018-06-20 ENCOUNTER — ANTI-COAG VISIT (OUTPATIENT)
Dept: CARDIOLOGY | Facility: CLINIC | Age: 83
End: 2018-06-20
Payer: MEDICARE

## 2018-06-20 DIAGNOSIS — Z79.01 LONG TERM (CURRENT) USE OF ANTICOAGULANTS: Primary | ICD-10-CM

## 2018-06-20 LAB — INR PPP: 2.5 (ref 2–3)

## 2018-06-20 PROCEDURE — 85610 PROTHROMBIN TIME: CPT | Mod: PBBFAC,PO

## 2018-06-20 NOTE — PROGRESS NOTES
Patient's INR is therapeutic at 2.5.  No changes in dose.  Recheck in 1 month.  Please call should you have any questions or concerns at [phone] or [phone].

## 2018-06-21 ENCOUNTER — OFFICE VISIT (OUTPATIENT)
Dept: PULMONOLOGY | Facility: CLINIC | Age: 83
End: 2018-06-21
Payer: MEDICARE

## 2018-06-21 VITALS
DIASTOLIC BLOOD PRESSURE: 78 MMHG | OXYGEN SATURATION: 96 % | SYSTOLIC BLOOD PRESSURE: 170 MMHG | WEIGHT: 197.56 LBS | HEIGHT: 69 IN | HEART RATE: 76 BPM | BODY MASS INDEX: 29.26 KG/M2 | RESPIRATION RATE: 20 BRPM

## 2018-06-21 DIAGNOSIS — G47.33 OSA (OBSTRUCTIVE SLEEP APNEA): Primary | ICD-10-CM

## 2018-06-21 DIAGNOSIS — J44.9 CHRONIC OBSTRUCTIVE PULMONARY DISEASE, UNSPECIFIED COPD TYPE: ICD-10-CM

## 2018-06-21 DIAGNOSIS — J30.0 VASOMOTOR RHINITIS: ICD-10-CM

## 2018-06-21 PROCEDURE — 99215 OFFICE O/P EST HI 40 MIN: CPT | Mod: S$PBB,,, | Performed by: INTERNAL MEDICINE

## 2018-06-21 PROCEDURE — 99999 PR PBB SHADOW E&M-EST. PATIENT-LVL III: CPT | Mod: PBBFAC,,, | Performed by: INTERNAL MEDICINE

## 2018-06-21 PROCEDURE — 99213 OFFICE O/P EST LOW 20 MIN: CPT | Mod: PBBFAC,PO | Performed by: INTERNAL MEDICINE

## 2018-06-21 RX ORDER — GUAIFENESIN 600 MG/1
1200 TABLET, EXTENDED RELEASE ORAL 2 TIMES DAILY PRN
Qty: 60 TABLET | Status: SHIPPED | OUTPATIENT
Start: 2018-06-21 | End: 2018-08-09 | Stop reason: SDUPTHER

## 2018-06-21 RX ORDER — IPRATROPIUM BROMIDE 42 UG/1
SPRAY, METERED NASAL
Qty: 45 ML | Refills: 4 | Status: SHIPPED | OUTPATIENT
Start: 2018-06-21 | End: 2018-08-09 | Stop reason: SDUPTHER

## 2018-06-21 NOTE — PATIENT INSTRUCTIONS
Take duoneb and atrovent nasal spray at night          Ipratropium nasal spray  What is this medicine?  IPRATROPIUM (dm murillo) is used to relieve a runny nose due to seasonal allergies or non allergic causes, like a cold. This medicine does not help with nasal congestion or sneezing.  How should I use this medicine?  This medicine is for use only in the nose. Follow the directions on the prescription label. Do not use more often than directed. Do not share this medicine with anyone else. Make sure that you are using your nasal spray correctly. Ask you doctor or health care provider if you have any questions.  Talk to your pediatrician regarding the use of this medicine in children. While this drug may be prescribed for children as young as 6 years of age for selected conditions, precautions do apply.  What side effects may I notice from receiving this medicine?  Side effects that you should report to your doctor or health care professional as soon as possible:  · allergic reactions like skin rash, itching or hives, swelling of the face, lips, or tongue or difficulty breathing  · chest pain or fast heartbeat  · dizziness or fainting spell  · eye pain or change in vision  · infection  Side effects that usually do not require medical attention (report to your doctor or health care professional if they continue or are bothersome):  · dry eyes, mouth or nose  · irritation in the nose or throat  · nausea  · nosebleeds  · trouble passing urine  · unusual taste  What may interact with this medicine?  · atropine, hyoscyamine, and related medications  · medicines for motion sickness or dizziness  · medicines for overactive bladder  · some medicines for colds  · some medicines for stomach problems  What if I miss a dose?  If you miss a dose, use it as soon as you can. If it is almost time for your next dose, use only that dose. Do not use double or extra doses.  Where should I keep my medicine?  Keep out of the reach  of children.  Store at room temperature between 15 and 30 degrees C (59 and 86 degrees F). Avoid excessive humidity. Do not freeze. Throw away any unused medicine after the expiration date.  What should I tell my health care provider before I take this medicine?  They need to know if you have any of these conditions:  · bladder problems, difficulty passing urine  · glaucoma  · prostate trouble  · an unusual or allergic reaction to ipratropium, atropine, bromides, soya flour or protein, soybeans or peanuts, peanut oil, other medicines, foods, dyes, or preservatives  · pregnant or trying to get pregnant  · breast-feeding  What should I watch for while using this medicine?  Tell your doctor or health care professional if your symptoms do not improve. Do not use extra medicine. This medicine should start to work within a day or two of treatment.  Do not get this spray in your eyes. It can cause irritation, pain, or blurred vision. If you do get any in your eyes, rinse out with plenty of cool tap water and call your health care provider.  NOTE:This sheet is a summary. It may not cover all possible information. If you have questions about this medicine, talk to your doctor, pharmacist, or health care provider. Copyright© 2017 [employer]        Albuterol; Ipratropium solution for inhalation  What is this medicine?  ALBUTEROL; IPRATROPIUM (al BYOO ter ole; i pra RAKESHE pee um) has two bronchodilators. It helps open up the airways in your lungs to make it easier to breathe. This medicine is used to treat chronic obstructive pulmonary disease (COPD).  How should I use this medicine?  This medicine is used in a nebulizer. Nebulizers make a liquid into an aerosol that you breathe in through your mouth or your mouth and nose into your lungs. You will be taught how to use your nebulizer. Follow the directions on your prescription label. Take your medicine at regular intervals. Do not use more often than directed.  Talk to your  pediatrician regarding the use of this medicine in children. Special care may be needed.  What side effects may I notice from receiving this medicine?  Side effects that you should report to your doctor or health care professional as soon as possible:  · allergic reactions like skin rash, itching or hives, swelling of the face, lips, or tongue  · breathing problems  · feeling faint or lightheaded, falls  · fever  · high blood pressure  · irregular heartbeat or chest pain  · muscle cramps or weakness  · pain, tingling, numbness in the hands or feet  · vomiting  Side effects that usually do not require medical attention (report to your doctor or health care professional if they continue or are bothersome):  · blurred vision  · cough  · difficulty passing urine  · difficulty sleeping  · headache  · nervousness or trembling  · stuffy or runny nose  · unusual taste  · upset stomach  What may interact with this medicine?  Do not take this medicine with any of the following medications:  · MAOIs like Carbex, Eldepryl, Marplan, Nardil, and Parnate  This medicine may also interact with the following medications:  · diuretics  · medicines for depression, anxiety, or psychotic disturbances  · medicines for irregular heartbeat  · medicines for weight loss including some herbal products  · methadone  · pimozide  · some medicines for blood pressure or the heart  · sertindole  What if I miss a dose?  If you miss a dose, use it as soon as you can. If it is almost time for your next dose, use only that dose. Do not use double or extra doses.  Where should I keep my medicine?  Keep out of the reach of children.  Store at a room temperature 2 and 30 degees C (36 to 86 degrees F). Protect from light. Store this medicine in the protective pouch until ready to use. Throw away any unused medicine after the expiration date.  What should I tell my health care provider before I take this medicine?  They need to know if you have any of the  following conditions:  · heart disease  · high blood pressure  · irregular heartbeat  · an unusual or allergic reaction to albuterol, ipratropium, atropine, soya protein, soybeans or peanuts, other medicines, foods, dyes, or preservatives  · pregnant or trying to get pregnant  · breast-feeding  What should I watch for while using this medicine?  Tell your doctor or healthcare professional if your symptoms do not start to get better or if they get worse. If your breathing gets worse while you are using this medicine, call your doctor right away. Do not stop using your medicine unless your doctor tells you to.  Your mouth may get dry. Chewing sugarless gum or sucking hard candy, and drinking plenty of water may help. Contact your doctor if the problem does not go away or is severe.  You may get dizzy or have blurred vision. Do not drive, use machinery, or do anything that needs mental alertness until you know how this medicine affects you. Do not stand or sit up quickly, especially if you are an older patient. This reduces the risk of dizzy or fainting spells.  NOTE:This sheet is a summary. It may not cover all possible information. If you have questions about this medicine, talk to your doctor, pharmacist, or health care provider. Copyright© 2017 [employer]

## 2018-06-21 NOTE — PROGRESS NOTES
Subjective:       Patient ID: Bola Figueroa Sr. is a 83 y.o. male.    Chief Complaint: Sleep Apnea    HPI   Bola Figueroa Sr. presents for review of CPAP compliance. Information from CPAP machine downloaded and reviewed. Summary of compliance report is as follows:  Unable to use Continuous Positive Airway Pressure machine due to mucous production    Patient has complaints of mucous production - wants to try a nasal mask  Patient is not using CPAP as prescribed       COPD  He presents for evaluation and treatment of COPD. The patient is not currently have symptoms / an exacerbation. Just finished prednisone for exacerbation of chronic obstructive pulmonary disease .The patient has COPD for approximately 10 years. Symptoms in previous episodes have included dyspnea, cough and wheezing, and typically last 2 weeks. Previous episodes have been exacerbated by moderate activity. Current treatment includes albuterol nebulizer, which generally provides some relief of symptoms.   He uses 1 pillows at night. Patient currently is not on home oxygen therapy.. The patient is having no constitutional symptoms, denying fever, chills, anorexia, or weight loss. The patient has not been hospitalized for this condition before. He quit smoking approximately many years ago. The patient is experiencing exercise intolerance (difficulty walking 1 blocks on flat ground).- due to arthritis and SOB       Past Medical History:   Diagnosis Date    *Atrial fibrillation     Atrial fibrillation     Bilateral carotid artery stenosis 1/15/2016    Cardiac pacemaker 8/7/2013    Congestive heart failure 4/6/2015    COPD (chronic obstructive pulmonary disease) 8/7/2013    Coronary artery disease     Hyperlipidemia     Hypertension     Long-term (current) use of anticoagulants 8/7/2013    PVD (peripheral vascular disease)     S/P PTCA (percutaneous transluminal coronary angioplasty) 8/7/2013    Sleep apnea 8/7/2013    Stroke      Past Surgical  History:   Procedure Laterality Date    CATARACT EXTRACTION W/  INTRAOCULAR LENS IMPLANT      INSERT / REPLACE / REMOVE PACEMAKER  2005     Review of Systems   Constitutional: Positive for fatigue. Negative for fever.   HENT: Positive for postnasal drip, rhinorrhea and congestion.    Eyes: Negative for redness and itching.   Respiratory: Positive for cough, sputum production, shortness of breath, dyspnea on extertion, use of rescue inhaler and Paroxysmal Nocturnal Dyspnea.    Cardiovascular: Negative for chest pain, palpitations and leg swelling.   Genitourinary: Negative for difficulty urinating and hematuria.   Endocrine: Negative for cold intolerance and heat intolerance.    Skin: Negative for rash.   Gastrointestinal: Negative for nausea and abdominal pain.   Neurological: Negative for dizziness, syncope, weakness and light-headedness.   Hematological: Negative for adenopathy. Does not bruise/bleed easily.   Psychiatric/Behavioral: Negative for sleep disturbance. The patient is not nervous/anxious.          Objective:      Physical Exam   Constitutional: He is oriented to person, place, and time. He appears well-developed and well-nourished.   HENT:   Head: Normocephalic and atraumatic.   Eyes: Conjunctivae are normal. Pupils are equal, round, and reactive to light.   Neck: Neck supple. No JVD present. No tracheal deviation present. No thyromegaly present.   Cardiovascular: Normal rate.  An irregularly irregular rhythm present.   Murmur heard.   Systolic murmur is present with a grade of 2/6   Pulmonary/Chest: He has decreased breath sounds. He has wheezes in the right lower field and the left lower field. He has no rhonchi. He has no rales.   Abdominal: Soft. Bowel sounds are normal.   Musculoskeletal: Normal range of motion. He exhibits no edema or tenderness.   Lymphadenopathy:     He has no cervical adenopathy.   Neurological: He is alert and oriented to person, place, and time.   Skin: Skin is warm and  dry.   Nursing note and vitals reviewed.    Personal Diagnostic Review  PSG: significant for Obstructive Sleep Apnea    No flowsheet data found.      Assessment:       1. RUTH (obstructive sleep apnea)    2. Vasomotor rhinitis    3. Chronic obstructive pulmonary disease, unspecified COPD type        Outpatient Encounter Prescriptions as of 6/21/2018   Medication Sig Dispense Refill    albuterol (PROVENTIL) 2.5 mg /3 mL (0.083 %) nebulizer solution Take 3 mLs (2.5 mg total) by nebulization every 4 (four) hours as needed for Wheezing or Shortness of Breath. 320 mL 4    albuterol-ipratropium 2.5mg-0.5mg/3mL (DUO-NEB) 0.5 mg-3 mg(2.5 mg base)/3 mL nebulizer solution Take 3 mLs by nebulization every 6 (six) hours as needed for Wheezing. Rescue 360 mL 11    amlodipine-atorvastatin (CADUET) 10-20 mg per tablet Take 1 tablet by mouth once daily.      aspirin (ECOTRIN) 81 MG EC tablet Take 81 mg by mouth once daily.      budesonide (PULMICORT) 0.5 mg/2 mL nebulizer solution Take 2 mLs (0.5 mg total) by nebulization 2 (two) times daily. 120 mL 11    carvedilol (COREG) 6.25 MG tablet take 1 tablet by mouth twice a day with meals      doxycycline (VIBRAMYCIN) 100 MG Cap Take 1 capsule (100 mg total) by mouth 2 (two) times daily. 20 capsule 0    fluticasone-salmeterol 250-50 mcg/dose (ADVAIR DISKUS) 250-50 mcg/dose diskus inhaler Inhale 1 puff into the lungs 2 (two) times daily. Wash out mouth after use. 60 each 12    furosemide (LASIX) 40 MG tablet Take 40 mg by mouth once daily.       gabapentin (NEURONTIN) 100 MG capsule Take 100 mg by mouth 3 (three) times daily.  0    guaiFENesin (MUCINEX) 600 mg 12 hr tablet Take 2 tablets (1,200 mg total) by mouth 2 (two) times daily as needed for Congestion. 60 tablet prn    hydrALAZINE (APRESOLINE) 25 MG tablet take 1 tablet by mouth three times a day 90 tablet 5    ipratropium (ATROVENT) 42 mcg (0.06 %) nasal spray instill 2 sprays into each nostril three times a day 45 mL  4    isosorbide dinitrate (ISORDIL) 20 MG tablet take 1 tablet by mouth every 12 hours with HYDRALAZINE 60 tablet 6    latanoprost 0.005 % ophthalmic solution Place 1 drop into both eyes nightly.  0    levetiracetam (KEPPRA) 500 MG Tab Take 1 tablet by mouth Twice daily.      lubiprostone (AMITIZA) 8 MCG Cap Take 8 mcg by mouth 2 (two) times daily with meals.      nitroGLYCERIN (NITROBID) 6.5 MG CpSR take 1 capsule by mouth twice a day      pantoprazole (PROTONIX) 40 MG tablet Take 40 mg by mouth.      tamsulosin (FLOMAX) 0.4 mg Cp24 0.4 mg.      traMADol (ULTRAM) 50 mg tablet take 1 tablet by mouth every 6 hours if needed for UP TO 10 days  0    warfarin (COUMADIN) 5 MG tablet Take 1/2 tablet on Tuesdays and 1 tablet all other days as directed by the [facility]. 30 tablet 3    [DISCONTINUED] ipratropium (ATROVENT) 0.06 % nasal spray instill 2 sprays into each nostril three times a day 45 mL 4    [DISCONTINUED] ROBAFEN 100 mg/5 mL syrup   0     No facility-administered encounter medications on file as of 6/21/2018.      Orders Placed This Encounter   Procedures    CPAP/BIPAP SUPPLIES     [facility] DURABLE MEDICAL EQUIPMENT  [address], [address]  [phone]  Fax [phone]     Order Specific Question:   Type of mask:     Answer:   Nasal     Order Specific Question:   Headgear?     Answer:   Yes     Order Specific Question:   Tubing?     Answer:   Yes     Order Specific Question:   Humidifier chamber?     Answer:   Yes     Order Specific Question:   Chin strap?     Answer:   Yes     Order Specific Question:   Filters?     Answer:   Yes     Order Specific Question:   Other supplies:     Answer:   needs a chin strap     Order Specific Question:   Length of need (1-99 months):     Answer:   99     Plan:     Follow-up in 6 weeks, sooner should new symptoms or problems arise.    Follow-up in about 6 weeks (around 8/2/2018) for CPAP initial download - bring CPAP machine.      MEDICAL DECISION MAKING: Moderate to high complexity.  Overall, the multiple problems listed are of moderate to high severity that may impact quality of life and activities of daily living. Side effects of medications, treatment plan as well as options and alternatives reviewed and discussed with patient. There was counseling of patient concerning these issues.    Total time spent in face to face counseling and coordination of care - 40 minutes over 50% of time was used in discussion of prognosis, risks, benefits of treatment, instructions and compliance with regimen . Discussion with other physicians or health care providers (homehealth, durable medical equipment providers).

## 2018-06-28 DIAGNOSIS — I10 ESSENTIAL HYPERTENSION: ICD-10-CM

## 2018-06-28 RX ORDER — HYDRALAZINE HYDROCHLORIDE 25 MG/1
TABLET, FILM COATED ORAL
Qty: 90 TABLET | Refills: 5 | Status: SHIPPED | OUTPATIENT
Start: 2018-06-28 | End: 2019-02-01 | Stop reason: SDUPTHER

## 2018-07-11 ENCOUNTER — ANTI-COAG VISIT (OUTPATIENT)
Dept: CARDIOLOGY | Facility: CLINIC | Age: 83
End: 2018-07-11
Payer: MEDICARE

## 2018-07-11 DIAGNOSIS — Z79.01 LONG TERM (CURRENT) USE OF ANTICOAGULANTS: Primary | ICD-10-CM

## 2018-07-11 LAB — INR PPP: 2 (ref 2–3)

## 2018-07-11 PROCEDURE — 85610 PROTHROMBIN TIME: CPT | Mod: PBBFAC,PO

## 2018-07-11 NOTE — PROGRESS NOTES
Patient's INR is therapeutic at 2.0.  Instructed to decrease Vitamin K in diet.  No changes in dose.  Recheck in 1 month.  Please call should you have any questions or concerns at [phone] or [phone].

## 2018-07-12 ENCOUNTER — OFFICE VISIT (OUTPATIENT)
Dept: PODIATRY | Facility: CLINIC | Age: 83
End: 2018-07-12
Payer: MEDICARE

## 2018-07-12 VITALS
DIASTOLIC BLOOD PRESSURE: 79 MMHG | HEART RATE: 70 BPM | BODY MASS INDEX: 29.26 KG/M2 | WEIGHT: 197.56 LBS | HEIGHT: 69 IN | SYSTOLIC BLOOD PRESSURE: 150 MMHG

## 2018-07-12 DIAGNOSIS — B35.1 DERMATOPHYTOSIS OF NAIL: Primary | ICD-10-CM

## 2018-07-12 DIAGNOSIS — I73.9 PERIPHERAL VASCULAR DISEASE: ICD-10-CM

## 2018-07-12 PROCEDURE — 99499 UNLISTED E&M SERVICE: CPT | Mod: S$PBB,,, | Performed by: PODIATRIST

## 2018-07-12 PROCEDURE — 99999 PR PBB SHADOW E&M-EST. PATIENT-LVL IV: CPT | Mod: PBBFAC,,, | Performed by: PODIATRIST

## 2018-07-12 PROCEDURE — 11721 DEBRIDE NAIL 6 OR MORE: CPT | Mod: Q8,PBBFAC,PO | Performed by: PODIATRIST

## 2018-07-12 PROCEDURE — 99214 OFFICE O/P EST MOD 30 MIN: CPT | Mod: PBBFAC,PO,25 | Performed by: PODIATRIST

## 2018-07-12 PROCEDURE — 11721 DEBRIDE NAIL 6 OR MORE: CPT | Mod: Q8,S$PBB,, | Performed by: PODIATRIST

## 2018-07-12 NOTE — PROGRESS NOTES
"[facility] -   PODIATRIC MEDICINE AND SURGERY  PROGRESS NOTE         CHIEF COMPLAINT  Chief Complaint   Patient presents with    Routine Foot Care     PCP Dr. Bhatti 06/12/18, 3 month RFC-A       HPI  SUBJECTIVE: Bola Figueroa Sr. is a 83 y.o. male who  has a past medical history of *Atrial fibrillation; Atrial fibrillation; Bilateral carotid artery stenosis (1/15/2016); Cardiac pacemaker (8/7/2013); Congestive heart failure (4/6/2015); COPD (chronic obstructive pulmonary disease) (8/7/2013); Coronary artery disease; Hyperlipidemia; Hypertension; Long-term (current) use of anticoagulants (8/7/2013); PVD (peripheral vascular disease); S/P PTCA (percutaneous transluminal coronary angioplasty) (8/7/2013); Sleep apnea (8/7/2013); and Stroke. Bolapresents to clinic for high risk foot exam and care.  Bola denies numbness, burning, and/or tingling sensations in their feet. Patient admits to painful toenails aggravated by increased weight bearing, shoe gear, and pressure. States pain is relieved with routine debridements. Patient has no other pedal complaints at this time.    This patient has documented high risk feet requiring routine maintenance secondary to PVD and those secondary complications of PVD,  as mentioned.      HgA1c: No results found for: HGBA1C      REVIEW OF SYSTEMS  General: Denies any fever or chills  Chest: Denies shortness of breath, wheezing, coughing, or sputum production  Heart: Denies chest pain.  As noted above and per history of current illness above, otherwise negative in the remainder of the 14 systems.     PHYSICAL EXAM  Vitals:    07/12/18 1045   BP: (!) 150/79   Pulse: 70   Weight: 89.6 kg (197 lb 8.5 oz)   Height: 5' 9" (1.753 m)       GEN:  This patient is well-developed, well-nourished and appears stated age, well-oriented to person, place and time, and cooperative and pleasant on today's visit.      LOWER EXTREMITY  Vascular:   · DP pedal pulse 0/4 b/l, PT pedal pulse 0/4 " b/l  · Skin temperature warm to warm from prox to distally  · CFT <5 secs b/l  · There is mild  edema noted b/l.     Dermatologic:   · Thickened, dystrophic, elongated toenails with subungal debris 1-5 b/l.   · No open skin lesions noted  · No erythema or drainage noted b/l.  · Webspaces are C/D/I B/L.  · There is no hyperkeratotic tissue noted.  · Skin texture and turgor WNL  · There is no pedal hair growth noted    Neurologic:  · Protective sensation absent at 0/10 sites upon examination with [place] Weinsten 5.07 g monofilament.   · Propioception intact at 1st MTPJ b/l.   · Babinski reflex absent b/l. Light touch and sharp/dull sensation intact b/l.    Musculoskeletal/Orthopedic:  · No symptomatic structural abnormalities noted.   · Muscle strength is 5/5 for foot inverters, everters, plantarflexors, and dorsiflexors. Muscle tone is normal.  · Pain free range of motion in all four quadrants with stiffness and limitation b/l      ASSESSMENT  1. Dermatophytosis  2. PVD    PLAN  -patient was examined and evaulated  -Discuss presenting problems, etiology, pathologic processes and management options with patient today.   -I counseled the patient on their conditions, their implications and medical management. at.  -With patient's permission, nails were aggressively reduced and debrided x 10 to their soft tissue attachment mechanically and with electric , removing all offending nail and debris. Patient relates relief following the procedure. Patient will continue to monitor the areas daily, inspect feet, wear protective shoe gear when ambulatory, moisturizer to maintain skin integrity.    Future Appointments  Date Time Provider Department Center   8/8/2018 9:00 AM COUMADIN, [place] [facility] COUMAD [place]   8/9/2018 9:00 AM [facility] [place]   8/9/2018 9:20 AM SPIROMETRY, [facility] PULMLAB [place]   8/9/2018 9:40 AM Juanjo Wallace MD [facility] PULMSVC [place]   10/4/2018 11:00 AM Roma Cueto DPM [facility] POD [place]    11/2/2018 8:50 AM LABORATORY, [facility] [facility] LAB [place]   11/9/2018 9:30 AM PACEMAKER CLINIC [facility] ARR PRO [place]   11/9/2018 9:45 AM Caroline Mcdonough MD [facility] CARDIO [place]

## 2018-07-23 ENCOUNTER — HOSPITAL ENCOUNTER (EMERGENCY)
Facility: HOSPITAL | Age: 83
Discharge: HOME | End: 2018-07-23
Attending: EMERGENCY MEDICINE | Admitting: EMERGENCY MEDICINE
Payer: MEDICARE

## 2018-07-23 VITALS
TEMPERATURE: 98 F | DIASTOLIC BLOOD PRESSURE: 70 MMHG | HEART RATE: 73 BPM | RESPIRATION RATE: 20 BRPM | BODY MASS INDEX: 27.98 KG/M2 | WEIGHT: 195.44 LBS | HEIGHT: 70 IN | SYSTOLIC BLOOD PRESSURE: 135 MMHG | OXYGEN SATURATION: 98 %

## 2018-07-23 DIAGNOSIS — R07.9 CHEST PAIN: ICD-10-CM

## 2018-07-23 DIAGNOSIS — Z98.61 CORONARY ANGIOPLASTY STATUS: ICD-10-CM

## 2018-07-23 DIAGNOSIS — R07.9 CHEST PAIN, UNSPECIFIED TYPE: Primary | ICD-10-CM

## 2018-07-23 DIAGNOSIS — I48.91 ATRIAL FIBRILLATION, UNSPECIFIED TYPE: ICD-10-CM

## 2018-07-23 DIAGNOSIS — T82.9XXA PACEMAKER COMPLICATIONS: ICD-10-CM

## 2018-07-23 LAB
ALBUMIN SERPL BCP-MCNC: 4 G/DL
ALP SERPL-CCNC: 64 U/L
ALT SERPL W/O P-5'-P-CCNC: 12 U/L
ANION GAP SERPL CALC-SCNC: 11 MMOL/L
APTT BLDCRRT: 29.4 SEC
AST SERPL-CCNC: 25 U/L
BASOPHILS # BLD AUTO: 0.04 K/UL
BASOPHILS NFR BLD: 0.5 %
BILIRUB SERPL-MCNC: 2.1 MG/DL
BNP SERPL-MCNC: 177 PG/ML
BUN SERPL-MCNC: 10 MG/DL
CALCIUM SERPL-MCNC: 9.3 MG/DL
CHLORIDE SERPL-SCNC: 105 MMOL/L
CO2 SERPL-SCNC: 28 MMOL/L
CREAT SERPL-MCNC: 1.1 MG/DL
DIFFERENTIAL METHOD: NORMAL
EOSINOPHIL # BLD AUTO: 0.2 K/UL
EOSINOPHIL NFR BLD: 2.9 %
ERYTHROCYTE [DISTWIDTH] IN BLOOD BY AUTOMATED COUNT: 13.5 %
EST. GFR  (AFRICAN AMERICAN): >60 ML/MIN/1.73 M^2
EST. GFR  (NON AFRICAN AMERICAN): >60 ML/MIN/1.73 M^2
GLUCOSE SERPL-MCNC: 105 MG/DL
HCT VFR BLD AUTO: 42.4 %
HGB BLD-MCNC: 14.2 G/DL
INR PPP: 1.1
LYMPHOCYTES # BLD AUTO: 2.2 K/UL
LYMPHOCYTES NFR BLD: 28.7 %
MCH RBC QN AUTO: 30.3 PG
MCHC RBC AUTO-ENTMCNC: 33.5 G/DL
MCV RBC AUTO: 91 FL
MONOCYTES # BLD AUTO: 0.5 K/UL
MONOCYTES NFR BLD: 6.8 %
NEUTROPHILS # BLD AUTO: 4.6 K/UL
NEUTROPHILS NFR BLD: 61.1 %
PLATELET # BLD AUTO: 221 K/UL
PMV BLD AUTO: 11 FL
POTASSIUM SERPL-SCNC: 3.3 MMOL/L
PROT SERPL-MCNC: 7.8 G/DL
PROTHROMBIN TIME: 11.5 SEC
RBC # BLD AUTO: 4.68 M/UL
SODIUM SERPL-SCNC: 144 MMOL/L
TROPONIN I SERPL DL<=0.01 NG/ML-MCNC: 0.02 NG/ML
TROPONIN I SERPL DL<=0.01 NG/ML-MCNC: 0.02 NG/ML
WBC # BLD AUTO: 7.5 K/UL

## 2018-07-23 PROCEDURE — 84484 ASSAY OF TROPONIN QUANT: CPT

## 2018-07-23 PROCEDURE — 25000003 PHARM REV CODE 250: Performed by: EMERGENCY MEDICINE

## 2018-07-23 PROCEDURE — 99284 EMERGENCY DEPT VISIT MOD MDM: CPT | Mod: 25

## 2018-07-23 PROCEDURE — 25000003 PHARM REV CODE 250: Performed by: NURSE PRACTITIONER

## 2018-07-23 PROCEDURE — 80053 COMPREHEN METABOLIC PANEL: CPT

## 2018-07-23 PROCEDURE — 93010 ELECTROCARDIOGRAM REPORT: CPT | Mod: ,,, | Performed by: INTERNAL MEDICINE

## 2018-07-23 PROCEDURE — 83880 ASSAY OF NATRIURETIC PEPTIDE: CPT

## 2018-07-23 PROCEDURE — 85025 COMPLETE CBC W/AUTO DIFF WBC: CPT

## 2018-07-23 PROCEDURE — 85610 PROTHROMBIN TIME: CPT

## 2018-07-23 PROCEDURE — 85730 THROMBOPLASTIN TIME PARTIAL: CPT

## 2018-07-23 PROCEDURE — 36000 PLACE NEEDLE IN VEIN: CPT

## 2018-07-23 PROCEDURE — 84484 ASSAY OF TROPONIN QUANT: CPT | Mod: 91

## 2018-07-23 RX ORDER — AMLODIPINE BESYLATE AND ATORVASTATIN CALCIUM 10; 20 MG/1; MG/1
1 TABLET, FILM COATED ORAL DAILY
Status: DISCONTINUED | OUTPATIENT
Start: 2018-07-24 | End: 2018-07-23 | Stop reason: CLARIF

## 2018-07-23 RX ORDER — ISOSORBIDE DINITRATE 20 MG/1
20 TABLET ORAL 2 TIMES DAILY
Status: DISCONTINUED | OUTPATIENT
Start: 2018-07-23 | End: 2018-07-23 | Stop reason: HOSPADM

## 2018-07-23 RX ORDER — AMLODIPINE BESYLATE 5 MG/1
10 TABLET ORAL DAILY
Status: DISCONTINUED | OUTPATIENT
Start: 2018-07-24 | End: 2018-07-23 | Stop reason: HOSPADM

## 2018-07-23 RX ORDER — BUDESONIDE 0.5 MG/2ML
0.5 INHALANT ORAL 2 TIMES DAILY
Status: DISCONTINUED | OUTPATIENT
Start: 2018-07-23 | End: 2018-07-23 | Stop reason: HOSPADM

## 2018-07-23 RX ORDER — NAPROXEN SODIUM 220 MG/1
81 TABLET, FILM COATED ORAL
Status: COMPLETED | OUTPATIENT
Start: 2018-07-23 | End: 2018-07-23

## 2018-07-23 RX ORDER — ATORVASTATIN CALCIUM 10 MG/1
20 TABLET, FILM COATED ORAL DAILY
Status: DISCONTINUED | OUTPATIENT
Start: 2018-07-24 | End: 2018-07-23 | Stop reason: HOSPADM

## 2018-07-23 RX ORDER — FUROSEMIDE 40 MG/1
40 TABLET ORAL DAILY
Status: DISCONTINUED | OUTPATIENT
Start: 2018-07-23 | End: 2018-07-23 | Stop reason: HOSPADM

## 2018-07-23 RX ORDER — LEVETIRACETAM 500 MG/1
500 TABLET ORAL 2 TIMES DAILY
Status: DISCONTINUED | OUTPATIENT
Start: 2018-07-23 | End: 2018-07-23 | Stop reason: HOSPADM

## 2018-07-23 RX ORDER — ONDANSETRON 2 MG/ML
4 INJECTION INTRAMUSCULAR; INTRAVENOUS EVERY 8 HOURS PRN
Status: DISCONTINUED | OUTPATIENT
Start: 2018-07-23 | End: 2018-07-23 | Stop reason: HOSPADM

## 2018-07-23 RX ORDER — CARVEDILOL 3.12 MG/1
6.25 TABLET ORAL 2 TIMES DAILY WITH MEALS
Status: DISCONTINUED | OUTPATIENT
Start: 2018-07-23 | End: 2018-07-23 | Stop reason: HOSPADM

## 2018-07-23 RX ORDER — IPRATROPIUM BROMIDE AND ALBUTEROL SULFATE 2.5; .5 MG/3ML; MG/3ML
3 SOLUTION RESPIRATORY (INHALATION) EVERY 6 HOURS
Status: DISCONTINUED | OUTPATIENT
Start: 2018-07-23 | End: 2018-07-23 | Stop reason: HOSPADM

## 2018-07-23 RX ORDER — LATANOPROST 50 UG/ML
1 SOLUTION/ DROPS OPHTHALMIC NIGHTLY
Status: DISCONTINUED | OUTPATIENT
Start: 2018-07-23 | End: 2018-07-23 | Stop reason: HOSPADM

## 2018-07-23 RX ORDER — HYDRALAZINE HYDROCHLORIDE 25 MG/1
25 TABLET, FILM COATED ORAL 3 TIMES DAILY
Status: DISCONTINUED | OUTPATIENT
Start: 2018-07-23 | End: 2018-07-23 | Stop reason: HOSPADM

## 2018-07-23 RX ORDER — PANTOPRAZOLE SODIUM 40 MG/1
40 TABLET, DELAYED RELEASE ORAL DAILY
Status: DISCONTINUED | OUTPATIENT
Start: 2018-07-23 | End: 2018-07-23 | Stop reason: HOSPADM

## 2018-07-23 RX ORDER — PANTOPRAZOLE SODIUM 40 MG/1
40 TABLET, DELAYED RELEASE ORAL DAILY
Status: DISCONTINUED | OUTPATIENT
Start: 2018-07-23 | End: 2018-07-23 | Stop reason: SDUPTHER

## 2018-07-23 RX ORDER — ASPIRIN 81 MG/1
81 TABLET ORAL DAILY
Status: DISCONTINUED | OUTPATIENT
Start: 2018-07-24 | End: 2018-07-23 | Stop reason: HOSPADM

## 2018-07-23 RX ORDER — LUBIPROSTONE 8 UG/1
8 CAPSULE ORAL 2 TIMES DAILY WITH MEALS
Status: DISCONTINUED | OUTPATIENT
Start: 2018-07-23 | End: 2018-07-23 | Stop reason: HOSPADM

## 2018-07-23 RX ORDER — GABAPENTIN 100 MG/1
100 CAPSULE ORAL 3 TIMES DAILY
Status: DISCONTINUED | OUTPATIENT
Start: 2018-07-23 | End: 2018-07-23 | Stop reason: HOSPADM

## 2018-07-23 RX ADMIN — ASPIRIN 81 MG 81 MG: 81 TABLET ORAL at 11:07

## 2018-07-23 RX ADMIN — PANTOPRAZOLE SODIUM 40 MG: 40 TABLET, DELAYED RELEASE ORAL at 04:07

## 2018-07-23 RX ADMIN — ISOSORBIDE DINITRATE 20 MG: 20 TABLET ORAL at 04:07

## 2018-07-23 RX ADMIN — LUBIPROSTONE 8 MCG: 8 CAPSULE, GELATIN COATED ORAL at 05:07

## 2018-07-23 RX ADMIN — HYDRALAZINE HYDROCHLORIDE 25 MG: 25 TABLET, FILM COATED ORAL at 04:07

## 2018-07-23 RX ADMIN — CARVEDILOL 6.25 MG: 3.12 TABLET, FILM COATED ORAL at 04:07

## 2018-07-23 RX ADMIN — FUROSEMIDE 40 MG: 40 TABLET ORAL at 04:07

## 2018-07-23 RX ADMIN — GABAPENTIN 100 MG: 100 CAPSULE ORAL at 04:07

## 2018-07-23 RX ADMIN — LEVETIRACETAM 500 MG: 500 TABLET, FILM COATED ORAL at 04:07

## 2018-07-23 RX ADMIN — NITROGLYCERIN 0.5 INCH: 20 OINTMENT TOPICAL at 11:07

## 2018-07-23 NOTE — DISCHARGE SUMMARY
[facility] Hospital Medicine  Discharge Summary      Patient Name: Bola Figueroa Sr.  MRN: [id number]  Admission Date: 7/23/2018  Hospital Length of Stay: 0 days  Discharge Date and Time:  07/23/2018 4:59 PM  Attending Physician: Dr. Chang  Discharging Provider: Vita Floyd NP  Primary Care Provider: Naga Bhatti MD      HPI:   The pt is a 82 yo male with CAD s/p stent, PPM, Afib-on coumadin who presented to ed with chest pain. Pt reports constant left sided chest pain at PPM site x 3 days rated 5/10 nonradiating that was worse yesterday- improved to 3/10 pain today. Pt denied any exacerbating or alleviating factors. Denies SOB, N/V, diaphoresis.   In the ED, /93, EKG showed a paced rhythm. Troponin normal. CXR normal. Pacemaker interrogation reported by ED as normal.     * No surgery found *      Hospital Course:   The pt is a 82 yo male who was placed in observation for chest pain at PPM sit. PPM interrogation was reported as normal by ED. EKG sowed a paced rhythm. Serial troponin normal. No ACS. Pt will follow up with cardiology as outpatient.        Final Active Diagnoses:    Diagnosis Date Noted POA    PRINCIPAL PROBLEM:  Chest pain [R07.9] 07/23/2018 Yes      Problems Resolved During this Admission:    Diagnosis Date Noted Date Resolved POA       Discharged Condition: stable    Disposition: Home or Self Care    Follow Up:  Follow-up Information     Naga Bhatti MD In 3 days.    Specialty:  Cardiology  Contact information:  [address]  [address]  [place] [address] [phone]             [facility] In 1 week.    Specialty:  Cardiology  Contact information:  [address] [address] [phone]  Additional information:  (off [facility]) 2nd floor               Patient Instructions:     Ambulatory Referral to Cardiology   Referral Priority: Routine Referral Type: Consultation   Referral Reason: Specialty  Services Required    Requested Specialty: Cardiology    Number of Visits Requested: 1      Diet Cardiac     Activity as tolerated         Significant Diagnostic Studies:   Imaging Results          X-Ray Chest AP Portable (Final result)  Result time 07/23/18 11:16:39    Final result by SALTY Mccollum Sr., MD (07/23/18 11:16:39)                 Impression:      1. The lungs are clear.  2. A cardiac pacemaker remains in place. The leads appear to be intact.  .      Electronically signed by: Addy Mccollum MD  Date:    07/23/2018  Time:    11:16             Narrative:    EXAMINATION:  XR CHEST AP PORTABLE    CLINICAL HISTORY:  Chest pain, unspecified    COMPARISON:  06/13/2018    FINDINGS:  A cardiac pacemaker remains in place.  The leads appear to be intact.  The size of the heart is normal. The lungs are clear. There is no pneumothorax.  The costophrenic angles are sharp.                                Pending Diagnostic Studies:     None         Medications:  Reconciled Home Medications:      Medication List      CONTINUE taking these medications    albuterol 2.5 mg /3 mL (0.083 %) nebulizer solution  Commonly known as:  PROVENTIL  Take 3 mLs (2.5 mg total) by nebulization every 4 (four) hours as needed for Wheezing or Shortness of Breath.     albuterol-ipratropium 2.5 mg-0.5 mg/3 mL nebulizer solution  Commonly known as:  DUO-NEB  Take 3 mLs by nebulization every 6 (six) hours as needed for Wheezing. Rescue     AMITIZA 8 MCG Cap  Generic drug:  lubiprostone  Take 8 mcg by mouth 2 (two) times daily with meals.     aspirin 81 MG EC tablet  Commonly known as:  ECOTRIN  Take 81 mg by mouth once daily.     budesonide 0.5 mg/2 mL nebulizer solution  Commonly known as:  PULMICORT  Take 2 mLs (0.5 mg total) by nebulization 2 (two) times daily.     carvedilol 6.25 MG tablet  Commonly known as:  COREG  take 1 tablet by mouth twice a day with meals     fluticasone-salmeterol 250-50 mcg/dose 250-50 mcg/dose diskus  inhaler  Commonly known as:  ADVAIR DISKUS  Inhale 1 puff into the lungs 2 (two) times daily. Wash out mouth after use.     furosemide 40 MG tablet  Commonly known as:  LASIX  Take 40 mg by mouth once daily.     gabapentin 100 MG capsule  Commonly known as:  NEURONTIN  Take 100 mg by mouth 3 (three) times daily.     guaiFENesin 600 mg 12 hr tablet  Commonly known as:  MUCINEX  Take 2 tablets (1,200 mg total) by mouth 2 (two) times daily as needed for Congestion.     hydrALAZINE 25 MG tablet  Commonly known as:  APRESOLINE  take 1 tablet by mouth three times a day     ipratropium 42 mcg (0.06 %) nasal spray  Commonly known as:  ATROVENT  instill 2 sprays into each nostril three times a day     isosorbide dinitrate 20 MG tablet  Commonly known as:  ISORDIL  take 1 tablet by mouth every 12 hours with HYDRALAZINE     latanoprost 0.005 % ophthalmic solution  Place 1 drop into both eyes nightly.     levETIRAcetam 500 MG Tab  Commonly known as:  KEPPRA  Take 1 tablet by mouth Twice daily.     nitroGLYCERIN 6.5 MG Cpsr  Commonly known as:  NITROBID  take 1 capsule by mouth twice a day     pantoprazole 40 MG tablet  Commonly known as:  PROTONIX  Take 40 mg by mouth.     tamsulosin 0.4 mg Cap  Commonly known as:  FLOMAX  0.4 mg.     traMADol 50 mg tablet  Commonly known as:  ULTRAM  take 1 tablet by mouth every 6 hours if needed for UP TO 10 days     warfarin 5 MG tablet  Commonly known as:  COUMADIN  Take 1/2 tablet on Tuesdays and 1 tablet all other days as directed by the [facility]mad[facility].        ASK your doctor about these medications    amlodipine-atorvastatin 10-20 mg per tablet  Commonly known as:  CADUET  Take 1 tablet by mouth once daily.            Indwelling Lines/Drains at time of discharge:   Lines/Drains/Airways          No matching active lines, drains, or airways          Time spent on the discharge of patient: 42 minutes  Patient was seen and examined on the date of discharge and determined to be suitable for  discharge.         Vita Floyd NP  Department of Hospital Medicine  [facility]

## 2018-07-23 NOTE — HOSPITAL COURSE
The pt is a 82 yo male who was placed in observation for chest pain at PPM sit. PPM interrogation was reported as normal by ED. EKG sowed a paced rhythm. Serial troponin normal. No ACS. Pt will follow up with cardiology as outpatient.

## 2018-07-23 NOTE — H&P
[facility]  History & Physical    Patient Name: Bola Figueroa Sr.  MRN: [id number]  Admission Date: 7/23/2018  Attending Physician:Primary Care Provider: Naga Bhatti MD         Patient information was obtained from patient and ER records.     Subjective:     Principal Problem:Chest pain    Chief Complaint:   Chief Complaint   Patient presents with    Pacemaker Problem     pt states he has pain around his pacemaker/defib for the past 3 days        HPI: The pt is a 84 yo male with CAD s/p stent, PPM, Afib-on coumadin who presented to ed with chest pain. Pt reports constant left sided chest pain at PPM site x 3 days rated 5/10 nonradiating that was worse yesterday- improved to 3/10 pain today. Pt denied any exacerbating or alleviating factors. Denies SOB, N/V, diaphoresis.   In the ED, /93, EKG showed a paced rhythm. Troponin normal. CXR normal. Pacemaker interrogation reported by ED as normal.     Past Medical History:   Diagnosis Date    *Atrial fibrillation     Atrial fibrillation     Bilateral carotid artery stenosis 1/15/2016    Cardiac pacemaker 8/7/2013    Congestive heart failure 4/6/2015    COPD (chronic obstructive pulmonary disease) 8/7/2013    Coronary artery disease     Hyperlipidemia     Hypertension     Long-term (current) use of anticoagulants 8/7/2013    PVD (peripheral vascular disease)     S/P PTCA (percutaneous transluminal coronary angioplasty) 8/7/2013    Sleep apnea 8/7/2013    Stroke        Past Surgical History:   Procedure Laterality Date    CATARACT EXTRACTION W/  INTRAOCULAR LENS IMPLANT      INSERT / REPLACE / REMOVE PACEMAKER  2005       Review of patient's allergies indicates:   Allergen Reactions    No known drug allergies        No current facility-administered medications on file prior to encounter.      Current Outpatient Prescriptions on File Prior to Encounter   Medication Sig    albuterol (PROVENTIL) 2.5 mg /3 mL  (0.083 %) nebulizer solution Take 3 mLs (2.5 mg total) by nebulization every 4 (four) hours as needed for Wheezing or Shortness of Breath.    albuterol-ipratropium 2.5mg-0.5mg/3mL (DUO-NEB) 0.5 mg-3 mg(2.5 mg base)/3 mL nebulizer solution Take 3 mLs by nebulization every 6 (six) hours as needed for Wheezing. Rescue    amlodipine-atorvastatin (CADUET) 10-20 mg per tablet Take 1 tablet by mouth once daily.    aspirin (ECOTRIN) 81 MG EC tablet Take 81 mg by mouth once daily.    budesonide (PULMICORT) 0.5 mg/2 mL nebulizer solution Take 2 mLs (0.5 mg total) by nebulization 2 (two) times daily.    carvedilol (COREG) 6.25 MG tablet take 1 tablet by mouth twice a day with meals    fluticasone-salmeterol 250-50 mcg/dose (ADVAIR DISKUS) 250-50 mcg/dose diskus inhaler Inhale 1 puff into the lungs 2 (two) times daily. Wash out mouth after use.    furosemide (LASIX) 40 MG tablet Take 40 mg by mouth once daily.     gabapentin (NEURONTIN) 100 MG capsule Take 100 mg by mouth 3 (three) times daily.    guaiFENesin (MUCINEX) 600 mg 12 hr tablet Take 2 tablets (1,200 mg total) by mouth 2 (two) times daily as needed for Congestion.    hydrALAZINE (APRESOLINE) 25 MG tablet take 1 tablet by mouth three times a day    ipratropium (ATROVENT) 42 mcg (0.06 %) nasal spray instill 2 sprays into each nostril three times a day    isosorbide dinitrate (ISORDIL) 20 MG tablet take 1 tablet by mouth every 12 hours with HYDRALAZINE    latanoprost 0.005 % ophthalmic solution Place 1 drop into both eyes nightly.    levetiracetam (KEPPRA) 500 MG Tab Take 1 tablet by mouth Twice daily.    lubiprostone (AMITIZA) 8 MCG Cap Take 8 mcg by mouth 2 (two) times daily with meals.    nitroGLYCERIN (NITROBID) 6.5 MG CpSR take 1 capsule by mouth twice a day    pantoprazole (PROTONIX) 40 MG tablet Take 40 mg by mouth.    tamsulosin (FLOMAX) 0.4 mg Cp24 0.4 mg.    traMADol (ULTRAM) 50 mg tablet take 1 tablet by mouth every 6 hours if needed for UP  TO 10 days    warfarin (COUMADIN) 5 MG tablet Take 1/2 tablet on Tuesdays and 1 tablet all other days as directed by the Coumad[facility] Clinic.     Family History     Problem Relation (Age of Onset)    Diabetes Sister, Maternal Grandfather    Fainting Sister    Heart attack Paternal Uncle    Heart disease Paternal Uncle    Hypertension Maternal Grandmother        Social History Main Topics    Smoking status: Former Smoker     Packs/day: 1.50     Years: 35.00     Types: Cigarettes     Quit date: 11/13/1979    Smokeless tobacco: Never Used    Alcohol use No      Comment: QUIT 08/13/77    Drug use: No    Sexual activity: Not on file     Review of Systems   Constitutional: Negative for appetite change, chills, diaphoresis, fatigue and fever.   HENT: Negative for congestion, nosebleeds, sore throat and trouble swallowing.    Eyes: Negative for pain, discharge and visual disturbance.   Respiratory: Negative for apnea, cough, chest tightness, shortness of breath, wheezing and stridor.    Cardiovascular: Positive for chest pain. Negative for palpitations and leg swelling.   Gastrointestinal: Negative for abdominal distention, abdominal pain, blood in stool, constipation, diarrhea, nausea and vomiting.   Endocrine: Negative for cold intolerance and heat intolerance.   Genitourinary: Negative for difficulty urinating, dysuria, flank pain, frequency and urgency.   Musculoskeletal: Negative for arthralgias, back pain, joint swelling, myalgias, neck pain and neck stiffness.   Skin: Negative for rash and wound.   Allergic/Immunologic: Negative for food allergies and immunocompromised state.   Neurological: Negative for dizziness, seizures, syncope, facial asymmetry, weakness, light-headedness and headaches.   Hematological: Negative for adenopathy.   Psychiatric/Behavioral: Negative for agitation, behavioral problems and confusion. The patient is not nervous/anxious.      Objective:     Vital Signs (Most Recent):  Temp: 97.9 °F  (36.6 °C) (07/23/18 1036)  Pulse: 69 (07/23/18 1302)  Resp: 15 (07/23/18 1302)  BP: (!) 188/87 (07/23/18 1302)  SpO2: 99 % (07/23/18 1302) Vital Signs (24h Range):  Temp:  [97.9 °F (36.6 °C)] 97.9 °F (36.6 °C)  Pulse:  [69-78] 69  Resp:  [15-25] 15  SpO2:  [98 %-100 %] 99 %  BP: (168-194)/(84-93) 188/87     Weight: 88.6 kg (195 lb 7 oz)  Body mass index is 28.04 kg/m².    Physical Exam   Constitutional: He is oriented to person, place, and time. He appears well-developed and well-nourished. No distress.   HENT:   Head: Normocephalic and atraumatic.   Nose: Nose normal.   Mouth/Throat: Oropharynx is clear and moist.   Eyes: Conjunctivae are normal. No scleral icterus.   Neck: Normal range of motion. Neck supple.   Cardiovascular: Normal rate, regular rhythm, normal heart sounds and intact distal pulses.  Exam reveals no gallop and no friction rub.    No murmur heard.  Pulmonary/Chest: Effort normal and breath sounds normal. No stridor. No respiratory distress. He has no wheezes. He has no rales. He exhibits no tenderness.   Abdominal: Soft. Bowel sounds are normal. He exhibits no distension. There is no tenderness. There is no rebound and no guarding.   Musculoskeletal: Normal range of motion. He exhibits no edema, tenderness or deformity.   Neurological: He is alert and oriented to person, place, and time. He has normal reflexes. No cranial nerve deficit. He exhibits normal muscle tone. Coordination normal.   Skin: Skin is warm and dry. No rash noted. He is not diaphoretic. No erythema. No pallor.   Psychiatric: He has a normal mood and affect. His behavior is normal. Thought content normal.   Nursing note and vitals reviewed.          Significant Labs:   BMP:   Recent Labs  Lab 07/23/18  1050         K 3.3*      CO2 28   BUN 10   CREATININE 1.1   CALCIUM 9.3     CBC:   Recent Labs  Lab 07/23/18  1050   WBC 7.50   HGB 14.2   HCT 42.4        CMP:   Recent Labs  Lab 07/23/18  1050       K 3.3*      CO2 28      BUN 10   CREATININE 1.1   CALCIUM 9.3   PROT 7.8   ALBUMIN 4.0   BILITOT 2.1*   ALKPHOS 64   AST 25   ALT 12   ANIONGAP 11   EGFRNONAA >60     All pertinent labs within the past 24 hours have been reviewed.    Significant Imaging:  Imaging Results          X-Ray Chest AP Portable (Final result)  Result time 07/23/18 11:16:39    Final result by SALTY Mccollum Sr., MD (07/23/18 11:16:39)                 Impression:      1. The lungs are clear.  2. A cardiac pacemaker remains in place. The leads appear to be intact.  .      Electronically signed by: Addy Mccollum MD  Date:    07/23/2018  Time:    11:16             Narrative:    EXAMINATION:  XR CHEST AP PORTABLE    CLINICAL HISTORY:  Chest pain, unspecified    COMPARISON:  06/13/2018    FINDINGS:  A cardiac pacemaker remains in place.  The leads appear to be intact.  The size of the heart is normal. The lungs are clear. There is no pneumothorax.  The costophrenic angles are sharp.                              Assessment/Plan:     * Chest pain    Serial troponin   Cont ASA, Coreg, amlodipine, imdur            VTE Risk Mitigation     None             Vita Floyd NP  Department of Hospital Medicine   [facility]

## 2018-07-23 NOTE — ED NOTES
[phone], will notify representative.
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
## 2018-07-23 NOTE — SUBJECTIVE & OBJECTIVE
Past Medical History:   Diagnosis Date    *Atrial fibrillation     Atrial fibrillation     Bilateral carotid artery stenosis 1/15/2016    Cardiac pacemaker 8/7/2013    Congestive heart failure 4/6/2015    COPD (chronic obstructive pulmonary disease) 8/7/2013    Coronary artery disease     Hyperlipidemia     Hypertension     Long-term (current) use of anticoagulants 8/7/2013    PVD (peripheral vascular disease)     S/P PTCA (percutaneous transluminal coronary angioplasty) 8/7/2013    Sleep apnea 8/7/2013    Stroke        Past Surgical History:   Procedure Laterality Date    CATARACT EXTRACTION W/  INTRAOCULAR LENS IMPLANT      INSERT / REPLACE / REMOVE PACEMAKER  2005       Review of patient's allergies indicates:   Allergen Reactions    No known drug allergies        No current facility-administered medications on file prior to encounter.      Current Outpatient Prescriptions on File Prior to Encounter   Medication Sig    albuterol (PROVENTIL) 2.5 mg /3 mL (0.083 %) nebulizer solution Take 3 mLs (2.5 mg total) by nebulization every 4 (four) hours as needed for Wheezing or Shortness of Breath.    albuterol-ipratropium 2.5mg-0.5mg/3mL (DUO-NEB) 0.5 mg-3 mg(2.5 mg base)/3 mL nebulizer solution Take 3 mLs by nebulization every 6 (six) hours as needed for Wheezing. Rescue    amlodipine-atorvastatin (CADUET) 10-20 mg per tablet Take 1 tablet by mouth once daily.    aspirin (ECOTRIN) 81 MG EC tablet Take 81 mg by mouth once daily.    budesonide (PULMICORT) 0.5 mg/2 mL nebulizer solution Take 2 mLs (0.5 mg total) by nebulization 2 (two) times daily.    carvedilol (COREG) 6.25 MG tablet take 1 tablet by mouth twice a day with meals    fluticasone-salmeterol 250-50 mcg/dose (ADVAIR DISKUS) 250-50 mcg/dose diskus inhaler Inhale 1 puff into the lungs 2 (two) times daily. Wash out mouth after use.    furosemide (LASIX) 40 MG tablet Take 40 mg by mouth once daily.     gabapentin (NEURONTIN) 100 MG  capsule Take 100 mg by mouth 3 (three) times daily.    guaiFENesin (MUCINEX) 600 mg 12 hr tablet Take 2 tablets (1,200 mg total) by mouth 2 (two) times daily as needed for Congestion.    hydrALAZINE (APRESOLINE) 25 MG tablet take 1 tablet by mouth three times a day    ipratropium (ATROVENT) 42 mcg (0.06 %) nasal spray instill 2 sprays into each nostril three times a day    isosorbide dinitrate (ISORDIL) 20 MG tablet take 1 tablet by mouth every 12 hours with HYDRALAZINE    latanoprost 0.005 % ophthalmic solution Place 1 drop into both eyes nightly.    levetiracetam (KEPPRA) 500 MG Tab Take 1 tablet by mouth Twice daily.    lubiprostone (AMITIZA) 8 MCG Cap Take 8 mcg by mouth 2 (two) times daily with meals.    nitroGLYCERIN (NITROBID) 6.5 MG CpSR take 1 capsule by mouth twice a day    pantoprazole (PROTONIX) 40 MG tablet Take 40 mg by mouth.    tamsulosin (FLOMAX) 0.4 mg Cp24 0.4 mg.    traMADol (ULTRAM) 50 mg tablet take 1 tablet by mouth every 6 hours if needed for UP TO 10 days    warfarin (COUMADIN) 5 MG tablet Take 1/2 tablet on Tuesdays and 1 tablet all other days as directed by the [facility].     Family History     Problem Relation (Age of Onset)    Diabetes Sister, Maternal Grandfather    Fainting Sister    Heart attack Paternal Uncle    Heart disease Paternal Uncle    Hypertension Maternal Grandmother        Social History Main Topics    Smoking status: Former Smoker     Packs/day: 1.50     Years: 35.00     Types: Cigarettes     Quit date: 11/13/1979    Smokeless tobacco: Never Used    Alcohol use No      Comment: QUIT 08/13/77    Drug use: No    Sexual activity: Not on file     Review of Systems   Constitutional: Negative for appetite change, chills, diaphoresis, fatigue and fever.   HENT: Negative for congestion, nosebleeds, sore throat and trouble swallowing.    Eyes: Negative for pain, discharge and visual disturbance.   Respiratory: Negative for apnea, cough, chest tightness,  shortness of breath, wheezing and stridor.    Cardiovascular: Positive for chest pain. Negative for palpitations and leg swelling.   Gastrointestinal: Negative for abdominal distention, abdominal pain, blood in stool, constipation, diarrhea, nausea and vomiting.   Endocrine: Negative for cold intolerance and heat intolerance.   Genitourinary: Negative for difficulty urinating, dysuria, flank pain, frequency and urgency.   Musculoskeletal: Negative for arthralgias, back pain, joint swelling, myalgias, neck pain and neck stiffness.   Skin: Negative for rash and wound.   Allergic/Immunologic: Negative for food allergies and immunocompromised state.   Neurological: Negative for dizziness, seizures, syncope, facial asymmetry, weakness, light-headedness and headaches.   Hematological: Negative for adenopathy.   Psychiatric/Behavioral: Negative for agitation, behavioral problems and confusion. The patient is not nervous/anxious.      Objective:     Vital Signs (Most Recent):  Temp: 97.9 °F (36.6 °C) (07/23/18 1036)  Pulse: 69 (07/23/18 1302)  Resp: 15 (07/23/18 1302)  BP: (!) 188/87 (07/23/18 1302)  SpO2: 99 % (07/23/18 1302) Vital Signs (24h Range):  Temp:  [97.9 °F (36.6 °C)] 97.9 °F (36.6 °C)  Pulse:  [69-78] 69  Resp:  [15-25] 15  SpO2:  [98 %-100 %] 99 %  BP: (168-194)/(84-93) 188/87     Weight: 88.6 kg (195 lb 7 oz)  Body mass index is 28.04 kg/m².    Physical Exam   Constitutional: He is oriented to person, place, and time. He appears well-developed and well-nourished. No distress.   HENT:   Head: Normocephalic and atraumatic.   Nose: Nose normal.   Mouth/Throat: Oropharynx is clear and moist.   Eyes: Conjunctivae are normal. No scleral icterus.   Neck: Normal range of motion. Neck supple.   Cardiovascular: Normal rate, regular rhythm, normal heart sounds and intact distal pulses.  Exam reveals no gallop and no friction rub.    No murmur heard.  Pulmonary/Chest: Effort normal and breath sounds normal. No stridor. No  respiratory distress. He has no wheezes. He has no rales. He exhibits no tenderness.   Abdominal: Soft. Bowel sounds are normal. He exhibits no distension. There is no tenderness. There is no rebound and no guarding.   Musculoskeletal: Normal range of motion. He exhibits no edema, tenderness or deformity.   Neurological: He is alert and oriented to person, place, and time. He has normal reflexes. No cranial nerve deficit. He exhibits normal muscle tone. Coordination normal.   Skin: Skin is warm and dry. No rash noted. He is not diaphoretic. No erythema. No pallor.   Psychiatric: He has a normal mood and affect. His behavior is normal. Thought content normal.   Nursing note and vitals reviewed.          Significant Labs:   BMP:   Recent Labs  Lab 07/23/18  1050         K 3.3*      CO2 28   BUN 10   CREATININE 1.1   CALCIUM 9.3     CBC:   Recent Labs  Lab 07/23/18  1050   WBC 7.50   HGB 14.2   HCT 42.4        CMP:   Recent Labs  Lab 07/23/18  1050      K 3.3*      CO2 28      BUN 10   CREATININE 1.1   CALCIUM 9.3   PROT 7.8   ALBUMIN 4.0   BILITOT 2.1*   ALKPHOS 64   AST 25   ALT 12   ANIONGAP 11   EGFRNONAA >60     All pertinent labs within the past 24 hours have been reviewed.    Significant Imaging:  Imaging Results          X-Ray Chest AP Portable (Final result)  Result time 07/23/18 11:16:39    Final result by SALTY Mccollum Sr., MD (07/23/18 11:16:39)                 Impression:      1. The lungs are clear.  2. A cardiac pacemaker remains in place. The leads appear to be intact.  .      Electronically signed by: Addy Mccollum MD  Date:    07/23/2018  Time:    11:16             Narrative:    EXAMINATION:  XR CHEST AP PORTABLE    CLINICAL HISTORY:  Chest pain, unspecified    COMPARISON:  06/13/2018    FINDINGS:  A cardiac pacemaker remains in place.  The leads appear to be intact.  The size of the heart is normal. The lungs are clear. There is no pneumothorax.  The  costophrenic angles are sharp.

## 2018-07-23 NOTE — ED NOTES
Pt's daughter called and left a message to call when pt is ready to be picked up. Luz Maria Alexis [phone].

## 2018-07-23 NOTE — PLAN OF CARE
07/23/18 1550   BANUELOS Message   Medicare Outpatient and Observation Notification regarding financial responsibility Given to patient/caregiver;Explained to patient/caregiver;Signed/date by patient/caregiver   Date BANUELOS was signed 07/23/18   Time BANUELOS was signed [id number]

## 2018-07-23 NOTE — HPI
The pt is a 84 yo male with CAD s/p stent, PPM, Afib-on coumadin who presented to ed with chest pain. Pt reports constant left sided chest pain at PPM site x 3 days rated 5/10 nonradiating that was worse yesterday- improved to 3/10 pain today. Pt denied any exacerbating or alleviating factors. Denies SOB, N/V, diaphoresis.   In the ED, /93, EKG showed a paced rhythm. Troponin normal. CXR normal. Pacemaker interrogation reported by ED as normal.

## 2018-07-23 NOTE — ED PROVIDER NOTES
"SCRIBE #1 NOTE: I, Corinne Mack, am scribing for, and in the presence of, Richy Brownlee Jr., MD. I have scribed the entire note.      History      Chief Complaint   Patient presents with    Pacemaker Problem     pt states he has pain around his pacemaker/defib for the past 3 days       Review of patient's allergies indicates:   Allergen Reactions    No known drug allergies         HPI   HPI    7/23/2018, 10:50 AM   History obtained from the patient      History of Present Illness: Bola Figueroa Sr. is a 83 y.o. male patient with PMHx of Afib, CHF, COPD, CAD, HTN, PVD, and stroke who presents to the Emergency Department for CP which onset gradually 3 days ago and was at its worst yesterday. Pt states his pain is "around his pacemaker" and is "an unfamiliar pain" to him. Symptoms are intermittent and moderate in severity. No mitigating or exacerbating factors reported. No associated sxs reported. Patient denies any diaphoresis, SOB, N/V, back pain, neck pain, HA, dizziness, and all other sxs at this time. No prior Tx reported. No further complaints or concerns at this time.         Arrival mode: Personal vehicle    PCP: Naga Bhatti MD       Past Medical History:  Past Medical History:   Diagnosis Date    *Atrial fibrillation     Atrial fibrillation     Bilateral carotid artery stenosis 1/15/2016    Cardiac pacemaker 8/7/2013    Congestive heart failure 4/6/2015    COPD (chronic obstructive pulmonary disease) 8/7/2013    Coronary artery disease     Hyperlipidemia     Hypertension     Long-term (current) use of anticoagulants 8/7/2013    PVD (peripheral vascular disease)     S/P PTCA (percutaneous transluminal coronary angioplasty) 8/7/2013    Sleep apnea 8/7/2013    Stroke        Past Surgical History:  Past Surgical History:   Procedure Laterality Date    CATARACT EXTRACTION W/  INTRAOCULAR LENS IMPLANT      INSERT / REPLACE / REMOVE PACEMAKER  2005         Family History:  Family History "   Problem Relation Age of Onset    Diabetes Sister     Fainting Sister     Heart disease Paternal Uncle     Heart attack Paternal Uncle     Hypertension Maternal Grandmother     Diabetes Maternal Grandfather        Social History:  Social History     Social History Main Topics    Smoking status: Former Smoker     Packs/day: 1.50     Years: 35.00     Types: Cigarettes     Quit date: 11/13/1979    Smokeless tobacco: Never Used    Alcohol use No      Comment: QUIT 08/13/77    Drug use: No    Sexual activity: Not on file       ROS   Review of Systems   Constitutional: Negative for chills, diaphoresis and fever.   Respiratory: Negative for cough and shortness of breath.    Cardiovascular: Positive for chest pain. Negative for leg swelling.   Gastrointestinal: Negative for abdominal pain, diarrhea, nausea and vomiting.   Musculoskeletal: Negative for back pain, neck pain and neck stiffness.   Skin: Negative for rash and wound.   Neurological: Negative for dizziness, light-headedness, numbness and headaches.   All other systems reviewed and are negative.    Physical Exam      Initial Vitals [07/23/18 1036]   BP Pulse Resp Temp SpO2   (!) 168/84 78 18 97.9 °F (36.6 °C) 98 %      MAP       --          Physical Exam  Nursing Notes and Vital Signs Reviewed.  Constitutional: Patient is in no apparent distress. Well-developed and well-nourished.  Head: Atraumatic. Normocephalic.  Eyes: PERRL. EOM intact. Conjunctivae are not pale. No scleral icterus.  ENT: Mucous membranes are moist. Oropharynx is clear and symmetric.    Neck: Supple. Full ROM. No lymphadenopathy.  Cardiovascular: Regular rate. Regular rhythm. No murmurs, rubs, or gallops. Distal pulses are 2+ and symmetric.  Pulmonary/Chest: No respiratory distress. Clear to auscultation bilaterally. No wheezing or rales. Pacemaker to the L chest wall.  Abdominal: Soft and non-distended.  There is no tenderness.  No rebound, guarding, or rigidity.   Musculoskeletal:  "Moves all extremities. No obvious deformities.   Skin: Warm and dry.  Neurological:  Alert, awake, and appropriate.  Normal speech.  No acute focal neurological deficits are appreciated.  Psychiatric: Normal affect. Good eye contact. Appropriate in content.    ED Course    Procedures  ED Vital Signs:  Vitals:    07/23/18 1036 07/23/18 1052 07/23/18 1242   BP: (!) 168/84 (!) 194/93 (!) 192/88   Pulse: 78 70 71   Resp: 18 (!) 25 (!) 22   Temp: 97.9 °F (36.6 °C)     TempSrc: Oral     SpO2: 98% 100% 98%   Weight: 88.6 kg (195 lb 7 oz)     Height: 5' 10" (1.778 m)         Abnormal Lab Results:  Labs Reviewed   COMPREHENSIVE METABOLIC PANEL - Abnormal; Notable for the following:        Result Value    Potassium 3.3 (*)     Total Bilirubin 2.1 (*)     All other components within normal limits   B-TYPE NATRIURETIC PEPTIDE - Abnormal; Notable for the following:      (*)     All other components within normal limits   CBC W/ AUTO DIFFERENTIAL   APTT   PROTIME-INR   TROPONIN I   TROPONIN I        All Lab Results:  Results for orders placed or performed during the hospital encounter of 07/23/18   CBC auto differential   Result Value Ref Range    WBC 7.50 3.90 - 12.70 K/uL    RBC 4.68 4.60 - 6.20 M/uL    Hemoglobin 14.2 14.0 - 18.0 g/dL    Hematocrit 42.4 40.0 - 54.0 %    MCV 91 82 - 98 fL    MCH 30.3 27.0 - 31.0 pg    MCHC 33.5 32.0 - 36.0 g/dL    RDW 13.5 11.5 - 14.5 %    Platelets 221 150 - 350 K/uL    MPV 11.0 9.2 - 12.9 fL    Gran # (ANC) 4.6 1.8 - 7.7 K/uL    Lymph # 2.2 1.0 - 4.8 K/uL    Mono # 0.5 0.3 - 1.0 K/uL    Eos # 0.2 0.0 - 0.5 K/uL    Baso # 0.04 0.00 - 0.20 K/uL    Gran% 61.1 38.0 - 73.0 %    Lymph% 28.7 18.0 - 48.0 %    Mono% 6.8 4.0 - 15.0 %    Eosinophil% 2.9 0.0 - 8.0 %    Basophil% 0.5 0.0 - 1.9 %    Differential Method Automated    Comprehensive metabolic panel   Result Value Ref Range    Sodium 144 136 - 145 mmol/L    Potassium 3.3 (L) 3.5 - 5.1 mmol/L    Chloride 105 95 - 110 mmol/L    CO2 28 23 - " 29 mmol/L    Glucose 105 70 - 110 mg/dL    BUN, Bld 10 8 - 23 mg/dL    Creatinine 1.1 0.5 - 1.4 mg/dL    Calcium 9.3 8.7 - 10.5 mg/dL    Total Protein 7.8 6.0 - 8.4 g/dL    Albumin 4.0 3.5 - 5.2 g/dL    Total Bilirubin 2.1 (H) 0.1 - 1.0 mg/dL    Alkaline Phosphatase 64 55 - 135 U/L    AST 25 10 - 40 U/L    ALT 12 10 - 44 U/L    Anion Gap 11 8 - 16 mmol/L    eGFR if African American >60 >60 mL/min/1.73 m^2    eGFR if non African American >60 >60 mL/min/1.73 m^2   APTT   Result Value Ref Range    aPTT 29.4 21.0 - 32.0 sec   Protime-INR   Result Value Ref Range    Prothrombin Time 11.5 9.0 - 12.5 sec    INR 1.1 0.8 - 1.2   Troponin I   Result Value Ref Range    Troponin I 0.021 0.000 - 0.026 ng/mL   Brain natriuretic peptide   Result Value Ref Range     (H) 0 - 99 pg/mL       Imaging Results:  Imaging Results          X-Ray Chest AP Portable (Final result)  Result time 07/23/18 11:16:39    Final result by SALTY Mccollum Sr., MD (07/23/18 11:16:39)                 Impression:      1. The lungs are clear.  2. A cardiac pacemaker remains in place. The leads appear to be intact.  .      Electronically signed by: Addy Mccollum MD  Date:    07/23/2018  Time:    11:16             Narrative:    EXAMINATION:  XR CHEST AP PORTABLE    CLINICAL HISTORY:  Chest pain, unspecified    COMPARISON:  06/13/2018    FINDINGS:  A cardiac pacemaker remains in place.  The leads appear to be intact.  The size of the heart is normal. The lungs are clear. There is no pneumothorax.  The costophrenic angles are sharp.                                 The EKG was ordered, reviewed, and independently interpreted by the ED provider.  Interpretation time: 1044  Rate: 76 BPM  Rhythm: Ventricular-paced rhythm  Interpretation: No STEMI.             The Emergency Provider reviewed the vital signs and test results, which are outlined above.    ED Discussion     12:41 PM: Discussed case with ALEX Hollingsworth ([facility]). Dr. Chang  agrees with current care and management of pt and accepts admission.   Admitting Service: Hospital medicine   Admitting Physician: Dr. Chang  Admit to: Obs Tele    12:44 PM: Re-evaluated pt. I have discussed test results, shared treatment plan, and the need for admission with patient and family at bedside. Pt and family express understanding at this time and agree with all information. All questions answered. Pt and family have no further questions or concerns at this time. Pt is ready for admit.    12:57 PM: Dr. brownlee discussed the pt's case with Dr. Sarmiento (Cardiology) who agrees with the current plan of care.    3:29 PM: Informed by the pacemaker interrogator that the pt's pacemaker is functioning normally.    ED Medication(s):  Medications   nitroGLYCERIN 2% TD oint ointment 0.5 inch (0.5 inches Topical (Top) Given 7/23/18 1115)   aspirin chewable tablet 81 mg (81 mg Oral Given 7/23/18 1115)       New Prescriptions    No medications on file             Medical Decision Making    Medical Decision Making:   Clinical Tests:   Lab Tests: Ordered and Reviewed  Radiological Study: Ordered and Reviewed  Medical Tests: Ordered and Reviewed           Scribe Attestation:   Scribe #1: I performed the above scribed service and the documentation accurately describes the services I performed. I attest to the accuracy of the note.    Attending:   Physician Attestation Statement for Scribe #1: I, Richy Brownlee Jr., MD, personally performed the services described in this documentation, as scribed by Corinne Mack, in my presence, and it is both accurate and complete.          Clinical Impression       ICD-10-CM ICD-9-CM   1. Chest pain, unspecified type R07.9 786.50   2. Pacemaker complications T82.9XXA 996.72   3. Chest pain R07.9 786.50   4. Atrial fibrillation, unspecified type I48.91 427.31   5. Coronary angioplasty status Z98.61 V45.82       Disposition:   Disposition: Placed in Observation  Condition: Fair            Richy Brownlee Jr., MD  07/23/18 1910

## 2018-07-31 ENCOUNTER — OFFICE VISIT (OUTPATIENT)
Dept: CARDIOLOGY | Facility: CLINIC | Age: 83
End: 2018-07-31
Payer: MEDICARE

## 2018-07-31 VITALS
HEIGHT: 70 IN | SYSTOLIC BLOOD PRESSURE: 126 MMHG | HEART RATE: 68 BPM | WEIGHT: 195.31 LBS | BODY MASS INDEX: 27.96 KG/M2 | DIASTOLIC BLOOD PRESSURE: 66 MMHG

## 2018-07-31 DIAGNOSIS — Z98.61 S/P PTCA (PERCUTANEOUS TRANSLUMINAL CORONARY ANGIOPLASTY): ICD-10-CM

## 2018-07-31 DIAGNOSIS — I50.9 CONGESTIVE HEART FAILURE, UNSPECIFIED HF CHRONICITY, UNSPECIFIED HEART FAILURE TYPE: ICD-10-CM

## 2018-07-31 DIAGNOSIS — I25.10 CORONARY ARTERY DISEASE INVOLVING NATIVE CORONARY ARTERY OF NATIVE HEART WITHOUT ANGINA PECTORIS: ICD-10-CM

## 2018-07-31 DIAGNOSIS — Z95.0 PRESENCE OF CARDIAC PACEMAKER: ICD-10-CM

## 2018-07-31 DIAGNOSIS — I25.10 ASCVD (ARTERIOSCLEROTIC CARDIOVASCULAR DISEASE): ICD-10-CM

## 2018-07-31 DIAGNOSIS — I73.9 PVD (PERIPHERAL VASCULAR DISEASE): ICD-10-CM

## 2018-07-31 DIAGNOSIS — I10 ESSENTIAL HYPERTENSION: ICD-10-CM

## 2018-07-31 DIAGNOSIS — I65.23 BILATERAL CAROTID ARTERY STENOSIS: ICD-10-CM

## 2018-07-31 DIAGNOSIS — I70.219 ATHEROSCLEROTIC PVD WITH INTERMITTENT CLAUDICATION: ICD-10-CM

## 2018-07-31 DIAGNOSIS — R07.9 CHEST PAIN, UNSPECIFIED TYPE: ICD-10-CM

## 2018-07-31 DIAGNOSIS — Z95.5 STATUS POST CORONARY ARTERY BALLOON DILATION: ICD-10-CM

## 2018-07-31 DIAGNOSIS — G47.33 MILD OBSTRUCTIVE SLEEP APNEA: ICD-10-CM

## 2018-07-31 DIAGNOSIS — Z95.0 CARDIAC PACEMAKER: Primary | ICD-10-CM

## 2018-07-31 DIAGNOSIS — Z79.01 LONG TERM CURRENT USE OF ANTICOAGULANT THERAPY: ICD-10-CM

## 2018-07-31 DIAGNOSIS — I48.91 ATRIAL FIBRILLATION, UNSPECIFIED TYPE: ICD-10-CM

## 2018-07-31 PROCEDURE — 99999 PR PBB SHADOW E&M-EST. PATIENT-LVL III: CPT | Mod: PBBFAC,,, | Performed by: NURSE PRACTITIONER

## 2018-07-31 PROCEDURE — 99213 OFFICE O/P EST LOW 20 MIN: CPT | Mod: PBBFAC | Performed by: NURSE PRACTITIONER

## 2018-07-31 PROCEDURE — 99213 OFFICE O/P EST LOW 20 MIN: CPT | Mod: S$PBB,,, | Performed by: NURSE PRACTITIONER

## 2018-07-31 NOTE — PROGRESS NOTES
Subjective:    Patient ID:  Bola Figueroa Sr. is a 83 y.o. male who presents for follow-up of Hospital Follow Up; Hypertension; Congestive Heart Failure; and Coronary Artery Disease      HPI     Mr. Figueroa is an 83 year old male with a PMHx of AFIB, PVD, HTN, HLP, CAD s/p PTCA, CVA who presents to clinic for hospital follow up. He was recently admitted overnight at [facility] for PPM site discomfort and was subsequently discharged home after decrease in discomfort. He returns today and states that he is doing well. Denies any ongoing chest discomfort to PPM site today. No chest pain, chest discomfort or anginal equivalents. No SOB, dyspnea, palpitations, syncope, or near syncope. No recent falls. No CNS complaints to suggest TIA or CVA today. No signs of abnormal bleeding. No leg swelling or claudication pain today. Compliant with medications and diet. Uses walker for fall prevention. Compliant with coumadin clinic management. No signs of decompensated HF today. Denies any orthopnea or PND.     Review of Systems   Constitution: Negative for weakness.   HENT: Negative for hearing loss and hoarse voice.    Eyes: Negative for visual disturbance.   Cardiovascular: Negative for chest pain, claudication, dyspnea on exertion, irregular heartbeat, leg swelling, near-syncope, orthopnea, palpitations, paroxysmal nocturnal dyspnea and syncope.   Respiratory: Negative for cough, hemoptysis, shortness of breath, sleep disturbances due to breathing, snoring and wheezing.    Endocrine: Negative for cold intolerance and heat intolerance.   Hematologic/Lymphatic: Bruises/bleeds easily.   Skin: Negative for color change, dry skin and nail changes.   Musculoskeletal: Positive for arthritis, back pain and joint pain. Negative for myalgias.        Uses walker   Gastrointestinal: Negative for bloating, abdominal pain, constipation, nausea and vomiting.   Genitourinary: Negative for dysuria, flank pain, hematuria and hesitancy.   Neurological:  "Negative for headaches, light-headedness, loss of balance, numbness and paresthesias.   Psychiatric/Behavioral: Negative for altered mental status.        Hx of dementia   Allergic/Immunologic: Negative for environmental allergies.       /66   Pulse 68   Ht 5' 10" (1.778 m)   Wt 88.6 kg (195 lb 5.2 oz)   BMI 28.03 kg/m²     Objective:    Physical Exam   Constitutional: He is oriented to person, place, and time. He appears well-developed and well-nourished. No distress.   HENT:   Head: Normocephalic and atraumatic.   Eyes: Pupils are equal, round, and reactive to light.   Neck: Normal range of motion and full passive range of motion without pain. Neck supple. No JVD present.   Cardiovascular: Normal rate, regular rhythm, S1 normal, S2 normal and intact distal pulses.  PMI is not displaced.  Exam reveals no distant heart sounds.    No murmur heard.  Pulses:       Radial pulses are 2+ on the right side, and 2+ on the left side.        Dorsalis pedis pulses are 2+ on the right side, and 2+ on the left side.   PPM site well healed   Pulmonary/Chest: Effort normal and breath sounds normal. No accessory muscle usage. No respiratory distress. He has no decreased breath sounds. He has no wheezes.   Abdominal: Soft. Bowel sounds are normal. He exhibits no distension. There is no tenderness.   Musculoskeletal: Normal range of motion. He exhibits edema (trace BLE).        Right ankle: He exhibits swelling.        Left ankle: He exhibits swelling.   Neurological: He is alert and oriented to person, place, and time.   Skin: Skin is warm and dry. He is not diaphoretic. No cyanosis. Nails show no clubbing.   Psychiatric: He has a normal mood and affect. His speech is normal and behavior is normal. Judgment and thought content normal. Cognition and memory are normal.   Nursing note and vitals reviewed.        Assessment:       1. Cardiac pacemaker    2. S/P PTCA (percutaneous transluminal coronary angioplasty)    3. ASCVD " (arteriosclerotic cardiovascular disease)    4. Congestive heart failure, unspecified HF chronicity, unspecified heart failure type    5. Status post coronary artery balloon dilation    6. Presence of cardiac pacemaker    7. Bilateral carotid artery stenosis    8. Atherosclerotic PVD with intermittent claudication    9. Long term current use of anticoagulant therapy    10. Mild obstructive sleep apnea    11. Chest pain, unspecified type    12. Atrial fibrillation, unspecified type    13. Coronary artery disease involving native coronary artery of native heart without angina pectoris    14. PVD (peripheral vascular disease)    15. Essential hypertension       BP well controlled today in clinic on current regimen  No chest pain, chest discomfort or anginal equivalent today.   Denies any recent falls.   Uses walker for fall prevention.  No SOB, dyspnea, leg swelling or claudication today.   Complaint with medications and diet.   NO CNS complaints to suggest TIA or CVA today.  No signs of abnormal bleeding on ASA and Coumadin  Plan:   Continue same CV meds for now  Continue Coumadin clinic   Encourage physical activity as tolerated.  DASH diet, 2 gm sodium restriction  50-60 oz fluid restriction per day  Daily weights  If weight increased by 3 lbs in 1 day or 5 lbs in 1 week, call clinic.   Keep follow up with Dr. Mcdonough with labs.

## 2018-08-08 ENCOUNTER — ANTI-COAG VISIT (OUTPATIENT)
Dept: CARDIOLOGY | Facility: CLINIC | Age: 83
End: 2018-08-08
Payer: MEDICARE

## 2018-08-08 DIAGNOSIS — Z79.01 LONG TERM (CURRENT) USE OF ANTICOAGULANTS: Primary | ICD-10-CM

## 2018-08-08 LAB — INR PPP: 1.5 (ref 2–3)

## 2018-08-08 PROCEDURE — 99999 PR PBB SHADOW E&M-EST. PATIENT-LVL I: CPT | Mod: PBBFAC,,,

## 2018-08-08 PROCEDURE — 99211 OFF/OP EST MAY X REQ PHY/QHP: CPT | Mod: PBBFAC,PO

## 2018-08-08 PROCEDURE — 85610 PROTHROMBIN TIME: CPT | Mod: PBBFAC,PO

## 2018-08-08 NOTE — PROGRESS NOTES
Patient's INR is subtherapeutic at 1.5.  Reports x 1 dose missed.  Instructed to take 7.5 mg today - only, then start new dose of 5 mg Monday through Friday and 2.5 mg (1/2 tablet) on Saturday and Sunday per dose calendar given.  Recheck in 1 week.

## 2018-08-09 ENCOUNTER — OFFICE VISIT (OUTPATIENT)
Dept: PULMONOLOGY | Facility: CLINIC | Age: 83
End: 2018-08-09
Payer: MEDICARE

## 2018-08-09 ENCOUNTER — HOSPITAL ENCOUNTER (OUTPATIENT)
Dept: RADIOLOGY | Facility: HOSPITAL | Age: 83
Discharge: HOME | End: 2018-08-09
Attending: NURSE PRACTITIONER
Payer: MEDICARE

## 2018-08-09 ENCOUNTER — PROCEDURE VISIT (OUTPATIENT)
Dept: PULMONOLOGY | Facility: CLINIC | Age: 83
End: 2018-08-09
Payer: MEDICARE

## 2018-08-09 VITALS
SYSTOLIC BLOOD PRESSURE: 126 MMHG | WEIGHT: 191 LBS | OXYGEN SATURATION: 97 % | RESPIRATION RATE: 18 BRPM | HEIGHT: 70 IN | DIASTOLIC BLOOD PRESSURE: 80 MMHG | HEART RATE: 89 BPM | BODY MASS INDEX: 27.35 KG/M2

## 2018-08-09 DIAGNOSIS — J98.6 ELEVATED DIAPHRAGM: ICD-10-CM

## 2018-08-09 DIAGNOSIS — J44.9 COPD, SEVERE: ICD-10-CM

## 2018-08-09 DIAGNOSIS — J44.9 CHRONIC OBSTRUCTIVE PULMONARY DISEASE, UNSPECIFIED COPD TYPE: ICD-10-CM

## 2018-08-09 DIAGNOSIS — J30.0 VASOMOTOR RHINITIS: ICD-10-CM

## 2018-08-09 DIAGNOSIS — R05.9 COUGH: ICD-10-CM

## 2018-08-09 DIAGNOSIS — J44.1 COPD EXACERBATION: Primary | ICD-10-CM

## 2018-08-09 DIAGNOSIS — R09.02 EXERCISE HYPOXEMIA: ICD-10-CM

## 2018-08-09 PROCEDURE — 94010 BREATHING CAPACITY TEST: CPT | Mod: 26,S$PBB,, | Performed by: INTERNAL MEDICINE

## 2018-08-09 PROCEDURE — 96372 THER/PROPH/DIAG INJ SC/IM: CPT | Mod: PBBFAC,PO

## 2018-08-09 PROCEDURE — 99999 PR PBB SHADOW E&M-EST. PATIENT-LVL IV: CPT | Mod: PBBFAC,,, | Performed by: INTERNAL MEDICINE

## 2018-08-09 PROCEDURE — 94010 BREATHING CAPACITY TEST: CPT | Mod: PBBFAC,PO

## 2018-08-09 PROCEDURE — 99214 OFFICE O/P EST MOD 30 MIN: CPT | Mod: PBBFAC,25,PO | Performed by: INTERNAL MEDICINE

## 2018-08-09 PROCEDURE — 71046 X-RAY EXAM CHEST 2 VIEWS: CPT | Mod: TC,FY,PO

## 2018-08-09 PROCEDURE — 71046 X-RAY EXAM CHEST 2 VIEWS: CPT | Mod: 26,,, | Performed by: RADIOLOGY

## 2018-08-09 PROCEDURE — 99215 OFFICE O/P EST HI 40 MIN: CPT | Mod: 25,S$PBB,, | Performed by: INTERNAL MEDICINE

## 2018-08-09 RX ORDER — PROMETHAZINE HYDROCHLORIDE AND DEXTROMETHORPHAN HYDROBROMIDE 6.25; 15 MG/5ML; MG/5ML
5 SYRUP ORAL EVERY 6 HOURS PRN
Qty: 473 ML | Refills: 5 | Status: SHIPPED | OUTPATIENT
Start: 2018-08-09 | End: 2018-10-31 | Stop reason: SDUPTHER

## 2018-08-09 RX ORDER — FLUTICASONE PROPIONATE AND SALMETEROL 250; 50 UG/1; UG/1
1 POWDER RESPIRATORY (INHALATION) 2 TIMES DAILY
Qty: 60 EACH | Refills: 12 | Status: SHIPPED | OUTPATIENT
Start: 2018-08-09 | End: 2019-04-16 | Stop reason: SDUPTHER

## 2018-08-09 RX ORDER — TRIAMCINOLONE ACETONIDE 40 MG/ML
80 INJECTION, SUSPENSION INTRA-ARTICULAR; INTRAMUSCULAR
Status: COMPLETED | OUTPATIENT
Start: 2018-08-09 | End: 2018-08-09

## 2018-08-09 RX ORDER — AZITHROMYCIN 250 MG/1
250 TABLET, FILM COATED ORAL DAILY
Qty: 6 TABLET | Refills: 1 | Status: SHIPPED | OUTPATIENT
Start: 2018-08-09 | End: 2018-10-31 | Stop reason: SDUPTHER

## 2018-08-09 RX ORDER — IPRATROPIUM BROMIDE AND ALBUTEROL SULFATE 2.5; .5 MG/3ML; MG/3ML
3 SOLUTION RESPIRATORY (INHALATION) EVERY 6 HOURS PRN
Qty: 360 ML | Refills: 11 | Status: SHIPPED | OUTPATIENT
Start: 2018-08-09 | End: 2018-10-31 | Stop reason: SDUPTHER

## 2018-08-09 RX ORDER — IPRATROPIUM BROMIDE 42 UG/1
SPRAY, METERED NASAL
Qty: 45 ML | Refills: 4 | Status: SHIPPED | OUTPATIENT
Start: 2018-08-09 | End: 2019-09-05 | Stop reason: SDUPTHER

## 2018-08-09 RX ORDER — GUAIFENESIN 600 MG/1
1200 TABLET, EXTENDED RELEASE ORAL 2 TIMES DAILY PRN
Qty: 60 TABLET | Status: SHIPPED | OUTPATIENT
Start: 2018-08-09 | End: 2018-10-31 | Stop reason: SDUPTHER

## 2018-08-09 RX ORDER — PREDNISONE 20 MG/1
TABLET ORAL
Qty: 20 TABLET | Refills: 1 | Status: SHIPPED | OUTPATIENT
Start: 2018-08-09 | End: 2018-09-21

## 2018-08-09 RX ADMIN — TRIAMCINOLONE ACETONIDE 80 MG: 40 INJECTION, SUSPENSION INTRA-ARTICULAR; INTRAMUSCULAR at 11:08

## 2018-08-09 NOTE — PROGRESS NOTES
Subjective:       Patient ID: Bola Figueroa Sr. is a 83 y.o. male.    Chief Complaint: Sleep Apnea and COPD    HPI COPD  He presents for evaluation and treatment of COPD. The patient is currently having symptoms / an exacerbation. Current symptoms include acute dyspnea, chronic dyspnea, cough productive of yellow sputum in small amounts and wheezing. Symptoms have been present since several months ago and have been gradually worsening. He denies chills and fever. Associated symptoms include fatigue and poor exercise tolerance.  This episode appears to have been triggered by no identifiable factor. Treatments tried for the current exacerbation: albuterol nebulizer. The patient has been having similar episodes for approximately 10 years. He uses 1 pillows at night. Patient currently is not on home oxygen therapy.. The patient is having no constitutional symptoms, denying fever, chills, anorexia, or weight loss. The patient has been hospitalized for this condition before. He has a history of 50 pack years. The patient is experiencing exercise intolerance (difficulty walking 3 blocks on flat ground).        Past Medical History:   Diagnosis Date    *Atrial fibrillation     Atrial fibrillation     Bilateral carotid artery stenosis 1/15/2016    Cardiac pacemaker 8/7/2013    Congestive heart failure 4/6/2015    COPD (chronic obstructive pulmonary disease) 8/7/2013    Coronary artery disease     Hyperlipidemia     Hypertension     Long-term (current) use of anticoagulants 8/7/2013    PVD (peripheral vascular disease)     S/P PTCA (percutaneous transluminal coronary angioplasty) 8/7/2013    Sleep apnea 8/7/2013    Stroke      Past Surgical History:   Procedure Laterality Date    CATARACT EXTRACTION W/  INTRAOCULAR LENS IMPLANT      INSERT / REPLACE / REMOVE PACEMAKER  2005     Social History     Social History    Marital status:      Spouse name: N/A    Number of children: N/A    Years of education:  N/A     Occupational History    Not on file.     Social History Main Topics    Smoking status: Former Smoker     Packs/day: 1.50     Years: 35.00     Types: Cigarettes     Quit date: 11/13/1979    Smokeless tobacco: Never Used    Alcohol use No      Comment: QUIT 08/13/77    Drug use: No    Sexual activity: Not on file     Other Topics Concern    Not on file     Social History Narrative    No narrative on file     Review of Systems   Constitutional: Positive for fatigue. Negative for fever.   HENT: Positive for postnasal drip, rhinorrhea and congestion.    Eyes: Negative for redness and itching.   Respiratory: Positive for cough, sputum production, shortness of breath, dyspnea on extertion, use of rescue inhaler and Paroxysmal Nocturnal Dyspnea.    Cardiovascular: Negative for chest pain, palpitations and leg swelling.   Genitourinary: Negative for difficulty urinating and hematuria.   Endocrine: Negative for cold intolerance and heat intolerance.    Skin: Negative for rash.   Gastrointestinal: Negative for nausea and abdominal pain.   Neurological: Negative for dizziness, syncope, weakness and light-headedness.   Hematological: Negative for adenopathy. Does not bruise/bleed easily.   Psychiatric/Behavioral: Negative for sleep disturbance. The patient is not nervous/anxious.        Objective:      Physical Exam   Constitutional: He is oriented to person, place, and time. He appears well-developed and well-nourished.   HENT:   Head: Normocephalic and atraumatic.   Mouth/Throat: Oropharyngeal exudate present.   Eyes: Conjunctivae are normal. Pupils are equal, round, and reactive to light.   Neck: Neck supple. No JVD present. No tracheal deviation present. No thyromegaly present.   Cardiovascular: Normal rate, regular rhythm and normal heart sounds.    No murmur heard.  Pulmonary/Chest: Effort normal. He has decreased breath sounds. He has wheezes in the right lower field and the left lower field. He has no  rhonchi. He has no rales.   Abdominal: Soft. Bowel sounds are normal.   Musculoskeletal: Normal range of motion. He exhibits no edema or tenderness.   Lymphadenopathy:     He has no cervical adenopathy.   Neurological: He is alert and oriented to person, place, and time.   Skin: Skin is warm and dry.   Nursing note and vitals reviewed.    Personal Diagnostic Review  Chest X-Ray: I personally reviewed the films and findings are:, air trapping/emphysema, elevated diaphragm right  Pulmonary function tests:  Mod obstruction    Pulmonary Studies Review 8/9/2018 7/31/2018 7/23/2018 7/23/2018 7/23/2018 7/23/2018 7/23/2018   SpO2 97 - 98 97 97 99 97   Ordering Provider - - - - - - -   Interpreting Provider - - - - - - -   Performing nurse/tech/RT - - - - - - -   Diagnosis - - - - - - -   Height 70.000 70.000 - - - - -   Weight 3056 3125.24 - - - - -   BMI (Calculated) 27.5 28.1 - - - - -   Predicted Distance 256.71 253.34 - - - - -   Patient Race - - - - - - -   6MWT Status - - - - - - -   Patient Reported - - - - - - -   Was O2 used? - - - - - - -   6MW Distance walked (feet) - - - - - - -   Distance walked (meters) - - - - - - -   Did patient stop? - - - - - - -   Type of assistive device(s) used? - - - - - - -   Is extra documentation required for this patient? - - - - - - -   Oxygen Saturation - - - - - - -   Supplemental Oxygen - - - - - - -   Heart Rate - - - - - - -   Blood Pressure - - - - - - -   Ramya Dyspnea Rating  - - - - - - -   Oxygen Saturation - - - - - - -   Supplemental Oxygen - - - - - - -   Heart Rate - - - - - - -   Blood Pressure - - - - - - -   Ramya Dyspnea Rating  - - - - - - -   Recovery Time (seconds) - - - - - - -   Oxygen Saturation - - - - - - -   Supplemental Oxygen - - - - - - -   Heart Rate - - - - - - -   Blood Pressure - - - - - - -   Ramya Dyspnea Rating  - - - - - - -   Is procedure ready for interpretation? - - - - - - -   Did the patient stop or pause? - - - - - - -   Total Time Walked  (Calculated) - - - - - - -   Total Laps Walked - - - - - - -   Final Partial Lap Distance (feet) - - - - - - -   Total Distance Feet (Calculated) - - - - - - -   Total Distance Meters (Calculated) - - - - - - -   Predicted Distance Meters (Calculated) 467.8 464.35 - - - - -   Percentage of Predicted (Calculated) - - - - - - -   Peak VO2 (Calculated) - - - - - - -   Mets - - - - - - -   Has The Patient Had a Previous Six Minute Walk Test? - - - - - - -   Oxygen Qualification? - - - - - - -     No flowsheet data found.      Assessment:       1. COPD exacerbation    2. Chronic obstructive pulmonary disease, unspecified COPD type    3. COPD, severe    4. Cough    5. Elevated diaphragm    6. Vasomotor rhinitis        Outpatient Encounter Prescriptions as of 8/9/2018   Medication Sig Dispense Refill    albuterol (PROVENTIL) 2.5 mg /3 mL (0.083 %) nebulizer solution Take 3 mLs (2.5 mg total) by nebulization every 4 (four) hours as needed for Wheezing or Shortness of Breath. 320 mL 4    albuterol-ipratropium (DUO-NEB) 2.5 mg-0.5 mg/3 mL nebulizer solution Take 3 mLs by nebulization every 6 (six) hours as needed for Wheezing. Rescue 360 mL 11    amlodipine-atorvastatin (CADUET) 10-20 mg per tablet Take 1 tablet by mouth once daily.      aspirin (ECOTRIN) 81 MG EC tablet Take 81 mg by mouth once daily.      budesonide (PULMICORT) 0.5 mg/2 mL nebulizer solution Take 2 mLs (0.5 mg total) by nebulization 2 (two) times daily. 120 mL 11    carvedilol (COREG) 6.25 MG tablet take 1 tablet by mouth twice a day with meals      fluticasone-salmeterol 250-50 mcg/dose (ADVAIR DISKUS) 250-50 mcg/dose diskus inhaler Inhale 1 puff into the lungs 2 (two) times daily. Wash out mouth after use. 60 each 12    furosemide (LASIX) 40 MG tablet Take 40 mg by mouth once daily.       gabapentin (NEURONTIN) 100 MG capsule Take 100 mg by mouth 3 (three) times daily.  0    guaiFENesin (MUCINEX) 600 mg 12 hr tablet Take 2 tablets (1,200 mg  total) by mouth 2 (two) times daily as needed for Congestion. Take with water 60 tablet prn    hydrALAZINE (APRESOLINE) 25 MG tablet take 1 tablet by mouth three times a day 90 tablet 5    ipratropium (ATROVENT) 42 mcg (0.06 %) nasal spray instill 2 sprays into each nostril three times a day 45 mL 4    isosorbide dinitrate (ISORDIL) 20 MG tablet take 1 tablet by mouth every 12 hours with HYDRALAZINE 60 tablet 6    latanoprost 0.005 % ophthalmic solution Place 1 drop into both eyes nightly.  0    levetiracetam (KEPPRA) 500 MG Tab Take 1 tablet by mouth Twice daily.      lubiprostone (AMITIZA) 8 MCG Cap Take 8 mcg by mouth 2 (two) times daily with meals.      nitroGLYCERIN (NITROBID) 6.5 MG CpSR take 1 capsule by mouth twice a day      pantoprazole (PROTONIX) 40 MG tablet Take 40 mg by mouth.      tamsulosin (FLOMAX) 0.4 mg Cp24 0.4 mg.      traMADol (ULTRAM) 50 mg tablet take 1 tablet by mouth every 6 hours if needed for UP TO 10 days  0    warfarin (COUMADIN) 5 MG tablet Take 1/2 tablet on Tuesdays and 1 tablet all other days as directed by the [facility]. (Patient taking differently: Take 1 table Monday through Friday and 1/2 tablet on Saturday and Sunday as directed by the [facility].) 30 tablet 3    [DISCONTINUED] albuterol-ipratropium 2.5mg-0.5mg/3mL (DUO-NEB) 0.5 mg-3 mg(2.5 mg base)/3 mL nebulizer solution Take 3 mLs by nebulization every 6 (six) hours as needed for Wheezing. Rescue 360 mL 11    [DISCONTINUED] fluticasone-salmeterol 250-50 mcg/dose (ADVAIR DISKUS) 250-50 mcg/dose diskus inhaler Inhale 1 puff into the lungs 2 (two) times daily. Wash out mouth after use. 60 each 12    [DISCONTINUED] guaiFENesin (MUCINEX) 600 mg 12 hr tablet Take 2 tablets (1,200 mg total) by mouth 2 (two) times daily as needed for Congestion. 60 tablet prn    [DISCONTINUED] ipratropium (ATROVENT) 42 mcg (0.06 %) nasal spray instill 2 sprays into each nostril three times a day 45 mL 4    azithromycin  (ZITHROMAX Z-ROGELIO) 250 MG tablet Take 1 tablet (250 mg total) by mouth once daily. 500 mg on day 1 (two tablets) followed by 250 mg once daily on days 2-5 6 tablet 1    predniSONE (DELTASONE) 20 MG tablet Prednisone 60 mg/ day for 3 days, 40 mg/day for 3 days,20 mg/ day for 3 days, (1/2 tablet )10 mg a day for 3 days. 20 tablet 1    promethazine-dextromethorphan (PROMETHAZINE-DM) 6.25-15 mg/5 mL Syrp Take 5 mLs by mouth every 6 (six) hours as needed. 473 mL 5     Facility-Administered Encounter Medications as of 8/9/2018   Medication Dose Route Frequency Provider Last Rate Last Dose    triamcinolone acetonide injection 80 mg  80 mg Intramuscular 1 time in Clinic/[facility]OD Juanjo Wallace MD         No orders of the defined types were placed in this encounter.    Plan:       Requested Prescriptions     Signed Prescriptions Disp Refills    guaiFENesin (MUCINEX) 600 mg 12 hr tablet 60 tablet prn     Sig: Take 2 tablets (1,200 mg total) by mouth 2 (two) times daily as needed for Congestion. Take with water    predniSONE (DELTASONE) 20 MG tablet 20 tablet 1     Sig: Prednisone 60 mg/ day for 3 days, 40 mg/day for 3 days,20 mg/ day for 3 days, (1/2 tablet )10 mg a day for 3 days.    azithromycin (ZITHROMAX Z-ROGELIO) 250 MG tablet 6 tablet 1     Sig: Take 1 tablet (250 mg total) by mouth once daily. 500 mg on day 1 (two tablets) followed by 250 mg once daily on days 2-5    albuterol-ipratropium (DUO-NEB) 2.5 mg-0.5 mg/3 mL nebulizer solution 360 mL 11     Sig: Take 3 mLs by nebulization every 6 (six) hours as needed for Wheezing. Rescue    promethazine-dextromethorphan (PROMETHAZINE-DM) 6.25-15 mg/5 mL Syrp 473 mL 5     Sig: Take 5 mLs by mouth every 6 (six) hours as needed.    fluticasone-salmeterol 250-50 mcg/dose (ADVAIR DISKUS) 250-50 mcg/dose diskus inhaler 60 each 12     Sig: Inhale 1 puff into the lungs 2 (two) times daily. Wash out mouth after use.    ipratropium (ATROVENT) 42 mcg (0.06 %) nasal spray 45 mL 4      Sig: instill 2 sprays into each nostril three times a day     COPD exacerbation  -     predniSONE (DELTASONE) 20 MG tablet; Prednisone 60 mg/ day for 3 days, 40 mg/day for 3 days,20 mg/ day for 3 days, (1/2 tablet )10 mg a day for 3 days.  Dispense: 20 tablet; Refill: 1  -     azithromycin (ZITHROMAX Z-ROGELIO) 250 MG tablet; Take 1 tablet (250 mg total) by mouth once daily. 500 mg on day 1 (two tablets) followed by 250 mg once daily on days 2-5  Dispense: 6 tablet; Refill: 1  -     triamcinolone acetonide injection 80 mg; Inject 2 mLs (80 mg total) into the muscle one time.    Chronic obstructive pulmonary disease, unspecified COPD type  -     guaiFENesin (MUCINEX) 600 mg 12 hr tablet; Take 2 tablets (1,200 mg total) by mouth 2 (two) times daily as needed for Congestion. Take with water  Dispense: 60 tablet; Refill: prn    COPD, severe  -     albuterol-ipratropium (DUO-NEB) 2.5 mg-0.5 mg/3 mL nebulizer solution; Take 3 mLs by nebulization every 6 (six) hours as needed for Wheezing. Rescue  Dispense: 360 mL; Refill: 11  -     fluticasone-salmeterol 250-50 mcg/dose (ADVAIR DISKUS) 250-50 mcg/dose diskus inhaler; Inhale 1 puff into the lungs 2 (two) times daily. Wash out mouth after use.  Dispense: 60 each; Refill: 12    Cough  -     promethazine-dextromethorphan (PROMETHAZINE-DM) 6.25-15 mg/5 mL Syrp; Take 5 mLs by mouth every 6 (six) hours as needed.  Dispense: 473 mL; Refill: 5    Elevated diaphragm    Vasomotor rhinitis  -     ipratropium (ATROVENT) 42 mcg (0.06 %) nasal spray; instill 2 sprays into each nostril three times a day  Dispense: 45 mL; Refill: 4      No Follow-up on file.    Patient prescribed a controlled substance. 7 day prescription limit for acute conditions explained to patient. For chronic conditions the need to limit refills to no sooner than 30 days discussed with 90 day limit. E-signed prescription sent to pharmacy or printed and signed copy  prescription given to patient with 90 day limit . Side  effects reviewed in detail. Instructed not to combine with other controlled substances, alcohol or recreational drugs. Habit forming, addictive potential of drug discussed. Advised not to operate a vehicle while taking this medication. Policy of limited refills discussed.       MEDICAL DECISION MAKING: Moderate to high complexity.  Overall, the multiple problems listed are of moderate to high severity that may impact quality of life and activities of daily living. Side effects of medications, treatment plan as well as options and alternatives reviewed and discussed with patient. There was counseling of patient concerning these issues.    Total time spent in face to face counseling and coordination of care - 40 minutes over 50% of time was used in discussion of prognosis, risks, benefits of treatment, instructions and compliance with regimen . Discussion with other physicians or health care providers (homehealth, durable medical equipment providers).

## 2018-08-09 NOTE — PATIENT INSTRUCTIONS
Azithromycin tablets  What is this medicine?  AZITHROMYCIN (az ith karel MYE sin) is a macrolide antibiotic. It is used to treat or prevent certain kinds of bacterial infections. It will not work for colds, flu, or other viral infections.  How should I use this medicine?  Take this medicine by mouth with a full glass of water. Follow the directions on the prescription label. The tablets can be taken with food or on an empty stomach. If the medicine upsets your stomach, take it with food. Take your medicine at regular intervals. Do not take your medicine more often than directed. Take all of your medicine as directed even if you think your are better. Do not skip doses or stop your medicine early. Talk to your pediatrician regarding the use of this medicine in children. Special care may be needed.  What side effects may I notice from receiving this medicine?  Side effects that you should report to your doctor or health care professional as soon as possible:  · allergic reactions like skin rash, itching or hives, swelling of the face, lips, or tongue  · confusion, nightmares or hallucinations  · dark urine  · difficulty breathing  · hearing loss  · irregular heartbeat or chest pain  · pain or difficulty passing urine  · redness, blistering, peeling or loosening of the skin, including inside the mouth  · white patches or sores in the mouth  · yellowing of the eyes or skin  Side effects that usually do not require medical attention (report to your doctor or health care professional if they continue or are bothersome):  · diarrhea  · dizziness, drowsiness  · headache  · stomach upset or vomiting  · tooth discoloration  · vaginal irritation  What may interact with this medicine?  Do not take this medicine with any of the following medications:  · lincomycin  This medicine may also interact with the following medications:  · amiodarone  · antacids  · birth control  pills  · cyclosporine  · digoxin  · magnesium  · nelfinavir  · phenytoin  · warfarin  What if I miss a dose?  If you miss a dose, take it as soon as you can. If it is almost time for your next dose, take only that dose. Do not take double or extra doses.  Where should I keep my medicine?  Keep out of the reach of children.  Store at room temperature between 15 and 30 degrees C (59 and 86 degrees F). Throw away any unused medicine after the expiration date.  What should I tell my health care provider before I take this medicine?  They need to know if you have any of these conditions:  · kidney disease  · liver disease  · irregular heartbeat or heart disease  · an unusual or allergic reaction to azithromycin, erythromycin, other macrolide antibiotics, foods, dyes, or preservatives  · pregnant or trying to get pregnant  · breast-feeding  What should I watch for while using this medicine?  Tell your doctor or health care professional if your symptoms do not improve.  Do not treat diarrhea with over the counter products. Contact your doctor if you have diarrhea that lasts more than 2 days or if it is severe and watery.  This medicine can make you more sensitive to the sun. Keep out of the sun. If you cannot avoid being in the sun, wear protective clothing and use sunscreen. Do not use sun lamps or tanning beds/booths.  NOTE:This sheet is a summary. It may not cover all possible information. If you have questions about this medicine, talk to your doctor, pharmacist, or health care provider. Copyright© 2017 [employer]        Prednisone tablets  What is this medicine?  PREDNISONE (PRED ni sone) is a corticosteroid. It is commonly used to treat inflammation of the skin, joints, lungs, and other organs. Common conditions treated include asthma, allergies, and arthritis. It is also used for other conditions, such as blood disorders and diseases of the adrenal glands.  How should I use this medicine?  Take this medicine by  mouth with a glass of water. Follow the directions on the prescription label. Take this medicine with food. If you are taking this medicine once a day, take it in the morning. Do not take more medicine than you are told to take. Do not suddenly stop taking your medicine because you may develop a severe reaction. Your doctor will tell you how much medicine to take. If your doctor wants you to stop the medicine, the dose may be slowly lowered over time to avoid any side effects.  Talk to your pediatrician regarding the use of this medicine in children. Special care may be needed.  What side effects may I notice from receiving this medicine?  Side effects that you should report to your doctor or health care professional as soon as possible:  · allergic reactions like skin rash, itching or hives, swelling of the face, lips, or tongue  · changes in emotions or moods  · changes in vision  · depressed mood  · eye pain  · fever or chills, cough, sore throat, pain or difficulty passing urine  · increased thirst  · swelling of ankles, feet  Side effects that usually do not require medical attention (report to your doctor or health care professional if they continue or are bothersome):  · confusion, excitement, restlessness  · headache  · nausea, vomiting  · skin problems, acne, thin and shiny skin  · trouble sleeping  · weight gain  What may interact with this medicine?  Do not take this medicine with any of the following medications:  · metyrapone  · mifepristone  This medicine may also interact with the following medications:  · aminoglutethimide  · amphotericin B  · aspirin and aspirin-like medicines  · barbiturates  · certain medicines for diabetes, like glipizide or glyburide  · cholestyramine  · cholinesterase inhibitors  · cyclosporine  · digoxin  · diuretics  · ephedrine  · female hormones, like estrogens and birth control pills  · isoniazid  · ketoconazole  · NSAIDS, medicines for pain and inflammation, like  ibuprofen or naproxen  · phenytoin  · rifampin  · toxoids  · vaccines  · warfarin  What if I miss a dose?  If you miss a dose, take it as soon as you can. If it is almost time for your next dose, talk to your doctor or health care professional. You may need to miss a dose or take an extra dose. Do not take double or extra doses without advice.  Where should I keep my medicine?  Keep out of the reach of children.  Store at room temperature between 15 and 30 degrees C (59 and 86 degrees F). Protect from light. Keep container tightly closed. Throw away any unused medicine after the expiration date.  What should I tell my health care provider before I take this medicine?  They need to know if you have any of these conditions:  · Cushing's syndrome  · diabetes  · glaucoma  · heart disease  · high blood pressure  · infection (especially a virus infection such as chickenpox, cold sores, or herpes)  · kidney disease  · liver disease  · mental illness  · myasthenia gravis  · osteoporosis  · seizures  · stomach or intestine problems  · thyroid disease  · an unusual or allergic reaction to lactose, prednisone, other medicines, foods, dyes, or preservatives  · pregnant or trying to get pregnant  · breast-feeding  What should I watch for while using this medicine?  Visit your doctor or health care professional for regular checks on your progress. If you are taking this medicine over a prolonged period, carry an identification card with your name and address, the type and dose of your medicine, and your doctor's name and address.  This medicine may increase your risk of getting an infection. Tell your doctor or health care professional if you are around anyone with measles or chickenpox, or if you develop sores or blisters that do not heal properly.  If you are going to have surgery, tell your doctor or health care professional that you have taken this medicine within the last twelve months.  Ask your doctor or health care  professional about your diet. You may need to lower the amount of salt you eat.  This medicine may affect blood sugar levels. If you have diabetes, check with your doctor or health care professional before you change your diet or the dose of your diabetic medicine.  NOTE:This sheet is a summary. It may not cover all possible information. If you have questions about this medicine, talk to your doctor, pharmacist, or health care provider. Copyright© 2017 [employer]

## 2018-08-09 NOTE — LETTER
August 9, 2018      Kamala Garcia, NP  [address] [facility] [address] [place] [address]           [facility] - Pulmonary Services  [address] [facility] [place] [place] [address]  Phone: [phone]  Fax: [phone]          Patient: Bola Figueroa Sr.   MR Number: [id number]   YOB: 1934   Date of Visit: 8/9/2018       Dear Kamala Garcia:    Thank you for referring Bola Figueroa to me for evaluation. Attached you will find relevant portions of my assessment and plan of care.    If you have questions, please do not hesitate to call me. I look forward to following Bola Figueroa along with you.    Sincerely,    Juanjo Wallace MD    Enclosure  CC:  No Recipients    If you would like to receive this communication electronically, please contact [email or web address] or [phone] to request more information on [employer] Link access.    For providers and/or their staff who would like to refer a patient to [facility], please contact us through our one-stop-shop provider referral line, [facility] , at [phone].    If you feel you have received this communication in error or would no longer like to receive these types of communications, please e-mail [email or web address]

## 2018-08-15 ENCOUNTER — ANTI-COAG VISIT (OUTPATIENT)
Dept: CARDIOLOGY | Facility: CLINIC | Age: 83
End: 2018-08-15
Payer: MEDICARE

## 2018-08-15 DIAGNOSIS — Z79.01 LONG TERM (CURRENT) USE OF ANTICOAGULANTS: Primary | ICD-10-CM

## 2018-08-15 LAB
BRPFT: ABNORMAL
FEF 25 75 LLN: 0.45
FEF 25 75 PRE REF: 21.4 %
FEF 25 75 PRE: 0.35 L/S (ref 0.45–3.5)
FEF 25 75 REF: 1.61
FET100 PRE: 15.18 SEC
FEV1 FVC LLN: 60
FEV1 FVC PRE REF: 70.9 %
FEV1 FVC PRE: 53.15 % (ref 60.14–88.56)
FEV1 FVC REF: 75
FEV1 LLN: 1.47
FEV1 PRE REF: 63 %
FEV1 PRE: 1.44 L (ref 1.47–3.03)
FEV1 REF: 2.29
FEV6 LLN: 2.34
FEV6 PRE REF: 67.3 %
FEV6 PRE: 2.18 L (ref 2.34–4.14)
FEV6 REF: 3.24
FVC LLN: 2.12
FVC PRE REF: 88 %
FVC PRE: 2.71 L (ref 2.12–4.05)
FVC REF: 3.08
INR PPP: 2.5 (ref 2–3)
MVV LLN: 90
MVV REF: 106
PEF LLN: 4.65
PEF PRE REF: 35.1 %
PEF PRE: 2.54 L/S (ref 4.65–9.81)
PEF REF: 7.23

## 2018-08-15 PROCEDURE — 85610 PROTHROMBIN TIME: CPT | Mod: PBBFAC,PO

## 2018-08-15 NOTE — PROGRESS NOTES
Patient's INR is therapeutic at 2.5.  No changes in dose - Continue 5 mg Monday through Friday and 2.5 mg on Saturday and Sunday.  Recheck in 2 weeks.  Please call should you have any questions or concerns at [phone] or [phone].

## 2018-08-29 ENCOUNTER — ANTI-COAG VISIT (OUTPATIENT)
Dept: CARDIOLOGY | Facility: CLINIC | Age: 83
End: 2018-08-29
Payer: MEDICARE

## 2018-08-29 DIAGNOSIS — Z79.01 LONG TERM (CURRENT) USE OF ANTICOAGULANTS: Primary | ICD-10-CM

## 2018-08-29 LAB — INR PPP: 1.4 (ref 2–3)

## 2018-08-29 PROCEDURE — 85610 PROTHROMBIN TIME: CPT | Mod: PBBFAC,PO

## 2018-08-29 PROCEDURE — 99999 PR PBB SHADOW E&M-EST. PATIENT-LVL I: CPT | Mod: PBBFAC,,,

## 2018-08-29 PROCEDURE — 99211 OFF/OP EST MAY X REQ PHY/QHP: CPT | Mod: PBBFAC,PO

## 2018-08-29 NOTE — PROGRESS NOTES
Patient's INR is subtherapeutic at 1.4.  Intake of cabbage two days ago reported.  Vitamin K education - given.  Instructed to decrease Vitamin K in diet to avoid risk factors.  Will increase dose to 7.5 mg today and tomorrow (8/30) - only, then on Friday (8/31), patient will resume on regular scheduled dose per dose calendar given.  Recheck in 1 week.

## 2018-09-05 ENCOUNTER — ANTI-COAG VISIT (OUTPATIENT)
Dept: CARDIOLOGY | Facility: CLINIC | Age: 83
End: 2018-09-05
Payer: MEDICARE

## 2018-09-05 DIAGNOSIS — Z79.01 LONG TERM (CURRENT) USE OF ANTICOAGULANTS: Primary | ICD-10-CM

## 2018-09-05 LAB — INR PPP: 2.2 (ref 2–3)

## 2018-09-05 PROCEDURE — 85610 PROTHROMBIN TIME: CPT | Mod: PBBFAC,PO

## 2018-09-05 NOTE — PROGRESS NOTES
Patient's INR is therapeutic at 2.2.  No changes in dose.  Recheck in 3 weeks.  Please call should you have any questions or concerns at [phone] or [phone].

## 2018-09-20 ENCOUNTER — HOSPITAL ENCOUNTER (EMERGENCY)
Facility: HOSPITAL | Age: 83
Discharge: HOME | End: 2018-09-21
Attending: EMERGENCY MEDICINE
Payer: MEDICARE

## 2018-09-20 DIAGNOSIS — R06.02 SOB (SHORTNESS OF BREATH): ICD-10-CM

## 2018-09-20 LAB
APTT BLDCRRT: 29.7 SEC
INR PPP: 1.5
PROTHROMBIN TIME: 15.6 SEC

## 2018-09-20 PROCEDURE — 63600175 PHARM REV CODE 636 W HCPCS: Performed by: EMERGENCY MEDICINE

## 2018-09-20 PROCEDURE — 99284 EMERGENCY DEPT VISIT MOD MDM: CPT | Mod: 25

## 2018-09-20 PROCEDURE — 83880 ASSAY OF NATRIURETIC PEPTIDE: CPT

## 2018-09-20 PROCEDURE — 85025 COMPLETE CBC W/AUTO DIFF WBC: CPT

## 2018-09-20 PROCEDURE — 80053 COMPREHEN METABOLIC PANEL: CPT

## 2018-09-20 PROCEDURE — 93010 ELECTROCARDIOGRAM REPORT: CPT | Mod: ,,, | Performed by: INTERNAL MEDICINE

## 2018-09-20 PROCEDURE — 84484 ASSAY OF TROPONIN QUANT: CPT

## 2018-09-20 PROCEDURE — 93005 ELECTROCARDIOGRAM TRACING: CPT

## 2018-09-20 PROCEDURE — 85610 PROTHROMBIN TIME: CPT

## 2018-09-20 PROCEDURE — 85730 THROMBOPLASTIN TIME PARTIAL: CPT

## 2018-09-20 RX ORDER — PREDNISONE 20 MG/1
40 TABLET ORAL
Status: COMPLETED | OUTPATIENT
Start: 2018-09-20 | End: 2018-09-20

## 2018-09-20 RX ADMIN — PREDNISONE 40 MG: 20 TABLET ORAL at 11:09

## 2018-09-21 VITALS
RESPIRATION RATE: 20 BRPM | DIASTOLIC BLOOD PRESSURE: 85 MMHG | HEART RATE: 69 BPM | HEIGHT: 69 IN | TEMPERATURE: 98 F | BODY MASS INDEX: 28.33 KG/M2 | OXYGEN SATURATION: 98 % | WEIGHT: 191.25 LBS | SYSTOLIC BLOOD PRESSURE: 165 MMHG

## 2018-09-21 LAB
ALBUMIN SERPL BCP-MCNC: 3.7 G/DL
ALP SERPL-CCNC: 62 U/L
ALT SERPL W/O P-5'-P-CCNC: 18 U/L
ANION GAP SERPL CALC-SCNC: 13 MMOL/L
AST SERPL-CCNC: 26 U/L
BASOPHILS # BLD AUTO: 0.04 K/UL
BASOPHILS NFR BLD: 0.5 %
BILIRUB SERPL-MCNC: 1.1 MG/DL
BNP SERPL-MCNC: 120 PG/ML
BUN SERPL-MCNC: 15 MG/DL
CALCIUM SERPL-MCNC: 8.9 MG/DL
CHLORIDE SERPL-SCNC: 108 MMOL/L
CO2 SERPL-SCNC: 20 MMOL/L
CREAT SERPL-MCNC: 1 MG/DL
DIFFERENTIAL METHOD: ABNORMAL
EOSINOPHIL # BLD AUTO: 0.1 K/UL
EOSINOPHIL NFR BLD: 1.8 %
ERYTHROCYTE [DISTWIDTH] IN BLOOD BY AUTOMATED COUNT: 13.3 %
EST. GFR  (AFRICAN AMERICAN): >60 ML/MIN/1.73 M^2
EST. GFR  (NON AFRICAN AMERICAN): >60 ML/MIN/1.73 M^2
GLUCOSE SERPL-MCNC: 105 MG/DL
HCT VFR BLD AUTO: 42.2 %
HGB BLD-MCNC: 13.4 G/DL
LYMPHOCYTES # BLD AUTO: 1.6 K/UL
LYMPHOCYTES NFR BLD: 19.8 %
MCH RBC QN AUTO: 30.2 PG
MCHC RBC AUTO-ENTMCNC: 31.8 G/DL
MCV RBC AUTO: 95 FL
MONOCYTES # BLD AUTO: 0.7 K/UL
MONOCYTES NFR BLD: 8.4 %
NEUTROPHILS # BLD AUTO: 5.5 K/UL
NEUTROPHILS NFR BLD: 69.5 %
PLATELET # BLD AUTO: 207 K/UL
PMV BLD AUTO: 11.3 FL
POTASSIUM SERPL-SCNC: 4.2 MMOL/L
PROT SERPL-MCNC: 7.3 G/DL
RBC # BLD AUTO: 4.43 M/UL
SODIUM SERPL-SCNC: 141 MMOL/L
TROPONIN I SERPL DL<=0.01 NG/ML-MCNC: 0.01 NG/ML
WBC # BLD AUTO: 7.86 K/UL

## 2018-09-21 RX ORDER — LEVOFLOXACIN 500 MG/1
500 TABLET, FILM COATED ORAL DAILY
Qty: 5 TABLET | Refills: 0 | Status: SHIPPED | OUTPATIENT
Start: 2018-09-21 | End: 2018-09-26

## 2018-09-21 RX ORDER — PREDNISONE 20 MG/1
40 TABLET ORAL DAILY
Qty: 10 TABLET | Refills: 0 | Status: SHIPPED | OUTPATIENT
Start: 2018-09-21 | End: 2018-09-26

## 2018-09-21 NOTE — ED PROVIDER NOTES
SCRIBE #1 NOTE: I, Isabela Harper, am scribing for, and in the presence of, Edd Harper MD. I have scribed the entire note.      History      Chief Complaint   Patient presents with    Shortness of Breath     reports increased shortness of breath over the last several days, reports nebs arent working at home.        Review of patient's allergies indicates:   Allergen Reactions    No known drug allergies         HPI   HPI    9/20/2018, 11:07 PM   History obtained from the patient      History of Present Illness: Bola Figueroa Sr. is a 83 y.o. male patient who presents to the Emergency Department for SOB which worsened tonight. Pt states he is usually SOB at baseline. Symptoms are constant and moderate in severity. No mitigating or exacerbating factors reported. Associated sxs include nausea and productive cough. Patient denies any fever, chills, V/D, rhinorrhea, congestion, sore throat, CP, abd pain, weakness, numbness, and all other sxs at this time. Prior Tx includes a nebulizer that improves pt sxs. Pt attempted to take his Advair for current sxs, but it did not help. No further complaints or concerns at this time.       Arrival mode: Personal vehicle    PCP: Naga Bhatti MD       Past Medical History:  Past Medical History:   Diagnosis Date    *Atrial fibrillation     Atrial fibrillation     Bilateral carotid artery stenosis 1/15/2016    Cardiac pacemaker 8/7/2013    Congestive heart failure 4/6/2015    COPD (chronic obstructive pulmonary disease) 8/7/2013    Coronary artery disease     Hyperlipidemia     Hypertension     Long-term (current) use of anticoagulants 8/7/2013    PVD (peripheral vascular disease)     S/P PTCA (percutaneous transluminal coronary angioplasty) 8/7/2013    Sleep apnea 8/7/2013    Stroke        Past Surgical History:  Past Surgical History:   Procedure Laterality Date    CATARACT EXTRACTION W/  INTRAOCULAR LENS IMPLANT      INSERT / REPLACE / REMOVE PACEMAKER   2005    REPLACEMENT, PULSE GENERATOR, CARDIAC PACEMAKER Left 3/20/2014    Performed by Behzad Sarmiento MD at [facility] CATH LAB         Family History:  Family History   Problem Relation Age of Onset    Diabetes Sister     Fainting Sister     Heart disease Paternal Uncle     Heart attack Paternal Uncle     Hypertension Maternal Grandmother     Diabetes Maternal Grandfather        Social History:  Social History     Tobacco Use    Smoking status: Former Smoker     Packs/day: 1.50     Years: 35.00     Pack years: 52.50     Types: Cigarettes     Last attempt to quit: 1979     Years since quittin.8    Smokeless tobacco: Never Used   Substance and Sexual Activity    Alcohol use: No     Comment: QUIT 77    Drug use: No    Sexual activity: Unknown       ROS   Review of Systems   Constitutional: Negative for chills, diaphoresis and fever.   HENT: Negative for congestion, postnasal drip, rhinorrhea and sore throat.    Respiratory: Positive for cough (Productive) and shortness of breath. Negative for choking and stridor.    Cardiovascular: Negative for chest pain.   Gastrointestinal: Positive for nausea. Negative for abdominal pain, diarrhea and vomiting.   Genitourinary: Negative for dysuria, frequency and hematuria.   Musculoskeletal: Negative for back pain and neck pain.   Skin: Negative for rash.   Neurological: Negative for dizziness, weakness, numbness and headaches.   Hematological: Does not bruise/bleed easily.     Physical Exam      Initial Vitals [18]   BP Pulse Resp Temp SpO2   (!) 163/78 74 20 98.4 °F (36.9 °C) 96 %      MAP       --          Physical Exam  Nursing Notes and Vital Signs Reviewed.  Constitutional: Patient is in no acute distress. Well-developed and well-nourished.  Head: Atraumatic. Normocephalic.  Eyes: PERRL. EOM intact. Conjunctivae are not pale. No scleral icterus.  ENT: Mucous membranes are moist. Oropharynx is clear and symmetric.    Neck: Supple. Full  "ROM. No lymphadenopathy. No JVD.  Cardiovascular: Regular rate. Regular rhythm. No murmurs, rubs, or gallops. Distal pulses are 2+ and symmetric.  Pulmonary/Chest: No respiratory distress. Clear to auscultation bilaterally. No wheezing or rales.  Abdominal: Soft and non-distended.  There is no tenderness.  No rebound, guarding, or rigidity.  Musculoskeletal: Moves all extremities. 1+ BLE trace edema.  Skin: Warm and dry.  Neurological:  Alert, awake, and appropriate.  Normal speech.  No acute focal neurological deficits are appreciated.  Psychiatric: Normal affect. Good eye contact. Appropriate in content.    ED Course    Procedures  ED Vital Signs:  Vitals:    09/20/18 2057 09/20/18 2323 09/20/18 2331 09/21/18 0116   BP: (!) 163/78  (!) 163/79 (!) 165/85   Pulse: 74 73 71 69   Resp: 20  (!) 21 20   Temp: 98.4 °F (36.9 °C)      TempSrc: Oral      SpO2: 96%  100% 98%   Weight: 86.7 kg (191 lb 4 oz)      Height: 5' 9" (1.753 m)       09/21/18 0129   BP:    Pulse:    Resp:    Temp: 98 °F (36.7 °C)   TempSrc: Oral   SpO2:    Weight:    Height:        Abnormal Lab Results:  Labs Reviewed   CBC W/ AUTO DIFFERENTIAL - Abnormal; Notable for the following components:       Result Value    RBC 4.43 (*)     Hemoglobin 13.4 (*)     MCHC 31.8 (*)     All other components within normal limits   COMPREHENSIVE METABOLIC PANEL - Abnormal; Notable for the following components:    CO2 20 (*)     Total Bilirubin 1.1 (*)     All other components within normal limits   PROTIME-INR - Abnormal; Notable for the following components:    Prothrombin Time 15.6 (*)     INR 1.5 (*)     All other components within normal limits   B-TYPE NATRIURETIC PEPTIDE - Abnormal; Notable for the following components:     (*)     All other components within normal limits   APTT   TROPONIN I        All Lab Results:  Results for orders placed or performed during the hospital encounter of 09/20/18   CBC auto differential   Result Value Ref Range    WBC " 7.86 3.90 - 12.70 K/uL    RBC 4.43 (L) 4.60 - 6.20 M/uL    Hemoglobin 13.4 (L) 14.0 - 18.0 g/dL    Hematocrit 42.2 40.0 - 54.0 %    MCV 95 82 - 98 fL    MCH 30.2 27.0 - 31.0 pg    MCHC 31.8 (L) 32.0 - 36.0 g/dL    RDW 13.3 11.5 - 14.5 %    Platelets 207 150 - 350 K/uL    MPV 11.3 9.2 - 12.9 fL    Gran # (ANC) 5.5 1.8 - 7.7 K/uL    Lymph # 1.6 1.0 - 4.8 K/uL    Mono # 0.7 0.3 - 1.0 K/uL    Eos # 0.1 0.0 - 0.5 K/uL    Baso # 0.04 0.00 - 0.20 K/uL    Gran% 69.5 38.0 - 73.0 %    Lymph% 19.8 18.0 - 48.0 %    Mono% 8.4 4.0 - 15.0 %    Eosinophil% 1.8 0.0 - 8.0 %    Basophil% 0.5 0.0 - 1.9 %    Differential Method Automated    Comprehensive metabolic panel   Result Value Ref Range    Sodium 141 136 - 145 mmol/L    Potassium 4.2 3.5 - 5.1 mmol/L    Chloride 108 95 - 110 mmol/L    CO2 20 (L) 23 - 29 mmol/L    Glucose 105 70 - 110 mg/dL    BUN, Bld 15 8 - 23 mg/dL    Creatinine 1.0 0.5 - 1.4 mg/dL    Calcium 8.9 8.7 - 10.5 mg/dL    Total Protein 7.3 6.0 - 8.4 g/dL    Albumin 3.7 3.5 - 5.2 g/dL    Total Bilirubin 1.1 (H) 0.1 - 1.0 mg/dL    Alkaline Phosphatase 62 55 - 135 U/L    AST 26 10 - 40 U/L    ALT 18 10 - 44 U/L    Anion Gap 13 8 - 16 mmol/L    eGFR if African American >60 >60 mL/min/1.73 m^2    eGFR if non African American >60 >60 mL/min/1.73 m^2   Protime-INR   Result Value Ref Range    Prothrombin Time 15.6 (H) 9.0 - 12.5 sec    INR 1.5 (H) 0.8 - 1.2   APTT   Result Value Ref Range    aPTT 29.7 21.0 - 32.0 sec   Brain natriuretic peptide   Result Value Ref Range     (H) 0 - 99 pg/mL   Troponin I   Result Value Ref Range    Troponin I 0.008 0.000 - 0.026 ng/mL         Imaging Results:  Imaging Results          X-Ray Chest 1 View (Final result)  Result time 09/20/18 23:47:26    Final result by Arturo Ambrose MD (09/20/18 23:47:26)                 Impression:      No acute findings.      Electronically signed by: Arturo Ambrose MD  Date:    09/20/2018  Time:    23:47             Narrative:    EXAMINATION:  XR CHEST 1  VIEW    CLINICAL HISTORY:  Shortness of breath    TECHNIQUE:  AP view of the chest was performed.    COMPARISON:  08/09/2018    FINDINGS:  The cardiac and mediastinal silhouettes appear within normal limits. Stable mild elevation of the right hemidiaphragm and position of the left-sided pacemaker.  The lungs are clear bilaterally.  No acute osseous findings demonstrated.                                        The EKG was ordered, reviewed, and independently interpreted by the ED provider.  Interpretation time: 23:23  Rate: 70 BPM  Rhythm: ventricular-paced rhythm  Interpretation: Abnormal ECG. No ST elevations. No STEMI.      The Emergency Provider reviewed the vital signs and test results, which are outlined above.    ED Discussion     12:50 AM: Reassessed pt at this time.  Pt states his condition has improved at this time. Discussed with pt all pertinent ED information and results. Discussed pt dx and plan of tx. Gave pt all f/u and return to the ED instructions. All questions and concerns were addressed at this time. Pt expresses understanding of information and instructions, and is comfortable with plan to discharge. Pt is stable for discharge.    I discussed with patient and/or family/caretaker that evaluation in the ED does not suggest any emergent or life threatening medical conditions requiring immediate intervention beyond what was provided in the ED, and I believe patient is safe for discharge.  Regardless, an unremarkable evaluation in the ED does not preclude the development or presence of a serious of life threatening condition. As such, patient was instructed to return immediately for any worsening or change in current symptoms.    ED Medication(s):  Medications   predniSONE tablet 40 mg (40 mg Oral Given 9/20/18 3450)       Follow-up Information     Naga Bhatti MD In 2 days.    Specialty:  Cardiology  Contact information:  [address]  [address]  [facility]  [address]  [phone]             [facility]    Specialty:  Emergency Medicine  Why:  As needed, If symptoms worsen  Contact information:  [address]  [facility] [address] [phone]                   Medical Decision Making    Medical Decision Making:   Clinical Tests:   Lab Tests: Reviewed and Ordered  Radiological Study: Reviewed and Ordered  Medical Tests: Reviewed and Ordered           Scribe Attestation:   Scribe #1: I performed the above scribed service and the documentation accurately describes the services I performed. I attest to the accuracy of the note.    Attending:   Physician Attestation Statement for Scribe #1: I, Edd Harper MD, personally performed the services described in this documentation, as scribed by Isabela Harper, in my presence, and it is both accurate and complete.          Clinical Impression       ICD-10-CM ICD-9-CM   1. SOB (shortness of breath) R06.02 786.05   2. SOB (shortness of breath) R06.02 786.05       Disposition:   Disposition: Discharged  Condition: Stable         Edd Harper MD  09/21/18 0530

## 2018-09-26 ENCOUNTER — ANTI-COAG VISIT (OUTPATIENT)
Dept: CARDIOLOGY | Facility: CLINIC | Age: 83
End: 2018-09-26
Payer: MEDICARE

## 2018-09-26 DIAGNOSIS — Z79.01 LONG TERM (CURRENT) USE OF ANTICOAGULANTS: Primary | ICD-10-CM

## 2018-09-26 LAB — INR PPP: 1.9 (ref 2–3)

## 2018-09-26 PROCEDURE — 99999 PR PBB SHADOW E&M-EST. PATIENT-LVL I: CPT | Mod: PBBFAC,,,

## 2018-09-26 PROCEDURE — 85610 PROTHROMBIN TIME: CPT | Mod: PBBFAC,PO

## 2018-09-26 PROCEDURE — 99211 OFF/OP EST MAY X REQ PHY/QHP: CPT | Mod: PBBFAC,PO

## 2018-09-26 NOTE — PROGRESS NOTES
Patient's INR is slightly low at 1.9.  Post ED visit on 9/20/2018.  Yue completed this a.m..  INR 1.5 on 9/20/2018.  Instructed patient to start new dose of 5 mg Monday through Saturday and 2.5 mg on Sunday per dose calendar given.  Recheck in 3 weeks.

## 2018-10-04 ENCOUNTER — OFFICE VISIT (OUTPATIENT)
Dept: PODIATRY | Facility: CLINIC | Age: 83
End: 2018-10-04
Payer: MEDICARE

## 2018-10-04 VITALS
WEIGHT: 191.13 LBS | HEART RATE: 82 BPM | BODY MASS INDEX: 28.31 KG/M2 | HEIGHT: 69 IN | SYSTOLIC BLOOD PRESSURE: 159 MMHG | DIASTOLIC BLOOD PRESSURE: 76 MMHG

## 2018-10-04 DIAGNOSIS — B35.1 DERMATOPHYTOSIS OF NAIL: Primary | ICD-10-CM

## 2018-10-04 DIAGNOSIS — I73.9 PERIPHERAL VASCULAR DISEASE: ICD-10-CM

## 2018-10-04 PROCEDURE — 11721 DEBRIDE NAIL 6 OR MORE: CPT | Mod: Q8,PBBFAC,PO | Performed by: PODIATRIST

## 2018-10-04 PROCEDURE — 11721 DEBRIDE NAIL 6 OR MORE: CPT | Mod: Q8,S$PBB,, | Performed by: PODIATRIST

## 2018-10-04 PROCEDURE — 99499 UNLISTED E&M SERVICE: CPT | Mod: S$PBB,,, | Performed by: PODIATRIST

## 2018-10-04 PROCEDURE — 99999 PR PBB SHADOW E&M-EST. PATIENT-LVL IV: CPT | Mod: PBBFAC,,, | Performed by: PODIATRIST

## 2018-10-04 PROCEDURE — 99214 OFFICE O/P EST MOD 30 MIN: CPT | Mod: PBBFAC,PO | Performed by: PODIATRIST

## 2018-10-04 NOTE — PROGRESS NOTES
"[facility] -   PODIATRIC MEDICINE AND SURGERY  PROGRESS NOTE         CHIEF COMPLAINT  Chief Complaint   Patient presents with    Routine Foot Care     PCP DR Bhatti 9/10/18; [facility]       HPI  SUBJECTIVE: Bola Figueroa Sr. is a 83 y.o. male who  has a past medical history of *Atrial fibrillation, Atrial fibrillation, Bilateral carotid artery stenosis (1/15/2016), Cardiac pacemaker (8/7/2013), Congestive heart failure (4/6/2015), COPD (chronic obstructive pulmonary disease) (8/7/2013), Coronary artery disease, Hyperlipidemia, Hypertension, Long-term (current) use of anticoagulants (8/7/2013), PVD (peripheral vascular disease), S/P PTCA (percutaneous transluminal coronary angioplasty) (8/7/2013), Sleep apnea (8/7/2013), and Stroke. Bolapresents to clinic for high risk foot exam and care.  Bola denies numbness, burning, and/or tingling sensations in their feet. Patient admits to painful toenails aggravated by increased weight bearing, shoe gear, and pressure. States pain is relieved with routine debridements. Patient has no other pedal complaints at this time.    This patient has documented high risk feet requiring routine maintenance secondary to PVD and those secondary complications of PVD,  as mentioned.      HgA1c: No results found for: HGBA1C      REVIEW OF SYSTEMS  General: Denies any fever or chills  Chest: Denies shortness of breath, wheezing, coughing, or sputum production  Heart: Denies chest pain.  As noted above and per history of current illness above, otherwise negative in the remainder of the 14 systems.     PHYSICAL EXAM  Vitals:    10/04/18 1051   BP: (!) 159/76   Pulse: 82   Weight: 86.7 kg (191 lb 2.2 oz)   Height: 5' 9" (1.753 m)       GEN:  This patient is well-developed, well-nourished and appears stated age, well-oriented to person, place and time, and cooperative and pleasant on today's visit.      LOWER EXTREMITY  Vascular:   · DP pedal pulse 0/4 b/l, PT pedal pulse 0/4 b/l  · Skin " temperature warm to warm from prox to distally  · CFT <5 secs b/l  · There is mild  edema noted b/l.     Dermatologic:   · Thickened, dystrophic, elongated toenails with subungal debris 1-5 b/l.   · No open skin lesions noted  · No erythema or drainage noted b/l.  · Webspaces are C/D/I B/L.  · There is no hyperkeratotic tissue noted.  · Skin texture and turgor WNL  · There is no pedal hair growth noted    Neurologic:  · Protective sensation absent at 0/10 sites upon examination with [place] Weinsten 5.07 g monofilament.   · Propioception intact at 1st MTPJ b/l.   · Babinski reflex absent b/l. Light touch and sharp/dull sensation intact b/l.    Musculoskeletal/Orthopedic:  · No symptomatic structural abnormalities noted.   · Muscle strength is 5/5 for foot inverters, everters, plantarflexors, and dorsiflexors. Muscle tone is normal.  · Pain free range of motion in all four quadrants with stiffness and limitation b/l      ASSESSMENT  1. Dermatophytosis  2. PVD    PLAN  -patient was examined and evaulated  -Discuss presenting problems, etiology, pathologic processes and management options with patient today.   -I counseled the patient on their conditions, their implications and medical management. at.  -With patient's permission, nails were aggressively reduced and debrided x 10 to their soft tissue attachment mechanically and with electric , removing all offending nail and debris. Patient relates relief following the procedure. Patient will continue to monitor the areas daily, inspect feet, wear protective shoe gear when ambulatory, moisturizer to maintain skin integrity.    Future Appointments   Date Time Provider Department Center   10/17/2018  8:00 AM [facility] [place]   11/2/2018  8:50 AM LABORATORY, [facility] LAB [place]   11/9/2018  9:30 AM PACEMAKER CLINIC [facility] ARR PRO [place]   11/9/2018  9:45 AM Caroline Mcdonough MD [facility] CARDIO [place]   1/10/2019  9:20 AM Roma Cueto DPM [facility] POD [place]    2/18/2019  8:00 AM SPIROMETRY, [facility] PULMLAB [place]   2/18/2019  8:20 AM Juanjo Wallace MD [facility] PULMSVC [place]

## 2018-10-08 ENCOUNTER — TELEPHONE (OUTPATIENT)
Dept: PULMONOLOGY | Facility: CLINIC | Age: 83
End: 2018-10-08

## 2018-10-08 NOTE — TELEPHONE ENCOUNTER
----- Message from Ashley Hernandez sent at 10/8/2018 10:17 AM CDT -----  Contact: daughter  Requesting a call back regarding medication. Please call back at [phone].

## 2018-10-17 ENCOUNTER — ANTI-COAG VISIT (OUTPATIENT)
Dept: CARDIOLOGY | Facility: CLINIC | Age: 83
End: 2018-10-17
Payer: MEDICARE

## 2018-10-17 DIAGNOSIS — Z79.01 LONG TERM (CURRENT) USE OF ANTICOAGULANTS: Primary | ICD-10-CM

## 2018-10-17 LAB — INR PPP: 3.5 (ref 2–3)

## 2018-10-17 PROCEDURE — 99999 PR PBB SHADOW E&M-EST. PATIENT-LVL I: CPT | Mod: PBBFAC,,,

## 2018-10-17 PROCEDURE — 99211 OFF/OP EST MAY X REQ PHY/QHP: CPT | Mod: PBBFAC,PO

## 2018-10-17 PROCEDURE — 85610 PROTHROMBIN TIME: CPT | Mod: PBBFAC,PO

## 2018-10-17 NOTE — PROGRESS NOTES
Patient's INR is supra-therapeutic at 3.5.  No signs of abnormal bleeding reported.  Daughter reports patient's vitamin K intake as inconsistent.  Reeducated patient/caregiver on importance of maintaining a consistent intake of vitamin K, foods high in Vitamin K reviewed with patient/caregiver.  Daughter agreed on 1 serving of a dark green vegetable per week.  Advised patient if he experiences any signs of bleeding, he should seek immediate medial attention. Instructed patient to hold dose of coumadin for today only, then resume normal dose of 2.5mg on Sunday and 5mg on all other days.  Patient/caregiver voiced understanding.  Follow-up in 3 weeks.

## 2018-10-23 ENCOUNTER — TELEPHONE (OUTPATIENT)
Dept: PULMONOLOGY | Facility: CLINIC | Age: 83
End: 2018-10-23

## 2018-10-31 ENCOUNTER — OFFICE VISIT (OUTPATIENT)
Dept: PULMONOLOGY | Facility: CLINIC | Age: 83
End: 2018-10-31
Payer: MEDICARE

## 2018-10-31 VITALS
SYSTOLIC BLOOD PRESSURE: 130 MMHG | OXYGEN SATURATION: 99 % | RESPIRATION RATE: 17 BRPM | HEART RATE: 81 BPM | HEIGHT: 69 IN | BODY MASS INDEX: 29.06 KG/M2 | WEIGHT: 196.19 LBS | DIASTOLIC BLOOD PRESSURE: 80 MMHG

## 2018-10-31 DIAGNOSIS — J98.6 ELEVATED DIAPHRAGM: ICD-10-CM

## 2018-10-31 DIAGNOSIS — J44.1 COPD EXACERBATION: Primary | ICD-10-CM

## 2018-10-31 DIAGNOSIS — J44.9 COPD, SEVERE: ICD-10-CM

## 2018-10-31 DIAGNOSIS — G47.33 MILD OBSTRUCTIVE SLEEP APNEA: ICD-10-CM

## 2018-10-31 DIAGNOSIS — J44.9 CHRONIC OBSTRUCTIVE PULMONARY DISEASE, UNSPECIFIED COPD TYPE: ICD-10-CM

## 2018-10-31 DIAGNOSIS — R05.9 COUGH: ICD-10-CM

## 2018-10-31 PROCEDURE — 99214 OFFICE O/P EST MOD 30 MIN: CPT | Mod: PBBFAC,PO,25 | Performed by: INTERNAL MEDICINE

## 2018-10-31 PROCEDURE — 99214 OFFICE O/P EST MOD 30 MIN: CPT | Mod: 25,S$PBB,, | Performed by: INTERNAL MEDICINE

## 2018-10-31 PROCEDURE — 90662 IIV NO PRSV INCREASED AG IM: CPT | Mod: PBBFAC,PO

## 2018-10-31 PROCEDURE — 99999 PR PBB SHADOW E&M-EST. PATIENT-LVL IV: CPT | Mod: PBBFAC,,, | Performed by: INTERNAL MEDICINE

## 2018-10-31 RX ORDER — PREDNISONE 20 MG/1
TABLET ORAL
Qty: 20 TABLET | Refills: 1 | Status: SHIPPED | OUTPATIENT
Start: 2018-10-31 | End: 2019-04-16 | Stop reason: SDUPTHER

## 2018-10-31 RX ORDER — AZITHROMYCIN 250 MG/1
250 TABLET, FILM COATED ORAL DAILY
Qty: 6 TABLET | Refills: 1 | Status: SHIPPED | OUTPATIENT
Start: 2018-10-31 | End: 2018-11-09 | Stop reason: ALTCHOICE

## 2018-10-31 RX ORDER — GUAIFENESIN 600 MG/1
1200 TABLET, EXTENDED RELEASE ORAL 2 TIMES DAILY PRN
Qty: 60 TABLET | Status: SHIPPED | OUTPATIENT
Start: 2018-10-31 | End: 2019-04-16 | Stop reason: SDUPTHER

## 2018-10-31 RX ORDER — BUDESONIDE 0.5 MG/2ML
0.5 INHALANT ORAL 2 TIMES DAILY
Qty: 120 ML | Refills: 11 | Status: SHIPPED | OUTPATIENT
Start: 2018-10-31 | End: 2019-04-16 | Stop reason: ALTCHOICE

## 2018-10-31 RX ORDER — PROMETHAZINE HYDROCHLORIDE AND DEXTROMETHORPHAN HYDROBROMIDE 6.25; 15 MG/5ML; MG/5ML
5 SYRUP ORAL EVERY 6 HOURS PRN
Qty: 473 ML | Refills: 5 | Status: SHIPPED | OUTPATIENT
Start: 2018-10-31 | End: 2019-04-16 | Stop reason: SDUPTHER

## 2018-10-31 RX ORDER — IPRATROPIUM BROMIDE AND ALBUTEROL SULFATE 2.5; .5 MG/3ML; MG/3ML
3 SOLUTION RESPIRATORY (INHALATION) EVERY 6 HOURS PRN
Qty: 360 ML | Refills: 11 | Status: SHIPPED | OUTPATIENT
Start: 2018-10-31 | End: 2019-04-16

## 2018-10-31 NOTE — PROGRESS NOTES
Subjective:       Patient ID: Bola Figueroa Sr. is a 84 y.o. male.    Chief Complaint:   He   presents for evaluation and treatment of exacerbation of chronic obstructive pulmonary disease  after being discharged from the hospital  1  month ago. Since discharge symptoms have unchanged course since that time. Patient denies fever or chills. Symptoms are aggravated by activity. Symptoms improve with rest.  Respiratory: positive for cough, dyspnea on exertion, sputum and wheezing; Cardiovascular: no chest pain or palpitations.  Patient currently is not on home oxygen therapy..  MEDICAL RECORDS FROM THE HOSPITAL REVIEWED:  HPI     9/20/2018, 11:07 PM   History obtained from the patient                  History of Present Illness: Bola Figueroa Sr. is a 83 y.o. male patient who presents to the Emergency Department for SOB which worsened tonight. Pt states he is usually SOB at baseline. Symptoms are constant and moderate in severity. No mitigating or exacerbating factors reported. Associated sxs include nausea and productive cough. Patient denies any fever, chills, V/D, rhinorrhea, congestion, sore throat, CP, abd pain, weakness, numbness, and all other sxs at this time. Prior Tx includes a nebulizer that improves pt sxs. Pt attempted to take his Advair for current sxs, but it did not help. No further complaints or concerns at this time.   COPD    HPI  Past Medical History:   Diagnosis Date    *Atrial fibrillation     Atrial fibrillation     Bilateral carotid artery stenosis 1/15/2016    Cardiac pacemaker 8/7/2013    Congestive heart failure 4/6/2015    COPD (chronic obstructive pulmonary disease) 8/7/2013    Coronary artery disease     Hyperlipidemia     Hypertension     Long-term (current) use of anticoagulants 8/7/2013    PVD (peripheral vascular disease)     S/P PTCA (percutaneous transluminal coronary angioplasty) 8/7/2013    Sleep apnea 8/7/2013    Stroke      Past Surgical History:   Procedure Laterality  Date    CATARACT EXTRACTION W/  INTRAOCULAR LENS IMPLANT      INSERT / REPLACE / REMOVE PACEMAKER  2005    REPLACEMENT, PULSE GENERATOR, CARDIAC PACEMAKER Left 3/20/2014    Performed by Behzad Sarmiento MD at [facility]     Social History     Socioeconomic History    Marital status:      Spouse name: Not on file    Number of children: Not on file    Years of education: Not on file    Highest education level: Not on file   Social Needs    Financial resource strain: Not on file    Food insecurity - worry: Not on file    Food insecurity - inability: Not on file    Transportation needs - medical: Not on file    Transportation needs - non-medical: Not on file   Occupational History    Not on file   Tobacco Use    Smoking status: Former Smoker     Packs/day: 1.50     Years: 35.00     Pack years: 52.50     Types: Cigarettes     Last attempt to quit: 1979     Years since quittin.0    Smokeless tobacco: Never Used   Substance and Sexual Activity    Alcohol use: No     Comment: QUIT 77    Drug use: No    Sexual activity: Not on file   Other Topics Concern    Not on file   Social History Narrative    Not on file     Review of Systems   Constitutional: Positive for fatigue. Negative for fever.   HENT: Positive for postnasal drip, rhinorrhea and congestion.    Eyes: Negative for redness and itching.   Respiratory: Positive for cough, sputum production, shortness of breath, dyspnea on extertion, use of rescue inhaler and Paroxysmal Nocturnal Dyspnea.    Cardiovascular: Negative for chest pain, palpitations and leg swelling.   Genitourinary: Negative for difficulty urinating and hematuria.   Endocrine: Negative for cold intolerance and heat intolerance.    Skin: Negative for rash.   Gastrointestinal: Negative for nausea and abdominal pain.   Neurological: Negative for dizziness, syncope, weakness and light-headedness.   Hematological: Negative for adenopathy. Does not bruise/bleed  easily.   Psychiatric/Behavioral: Negative for sleep disturbance. The patient is not nervous/anxious.        Objective:      Physical Exam   Constitutional: He is oriented to person, place, and time. He appears well-developed and well-nourished.   HENT:   Head: Normocephalic and atraumatic.   Mouth/Throat: Oropharyngeal exudate present.   Eyes: Conjunctivae are normal. Pupils are equal, round, and reactive to light.   Neck: Neck supple. No JVD present. No tracheal deviation present. No thyromegaly present.   Cardiovascular: Normal rate. An irregular rhythm present. Exam reveals gallop and S4.   No murmur heard.  Pulmonary/Chest: Effort normal. He has decreased breath sounds. He has wheezes in the right lower field and the left lower field. He has no rhonchi. He has no rales.   Abdominal: Soft. Bowel sounds are normal.   Musculoskeletal: Normal range of motion. He exhibits no edema or tenderness.   Lymphadenopathy:     He has no cervical adenopathy.   Neurological: He is alert and oriented to person, place, and time.   Skin: Skin is warm and dry.   Nursing note and vitals reviewed.    Personal Diagnostic Review  Chest x-ray: hyperinflation and cardiomegaly, elevated diaphragm  .[id number]  No flowsheet data found.        Assessment:       1. COPD exacerbation    2. Chronic obstructive pulmonary disease, unspecified COPD type    3. COPD, severe    4. Cough    5. Elevated diaphragm    6. Mild obstructive sleep apnea        Outpatient Encounter Medications as of 10/31/2018   Medication Sig Dispense Refill    albuterol-ipratropium (DUO-NEB) 2.5 mg-0.5 mg/3 mL nebulizer solution Take 3 mLs by nebulization every 6 (six) hours as needed for Wheezing. Rescue 360 mL 11    amlodipine-atorvastatin (CADUET) 10-20 mg per tablet Take 1 tablet by mouth once daily.      aspirin (ECOTRIN) 81 MG EC tablet Take 81 mg by mouth once daily.      budesonide (PULMICORT) 0.5 mg/2 mL nebulizer solution Take 2 mLs (0.5 mg total) by  nebulization 2 (two) times daily. 120 mL 11    fluticasone-salmeterol 250-50 mcg/dose (ADVAIR DISKUS) 250-50 mcg/dose diskus inhaler Inhale 1 puff into the lungs 2 (two) times daily. Wash out mouth after use. 60 each 12    furosemide (LASIX) 40 MG tablet Take 40 mg by mouth once daily.       gabapentin (NEURONTIN) 100 MG capsule Take 100 mg by mouth 3 (three) times daily.  0    guaiFENesin (MUCINEX) 600 mg 12 hr tablet Take 2 tablets (1,200 mg total) by mouth 2 (two) times daily as needed for Congestion. Take with water 60 tablet prn    hydrALAZINE (APRESOLINE) 25 MG tablet take 1 tablet by mouth three times a day 90 tablet 5    ipratropium (ATROVENT) 42 mcg (0.06 %) nasal spray instill 2 sprays into each nostril three times a day 45 mL 4    isosorbide dinitrate (ISORDIL) 20 MG tablet take 1 tablet by mouth every 12 hours with HYDRALAZINE 60 tablet 6    latanoprost 0.005 % ophthalmic solution Place 1 drop into both eyes nightly.  0    levetiracetam (KEPPRA) 500 MG Tab Take 1 tablet by mouth Twice daily.      lubiprostone (AMITIZA) 8 MCG Cap Take 8 mcg by mouth 2 (two) times daily with meals.      nitroGLYCERIN (NITROBID) 6.5 MG CpSR take 1 capsule by mouth twice a day      pantoprazole (PROTONIX) 40 MG tablet Take 40 mg by mouth.      promethazine-dextromethorphan (PROMETHAZINE-DM) 6.25-15 mg/5 mL Syrp Take 5 mLs by mouth every 6 (six) hours as needed. 473 mL 5    tamsulosin (FLOMAX) 0.4 mg Cp24 Take 0.4 mg by mouth once daily.       traMADol (ULTRAM) 50 mg tablet take 1 tablet by mouth every 6 hours if needed for UP TO 10 days  0    warfarin (COUMADIN) 5 MG tablet Take 1/2 tablet on Tuesdays and 1 tablet all other days as directed by the [facility]. (Patient taking differently: Take 1 table Monday through Saturday and 1/2 tablet on Sunday as directed by the [facility].) 30 tablet 3    [DISCONTINUED] albuterol-ipratropium (DUO-NEB) 2.5 mg-0.5 mg/3 mL nebulizer solution Take 3 mLs by  nebulization every 6 (six) hours as needed for Wheezing. Rescue 360 mL 11    [DISCONTINUED] budesonide (PULMICORT) 0.5 mg/2 mL nebulizer solution Take 2 mLs (0.5 mg total) by nebulization 2 (two) times daily. 120 mL 11    [DISCONTINUED] carvedilol (COREG) 6.25 MG tablet take 1 tablet by mouth twice a day with meals      [DISCONTINUED] guaiFENesin (MUCINEX) 600 mg 12 hr tablet Take 2 tablets (1,200 mg total) by mouth 2 (two) times daily as needed for Congestion. Take with water 60 tablet prn    [DISCONTINUED] promethazine-dextromethorphan (PROMETHAZINE-DM) 6.25-15 mg/5 mL Syrp Take 5 mLs by mouth every 6 (six) hours as needed. 473 mL 5    albuterol (PROVENTIL) 2.5 mg /3 mL (0.083 %) nebulizer solution Take 3 mLs (2.5 mg total) by nebulization every 4 (four) hours as needed for Wheezing or Shortness of Breath. 320 mL 4    predniSONE (DELTASONE) 20 MG tablet Prednisone 60 mg/ day for 3 days, 40 mg/day for 3 days,20 mg/ day for 3 days, (1/2 tablet )10 mg a day for 3 days. 20 tablet 1    [DISCONTINUED] azithromycin (ZITHROMAX Z-ROGELIO) 250 MG tablet Take 1 tablet (250 mg total) by mouth once daily. 500 mg on day 1 (two tablets) followed by 250 mg once daily on days 2-5 6 tablet 1    [DISCONTINUED] azithromycin (ZITHROMAX Z-ROGELIO) 250 MG tablet Take 1 tablet (250 mg total) by mouth once daily. 500 mg on day 1 (two tablets) followed by 250 mg once daily on days 2-5 6 tablet 1     No facility-administered encounter medications on file as of 10/31/2018.      Orders Placed This Encounter   Procedures    X-Ray Chest PA And Lateral     Standing Status:   Future     Standing Expiration Date:   4/30/2020     Order Specific Question:   Reason for Exam:     Answer:   SOB    Influenza - High Dose (65+) (PF) (IM)    Spirometry with/without bronchodilator     Standing Status:   Future     Standing Expiration Date:   10/31/2019     Plan:       Requested Prescriptions     Signed Prescriptions Disp Refills    predniSONE  (DELTASONE) 20 MG tablet 20 tablet 1     Sig: Prednisone 60 mg/ day for 3 days, 40 mg/day for 3 days,20 mg/ day for 3 days, (1/2 tablet )10 mg a day for 3 days.    budesonide (PULMICORT) 0.5 mg/2 mL nebulizer solution 120 mL 11     Sig: Take 2 mLs (0.5 mg total) by nebulization 2 (two) times daily.    albuterol-ipratropium (DUO-NEB) 2.5 mg-0.5 mg/3 mL nebulizer solution 360 mL 11     Sig: Take 3 mLs by nebulization every 6 (six) hours as needed for Wheezing. Rescue    promethazine-dextromethorphan (PROMETHAZINE-DM) 6.25-15 mg/5 mL Syrp 473 mL 5     Sig: Take 5 mLs by mouth every 6 (six) hours as needed.    guaiFENesin (MUCINEX) 600 mg 12 hr tablet 60 tablet prn     Sig: Take 2 tablets (1,200 mg total) by mouth 2 (two) times daily as needed for Congestion. Take with water     COPD exacerbation  -     predniSONE (DELTASONE) 20 MG tablet; Prednisone 60 mg/ day for 3 days, 40 mg/day for 3 days,20 mg/ day for 3 days, (1/2 tablet )10 mg a day for 3 days.  Dispense: 20 tablet; Refill: 1  -     Discontinue: azithromycin (ZITHROMAX Z-ROGELIO) 250 MG tablet; Take 1 tablet (250 mg total) by mouth once daily. 500 mg on day 1 (two tablets) followed by 250 mg once daily on days 2-5  Dispense: 6 tablet; Refill: 1    Chronic obstructive pulmonary disease, unspecified COPD type  -     budesonide (PULMICORT) 0.5 mg/2 mL nebulizer solution; Take 2 mLs (0.5 mg total) by nebulization 2 (two) times daily.  Dispense: 120 mL; Refill: 11  -     guaiFENesin (MUCINEX) 600 mg 12 hr tablet; Take 2 tablets (1,200 mg total) by mouth 2 (two) times daily as needed for Congestion. Take with water  Dispense: 60 tablet; Refill: prn  -     Spirometry with/without bronchodilator; Future; Expected date: 05/03/2019  -     X-Ray Chest PA And Lateral; Future; Expected date: 10/31/2018    COPD, severe  -     albuterol-ipratropium (DUO-NEB) 2.5 mg-0.5 mg/3 mL nebulizer solution; Take 3 mLs by nebulization every 6 (six) hours as needed for Wheezing. Rescue   Dispense: 360 mL; Refill: 11    Cough  -     promethazine-dextromethorphan (PROMETHAZINE-DM) 6.25-15 mg/5 mL Syrp; Take 5 mLs by mouth every 6 (six) hours as needed.  Dispense: 473 mL; Refill: 5    Elevated diaphragm    Mild obstructive sleep apnea    Other orders  -     Influenza - High Dose (65+) (PF) (IM)           Follow-up in about 6 months (around 4/30/2019) for Review CXR, Review amie.    Review of medical records from hospital. Medical records from hospital were reviewed during office visit - these included but were not limited to review of radiographic studies and /or radiologists reports, laboratory studies, discharge summaries, procedure notes, consultations and other transcribed notes.    MEDICAL DECISION MAKING: Moderate to high complexity.  Overall, the multiple problems listed are of moderate to high severity that may impact quality of life and activities of daily living. Side effects of medications, treatment plan as well as options and alternatives reviewed and discussed with patient. There was counseling of patient concerning these issues.    Total time spent in face to face counseling and coordination of care - 40 minutes over 50% of time was used in discussion of prognosis, risks, benefits of treatment, instructions and compliance with regimen . Discussion with other physicians or health care providers (homehealth, durable medical equipment providers).

## 2018-11-02 ENCOUNTER — LAB VISIT (OUTPATIENT)
Dept: LAB | Facility: HOSPITAL | Age: 83
End: 2018-11-02
Attending: INTERNAL MEDICINE
Payer: MEDICARE

## 2018-11-02 DIAGNOSIS — I25.10 CORONARY ARTERY DISEASE INVOLVING NATIVE CORONARY ARTERY OF NATIVE HEART WITHOUT ANGINA PECTORIS: ICD-10-CM

## 2018-11-02 LAB
ALBUMIN SERPL BCP-MCNC: 3.6 G/DL
ALP SERPL-CCNC: 58 U/L
ALT SERPL W/O P-5'-P-CCNC: 12 U/L
ANION GAP SERPL CALC-SCNC: 9 MMOL/L
AST SERPL-CCNC: 17 U/L
BILIRUB SERPL-MCNC: 1.2 MG/DL
BUN SERPL-MCNC: 12 MG/DL
CALCIUM SERPL-MCNC: 9.4 MG/DL
CHLORIDE SERPL-SCNC: 108 MMOL/L
CHOLEST SERPL-MCNC: 105 MG/DL
CHOLEST/HDLC SERPL: 2.2 {RATIO}
CO2 SERPL-SCNC: 26 MMOL/L
CREAT SERPL-MCNC: 1.1 MG/DL
EST. GFR  (AFRICAN AMERICAN): >60 ML/MIN/1.73 M^2
EST. GFR  (NON AFRICAN AMERICAN): >60 ML/MIN/1.73 M^2
GLUCOSE SERPL-MCNC: 103 MG/DL
HDLC SERPL-MCNC: 48 MG/DL
HDLC SERPL: 45.7 %
LDLC SERPL CALC-MCNC: 46.4 MG/DL
NONHDLC SERPL-MCNC: 57 MG/DL
POTASSIUM SERPL-SCNC: 3.6 MMOL/L
PROT SERPL-MCNC: 6.6 G/DL
SODIUM SERPL-SCNC: 143 MMOL/L
TRIGL SERPL-MCNC: 53 MG/DL

## 2018-11-02 PROCEDURE — 80053 COMPREHEN METABOLIC PANEL: CPT

## 2018-11-02 PROCEDURE — 80061 LIPID PANEL: CPT

## 2018-11-02 PROCEDURE — 36415 COLL VENOUS BLD VENIPUNCTURE: CPT | Mod: PO

## 2018-11-07 ENCOUNTER — ANTI-COAG VISIT (OUTPATIENT)
Dept: CARDIOLOGY | Facility: CLINIC | Age: 83
End: 2018-11-07
Payer: MEDICARE

## 2018-11-07 DIAGNOSIS — Z79.01 LONG TERM (CURRENT) USE OF ANTICOAGULANTS: Primary | ICD-10-CM

## 2018-11-07 LAB — INR PPP: 2.4 (ref 2–3)

## 2018-11-07 PROCEDURE — 85610 PROTHROMBIN TIME: CPT | Mod: PBBFAC,PO

## 2018-11-07 NOTE — PROGRESS NOTES
Patient's INR is therapeutic at 2.4.  Wife informed clinic patient is on steroid taper -prednisone- (1st dose ~11/02) and azithromycin completed on 11/06.  Educated patient and caregiver of potential increase in INR; patient will be cautious of any abnormal bleeding.  No changes in dose.  Continue current dose of 2.5mg on Sundays and 5mg on all other days of the week.  Will monitor INR closely, recheck in 1 week.  Please call should you have any questions or concerns at [phone] or [phone].

## 2018-11-09 ENCOUNTER — OFFICE VISIT (OUTPATIENT)
Dept: CARDIOLOGY | Facility: CLINIC | Age: 83
End: 2018-11-09
Payer: MEDICARE

## 2018-11-09 ENCOUNTER — CLINICAL SUPPORT (OUTPATIENT)
Dept: CARDIOLOGY | Facility: CLINIC | Age: 83
End: 2018-11-09
Attending: INTERNAL MEDICINE
Payer: MEDICARE

## 2018-11-09 VITALS
SYSTOLIC BLOOD PRESSURE: 158 MMHG | HEART RATE: 82 BPM | HEIGHT: 69 IN | DIASTOLIC BLOOD PRESSURE: 80 MMHG | BODY MASS INDEX: 29.48 KG/M2 | WEIGHT: 199.06 LBS

## 2018-11-09 DIAGNOSIS — Z95.0 CARDIAC PACEMAKER: ICD-10-CM

## 2018-11-09 DIAGNOSIS — Z79.01 LONG TERM CURRENT USE OF ANTICOAGULANT THERAPY: ICD-10-CM

## 2018-11-09 DIAGNOSIS — Z98.61 CORONARY ANGIOPLASTY STATUS: ICD-10-CM

## 2018-11-09 DIAGNOSIS — I48.0 PAROXYSMAL ATRIAL FIBRILLATION: ICD-10-CM

## 2018-11-09 DIAGNOSIS — I50.9 CONGESTIVE HEART FAILURE, UNSPECIFIED HF CHRONICITY, UNSPECIFIED HEART FAILURE TYPE: ICD-10-CM

## 2018-11-09 DIAGNOSIS — I73.9 PVD (PERIPHERAL VASCULAR DISEASE): ICD-10-CM

## 2018-11-09 DIAGNOSIS — I10 ESSENTIAL HYPERTENSION: ICD-10-CM

## 2018-11-09 DIAGNOSIS — I63.20 CEREBROVASCULAR ACCIDENT (CVA) DUE TO OCCLUSION OF PRECEREBRAL ARTERY: ICD-10-CM

## 2018-11-09 DIAGNOSIS — Z95.5 STATUS POST CORONARY ARTERY BALLOON DILATION: ICD-10-CM

## 2018-11-09 DIAGNOSIS — F03.A0 MILD DEMENTIA: ICD-10-CM

## 2018-11-09 DIAGNOSIS — I25.10 ASCVD (ARTERIOSCLEROTIC CARDIOVASCULAR DISEASE): ICD-10-CM

## 2018-11-09 DIAGNOSIS — Z95.0 PRESENCE OF CARDIAC PACEMAKER: ICD-10-CM

## 2018-11-09 DIAGNOSIS — G47.33 MILD OBSTRUCTIVE SLEEP APNEA: ICD-10-CM

## 2018-11-09 DIAGNOSIS — I65.23 BILATERAL CAROTID ARTERY STENOSIS: ICD-10-CM

## 2018-11-09 DIAGNOSIS — I70.219 ATHEROSCLEROTIC PVD WITH INTERMITTENT CLAUDICATION: ICD-10-CM

## 2018-11-09 DIAGNOSIS — I48.91 ATRIAL FIBRILLATION WITH SLOW VENTRICULAR RESPONSE: ICD-10-CM

## 2018-11-09 DIAGNOSIS — I25.10 CORONARY ARTERY DISEASE INVOLVING NATIVE CORONARY ARTERY OF NATIVE HEART WITHOUT ANGINA PECTORIS: Primary | ICD-10-CM

## 2018-11-09 DIAGNOSIS — Z98.61 S/P PTCA (PERCUTANEOUS TRANSLUMINAL CORONARY ANGIOPLASTY): ICD-10-CM

## 2018-11-09 DIAGNOSIS — R01.0 STILL'S MURMUR: ICD-10-CM

## 2018-11-09 PROCEDURE — 99213 OFFICE O/P EST LOW 20 MIN: CPT | Mod: S$PBB,,, | Performed by: NURSE PRACTITIONER

## 2018-11-09 PROCEDURE — 99214 OFFICE O/P EST MOD 30 MIN: CPT | Mod: PBBFAC,PO | Performed by: NURSE PRACTITIONER

## 2018-11-09 PROCEDURE — 93280 PM DEVICE PROGR EVAL DUAL: CPT | Mod: PBBFAC,PO | Performed by: INTERNAL MEDICINE

## 2018-11-09 PROCEDURE — 99999 PR PBB SHADOW E&M-EST. PATIENT-LVL IV: CPT | Mod: PBBFAC,,, | Performed by: NURSE PRACTITIONER

## 2018-11-09 RX ORDER — CARVEDILOL 12.5 MG/1
12.5 TABLET ORAL 2 TIMES DAILY WITH MEALS
Qty: 60 TABLET | Refills: 11 | Status: SHIPPED | OUTPATIENT
Start: 2018-11-09 | End: 2018-11-09 | Stop reason: SDUPTHER

## 2018-11-09 RX ORDER — CARVEDILOL 12.5 MG/1
12.5 TABLET ORAL 2 TIMES DAILY WITH MEALS
Qty: 60 TABLET | Refills: 11 | Status: SHIPPED | OUTPATIENT
Start: 2018-11-09

## 2018-11-09 NOTE — PROGRESS NOTES
Subjective:   Patient ID:  Bola Figueroa Sr. is a 84 y.o. male who presents for evaluation of Coronary Artery Disease (6 mo f/u) and Hypertension      HPI   Mr. Figueroa is a 84 year old male with PMHx of PAF on Coumadin, s/p PPM, HTN, HLP, PVD, old CVA , sleep apnea who presents to clinic for routine follow up. He returns to clinic today with his wife and is doing well. He is [facility]. He is using walker for fall prevention today. Denies chest pain or anginal equivalents. No shortness of breath, FLOWERS or palpitations. No leg swelling or claudications. Denies orthopnea, PND or abdominal bloating. Denies exertional symptoms today. No syncope or near syncope. Reports compliance with medications and dietary restrictions. No CNS complaints to suggest recurrent CVA or TIA today in office. NO signs of abnormal bleeding on ASA.     Device check completed today in office.     Past Medical History:   Diagnosis Date    *Atrial fibrillation     Atrial fibrillation     Bilateral carotid artery stenosis 1/15/2016    Cardiac pacemaker 8/7/2013    Congestive heart failure 4/6/2015    COPD (chronic obstructive pulmonary disease) 8/7/2013    Coronary artery disease     Hyperlipidemia     Hypertension     Long-term (current) use of anticoagulants 8/7/2013    PVD (peripheral vascular disease)     S/P PTCA (percutaneous transluminal coronary angioplasty) 8/7/2013    Sleep apnea 8/7/2013    Stroke        Past Surgical History:   Procedure Laterality Date    CATARACT EXTRACTION W/  INTRAOCULAR LENS IMPLANT      INSERT / REPLACE / REMOVE PACEMAKER  2005    REPLACEMENT, PULSE GENERATOR, CARDIAC PACEMAKER Left 3/20/2014    Performed by Behzad Sarmiento MD at [facility]       Social History     Tobacco Use    Smoking status: Former Smoker     Packs/day: 1.50     Years: 35.00     Pack years: 52.50     Types: Cigarettes     Last attempt to quit: 11/13/1979     Years since  quittin.0    Smokeless tobacco: Never Used   Substance Use Topics    Alcohol use: No     Comment: QUIT 77    Drug use: No       Family History   Problem Relation Age of Onset    Diabetes Sister     Fainting Sister     Heart disease Paternal Uncle     Heart attack Paternal Uncle     Hypertension Maternal Grandmother     Diabetes Maternal Grandfather      Wt Readings from Last 3 Encounters:   18 90.3 kg (199 lb 1.2 oz)   10/31/18 89 kg (196 lb 3.4 oz)   10/04/18 86.7 kg (191 lb 2.2 oz)     Temp Readings from Last 3 Encounters:   18 98 °F (36.7 °C) (Oral)   18 97.9 °F (36.6 °C) (Oral)   18 99.2 °F (37.3 °C) (Oral)     BP Readings from Last 3 Encounters:   18 (!) 158/80   10/31/18 130/80   10/04/18 (!) 159/76     Pulse Readings from Last 3 Encounters:   18 82   10/31/18 81   10/04/18 82         Review of Systems   Constitution: Negative for weakness and malaise/fatigue.   HENT: Negative for hearing loss and hoarse voice.    Eyes: Negative for blurred vision and visual disturbance.   Cardiovascular: Negative for chest pain, claudication, dyspnea on exertion, irregular heartbeat, leg swelling, near-syncope, orthopnea, palpitations, paroxysmal nocturnal dyspnea and syncope.        Hx of PPM   Respiratory: Negative for cough, hemoptysis, shortness of breath, sleep disturbances due to breathing, snoring and wheezing.    Endocrine: Negative for cold intolerance and heat intolerance.   Hematologic/Lymphatic: Does not bruise/bleed easily.   Skin: Negative for color change, dry skin and nail changes.   Musculoskeletal: Positive for arthritis. Negative for back pain, joint pain and myalgias.        +walker   Gastrointestinal: Negative for bloating, abdominal pain, constipation, nausea and vomiting.   Genitourinary: Negative for dysuria, flank pain, hematuria and hesitancy.   Neurological: Negative for headaches, light-headedness, loss of balance, numbness and paresthesias.  "  Psychiatric/Behavioral: Negative for altered mental status.   Allergic/Immunologic: Negative for environmental allergies.     BP (!) 158/80 (BP Method: Large (Manual))   Pulse 82   Ht 5' 9" (1.753 m)   Wt 90.3 kg (199 lb 1.2 oz)   BMI 29.40 kg/m²       Recheck /75  Objective:   Physical Exam   Constitutional: He is oriented to person, place, and time. He appears well-developed and well-nourished. No distress.   HENT:   Head: Normocephalic and atraumatic.   Eyes: Pupils are equal, round, and reactive to light.   Neck: Normal range of motion and full passive range of motion without pain. Neck supple. No JVD present.   Cardiovascular: Normal rate, regular rhythm, S1 normal, S2 normal and intact distal pulses. PMI is not displaced. Exam reveals no distant heart sounds.   No murmur heard.  Pulses:       Radial pulses are 2+ on the right side, and 2+ on the left side.        Dorsalis pedis pulses are 2+ on the right side, and 2+ on the left side.   PPM site well healed   Pulmonary/Chest: Effort normal and breath sounds normal. No accessory muscle usage. No respiratory distress. He has no decreased breath sounds. He has no wheezes. He has no rales.   Abdominal: Soft. Bowel sounds are normal. He exhibits no distension. There is no tenderness.   Musculoskeletal: Normal range of motion. He exhibits no edema.        Right ankle: He exhibits no swelling.        Left ankle: He exhibits no swelling.   Neurological: He is alert and oriented to person, place, and time.   Skin: Skin is warm and dry. He is not diaphoretic. No cyanosis. Nails show no clubbing.   Psychiatric: He has a normal mood and affect. His speech is normal and behavior is normal. Judgment and thought content normal. Cognition and memory are normal.   Nursing note and vitals reviewed.      Lab Results   Component Value Date    CHOL 105 (L) 11/02/2018    CHOL 116 (L) 05/02/2018    CHOL 150 11/27/2017     Lab Results   Component Value Date    HDL 48 " 11/02/2018    HDL 52 05/02/2018    HDL 52 11/27/2017     Lab Results   Component Value Date    LDLCALC 46.4 (L) 11/02/2018    LDLCALC 46.4 (L) 05/02/2018    LDLCALC 81.6 11/27/2017     Lab Results   Component Value Date    TRIG 53 11/02/2018    TRIG 88 05/02/2018    TRIG 82 11/27/2017     Lab Results   Component Value Date    CHOLHDL 45.7 11/02/2018    CHOLHDL 44.8 05/02/2018    CHOLHDL 34.7 11/27/2017       Chemistry        Component Value Date/Time     11/02/2018 0848    K 3.6 11/02/2018 0848     11/02/2018 0848    CO2 26 11/02/2018 0848    BUN 12 11/02/2018 0848    CREATININE 1.1 11/02/2018 0848     11/02/2018 0848        Component Value Date/Time    CALCIUM 9.4 11/02/2018 0848    ALKPHOS 58 11/02/2018 0848    AST 17 11/02/2018 0848    ALT 12 11/02/2018 0848    BILITOT 1.2 (H) 11/02/2018 0848    ESTGFRAFRICA >60.0 11/02/2018 0848    EGFRNONAA >60.0 11/02/2018 0848          Lab Results   Component Value Date    TSH 2.9 10/18/2007     Lab Results   Component Value Date    INR 2.4 11/07/2018    INR 3.5 (A) 10/17/2018    INR 1.9 (A) 09/26/2018     Lab Results   Component Value Date    WBC 7.86 09/20/2018    HGB 13.4 (L) 09/20/2018    HCT 42.2 09/20/2018    MCV 95 09/20/2018     09/20/2018        Assessment:      1. Coronary artery disease involving native coronary artery of native heart without angina pectoris    2. S/P PTCA (percutaneous transluminal coronary angioplasty)    3. Presence of cardiac pacemaker    4. Paroxysmal atrial fibrillation    5. ASCVD (arteriosclerotic cardiovascular disease)    6. Atherosclerotic PVD with intermittent claudication    7. Bilateral carotid artery stenosis    8. Cardiac pacemaker    9. Congestive heart failure, unspecified HF chronicity, unspecified heart failure type    10. Coronary angioplasty status    11. Essential hypertension    12. PVD (peripheral vascular disease)    13. Status post coronary artery balloon dilation    14. Still's murmur    15.  Long term current use of anticoagulant therapy    16. Mild obstructive sleep apnea    17. Cerebrovascular accident (CVA) due to occlusion of precerebral artery    18. Mild dementia      Patient presented to clinic for routine follow up. He returns today and is doing well.   Denies chest pain or anginal equivalents.   BP slightly elevated today, better on repeat BP in office.   No SOB, FLOWERS or palpitations.  Physical therapy via [facility] at this time.   No CNS complaints to suggest TIA or CVA today.  No signs of abnormal bleeding on ASA.   Plan:     Increase Coreg to 12.5 mg twice a day  Continue all other CV meds for now  DASH diet, 2 gm sodium restriction  1L fluid intake per day  Encourage regular physical activity with therapy  Device check at next scheduled office visit  Follow up in 6 months with CMP and Lipids.

## 2018-11-09 NOTE — TELEPHONE ENCOUNTER
----- Message from Liberty Ambrose NP sent at 11/9/2018 11:22 AM CST -----  Please call patient. I have increased Coreg to 12.5 mg by mouth twice a day to assist with Blood pressure control.     He should start today.     Thanks  L[place]

## 2018-11-12 DIAGNOSIS — Z79.01 LONG TERM (CURRENT) USE OF ANTICOAGULANTS: ICD-10-CM

## 2018-11-12 RX ORDER — WARFARIN SODIUM 5 MG/1
TABLET ORAL
Qty: 30 TABLET | Refills: 3 | Status: SHIPPED | OUTPATIENT
Start: 2018-11-12 | End: 2019-03-28 | Stop reason: SDUPTHER

## 2018-11-12 NOTE — TELEPHONE ENCOUNTER
----- Message from Nessa Flores sent at 11/12/2018  1:52 PM CST -----  Contact: Luz Maria, pt's daughter  She's calling stating that a refill for Rx Warfarin 5mg was supposed to be sent to [facility] on [address] and [address], but they haven't received it yet, please advise [phone]

## 2018-11-14 ENCOUNTER — ANTI-COAG VISIT (OUTPATIENT)
Dept: CARDIOLOGY | Facility: CLINIC | Age: 83
End: 2018-11-14
Payer: MEDICARE

## 2018-11-14 DIAGNOSIS — Z79.01 LONG TERM (CURRENT) USE OF ANTICOAGULANTS: Primary | ICD-10-CM

## 2018-11-14 LAB — INR PPP: 2 (ref 2–3)

## 2018-11-14 PROCEDURE — 85610 PROTHROMBIN TIME: CPT | Mod: PBBFAC,PO

## 2018-11-14 NOTE — PROGRESS NOTES
Patient's INR is therapeutic at 2.0.  Instructed to maintain current dose of Warfarin 2.5 mg on Sunday and 5 mg Monday through Saturday.  Advised patient to be recheck in 1 week for completion of prednisone - 1 day remaining.  Patient's daughter requested to be recheck in 2 weeks.

## 2018-11-28 ENCOUNTER — ANTI-COAG VISIT (OUTPATIENT)
Dept: CARDIOLOGY | Facility: CLINIC | Age: 83
End: 2018-11-28
Payer: MEDICARE

## 2018-11-28 DIAGNOSIS — Z79.01 LONG TERM (CURRENT) USE OF ANTICOAGULANTS: Primary | ICD-10-CM

## 2018-11-28 LAB — INR PPP: 2.4 (ref 2–3)

## 2018-11-28 PROCEDURE — 85610 PROTHROMBIN TIME: CPT | Mod: PBBFAC,PO

## 2018-11-28 NOTE — PROGRESS NOTES
Patient's INR is therapeutic at 2.4.  Reports no current concerns at the time of visit.  Instructed to maintain current dose of Warfarin 2.5 mg every Sunday and 5 mg Monday through Saturday per dosing calendar given.  Recheck in 1 month.

## 2018-12-26 ENCOUNTER — ANTI-COAG VISIT (OUTPATIENT)
Dept: CARDIOLOGY | Facility: CLINIC | Age: 83
End: 2018-12-26
Payer: MEDICARE

## 2018-12-26 DIAGNOSIS — Z79.01 LONG TERM (CURRENT) USE OF ANTICOAGULANTS: Primary | ICD-10-CM

## 2018-12-26 LAB — INR PPP: 1.8 (ref 2–3)

## 2018-12-26 PROCEDURE — 85610 PROTHROMBIN TIME: CPT | Mod: PBBFAC,PO

## 2018-12-26 NOTE — PROGRESS NOTES
Patient's INR is slightly sub-therapeutic at 1.8.  No missed doses; Mr. Figueroa reports completing prednisone about 1 week ago.  No abnormal weakness or numbness noted.  Instructions given for patient to re-challenge current dose of 2.5mg on Sundays and 5mg  on all other days of the week.  Patient voiced understanding.  Follow-up in 4 weeks, per patient request.

## 2019-01-09 ENCOUNTER — OFFICE VISIT (OUTPATIENT)
Dept: PODIATRY | Facility: CLINIC | Age: 84
End: 2019-01-09
Payer: MEDICARE

## 2019-01-09 VITALS
DIASTOLIC BLOOD PRESSURE: 77 MMHG | HEIGHT: 69 IN | WEIGHT: 199.06 LBS | BODY MASS INDEX: 29.48 KG/M2 | HEART RATE: 72 BPM | SYSTOLIC BLOOD PRESSURE: 150 MMHG

## 2019-01-09 DIAGNOSIS — I73.9 PERIPHERAL VASCULAR DISEASE: ICD-10-CM

## 2019-01-09 DIAGNOSIS — B35.1 DERMATOPHYTOSIS OF NAIL: Primary | ICD-10-CM

## 2019-01-09 PROCEDURE — 99499 UNLISTED E&M SERVICE: CPT | Mod: S$PBB,,, | Performed by: PODIATRIST

## 2019-01-09 PROCEDURE — 99999 PR PBB SHADOW E&M-EST. PATIENT-LVL IV: CPT | Mod: PBBFAC,,, | Performed by: PODIATRIST

## 2019-01-09 PROCEDURE — 99499 NO LOS: ICD-10-PCS | Mod: S$PBB,,, | Performed by: PODIATRIST

## 2019-01-09 PROCEDURE — 99214 OFFICE O/P EST MOD 30 MIN: CPT | Mod: PBBFAC,PO,25 | Performed by: PODIATRIST

## 2019-01-09 PROCEDURE — 99999 PR PBB SHADOW E&M-EST. PATIENT-LVL IV: ICD-10-PCS | Mod: PBBFAC,,, | Performed by: PODIATRIST

## 2019-01-09 PROCEDURE — 11721 DEBRIDE NAIL 6 OR MORE: CPT | Mod: Q8,S$PBB,, | Performed by: PODIATRIST

## 2019-01-09 PROCEDURE — 11721 PR DEBRIDEMENT OF NAILS, 6 OR MORE: ICD-10-PCS | Mod: Q8,S$PBB,, | Performed by: PODIATRIST

## 2019-01-09 PROCEDURE — 11721 DEBRIDE NAIL 6 OR MORE: CPT | Mod: Q8,PBBFAC,PO | Performed by: PODIATRIST

## 2019-01-09 RX ORDER — ATORVASTATIN CALCIUM 20 MG/1
20 TABLET, FILM COATED ORAL
COMMUNITY
Start: 2019-01-08 | End: 2019-07-07

## 2019-01-09 RX ORDER — AMLODIPINE BESYLATE 10 MG/1
10 TABLET ORAL
COMMUNITY
Start: 2019-01-08 | End: 2019-07-03 | Stop reason: SDUPTHER

## 2019-01-09 NOTE — PROGRESS NOTES
"[facility] -   PODIATRIC MEDICINE AND SURGERY  PROGRESS NOTE         CHIEF COMPLAINT  Chief Complaint   Patient presents with    Routine Foot Care     PCP Dr. Bhatti 12/19/18 [facility]       HPI  SUBJECTIVE: Bola Figueroa Sr. is a 84 y.o. male who  has a past medical history of *Atrial fibrillation, Atrial fibrillation, Bilateral carotid artery stenosis (1/15/2016), Cardiac pacemaker (8/7/2013), Congestive heart failure (4/6/2015), COPD (chronic obstructive pulmonary disease) (8/7/2013), Coronary artery disease, Hyperlipidemia, Hypertension, Long-term (current) use of anticoagulants (8/7/2013), PVD (peripheral vascular disease), S/P PTCA (percutaneous transluminal coronary angioplasty) (8/7/2013), Sleep apnea (8/7/2013), and Stroke. Bolapresents to clinic for high risk foot exam and care.  Bola denies numbness, burning, and/or tingling sensations in their feet. Patient admits to painful toenails aggravated by increased weight bearing, shoe gear, and pressure. States pain is relieved with routine debridements. Patient has no other pedal complaints at this time.    This patient has documented high risk feet requiring routine maintenance secondary to PVD and those secondary complications of PVD,  as mentioned.      REVIEW OF SYSTEMS  General: Denies any fever or chills  Chest: Denies shortness of breath, wheezing, coughing, or sputum production  Heart: Denies chest pain.  As noted above and per history of current illness above, otherwise negative in the remainder of the 14 systems.     PHYSICAL EXAM  Vitals:    01/09/19 1037   BP: (!) 150/77   Pulse: 72   Weight: 90.3 kg (199 lb 1.2 oz)   Height: 5' 9" (1.753 m)       GEN:  This patient is well-developed, well-nourished and appears stated age, well-oriented to person, place and time, and cooperative and pleasant on today's visit.      LOWER EXTREMITY  Vascular:   · DP pedal pulse 0/4 b/l, PT pedal pulse 0/4 b/l  · Skin temperature warm to warm from prox to " distally  · CFT <5 secs b/l  · There is mild  edema noted b/l.     Dermatologic:   · Thickened, dystrophic, elongated toenails with subungal debris 1-5 b/l.   · No open skin lesions noted  · No erythema or drainage noted b/l.  · Webspaces are C/D/I B/L.  · There is no hyperkeratotic tissue noted.  · Skin texture and turgor WNL  · There is no pedal hair growth noted    Neurologic:  · Protective sensation absent at 0/10 sites upon examination with Leland Weinsten 5.07 g monofilament.   · Propioception intact at 1st MTPJ b/l.   · Babinski reflex absent b/l. Light touch and sharp/dull sensation intact b/l.    Musculoskeletal/Orthopedic:  · No symptomatic structural abnormalities noted.   · Muscle strength is 5/5 for foot inverters, everters, plantarflexors, and dorsiflexors. Muscle tone is normal.  · Pain free range of motion in all four quadrants with stiffness and limitation b/l      ASSESSMENT  1. Dermatophytosis  2. PVD    PLAN  -patient was examined and evaulated  -Discuss presenting problems, etiology, pathologic processes and management options with patient today.   -I counseled the patient on their conditions, their implications and medical management. at.  -With patient's permission, nails were aggressively reduced and debrided x 10 to their soft tissue attachment mechanically and with electric , removing all offending nail and debris. Patient relates relief following the procedure. Patient will continue to monitor the areas daily, inspect feet, wear protective shoe gear when ambulatory, moisturizer to maintain skin integrity.    Future Appointments   Date Time Provider Department Center   1/23/2019  8:45 AM [facility] COU[facility]   5/2/2019  9:00 AM [facility] XR2 [facility] XRAY [facility]   5/2/2019  9:20 AM SPIROMETRY, [facility] PULMLAB [facility]   5/2/2019  9:40 AM Juanjo Wallace MD [facility]

## 2019-01-23 ENCOUNTER — ANTI-COAG VISIT (OUTPATIENT)
Dept: CARDIOLOGY | Facility: CLINIC | Age: 84
End: 2019-01-23
Payer: MEDICARE

## 2019-01-23 DIAGNOSIS — Z79.01 LONG TERM (CURRENT) USE OF ANTICOAGULANTS: Primary | ICD-10-CM

## 2019-01-23 LAB — INR PPP: 1.7 (ref 2–3)

## 2019-01-23 PROCEDURE — 85610 PROTHROMBIN TIME: CPT | Mod: PBBFAC,PN

## 2019-01-23 PROCEDURE — 99211 PR OFFICE/OUTPT VISIT, EST, LEVL I: ICD-10-PCS | Mod: S$PBB,25,, | Performed by: INTERNAL MEDICINE

## 2019-01-23 PROCEDURE — 99211 OFF/OP EST MAY X REQ PHY/QHP: CPT | Mod: S$PBB,25,, | Performed by: INTERNAL MEDICINE

## 2019-01-23 NOTE — PROGRESS NOTES
Patient's INR has been trending down from INR goal at 1.7.  Reports no missed doses or diet changes.  Will increase today's (Wednesday) dose to 7.5 mg - only, then patient will start new dose of Warfarin 5 mg every evening per dosing calendar given.  Recheck in 2 weeks.

## 2019-02-01 DIAGNOSIS — I10 ESSENTIAL HYPERTENSION: ICD-10-CM

## 2019-02-01 RX ORDER — HYDRALAZINE HYDROCHLORIDE 25 MG/1
TABLET, FILM COATED ORAL
Qty: 90 TABLET | Refills: 3 | Status: SHIPPED | OUTPATIENT
Start: 2019-02-01 | End: 2019-06-12 | Stop reason: SDUPTHER

## 2019-02-06 ENCOUNTER — ANTI-COAG VISIT (OUTPATIENT)
Dept: CARDIOLOGY | Facility: CLINIC | Age: 84
End: 2019-02-06
Payer: MEDICARE

## 2019-02-06 DIAGNOSIS — Z79.01 LONG TERM (CURRENT) USE OF ANTICOAGULANTS: Primary | ICD-10-CM

## 2019-02-06 LAB — INR PPP: 1.5 (ref 2–3)

## 2019-02-06 PROCEDURE — 99211 OFF/OP EST MAY X REQ PHY/QHP: CPT | Mod: S$PBB,25,, | Performed by: INTERNAL MEDICINE

## 2019-02-06 PROCEDURE — 85610 PROTHROMBIN TIME: CPT | Mod: PBBFAC,PN

## 2019-02-06 PROCEDURE — 99211 PR OFFICE/OUTPT VISIT, EST, LEVL I: ICD-10-PCS | Mod: S$PBB,25,, | Performed by: INTERNAL MEDICINE

## 2019-02-06 NOTE — PROGRESS NOTES
Patient's INR is trending down from goal at 1.5.  Reports no missed doses or diet changes.  No signs or symptoms reported.  Will increase Warfarin dose to 7.5 mg every Wednesday and Friday; and 5 mg on all other days per week.  Dose calendar - given.  Recheck in 2 weeks.

## 2019-02-20 ENCOUNTER — ANTI-COAG VISIT (OUTPATIENT)
Dept: CARDIOLOGY | Facility: CLINIC | Age: 84
End: 2019-02-20
Payer: MEDICARE

## 2019-02-20 DIAGNOSIS — Z79.01 LONG TERM (CURRENT) USE OF ANTICOAGULANTS: Primary | ICD-10-CM

## 2019-02-20 LAB — INR PPP: 2.5 (ref 2–3)

## 2019-02-20 PROCEDURE — 85610 PROTHROMBIN TIME: CPT | Mod: PBBFAC,PN

## 2019-02-20 NOTE — PROGRESS NOTES
Patient's INR is therapeutic at 2.5.  Reports no recent medication changes.  Instructed to maintain current dose of Warfarin 7.5 mg every Wednesday and Friday; and 5 mg on all other days per week.  Dose calendar - given.  Recheck in 3 weeks.

## 2019-03-11 DIAGNOSIS — R05.9 COUGH: Primary | ICD-10-CM

## 2019-03-11 RX ORDER — BENZONATATE 200 MG/1
200 CAPSULE ORAL 3 TIMES DAILY PRN
Qty: 90 CAPSULE | Refills: 12 | Status: SHIPPED | OUTPATIENT
Start: 2019-03-11 | End: 2019-04-16 | Stop reason: SDUPTHER

## 2019-03-11 NOTE — TELEPHONE ENCOUNTER
----- Message from gAata Almodovar sent at 3/11/2019  3:31 PM CDT -----  Contact: Alla Alexis- daughter  Luz Maria is calling in regard to pt's cough medicine. Pharmacy states rx is on back order. Luz Maria would like to know what she can do for pt's medication. Please call Luz Maria back at [phone].      Thanks,   Agata Almodovar

## 2019-03-13 ENCOUNTER — ANTI-COAG VISIT (OUTPATIENT)
Dept: CARDIOLOGY | Facility: CLINIC | Age: 84
End: 2019-03-13
Payer: MEDICARE

## 2019-03-13 DIAGNOSIS — Z79.01 LONG TERM (CURRENT) USE OF ANTICOAGULANTS: Primary | ICD-10-CM

## 2019-03-13 LAB — INR PPP: 2.8 (ref 2–3)

## 2019-03-13 PROCEDURE — 85610 PROTHROMBIN TIME: CPT | Mod: PBBFAC,PN

## 2019-03-13 NOTE — PROGRESS NOTES
Patient's INR is therapeutic at 2.8.  Reports no recent changes.  Instructed to maintain current dose of Warfarin 7.5 mg every Wednesday and Friday; and 5 mg on all other days per week.  Recheck in 1 month.

## 2019-03-25 ENCOUNTER — TELEPHONE (OUTPATIENT)
Dept: PULMONOLOGY | Facility: CLINIC | Age: 84
End: 2019-03-25

## 2019-03-25 NOTE — TELEPHONE ENCOUNTER
----- Message from Suzi Balbuena sent at 3/25/2019  8:32 AM CDT -----  Contact: Luz Maria/daughter  Type:  Needs Medical Advice    Who Called: Luz Maria  Symptoms (please be specific): n/a  How long has patient had these symptoms: n/a  Pharmacy name and phone #: n/a  Would the patient rather a call back or a response via [facility]?  Call back  Best Call Back Number: [phone]  Additional Information: Luz Maria thinks the patient needs to be seen before May; she would like to discuss with the nurse first.

## 2019-03-25 NOTE — TELEPHONE ENCOUNTER
----- Message from Dawna Sepulveda sent at 3/25/2019 12:52 PM CDT -----  Type:  Patient Returning Call    Who Called: Pt  Daughter  Luz Maria  Who Left Message for Patient: Dr Wallace office  Does the patient know what this is regarding?: Pt's appt  Would the patient rather a call back or a response via [facility]?  Call back  Best Call Back Number: [phone]  Additional Information:  Please call again//[email or web address]

## 2019-03-25 NOTE — TELEPHONE ENCOUNTER
Pt accepted [facility] appointment on 4/16/2019 at 1340 with Dr Wallace.  Pt provided times for all testing appointments.  Pt provided times for all testing appointments.

## 2019-03-27 ENCOUNTER — HOSPITAL ENCOUNTER (EMERGENCY)
Facility: HOSPITAL | Age: 84
Discharge: HOME | End: 2019-03-27
Attending: EMERGENCY MEDICINE
Payer: MEDICARE

## 2019-03-27 VITALS
HEIGHT: 69 IN | RESPIRATION RATE: 20 BRPM | HEART RATE: 70 BPM | TEMPERATURE: 98 F | SYSTOLIC BLOOD PRESSURE: 165 MMHG | OXYGEN SATURATION: 95 % | DIASTOLIC BLOOD PRESSURE: 73 MMHG | BODY MASS INDEX: 29.4 KG/M2

## 2019-03-27 DIAGNOSIS — R07.9 CHEST PAIN: ICD-10-CM

## 2019-03-27 DIAGNOSIS — R05.9 COUGH: ICD-10-CM

## 2019-03-27 DIAGNOSIS — J44.1 COPD WITH ACUTE EXACERBATION: Primary | ICD-10-CM

## 2019-03-27 LAB
ALBUMIN SERPL BCP-MCNC: 3.7 G/DL (ref 3.5–5.2)
ALP SERPL-CCNC: 57 U/L (ref 55–135)
ALT SERPL W/O P-5'-P-CCNC: 11 U/L (ref 10–44)
ANION GAP SERPL CALC-SCNC: 9 MMOL/L (ref 8–16)
AST SERPL-CCNC: 18 U/L (ref 10–40)
BASOPHILS # BLD AUTO: 0.03 K/UL (ref 0–0.2)
BASOPHILS NFR BLD: 0.4 % (ref 0–1.9)
BILIRUB SERPL-MCNC: 1.6 MG/DL (ref 0.1–1)
BNP SERPL-MCNC: 211 PG/ML (ref 0–99)
BUN SERPL-MCNC: 14 MG/DL (ref 8–23)
CALCIUM SERPL-MCNC: 9 MG/DL (ref 8.7–10.5)
CHLORIDE SERPL-SCNC: 108 MMOL/L (ref 95–110)
CK SERPL-CCNC: 86 U/L (ref 20–200)
CO2 SERPL-SCNC: 26 MMOL/L (ref 23–29)
CREAT SERPL-MCNC: 1.1 MG/DL (ref 0.5–1.4)
DIFFERENTIAL METHOD: ABNORMAL
EOSINOPHIL # BLD AUTO: 0.2 K/UL (ref 0–0.5)
EOSINOPHIL NFR BLD: 2.4 % (ref 0–8)
ERYTHROCYTE [DISTWIDTH] IN BLOOD BY AUTOMATED COUNT: 13.3 % (ref 11.5–14.5)
EST. GFR  (AFRICAN AMERICAN): >60 ML/MIN/1.73 M^2
EST. GFR  (NON AFRICAN AMERICAN): >60 ML/MIN/1.73 M^2
GLUCOSE SERPL-MCNC: 108 MG/DL (ref 70–110)
HCT VFR BLD AUTO: 36.9 % (ref 40–54)
HGB BLD-MCNC: 12 G/DL (ref 14–18)
LYMPHOCYTES # BLD AUTO: 1.5 K/UL (ref 1–4.8)
LYMPHOCYTES NFR BLD: 21.6 % (ref 18–48)
MCH RBC QN AUTO: 30.3 PG (ref 27–31)
MCHC RBC AUTO-ENTMCNC: 32.5 G/DL (ref 32–36)
MCV RBC AUTO: 93 FL (ref 82–98)
MONOCYTES # BLD AUTO: 0.7 K/UL (ref 0.3–1)
MONOCYTES NFR BLD: 9.7 % (ref 4–15)
NEUTROPHILS # BLD AUTO: 4.4 K/UL (ref 1.8–7.7)
NEUTROPHILS NFR BLD: 65.9 % (ref 38–73)
PLATELET # BLD AUTO: 185 K/UL (ref 150–350)
PMV BLD AUTO: 10.5 FL (ref 9.2–12.9)
POTASSIUM SERPL-SCNC: 3.8 MMOL/L (ref 3.5–5.1)
PROT SERPL-MCNC: 6.8 G/DL (ref 6–8.4)
RBC # BLD AUTO: 3.96 M/UL (ref 4.6–6.2)
SODIUM SERPL-SCNC: 143 MMOL/L (ref 136–145)
TROPONIN I SERPL DL<=0.01 NG/ML-MCNC: 0.01 NG/ML (ref 0–0.03)
WBC # BLD AUTO: 6.7 K/UL (ref 3.9–12.7)

## 2019-03-27 PROCEDURE — 84484 ASSAY OF TROPONIN QUANT: CPT

## 2019-03-27 PROCEDURE — 94640 AIRWAY INHALATION TREATMENT: CPT

## 2019-03-27 PROCEDURE — 25000242 PHARM REV CODE 250 ALT 637 W/ HCPCS: Performed by: EMERGENCY MEDICINE

## 2019-03-27 PROCEDURE — 99285 EMERGENCY DEPT VISIT HI MDM: CPT | Mod: 25

## 2019-03-27 PROCEDURE — 93005 ELECTROCARDIOGRAM TRACING: CPT

## 2019-03-27 PROCEDURE — 80053 COMPREHEN METABOLIC PANEL: CPT

## 2019-03-27 PROCEDURE — 83880 ASSAY OF NATRIURETIC PEPTIDE: CPT

## 2019-03-27 PROCEDURE — 93010 ELECTROCARDIOGRAM REPORT: CPT | Mod: ,,, | Performed by: INTERNAL MEDICINE

## 2019-03-27 PROCEDURE — 93010 EKG 12-LEAD: ICD-10-PCS | Mod: ,,, | Performed by: INTERNAL MEDICINE

## 2019-03-27 PROCEDURE — 85025 COMPLETE CBC W/AUTO DIFF WBC: CPT

## 2019-03-27 PROCEDURE — 82550 ASSAY OF CK (CPK): CPT

## 2019-03-27 RX ORDER — IPRATROPIUM BROMIDE AND ALBUTEROL SULFATE 2.5; .5 MG/3ML; MG/3ML
3 SOLUTION RESPIRATORY (INHALATION)
Status: COMPLETED | OUTPATIENT
Start: 2019-03-27 | End: 2019-03-27

## 2019-03-27 RX ORDER — PREDNISONE 50 MG/1
50 TABLET ORAL DAILY
Qty: 5 TABLET | Refills: 0 | Status: SHIPPED | OUTPATIENT
Start: 2019-03-27 | End: 2019-04-01

## 2019-03-27 RX ORDER — GUAIFENESIN 100 MG/5ML
200 SOLUTION ORAL EVERY 4 HOURS PRN
Qty: 60 ML | Refills: 0 | Status: SHIPPED | OUTPATIENT
Start: 2019-03-27 | End: 2019-04-06

## 2019-03-27 RX ADMIN — IPRATROPIUM BROMIDE AND ALBUTEROL SULFATE 3 ML: .5; 3 SOLUTION RESPIRATORY (INHALATION) at 12:03

## 2019-03-27 NOTE — ED PROVIDER NOTES
SCRIBE #1 NOTE: I, Corinne Mack, am scribing for, and in the presence of, Alejandro Salcido MD. I have scribed the entire note.      History      Chief Complaint   Patient presents with    Chest Pain     R side CP radiating to shoulders x couple of days. +cough, SOB       Review of patient's allergies indicates:   Allergen Reactions    No known drug allergies         HPI   HPI    3/27/2019, 11:52 AM   History obtained from the patient      History of Present Illness: Bola Figueroa Sr. is a 84 y.o. male patient with PMHx of Afib, CHF, COPD, HTN, and stroke who presents to the Emergency Department for worsening SOB which onset gradually a week ago. Symptoms are intermittent and moderate in severity. No mitigating or exacerbating factors reported. Associated sxs include R sided CP radiating to the shoulders and cough. Patient denies any fever, chills, diaphoresis, N/V/D, abd pain, back pain, neck pain, HA, dizziness, and all other sxs at this time. No prior Tx reported. No further complaints or concerns at this time.         Arrival mode: Personal vehicle    PCP: Naga Bhatti MD       Past Medical History:  Past Medical History:   Diagnosis Date    *Atrial fibrillation     Atrial fibrillation     Bilateral carotid artery stenosis 1/15/2016    Cardiac pacemaker 8/7/2013    Congestive heart failure 4/6/2015    COPD (chronic obstructive pulmonary disease) 8/7/2013    Coronary artery disease     Hyperlipidemia     Hypertension     Long-term (current) use of anticoagulants 8/7/2013    PVD (peripheral vascular disease)     S/P PTCA (percutaneous transluminal coronary angioplasty) 8/7/2013    Sleep apnea 8/7/2013    Stroke        Past Surgical History:  Past Surgical History:   Procedure Laterality Date    CATARACT EXTRACTION W/  INTRAOCULAR LENS IMPLANT      INSERT / REPLACE / REMOVE PACEMAKER  2005    REPLACEMENT, PULSE GENERATOR, CARDIAC PACEMAKER Left 3/20/2014    Performed by Behzad RODRIGUES  MD [facility] at [facility] CATH LAB         Family History:  Family History   Problem Relation Age of Onset    Diabetes Sister     Fainting Sister     Heart disease Paternal Uncle     Heart attack Paternal Uncle     Hypertension Maternal Grandmother     Diabetes Maternal Grandfather        Social History:  Social History     Tobacco Use    Smoking status: Former Smoker     Packs/day: 1.50     Years: 35.00     Pack years: 52.50     Types: Cigarettes     Last attempt to quit: 1979     Years since quittin.3    Smokeless tobacco: Never Used   Substance and Sexual Activity    Alcohol use: No     Comment: QUIT 77    Drug use: No    Sexual activity: Not on file       ROS   Review of Systems   Constitutional: Negative for chills, diaphoresis and fever.   Respiratory: Positive for cough and shortness of breath.    Cardiovascular: Positive for chest pain (radiating). Negative for leg swelling.   Gastrointestinal: Negative for abdominal pain, diarrhea, nausea and vomiting.   Musculoskeletal: Negative for back pain, neck pain and neck stiffness.   Skin: Negative for rash and wound.   Neurological: Negative for dizziness, light-headedness, numbness and headaches.   All other systems reviewed and are negative.    Physical Exam      Initial Vitals [19 1139]   BP Pulse Resp Temp SpO2   (!) 165/73 72 20 98.4 °F (36.9 °C) (!) 93 %      MAP       --          Physical Exam  Nursing Notes and Vital Signs Reviewed.  Constitutional: Patient is in no acute distress. Well-developed and well-nourished.  Head: Atraumatic. Normocephalic.  Eyes: PERRL. EOM intact. Conjunctivae are not pale. No scleral icterus.  ENT: Mucous membranes are moist. Oropharynx is clear and symmetric.    Neck: Supple. Full ROM. No lymphadenopathy.  Cardiovascular: Regular rate. Regular rhythm. No murmurs, rubs, or gallops. Distal pulses are 2+ and symmetric.  Pulmonary/Chest: No respiratory distress. Decreased breath sounds bilaterally. No  "wheezing or rales.  Abdominal: Soft and non-distended.  There is no tenderness.  No rebound, guarding, or rigidity.   Musculoskeletal: Moves all extremities. No obvious deformities. Trace BLE edema.   Skin: Warm and dry.  Neurological:  Alert, awake, and appropriate.  Normal speech.  No acute focal neurological deficits are appreciated.  Psychiatric: Normal affect. Good eye contact. Appropriate in content.    ED Course    Procedures  ED Vital Signs:  Vitals:    03/27/19 1139 03/27/19 1209   BP: (!) 165/73    Pulse: 72 70   Resp: 20 20   Temp: 98.4 °F (36.9 °C)    TempSrc: Oral    SpO2: (!) 93% 95%   Height: 5' 9" (1.753 m)        Abnormal Lab Results:  Labs Reviewed   CBC W/ AUTO DIFFERENTIAL - Abnormal; Notable for the following components:       Result Value    RBC 3.96 (*)     Hemoglobin 12.0 (*)     Hematocrit 36.9 (*)     All other components within normal limits   COMPREHENSIVE METABOLIC PANEL - Abnormal; Notable for the following components:    Total Bilirubin 1.6 (*)     All other components within normal limits   B-TYPE NATRIURETIC PEPTIDE - Abnormal; Notable for the following components:     (*)     All other components within normal limits   CK   TROPONIN I   URINALYSIS, REFLEX TO URINE CULTURE        All Lab Results:  Results for orders placed or performed during the hospital encounter of 03/27/19   CBC auto differential   Result Value Ref Range    WBC 6.70 3.90 - 12.70 K/uL    RBC 3.96 (L) 4.60 - 6.20 M/uL    Hemoglobin 12.0 (L) 14.0 - 18.0 g/dL    Hematocrit 36.9 (L) 40.0 - 54.0 %    MCV 93 82 - 98 fL    MCH 30.3 27.0 - 31.0 pg    MCHC 32.5 32.0 - 36.0 g/dL    RDW 13.3 11.5 - 14.5 %    Platelets 185 150 - 350 K/uL    MPV 10.5 9.2 - 12.9 fL    Gran # (ANC) 4.4 1.8 - 7.7 K/uL    Lymph # 1.5 1.0 - 4.8 K/uL    Mono # 0.7 0.3 - 1.0 K/uL    Eos # 0.2 0.0 - 0.5 K/uL    Baso # 0.03 0.00 - 0.20 K/uL    Gran% 65.9 38.0 - 73.0 %    Lymph% 21.6 18.0 - 48.0 %    Mono% 9.7 4.0 - 15.0 %    Eosinophil% 2.4 0.0 " - 8.0 %    Basophil% 0.4 0.0 - 1.9 %    Differential Method Automated    Comprehensive metabolic panel   Result Value Ref Range    Sodium 143 136 - 145 mmol/L    Potassium 3.8 3.5 - 5.1 mmol/L    Chloride 108 95 - 110 mmol/L    CO2 26 23 - 29 mmol/L    Glucose 108 70 - 110 mg/dL    BUN, Bld 14 8 - 23 mg/dL    Creatinine 1.1 0.5 - 1.4 mg/dL    Calcium 9.0 8.7 - 10.5 mg/dL    Total Protein 6.8 6.0 - 8.4 g/dL    Albumin 3.7 3.5 - 5.2 g/dL    Total Bilirubin 1.6 (H) 0.1 - 1.0 mg/dL    Alkaline Phosphatase 57 55 - 135 U/L    AST 18 10 - 40 U/L    ALT 11 10 - 44 U/L    Anion Gap 9 8 - 16 mmol/L    eGFR if African American >60 >60 mL/min/1.73 m^2    eGFR if non African American >60 >60 mL/min/1.73 m^2   Brain natriuretic peptide   Result Value Ref Range     (H) 0 - 99 pg/mL   CK   Result Value Ref Range    CPK 86 20 - 200 U/L   Troponin I   Result Value Ref Range    Troponin I 0.011 0.000 - 0.026 ng/mL       Imaging Results:  Imaging Results          X-Ray Chest AP Portable (Final result)  Result time 03/27/19 12:09:32    Final result by SALTY Mccollum Sr., MD (03/27/19 12:09:32)                 Impression:      1. The lungs are clear.  2. A cardiac pacemaker remains in place. The leads appear to be intact.  .      Electronically signed by: Addy Mccollum MD  Date:    03/27/2019  Time:    12:09             Narrative:    EXAMINATION:  XR CHEST AP PORTABLE    CLINICAL HISTORY:  sob;    COMPARISON:  09/20/2018    FINDINGS:  A cardiac pacemaker remains in place.  The leads appear to be intact.  The size of the heart is normal. The lungs are clear. There is no pneumothorax.  The costophrenic angles are sharp.                                 The EKG was ordered, reviewed, and independently interpreted by the ED provider.  Interpretation time: 1141  Rate: 71 BPM  Rhythm: Ventricular-paced rhythm  Interpretation: Normal QRS. No STEMI.           The Emergency Provider reviewed the vital signs and test results, which are  outlined above.    ED Discussion     2:17 PM: Reassessed pt at this time. Pt is awake, alert, and in no distress. Discussed with pt all pertinent ED information and results. Discussed pt dx and plan of tx. Gave pt all f/u and return to the ED instructions. All questions and concerns were addressed at this time. Pt expresses understanding of information and instructions, and is comfortable with plan to discharge. Pt is stable for discharge.    I discussed with patient and/or family/caretaker that evaluation in the ED does not suggest any emergent or life threatening medical conditions requiring immediate intervention beyond what was provided in the ED, and I believe patient is safe for discharge.  Regardless, an unremarkable evaluation in the ED does not preclude the development or presence of a serious of life threatening condition. As such, patient was instructed to return immediately for any worsening or change in current symptoms.    I have discussed with patient and/or family/caretaker chest pain precautions, specifically to return for worsening chest pain, shortness of breath, fever, or any concern.  I have low suspicion for cardiopulmonary, vascular, infectious, respiratory, or other emergent medical condition based on my evaluation in the ED.      ED Medication(s):  Medications   albuterol-ipratropium 2.5 mg-0.5 mg/3 mL nebulizer solution 3 mL (3 mLs Nebulization Given 3/27/19 9153)          Medication List      CHANGE how you take these medications    * guaiFENesin 600 mg 12 hr tablet  Commonly known as:  MUCINEX  Take 2 tablets (1,200 mg total) by mouth 2 (two) times daily as needed for Congestion. Take with water  What changed:  Another medication with the same name was added. Make sure you understand how and when to take each.     * guaifenesin 100 mg/5 ml 100 mg/5 mL syrup  Commonly known as:  ROBITUSSIN  Take 10 mLs (200 mg total) by mouth every 4 (four) hours as needed for Cough or Congestion.  What  changed:  You were already taking a medication with the same name, and this prescription was added. Make sure you understand how and when to take each.     * predniSONE 20 MG tablet  Commonly known as:  DELTASONE  Prednisone 60 mg/ day for 3 days, 40 mg/day for 3 days,20 mg/ day for 3 days, (1/2 tablet )10 mg a day for 3 days.  What changed:  Another medication with the same name was added. Make sure you understand how and when to take each.     * predniSONE 50 MG Tab  Commonly known as:  DELTASONE  Take 1 tablet (50 mg total) by mouth once daily. for 5 days  What changed:  You were already taking a medication with the same name, and this prescription was added. Make sure you understand how and when to take each.         * This list has 4 medication(s) that are the same as other medications prescribed for you. Read the directions carefully, and ask your doctor or other care provider to review them with you.            ASK your doctor about these medications    albuterol 2.5 mg /3 mL (0.083 %) nebulizer solution  Commonly known as:  PROVENTIL  Take 3 mLs (2.5 mg total) by nebulization every 4 (four) hours as needed for Wheezing or Shortness of Breath.     albuterol-ipratropium 2.5 mg-0.5 mg/3 mL nebulizer solution  Commonly known as:  DUO-NEB  Take 3 mLs by nebulization every 6 (six) hours as needed for Wheezing. Rescue     AMITIZA 8 MCG Cap  Generic drug:  lubiprostone     amLODIPine 10 MG tablet  Commonly known as:  NORVASC     amlodipine-atorvastatin 10-20 mg per tablet  Commonly known as:  CADUET     aspirin 81 MG EC tablet  Commonly known as:  ECOTRIN     atorvastatin 20 MG tablet  Commonly known as:  LIPITOR     benzonatate 200 MG capsule  Commonly known as:  TESSALON  Take 1 capsule (200 mg total) by mouth 3 (three) times daily as needed for Cough.     budesonide 0.5 mg/2 mL nebulizer solution  Commonly known as:  PULMICORT  Take 2 mLs (0.5 mg total) by nebulization 2 (two) times daily.     carvedilol 12.5 MG  tablet  Commonly known as:  COREG  Take 1 tablet (12.5 mg total) by mouth 2 (two) times daily with meals.     fluticasone-salmeterol 250-50 mcg/dose 250-50 mcg/dose diskus inhaler  Commonly known as:  ADVAIR DISKUS  Inhale 1 puff into the lungs 2 (two) times daily. Wash out mouth after use.     furosemide 40 MG tablet  Commonly known as:  LASIX     gabapentin 100 MG capsule  Commonly known as:  NEURONTIN     hydrALAZINE 25 MG tablet  Commonly known as:  APRESOLINE  TAKE 1 TABLET BY MOUTH THREE TIMES DAILY     ipratropium 42 mcg (0.06 %) nasal spray  Commonly known as:  ATROVENT  instill 2 sprays into each nostril three times a day     isosorbide dinitrate 20 MG tablet  Commonly known as:  ISORDIL  take 1 tablet by mouth every 12 hours with HYDRALAZINE     latanoprost 0.005 % ophthalmic solution     levETIRAcetam 500 MG Tab  Commonly known as:  KEPPRA     nitroGLYCERIN 6.5 MG Cpsr  Commonly known as:  NITROBID     pantoprazole 40 MG tablet  Commonly known as:  PROTONIX     promethazine-dextromethorphan 6.25-15 mg/5 mL Syrp  Commonly known as:  PROMETHAZINE-DM  Take 5 mLs by mouth every 6 (six) hours as needed.     tamsulosin 0.4 mg Cap  Commonly known as:  FLOMAX     traMADol 50 mg tablet  Commonly known as:  ULTRAM     warfarin 5 MG tablet  Commonly known as:  COUMADIN  TAKE 1/2 TABLET BY MOUTH ON SUNDAY AND 1 TABLET BY MOUTH ALL OTHER DAYS           Where to Get Your Medications      [facility] [address] [place] [address] [address] [facility]  [address] [facility] [address]    Phone:  [phone]   · guaifenesin 100 mg/5 ml 100 mg/5 mL syrup  · predniSONE 50 MG Tab         Follow-up Information     Naga Bhatti MD.    Specialty:  Cardiology  Contact information:  [address] [facility]  [address]  [facility] [address] [phone]                     Medical Decision Making    Medical Decision Making:   Clinical Tests:   Lab Tests: Ordered and  Reviewed  Radiological Study: Ordered and Reviewed  Medical Tests: Ordered and Reviewed           Scribe Attestation:   Scribe #1: I performed the above scribed service and the documentation accurately describes the services I performed. I attest to the accuracy of the note 03/27/2019.    Attending:   Physician Attestation Statement for Scribe #1: I, Alejandro Salcido MD, personally performed the services described in this documentation, as scribed by Corinne Mack, in my presence, and it is both accurate and complete.          Clinical Impression       ICD-10-CM ICD-9-CM   1. COPD with acute exacerbation J44.1 491.21   2. Chest pain R07.9 786.50   3. Cough R05 786.2       Disposition:   Disposition: Discharged  Condition: Stable           Alejandro Salcido MD  03/27/19 5958

## 2019-03-28 DIAGNOSIS — Z79.01 LONG TERM (CURRENT) USE OF ANTICOAGULANTS: ICD-10-CM

## 2019-03-28 RX ORDER — WARFARIN SODIUM 5 MG/1
TABLET ORAL
Qty: 30 TABLET | Refills: 3 | Status: SHIPPED | OUTPATIENT
Start: 2019-03-28 | End: 2019-07-10 | Stop reason: SDUPTHER

## 2019-04-10 ENCOUNTER — ANTI-COAG VISIT (OUTPATIENT)
Dept: CARDIOLOGY | Facility: CLINIC | Age: 84
End: 2019-04-10
Payer: MEDICARE

## 2019-04-10 DIAGNOSIS — Z79.01 LONG TERM (CURRENT) USE OF ANTICOAGULANTS: Primary | ICD-10-CM

## 2019-04-10 LAB — INR PPP: 2.9 (ref 2–3)

## 2019-04-10 PROCEDURE — 85610 PROTHROMBIN TIME: CPT | Mod: PBBFAC,PN

## 2019-04-10 NOTE — PROGRESS NOTES
Patient's INR is therapeutic at 2.9.  Reports no recent changes.  Prednisone - completed.  Instructed to maintain current dose of Warfarin 7.5 mg every Wednesday and Friday; and 5 mg on all other days per week.  Dose calendar - given.  Recheck in 1 month.

## 2019-04-11 ENCOUNTER — OFFICE VISIT (OUTPATIENT)
Dept: PODIATRY | Facility: CLINIC | Age: 84
End: 2019-04-11
Payer: MEDICARE

## 2019-04-11 VITALS
WEIGHT: 199 LBS | SYSTOLIC BLOOD PRESSURE: 116 MMHG | DIASTOLIC BLOOD PRESSURE: 66 MMHG | HEART RATE: 73 BPM | BODY MASS INDEX: 29.47 KG/M2 | HEIGHT: 69 IN

## 2019-04-11 DIAGNOSIS — I73.9 PERIPHERAL VASCULAR DISEASE: Primary | ICD-10-CM

## 2019-04-11 DIAGNOSIS — B35.1 DERMATOPHYTOSIS OF NAIL: ICD-10-CM

## 2019-04-11 PROCEDURE — 99999 PR PBB SHADOW E&M-EST. PATIENT-LVL IV: CPT | Mod: PBBFAC,,, | Performed by: PODIATRIST

## 2019-04-11 PROCEDURE — 11721 DEBRIDE NAIL 6 OR MORE: CPT | Mod: Q8,PBBFAC,PN | Performed by: PODIATRIST

## 2019-04-11 PROCEDURE — 11721 DEBRIDE NAIL 6 OR MORE: CPT | Mod: Q8,S$PBB,, | Performed by: PODIATRIST

## 2019-04-11 PROCEDURE — 99214 OFFICE O/P EST MOD 30 MIN: CPT | Mod: PBBFAC,PN,25 | Performed by: PODIATRIST

## 2019-04-11 PROCEDURE — 99213 OFFICE O/P EST LOW 20 MIN: CPT | Mod: 25,S$PBB,, | Performed by: PODIATRIST

## 2019-04-11 PROCEDURE — 99999 PR PBB SHADOW E&M-EST. PATIENT-LVL IV: ICD-10-PCS | Mod: PBBFAC,,, | Performed by: PODIATRIST

## 2019-04-11 PROCEDURE — 99213 PR OFFICE/OUTPT VISIT, EST, LEVL III, 20-29 MIN: ICD-10-PCS | Mod: 25,S$PBB,, | Performed by: PODIATRIST

## 2019-04-11 PROCEDURE — 11721 PR DEBRIDEMENT OF NAILS, 6 OR MORE: ICD-10-PCS | Mod: Q8,S$PBB,, | Performed by: PODIATRIST

## 2019-04-11 NOTE — PROGRESS NOTES
"[facility] -   PODIATRIC MEDICINE AND SURGERY  PROGRESS NOTE         CHIEF COMPLAINT  Chief Complaint   Patient presents with    Routine Foot Care     PCP: Dr. Bhatti last seen on 3/14/19; pt reports no pain in feet at present.        HPI  SUBJECTIVE: Bola Figueroa Sr. is a 84 y.o. male who  has a past medical history of *Atrial fibrillation, Atrial fibrillation, Bilateral carotid artery stenosis (1/15/2016), Cardiac pacemaker (8/7/2013), Congestive heart failure (4/6/2015), COPD (chronic obstructive pulmonary disease) (8/7/2013), Coronary artery disease, Hyperlipidemia, Hypertension, Long-term (current) use of anticoagulants (8/7/2013), PVD (peripheral vascular disease), S/P PTCA (percutaneous transluminal coronary angioplasty) (8/7/2013), Sleep apnea (8/7/2013), and Stroke. Bolapresents to clinic for high risk foot exam and care.  Bola denies numbness, burning, and/or tingling sensations in their feet. Patient admits to painful toenails aggravated by increased weight bearing, shoe gear, and pressure. States pain is relieved with routine debridements. Patient has no other pedal complaints at this time.    This patient has documented high risk feet requiring routine maintenance secondary to PVD and those secondary complications of PVD,  as mentioned.      REVIEW OF SYSTEMS  General: Denies any fever or chills  Chest: Denies shortness of breath, wheezing, coughing, or sputum production  Heart: Denies chest pain.  As noted above and per history of current illness above, otherwise negative in the remainder of the 14 systems.     PHYSICAL EXAM  Vitals:    04/11/19 1324   BP: 116/66   Pulse: 73   Weight: 90.3 kg (199 lb)   Height: 5' 9" (1.753 m)   PainSc: 0-No pain       GEN:  This patient is well-developed, well-nourished and appears stated age, well-oriented to person, place and time, and cooperative and pleasant on today's visit.      LOWER EXTREMITY  Vascular:   · DP pedal pulse 0/4 b/l, PT pedal " pulse 0/4 b/l  · Skin temperature warm to warm from prox to distally  · CFT <5 secs b/l  · There is mild  edema noted b/l.     Dermatologic:   · Thickened, dystrophic, elongated toenails with subungal debris 1-5 b/l.   · No open skin lesions noted  · No erythema or drainage noted b/l.  · Webspaces are C/D/I B/L.  · There is no hyperkeratotic tissue noted.  · Skin texture and turgor WNL  · There is no pedal hair growth noted    Neurologic:  · Protective sensation absent at 0/10 sites upon examination with Tama Weinsten 5.07 g monofilament.   · Propioception intact at 1st MTPJ b/l.   · Babinski reflex absent b/l. Light touch and sharp/dull sensation intact b/l.    Musculoskeletal/Orthopedic:  · No symptomatic structural abnormalities noted.   · Muscle strength is 5/5 for foot inverters, everters, plantarflexors, and dorsiflexors. Muscle tone is normal.  · Pain free range of motion in all four quadrants with stiffness and limitation b/l      ASSESSMENT  Encounter Diagnoses   Name Primary?    Dermatophytosis of nail     Peripheral vascular disease Yes       PLAN  -patient was examined and evaulated  -Discuss presenting problems, etiology, pathologic processes and management options with patient today.   -I counseled the patient on their conditions, their implications and medical management.    Shoe inspection. Foot Education. Patient reminded of the importance of good nutrition to help prevent podiatric complications of peripheral vascular disease. Patient instructed on proper foot hygeine. We discussed wearing proper shoe gear, daily foot inspections, never walking without protective shoe gear, never putting sharp instruments to feet, routine podiatric nail visits every 3-4 months.     -With patient's permission, nails were aggressively reduced and debrided x 10 to their soft tissue attachment mechanically and with electric , removing all offending nail and debris. Patient relates relief following the  procedure. Patient will continue to monitor the areas daily, inspect feet, wear protective shoe gear when ambulatory, moisturizer to maintain skin integrity.    Future Appointments   Date Time Provider Department Center   4/16/2019  1:15 PM [facility] XR2 [facility] XRAY [facility]   4/16/2019  1:40 PM Juanjo Wallace MD [facility] PULMSVC [facility]   5/2/2019  9:20 AM SPIROMETRY, [facility] PULMFUN [facility]   5/8/2019  8:30 AM COUMADIN, [facility] COUMAD [facility]   5/14/2019  8:30 AM LABORATORY, [facility] LAB [facility]   5/14/2019  9:30 AM Liberty Ambrose NP [facility] CARDIO [facility]   6/27/2019  1:00 PM Roma Cueto DPM [facility] POD [place]        Report Electronically Signed By:     Roma Cueto DPM   Podiatry  [facility]  4/11/2019

## 2019-04-15 RX ORDER — NITROGLYCERIN 6.5 MG/1
6.5 CAPSULE ORAL 2 TIMES DAILY
Qty: 30 CAPSULE | Refills: 6 | Status: SHIPPED | OUTPATIENT
Start: 2019-04-15

## 2019-04-16 ENCOUNTER — HOSPITAL ENCOUNTER (OUTPATIENT)
Dept: RADIOLOGY | Facility: HOSPITAL | Age: 84
Discharge: HOME | End: 2019-04-16
Attending: INTERNAL MEDICINE
Payer: MEDICARE

## 2019-04-16 ENCOUNTER — OFFICE VISIT (OUTPATIENT)
Dept: PULMONOLOGY | Facility: CLINIC | Age: 84
End: 2019-04-16
Payer: MEDICARE

## 2019-04-16 VITALS
DIASTOLIC BLOOD PRESSURE: 84 MMHG | HEART RATE: 84 BPM | HEIGHT: 69 IN | RESPIRATION RATE: 17 BRPM | BODY MASS INDEX: 29.98 KG/M2 | WEIGHT: 202.38 LBS | SYSTOLIC BLOOD PRESSURE: 120 MMHG

## 2019-04-16 DIAGNOSIS — R06.02 SHORTNESS OF BREATH: ICD-10-CM

## 2019-04-16 DIAGNOSIS — G47.33 MILD OBSTRUCTIVE SLEEP APNEA: ICD-10-CM

## 2019-04-16 DIAGNOSIS — J44.1 COPD EXACERBATION: Primary | ICD-10-CM

## 2019-04-16 DIAGNOSIS — J44.9 CHRONIC OBSTRUCTIVE PULMONARY DISEASE, UNSPECIFIED COPD TYPE: ICD-10-CM

## 2019-04-16 DIAGNOSIS — J44.9 COPD, SEVERE: ICD-10-CM

## 2019-04-16 DIAGNOSIS — J98.6 ELEVATED DIAPHRAGM: ICD-10-CM

## 2019-04-16 DIAGNOSIS — R09.02 EXERCISE HYPOXEMIA: ICD-10-CM

## 2019-04-16 DIAGNOSIS — R05.9 COUGH: ICD-10-CM

## 2019-04-16 PROCEDURE — 99214 OFFICE O/P EST MOD 30 MIN: CPT | Mod: PBBFAC,25,PN | Performed by: INTERNAL MEDICINE

## 2019-04-16 PROCEDURE — 96372 THER/PROPH/DIAG INJ SC/IM: CPT | Mod: PBBFAC,PN

## 2019-04-16 PROCEDURE — 71046 XR CHEST PA AND LATERAL: ICD-10-PCS | Mod: 26,,, | Performed by: RADIOLOGY

## 2019-04-16 PROCEDURE — 99215 PR OFFICE/OUTPT VISIT, EST, LEVL V, 40-54 MIN: ICD-10-PCS | Mod: 25,S$PBB,, | Performed by: INTERNAL MEDICINE

## 2019-04-16 PROCEDURE — 71046 X-RAY EXAM CHEST 2 VIEWS: CPT | Mod: TC

## 2019-04-16 PROCEDURE — 99999 PR PBB SHADOW E&M-EST. PATIENT-LVL IV: ICD-10-PCS | Mod: PBBFAC,,, | Performed by: INTERNAL MEDICINE

## 2019-04-16 PROCEDURE — 71046 X-RAY EXAM CHEST 2 VIEWS: CPT | Mod: 26,,, | Performed by: RADIOLOGY

## 2019-04-16 PROCEDURE — 99999 PR PBB SHADOW E&M-EST. PATIENT-LVL IV: CPT | Mod: PBBFAC,,, | Performed by: INTERNAL MEDICINE

## 2019-04-16 PROCEDURE — 99215 OFFICE O/P EST HI 40 MIN: CPT | Mod: 25,S$PBB,, | Performed by: INTERNAL MEDICINE

## 2019-04-16 RX ORDER — PROMETHAZINE HYDROCHLORIDE AND DEXTROMETHORPHAN HYDROBROMIDE 6.25; 15 MG/5ML; MG/5ML
5 SYRUP ORAL EVERY 6 HOURS PRN
Qty: 473 ML | Refills: 5 | Status: SHIPPED | OUTPATIENT
Start: 2019-04-16 | End: 2019-09-05 | Stop reason: SDUPTHER

## 2019-04-16 RX ORDER — BENZONATATE 200 MG/1
200 CAPSULE ORAL 3 TIMES DAILY PRN
Qty: 90 CAPSULE | Refills: 12 | Status: SHIPPED | OUTPATIENT
Start: 2019-04-16 | End: 2019-09-05 | Stop reason: SDUPTHER

## 2019-04-16 RX ORDER — IPRATROPIUM BROMIDE AND ALBUTEROL SULFATE 2.5; .5 MG/3ML; MG/3ML
3 SOLUTION RESPIRATORY (INHALATION) EVERY 4 HOURS PRN
Qty: 360 ML | Refills: 11 | Status: SHIPPED | OUTPATIENT
Start: 2019-04-16 | End: 2019-09-05 | Stop reason: SDUPTHER

## 2019-04-16 RX ORDER — PREDNISONE 5 MG/1
5 TABLET ORAL DAILY
Qty: 30 TABLET | Refills: 5 | Status: ON HOLD | OUTPATIENT
Start: 2019-04-16 | End: 2019-08-24 | Stop reason: SDUPTHER

## 2019-04-16 RX ORDER — FLUTICASONE PROPIONATE AND SALMETEROL 250; 50 UG/1; UG/1
1 POWDER RESPIRATORY (INHALATION) 2 TIMES DAILY
Qty: 60 EACH | Refills: 12 | Status: SHIPPED | OUTPATIENT
Start: 2019-04-16 | End: 2019-07-25

## 2019-04-16 RX ORDER — GUAIFENESIN 600 MG/1
1200 TABLET, EXTENDED RELEASE ORAL 2 TIMES DAILY PRN
Qty: 60 TABLET | Status: ON HOLD | OUTPATIENT
Start: 2019-04-16 | End: 2019-08-24 | Stop reason: HOSPADM

## 2019-04-16 RX ORDER — TRIAMCINOLONE ACETONIDE 40 MG/ML
80 INJECTION, SUSPENSION INTRA-ARTICULAR; INTRAMUSCULAR
Status: COMPLETED | OUTPATIENT
Start: 2019-04-16 | End: 2019-04-16

## 2019-04-16 RX ORDER — PREDNISONE 20 MG/1
TABLET ORAL
Qty: 20 TABLET | Refills: 0 | Status: SHIPPED | OUTPATIENT
Start: 2019-04-16 | End: 2019-05-14

## 2019-04-16 RX ORDER — LEVOFLOXACIN 500 MG/1
500 TABLET, FILM COATED ORAL DAILY
Qty: 10 TABLET | Refills: 0 | Status: SHIPPED | OUTPATIENT
Start: 2019-04-16 | End: 2019-05-14

## 2019-04-16 RX ADMIN — TRIAMCINOLONE ACETONIDE 80 MG: 40 INJECTION, SUSPENSION INTRA-ARTICULAR; INTRAMUSCULAR at 02:04

## 2019-04-16 NOTE — PROGRESS NOTES
Subjective:       Patient ID: Bola Figueroa Sr. is a 84 y.o. male.    Chief Complaint: Sleep Apnea; COPD; and Asthma    HPI COPD  He presents for evaluation and treatment of COPD. The patient is currently having symptoms / an exacerbation. Current symptoms include acute dyspnea, chronic dyspnea, cough productive of white sputum in moderate amounts and wheezing. Symptoms have been present since several weeks ago and have been gradually worsening. He denies chills and fever. Associated symptoms include poor exercise tolerance.  This episode appears to have been triggered by pollens. Treatments tried for the current exacerbation: albuterol nebulizer. The patient has been having similar episodes for approximately 10 years. He uses 1 pillows at night. Patient currently is not on home oxygen therapy.. The patient is having no constitutional symptoms, denying fever, chills, anorexia, or weight loss. The patient has been hospitalized for this condition before. He quit smoking approximately many years ago. The patient is experiencing exercise intolerance (difficulty walking 1 blocks on flat ground).    Seen in Emergency Room in March 2019      Past Medical History:   Diagnosis Date    *Atrial fibrillation     Atrial fibrillation     Bilateral carotid artery stenosis 1/15/2016    Cardiac pacemaker 8/7/2013    Congestive heart failure 4/6/2015    COPD (chronic obstructive pulmonary disease) 8/7/2013    Coronary artery disease     Hyperlipidemia     Hypertension     Long-term (current) use of anticoagulants 8/7/2013    PVD (peripheral vascular disease)     S/P PTCA (percutaneous transluminal coronary angioplasty) 8/7/2013    Sleep apnea 8/7/2013    Stroke      Past Surgical History:   Procedure Laterality Date    CATARACT EXTRACTION W/  INTRAOCULAR LENS IMPLANT      INSERT / REPLACE / REMOVE PACEMAKER  2005    REPLACEMENT, PULSE GENERATOR, CARDIAC PACEMAKER Left 3/20/2014    Performed by Behzad Sarmiento MD at  [facility]     Social History     Socioeconomic History    Marital status:      Spouse name: Not on file    Number of children: Not on file    Years of education: Not on file    Highest education level: Not on file   Occupational History    Not on file   Social Needs    Financial resource strain: Not on file    Food insecurity:     Worry: Not on file     Inability: Not on file    Transportation needs:     Medical: Not on file     Non-medical: Not on file   Tobacco Use    Smoking status: Former Smoker     Packs/day: 1.50     Years: 35.00     Pack years: 52.50     Types: Cigarettes     Last attempt to quit: 1979     Years since quittin.4    Smokeless tobacco: Never Used   Substance and Sexual Activity    Alcohol use: No     Comment: QUIT 77    Drug use: No    Sexual activity: Not on file   Lifestyle    Physical activity:     Days per week: Not on file     Minutes per session: Not on file    Stress: Not on file   Relationships    Social connections:     Talks on phone: Not on file     Gets together: Not on file     Attends Restoration service: Not on file     Active member of club or organization: Not on file     Attends meetings of clubs or organizations: Not on file     Relationship status: Not on file   Other Topics Concern    Not on file   Social History Narrative    Not on file     Review of Systems   Constitutional: Positive for fatigue. Negative for fever.   HENT: Positive for postnasal drip, rhinorrhea and congestion.    Eyes: Negative for redness and itching.   Respiratory: Positive for cough, sputum production, shortness of breath, dyspnea on extertion, use of rescue inhaler and Paroxysmal Nocturnal Dyspnea.    Cardiovascular: Negative for chest pain, palpitations and leg swelling.   Genitourinary: Negative for difficulty urinating and hematuria.   Endocrine: Negative for cold intolerance and heat intolerance.    Skin: Negative for rash.   Gastrointestinal: Negative  for nausea and abdominal pain.   Neurological: Negative for dizziness, syncope, weakness and light-headedness.   Hematological: Negative for adenopathy. Does not bruise/bleed easily.   Psychiatric/Behavioral: Negative for sleep disturbance. The patient is not nervous/anxious.        Objective:      Physical Exam   Constitutional: He is oriented to person, place, and time. He appears well-developed and well-nourished.   HENT:   Head: Normocephalic and atraumatic.   Mouth/Throat: Oropharyngeal exudate present.   Eyes: Pupils are equal, round, and reactive to light. Conjunctivae are normal.   Neck: Neck supple. No JVD present. No tracheal deviation present. No thyromegaly present.   Cardiovascular: Normal rate, regular rhythm and normal heart sounds.   No murmur heard.  Pulmonary/Chest: Effort normal. He has decreased breath sounds. He has wheezes in the right lower field and the left lower field. He has no rhonchi. He has no rales.   Abdominal: Soft. Bowel sounds are normal.   Musculoskeletal: Normal range of motion. He exhibits no edema or tenderness.   Lymphadenopathy:     He has no cervical adenopathy.   Neurological: He is alert and oriented to person, place, and time.   Skin: Skin is warm and dry.   Nursing note and vitals reviewed.    Personal Diagnostic Review  Chest X-Ray: I personally reviewed the films and findings are:, air trapping/emphysema, elevated diaphragm  Pulmonary function tests:  No recent    Pulmonary Studies Review 4/16/2019 4/11/2019 3/27/2019 3/27/2019 1/9/2019 11/9/2018 10/31/2018   SpO2 - - 95 93 - - 99   Ordering Provider - - - - - - -   Interpreting Provider - - - - - - -   Performing nurse/tech/RT - - - - - - -   Diagnosis - - - - - - -   Height 69.000 69.000 - 69 69.000 69.000 69.000   Weight 3238.12 3184 - - 3185.21 3185.21 3139.35   BMI (Calculated) 29.9 29.4 - - 29.5 29.5 29   Predicted Distance 236.3 239.11 - 404.04 238.55 238.55 241.35   Patient Race - - - - - - -   6MWT Status - -  - - - - -   Patient Reported - - - - - - -   Was O2 used? - - - - - - -   6MW Distance walked (feet) - - - - - - -   Distance walked (meters) - - - - - - -   Did patient stop? - - - - - - -   Type of assistive device(s) used? - - - - - - -   Is extra documentation required for this patient? - - - - - - -   Oxygen Saturation - - - - - - -   Supplemental Oxygen - - - - - - -   Heart Rate - - - - - - -   Blood Pressure - - - - - - -   Ramya Dyspnea Rating  - - - - - - -   Oxygen Saturation - - - - - - -   Supplemental Oxygen - - - - - - -   Heart Rate - - - - - - -   Blood Pressure - - - - - - -   Ramya Dyspnea Rating  - - - - - - -   Recovery Time (seconds) - - - - - - -   Oxygen Saturation - - - - - - -   Supplemental Oxygen - - - - - - -   Heart Rate - - - - - - -   Blood Pressure - - - - - - -   Ramya Dyspnea Rating  - - - - - - -   Is procedure ready for interpretation? - - - - - - -   Did the patient stop or pause? - - - - - - -   Total Time Walked (Calculated) - - - - - - -   Total Laps Walked - - - - - - -   Final Partial Lap Distance (feet) - - - - - - -   Total Distance Feet (Calculated) - - - - - - -   Total Distance Meters (Calculated) - - - - - - -   Predicted Distance Meters (Calculated) 434.77 437.47 - - 437.41 437.41 439.7   Percentage of Predicted (Calculated) - - - - - - -   Peak VO2 (Calculated) - - - - - - -   Mets - - - - - - -   Has The Patient Had a Previous Six Minute Walk Test? - - - - - - -   Oxygen Qualification? - - - - - - -     No flowsheet data found.      Assessment:       1. COPD exacerbation    2. COPD, severe    3. Cough    4. Chronic obstructive pulmonary disease, unspecified COPD type    5. Exercise hypoxemia    6. Mild obstructive sleep apnea    7. Elevated diaphragm    8. Shortness of breath        Outpatient Encounter Medications as of 4/16/2019   Medication Sig Dispense Refill    albuterol-ipratropium (DUO-NEB) 2.5 mg-0.5 mg/3 mL nebulizer solution Take 3 mLs by nebulization every  4 (four) hours as needed for Wheezing. Rescue 360 mL 11    amLODIPine (NORVASC) 10 MG tablet Take 10 mg by mouth.      aspirin (ECOTRIN) 81 MG EC tablet Take 81 mg by mouth once daily.      atorvastatin (LIPITOR) 20 MG tablet Take 20 mg by mouth.      benzonatate (TESSALON) 200 MG capsule Take 1 capsule (200 mg total) by mouth 3 (three) times daily as needed for Cough. 90 capsule 12    carvedilol (COREG) 12.5 MG tablet Take 1 tablet (12.5 mg total) by mouth 2 (two) times daily with meals. 60 tablet 11    fluticasone-salmeterol diskus inhaler 250-50 mcg Inhale 1 puff into the lungs 2 (two) times daily. Wash out mouth after use. 60 each 12    furosemide (LASIX) 40 MG tablet Take 40 mg by mouth once daily.       gabapentin (NEURONTIN) 100 MG capsule Take 100 mg by mouth 3 (three) times daily.  0    guaiFENesin (MUCINEX) 600 mg 12 hr tablet Take 2 tablets (1,200 mg total) by mouth 2 (two) times daily as needed for Congestion. Take with water 60 tablet prn    hydrALAZINE (APRESOLINE) 25 MG tablet TAKE 1 TABLET BY MOUTH THREE TIMES DAILY 90 tablet 3    ipratropium (ATROVENT) 42 mcg (0.06 %) nasal spray instill 2 sprays into each nostril three times a day 45 mL 4    isosorbide dinitrate (ISORDIL) 20 MG tablet take 1 tablet by mouth every 12 hours with HYDRALAZINE 60 tablet 6    latanoprost 0.005 % ophthalmic solution Place 1 drop into both eyes nightly.  0    levetiracetam (KEPPRA) 500 MG Tab Take 1 tablet by mouth Twice daily.      lubiprostone (AMITIZA) 8 MCG Cap Take 8 mcg by mouth 2 (two) times daily with meals.      nitroGLYCERIN (NITROBID) 6.5 MG CpSR Take 1 capsule (6.5 mg total) by mouth 2 (two) times daily. 30 capsule 6    pantoprazole (PROTONIX) 40 MG tablet Take 40 mg by mouth.      promethazine-dextromethorphan (PROMETHAZINE-DM) 6.25-15 mg/5 mL Syrp Take 5 mLs by mouth every 6 (six) hours as needed. 473 mL 5    tamsulosin (FLOMAX) 0.4 mg Cp24 Take 0.4 mg by mouth once daily.       traMADol  (ULTRAM) 50 mg tablet take 1 tablet by mouth every 6 hours if needed for UP TO 10 days  0    warfarin (COUMADIN) 5 MG tablet TAKE 1.5 tablets every Wednesday and Friday; and 1 tablet all other days in the evening as directed by the [facility]. 30 tablet 3    [DISCONTINUED] albuterol (PROVENTIL) 2.5 mg /3 mL (0.083 %) nebulizer solution Take 3 mLs (2.5 mg total) by nebulization every 4 (four) hours as needed for Wheezing or Shortness of Breath. 320 mL 4    [DISCONTINUED] albuterol-ipratropium (DUO-NEB) 2.5 mg-0.5 mg/3 mL nebulizer solution Take 3 mLs by nebulization every 6 (six) hours as needed for Wheezing. Rescue 360 mL 11    [DISCONTINUED] benzonatate (TESSALON) 200 MG capsule Take 1 capsule (200 mg total) by mouth 3 (three) times daily as needed for Cough. 90 capsule 12    [DISCONTINUED] fluticasone-salmeterol 250-50 mcg/dose (ADVAIR DISKUS) 250-50 mcg/dose diskus inhaler Inhale 1 puff into the lungs 2 (two) times daily. Wash out mouth after use. 60 each 12    [DISCONTINUED] guaiFENesin (MUCINEX) 600 mg 12 hr tablet Take 2 tablets (1,200 mg total) by mouth 2 (two) times daily as needed for Congestion. Take with water 60 tablet prn    [DISCONTINUED] promethazine-dextromethorphan (PROMETHAZINE-DM) 6.25-15 mg/5 mL Syrp Take 5 mLs by mouth every 6 (six) hours as needed. 473 mL 5    amlodipine-atorvastatin (CADUET) 10-20 mg per tablet Take 1 tablet by mouth once daily.      levoFLOXacin (LEVAQUIN) 500 MG tablet Take 1 tablet (500 mg total) by mouth once daily. 10 tablet 0    predniSONE (DELTASONE) 20 MG tablet Prednisone 60 mg/ day for 3 days, 40 mg/day for 3 days,20 mg/ day for 3 days, (1/2 tablet )10 mg a day for 3 days. 20 tablet 0    predniSONE (DELTASONE) 5 MG tablet Take 1 tablet (5 mg total) by mouth once daily. Start after high dose prednisone 30 tablet 5    [DISCONTINUED] budesonide (PULMICORT) 0.5 mg/2 mL nebulizer solution Take 2 mLs (0.5 mg total) by nebulization 2 (two) times daily. 120  mL 11    [DISCONTINUED] predniSONE (DELTASONE) 20 MG tablet Prednisone 60 mg/ day for 3 days, 40 mg/day for 3 days,20 mg/ day for 3 days, (1/2 tablet )10 mg a day for 3 days. 20 tablet 1    [] triamcinolone acetonide injection 80 mg        No facility-administered encounter medications on file as of 2019.      Orders Placed This Encounter   Procedures    Six Minute Walk Test to qualify for Home Oxygen     Standing Status:   Future     Standing Expiration Date:   2020     Plan:       Requested Prescriptions     Signed Prescriptions Disp Refills    albuterol-ipratropium (DUO-NEB) 2.5 mg-0.5 mg/3 mL nebulizer solution 360 mL 11     Sig: Take 3 mLs by nebulization every 4 (four) hours as needed for Wheezing. Rescue    predniSONE (DELTASONE) 20 MG tablet 20 tablet 0     Sig: Prednisone 60 mg/ day for 3 days, 40 mg/day for 3 days,20 mg/ day for 3 days, (1/2 tablet )10 mg a day for 3 days.    promethazine-dextromethorphan (PROMETHAZINE-DM) 6.25-15 mg/5 mL Syrp 473 mL 5     Sig: Take 5 mLs by mouth every 6 (six) hours as needed.    levoFLOXacin (LEVAQUIN) 500 MG tablet 10 tablet 0     Sig: Take 1 tablet (500 mg total) by mouth once daily.    benzonatate (TESSALON) 200 MG capsule 90 capsule 12     Sig: Take 1 capsule (200 mg total) by mouth 3 (three) times daily as needed for Cough.    guaiFENesin (MUCINEX) 600 mg 12 hr tablet 60 tablet prn     Sig: Take 2 tablets (1,200 mg total) by mouth 2 (two) times daily as needed for Congestion. Take with water    fluticasone-salmeterol diskus inhaler 250-50 mcg 60 each 12     Sig: Inhale 1 puff into the lungs 2 (two) times daily. Wash out mouth after use.    predniSONE (DELTASONE) 5 MG tablet 30 tablet 5     Sig: Take 1 tablet (5 mg total) by mouth once daily. Start after high dose prednisone     COPD exacerbation  -     triamcinolone acetonide injection 80 mg  -     predniSONE (DELTASONE) 20 MG tablet; Prednisone 60 mg/ day for 3 days, 40 mg/day for 3  days,20 mg/ day for 3 days, (1/2 tablet )10 mg a day for 3 days.  Dispense: 20 tablet; Refill: 0  -     levoFLOXacin (LEVAQUIN) 500 MG tablet; Take 1 tablet (500 mg total) by mouth once daily.  Dispense: 10 tablet; Refill: 0  -     predniSONE (DELTASONE) 5 MG tablet; Take 1 tablet (5 mg total) by mouth once daily. Start after high dose prednisone  Dispense: 30 tablet; Refill: 5    COPD, severe  -     albuterol-ipratropium (DUO-NEB) 2.5 mg-0.5 mg/3 mL nebulizer solution; Take 3 mLs by nebulization every 4 (four) hours as needed for Wheezing. Rescue  Dispense: 360 mL; Refill: 11  -     fluticasone-salmeterol diskus inhaler 250-50 mcg; Inhale 1 puff into the lungs 2 (two) times daily. Wash out mouth after use.  Dispense: 60 each; Refill: 12  -     Six Minute Walk Test to qualify for Home Oxygen; Future    Cough  -     promethazine-dextromethorphan (PROMETHAZINE-DM) 6.25-15 mg/5 mL Syrp; Take 5 mLs by mouth every 6 (six) hours as needed.  Dispense: 473 mL; Refill: 5  -     benzonatate (TESSALON) 200 MG capsule; Take 1 capsule (200 mg total) by mouth 3 (three) times daily as needed for Cough.  Dispense: 90 capsule; Refill: 12    Chronic obstructive pulmonary disease, unspecified COPD type  -     guaiFENesin (MUCINEX) 600 mg 12 hr tablet; Take 2 tablets (1,200 mg total) by mouth 2 (two) times daily as needed for Congestion. Take with water  Dispense: 60 tablet; Refill: prn    Exercise hypoxemia  -     Six Minute Walk Test to qualify for Home Oxygen; Future    Mild obstructive sleep apnea    Elevated diaphragm    Shortness of breath      Follow up in about 3 months (around 7/16/2019) for 6 min Geovanna herrera.    [id number]  MEDICAL DECISION MAKING: Moderate to high complexity.  Overall, the multiple problems listed are of moderate to high severity that may impact quality of life and activities of daily living. Side effects of medications, treatment plan as well as options and alternatives reviewed and discussed with  patient. There was counseling of patient concerning these issues.    Total time spent in face to face counseling and coordination of care - 40 minutes over 50% of time was used in discussion of prognosis, risks, benefits of treatment, instructions and compliance with regimen . Discussion with other physicians or health care providers (homehealth, durable medical equipment providers).

## 2019-04-16 NOTE — PATIENT INSTRUCTIONS
Levofloxacin tablets  What is this medicine?  LEVOFLOXACIN (kortney vega PREM nik sin) is a quinolone antibiotic. It is used to treat certain kinds of bacterial infections. It will not work for colds, flu, or other viral infections.  How should I use this medicine?  Take this medicine by mouth with a full glass of water. Follow the directions on the prescription label. This medicine can be taken with or without food. Take your medicine at regular intervals. Do not take your medicine more often than directed. Do not skip doses or stop your medicine early even if you feel better. Do not stop taking except on your doctor's advice.  A special MedGuide will be given to you by the pharmacist with each prescription and refill. Be sure to read this information carefully each time.  Talk to your pediatrician regarding the use of this medicine in children. While this drug may be prescribed for children as young as 6 months for selected conditions, precautions do apply.  What side effects may I notice from receiving this medicine?  Side effects that you should report to your doctor or health care professional as soon as possible:  · allergic reactions like skin rash or hives, swelling of the face, lips, or tongue  · anxious  · confusion  · depressed mood  · diarrhea  · fast, irregular heartbeat  · hallucination, loss of contact with reality  · joint, muscle, or tendon pain or swelling  · pain, tingling, numbness in the hands or feet  · suicidal thoughts or other mood changes  · sunburn  · unusually weak or tired  Side effects that usually do not require medical attention (report to your doctor or health care professional if they continue or are bothersome):  · dry mouth  · headache  · nausea  · trouble sleeping  What may interact with this medicine?  Do not take this medicine with any of the following medications:  · arsenic trioxide  · chloroquine  · droperidol  · medicines for irregular heart rhythm like amiodarone, disopyramide,  dofetilide, flecainide, quinidine, procainamide, sotalol  · some medicines for depression or mental problems like phenothiazines, pimozide, and ziprasidone  This medicine may also interact with the following medications:  · amoxapine  · antacids  · birth control pills  · cisapride  · dairy products  · didanosine (ddI) buffered tablets or powder  · haloperidol  · multivitamins  · NSAIDS, medicines for pain and inflammation, like ibuprofen or naproxen  · retinoid products like tretinoin or isotretinoin  · risperidone  · some other antibiotics like clarithromycin or erythromycin  · sucralfate  · theophylline  · warfarin  What if I miss a dose?  If you miss a dose, take it as soon as you remember. If it is almost time for your next dose, take only that dose. Do not take double or extra doses.  Where should I keep my medicine?  Keep out of the reach of children.  Store at room temperature between 15 and 30 degrees C (59 and 86 degrees F). Keep in a tightly closed container. Throw away any unused medicine after the expiration date.  What should I tell my health care provider before I take this medicine?  They need to know if you have any of these conditions:  · bone problems  · cerebral disease  · history of low levels of potassium in the blood  · irregular heartbeat  · joint problems  · kidney disease  · myasthenia gravis  · seizures  · tendon problems  · tingling of the fingers or toes, or other nerve disorder  · an unusual or allergic reaction to levofloxacin, other quinolone antibiotics, foods, dyes, or preservatives  · pregnant or trying to get pregnant  · breast-feeding  What should I watch for while using this medicine?  Tell your doctor or health care professional if your symptoms do not improve or if they get worse. Drink several glasses of water a day and cut down on drinks that contain caffeine. You must not get dehydrated while taking this medicine.  You may get drowsy or dizzy. Do not drive, use machinery, or  do anything that needs mental alertness until you know how this medicine affects you. Do not sit or stand up quickly, especially if you are an older patient. This reduces the risk of dizzy or fainting spells.  This medicine can make you more sensitive to the sun. Keep out of the sun. If you cannot avoid being in the sun, wear protective clothing and use a sunscreen. Do not use sun lamps or tanning beds/booths. Contact your doctor if you get a sunburn.  If you are a diabetic monitor your blood glucose carefully. If you get an unusual reading stop taking this medicine and call your doctor right away.  Do not treat diarrhea with over-the-counter products. Contact your doctor if you have diarrhea that lasts more than 2 days or if the diarrhea is severe and watery.  Avoid antacids, calcium, iron, and zinc products for 2 hours before and 2 hours after taking a dose of this medicine.  NOTE:This sheet is a summary. It may not cover all possible information. If you have questions about this medicine, talk to your doctor, pharmacist, or health care provider. Copyright© 2017 [employer]

## 2019-04-29 ENCOUNTER — TELEPHONE (OUTPATIENT)
Dept: PULMONOLOGY | Facility: CLINIC | Age: 84
End: 2019-04-29

## 2019-04-29 NOTE — TELEPHONE ENCOUNTER
----- Message from Juancho Hernandez sent at 4/29/2019  1:30 PM CDT -----  ..Type:  Patient Returning Call    Who Called:Luz Maria ( pt daughter )   Who Left Message for Patient:  Does the patient know what this is regarding?: breathing test   Would the patient rather a call back or a response via [facility]? Call back  Best Call Back Number: [phone]  Additional Information: Luz Maria ( pt daughter ) is requesting a call from nurse to discus questions and concerns regarding pt breathing test

## 2019-05-01 ENCOUNTER — TELEPHONE (OUTPATIENT)
Dept: PULMONOLOGY | Facility: CLINIC | Age: 84
End: 2019-05-01

## 2019-05-01 NOTE — TELEPHONE ENCOUNTER
----- Message from Juan David Fernandes sent at 5/1/2019  9:11 AM CDT -----  Contact: pt daughter - Luz Maria  Type:  Needs Medical Advice    Who Called:  Pt daughter   Symptoms (please be specific): Breathing walk    How long has patient had these symptoms:    Pharmacy name and phone #:    Would the patient rather a call back or a response via [facility]? Phone   Best Call Back Number: [phone]  Additional Information: Wants to know if pt can keep the appt tomorrow as well as next month

## 2019-05-08 ENCOUNTER — ANTI-COAG VISIT (OUTPATIENT)
Dept: CARDIOLOGY | Facility: CLINIC | Age: 84
End: 2019-05-08
Payer: MEDICARE

## 2019-05-08 DIAGNOSIS — Z79.01 LONG TERM (CURRENT) USE OF ANTICOAGULANTS: Primary | ICD-10-CM

## 2019-05-08 LAB — INR PPP: 4.5 (ref 2–3)

## 2019-05-08 PROCEDURE — 93793 ANTICOAG MGMT PT WARFARIN: CPT | Mod: ,,,

## 2019-05-08 PROCEDURE — 93793 PR ANTICOAGULANT MGMT FOR PT TAKING WARFARIN: ICD-10-PCS | Mod: ,,,

## 2019-05-08 PROCEDURE — 85610 PROTHROMBIN TIME: CPT | Mod: PBBFAC

## 2019-05-08 NOTE — PROGRESS NOTES
Patient's INR is supra-therapeutic at 4.5.  Mr. Figueroa was seen in the emergency department on 3/27/19 with COPD complications.  Patient followed up with pulmonologist on 4/16 - pertinent medication changes: Levaquin 500mg x 10 days  and prednisone taper are both completed.  Currently taking prednisone 5mg daily and promethazine DM cough syrup.  Advised patient and caregiver of potential interactions and importance of contacting coumadin with all medication changes.   No signs of bleeding noted; advised patient to seek immediate medical attention if he notices any abnormal bleeding.  Instructions given for patient to hold dose of coumadin on today; then resume current dose of 7.5mg on Wednesdays, Fridays and 1 tablet on all other days of the week.  Patient/caregiver voiced understanding.  Follow-up in 2 weeks.

## 2019-05-14 ENCOUNTER — LAB VISIT (OUTPATIENT)
Dept: LAB | Facility: HOSPITAL | Age: 84
End: 2019-05-14
Payer: MEDICARE

## 2019-05-14 ENCOUNTER — TELEPHONE (OUTPATIENT)
Dept: CARDIOLOGY | Facility: CLINIC | Age: 84
End: 2019-05-14

## 2019-05-14 ENCOUNTER — OFFICE VISIT (OUTPATIENT)
Dept: CARDIOLOGY | Facility: CLINIC | Age: 84
End: 2019-05-14
Payer: MEDICARE

## 2019-05-14 VITALS
WEIGHT: 201.25 LBS | SYSTOLIC BLOOD PRESSURE: 160 MMHG | HEIGHT: 69 IN | HEART RATE: 84 BPM | BODY MASS INDEX: 29.81 KG/M2 | DIASTOLIC BLOOD PRESSURE: 82 MMHG

## 2019-05-14 DIAGNOSIS — I27.20 PULMONARY HTN: ICD-10-CM

## 2019-05-14 DIAGNOSIS — Z95.0 CARDIAC PACEMAKER: ICD-10-CM

## 2019-05-14 DIAGNOSIS — Z98.61 S/P PTCA (PERCUTANEOUS TRANSLUMINAL CORONARY ANGIOPLASTY): ICD-10-CM

## 2019-05-14 DIAGNOSIS — I73.9 PVD (PERIPHERAL VASCULAR DISEASE): ICD-10-CM

## 2019-05-14 DIAGNOSIS — Z95.0 PRESENCE OF CARDIAC PACEMAKER: ICD-10-CM

## 2019-05-14 DIAGNOSIS — G47.33 MILD OBSTRUCTIVE SLEEP APNEA: ICD-10-CM

## 2019-05-14 DIAGNOSIS — I48.0 PAROXYSMAL ATRIAL FIBRILLATION: Primary | ICD-10-CM

## 2019-05-14 DIAGNOSIS — Z79.01 LONG TERM CURRENT USE OF ANTICOAGULANT THERAPY: ICD-10-CM

## 2019-05-14 DIAGNOSIS — I10 ESSENTIAL HYPERTENSION: ICD-10-CM

## 2019-05-14 DIAGNOSIS — I25.118 CORONARY ARTERY DISEASE OF NATIVE ARTERY OF NATIVE HEART WITH STABLE ANGINA PECTORIS: ICD-10-CM

## 2019-05-14 DIAGNOSIS — I25.10 CORONARY ARTERY DISEASE INVOLVING NATIVE CORONARY ARTERY OF NATIVE HEART WITHOUT ANGINA PECTORIS: ICD-10-CM

## 2019-05-14 DIAGNOSIS — J44.9 COPD, SEVERE: ICD-10-CM

## 2019-05-14 LAB
ALBUMIN SERPL BCP-MCNC: 3.5 G/DL (ref 3.5–5.2)
ALP SERPL-CCNC: 61 U/L (ref 55–135)
ALT SERPL W/O P-5'-P-CCNC: 26 U/L (ref 10–44)
ANION GAP SERPL CALC-SCNC: 8 MMOL/L (ref 8–16)
AST SERPL-CCNC: 16 U/L (ref 10–40)
BILIRUB SERPL-MCNC: 1.4 MG/DL (ref 0.1–1)
BUN SERPL-MCNC: 16 MG/DL (ref 8–23)
CALCIUM SERPL-MCNC: 9.6 MG/DL (ref 8.7–10.5)
CHLORIDE SERPL-SCNC: 107 MMOL/L (ref 95–110)
CHOLEST SERPL-MCNC: 142 MG/DL (ref 120–199)
CHOLEST/HDLC SERPL: 2.1 {RATIO} (ref 2–5)
CO2 SERPL-SCNC: 28 MMOL/L (ref 23–29)
CREAT SERPL-MCNC: 1 MG/DL (ref 0.5–1.4)
EST. GFR  (AFRICAN AMERICAN): >60 ML/MIN/1.73 M^2
EST. GFR  (NON AFRICAN AMERICAN): >60 ML/MIN/1.73 M^2
GLUCOSE SERPL-MCNC: 131 MG/DL (ref 70–110)
HDLC SERPL-MCNC: 68 MG/DL (ref 40–75)
HDLC SERPL: 47.9 % (ref 20–50)
LDLC SERPL CALC-MCNC: 61 MG/DL (ref 63–159)
NONHDLC SERPL-MCNC: 74 MG/DL
POTASSIUM SERPL-SCNC: 4.6 MMOL/L (ref 3.5–5.1)
PROT SERPL-MCNC: 6.7 G/DL (ref 6–8.4)
SODIUM SERPL-SCNC: 143 MMOL/L (ref 136–145)
TRIGL SERPL-MCNC: 65 MG/DL (ref 30–150)

## 2019-05-14 PROCEDURE — 80061 LIPID PANEL: CPT

## 2019-05-14 PROCEDURE — 99214 PR OFFICE/OUTPT VISIT, EST, LEVL IV, 30-39 MIN: ICD-10-PCS | Mod: S$PBB,,, | Performed by: NURSE PRACTITIONER

## 2019-05-14 PROCEDURE — 99999 PR PBB SHADOW E&M-EST. PATIENT-LVL IV: ICD-10-PCS | Mod: PBBFAC,,, | Performed by: NURSE PRACTITIONER

## 2019-05-14 PROCEDURE — 99214 OFFICE O/P EST MOD 30 MIN: CPT | Mod: PBBFAC | Performed by: NURSE PRACTITIONER

## 2019-05-14 PROCEDURE — 99999 PR PBB SHADOW E&M-EST. PATIENT-LVL IV: CPT | Mod: PBBFAC,,, | Performed by: NURSE PRACTITIONER

## 2019-05-14 PROCEDURE — 36415 COLL VENOUS BLD VENIPUNCTURE: CPT

## 2019-05-14 PROCEDURE — 80053 COMPREHEN METABOLIC PANEL: CPT

## 2019-05-14 PROCEDURE — 99214 OFFICE O/P EST MOD 30 MIN: CPT | Mod: S$PBB,,, | Performed by: NURSE PRACTITIONER

## 2019-05-14 NOTE — PROGRESS NOTES
Subjective:   Patient ID:  Bola Figueroa Sr. is a 84 y.o. male who presents for evaluation of Congestive Heart Failure      HPI     Mr. Figueroa is a 84 year old male who presents to clinic for 6 month follow up.   His BP is elevated today since he did not take his HTN meds this AM.   His BP has been running 120-130/70-80s at home which is low for him.   His current medical conditions include AFIB on Coumadin, S/P PPM, HTN, HLP, PVD, Old CVA, RUTH.   He returns today and states he is doing ok.   Denies chest pain or anginal equivalents.   He does have some chronic SOB issues but seems stable today.   Denies FLOWERS or palpitations.   Trace LE edema today in office.   Denies claudications.   He is using walker for fall prevention.   He does have right eye patch in place today due to blurry vision issues.   No recent falls, syncope or near syncopal events.   NO dizziness or lightheadedness issues lately.   He is trying to stay physically active.   Reports varied compliance with medications and dietary restrictions.   No CNS complaints to suggest TIA or CVA today.  No signs of abnormal bleeding on Coumadin    Most recent INR 4.5 secondary to Levaquin and high dose Steroids per Pulmonary.   He is managed by [facility] and repeat INR scheduled.       Past Medical History:   Diagnosis Date    *Atrial fibrillation     Atrial fibrillation     Bilateral carotid artery stenosis 1/15/2016    Cardiac pacemaker 8/7/2013    Congestive heart failure 4/6/2015    COPD (chronic obstructive pulmonary disease) 8/7/2013    Coronary artery disease     Hyperlipidemia     Hypertension     Long-term (current) use of anticoagulants 8/7/2013    PVD (peripheral vascular disease)     S/P PTCA (percutaneous transluminal coronary angioplasty) 8/7/2013    Sleep apnea 8/7/2013    Stroke        Past Surgical History:   Procedure Laterality Date    CATARACT EXTRACTION W/  INTRAOCULAR LENS IMPLANT      INSERT / REPLACE / REMOVE PACEMAKER   2005    REPLACEMENT, PULSE GENERATOR, CARDIAC PACEMAKER Left 3/20/2014    Performed by Behzad Sarmiento MD at [facility] CATH LAB       Social History     Tobacco Use    Smoking status: Former Smoker     Packs/day: 1.50     Years: 35.00     Pack years: 52.50     Types: Cigarettes     Last attempt to quit: 1979     Years since quittin.5    Smokeless tobacco: Never Used   Substance Use Topics    Alcohol use: No     Comment: QUIT 77    Drug use: No       Family History   Problem Relation Age of Onset    Diabetes Sister     Fainting Sister     Heart disease Paternal Uncle     Heart attack Paternal Uncle     Hypertension Maternal Grandmother     Diabetes Maternal Grandfather        Review of Systems   Constitution: Positive for malaise/fatigue.   HENT: Negative for hearing loss and hoarse voice.    Eyes: Negative for blurred vision and visual disturbance.   Cardiovascular: Positive for dyspnea on exertion and leg swelling (trace LE). Negative for chest pain, claudication, irregular heartbeat, near-syncope, orthopnea, palpitations, paroxysmal nocturnal dyspnea and syncope.   Respiratory: Negative for cough, hemoptysis, shortness of breath, sleep disturbances due to breathing, snoring and wheezing.         Hx of Severe COPD   Endocrine: Negative for cold intolerance and heat intolerance.   Hematologic/Lymphatic: Bruises/bleeds easily (on Coumadin).   Skin: Negative for color change, dry skin and nail changes.   Musculoskeletal: Positive for arthritis and back pain. Negative for joint pain and myalgias.        +walker   Gastrointestinal: Negative for bloating, abdominal pain, constipation, nausea and vomiting.   Genitourinary: Negative for dysuria, flank pain, hematuria and hesitancy.   Neurological: Negative for headaches, light-headedness, loss of balance, numbness, paresthesias and weakness.   Psychiatric/Behavioral: Negative for altered mental status.   Allergic/Immunologic: Negative for  "environmental allergies.     BP (!) 160/82 (BP Location: Left arm, Patient Position: Sitting, BP Method: Large (Manual))   Pulse 84   Ht 5' 9" (1.753 m)   Wt 91.3 kg (201 lb 4.5 oz)   BMI 29.72 kg/m²     Objective:   Physical Exam   Constitutional: He is oriented to person, place, and time. He appears well-developed and well-nourished. No distress.   HENT:   Head: Normocephalic and atraumatic.   Eyes:   +right eye patch   Neck: Normal range of motion and full passive range of motion without pain. Neck supple. No JVD present.   Cardiovascular: Normal rate, regular rhythm, S1 normal, S2 normal and intact distal pulses. PMI is not displaced. Exam reveals no distant heart sounds.   No murmur heard.  Pulses:       Radial pulses are 2+ on the right side, and 2+ on the left side.        Dorsalis pedis pulses are 2+ on the right side, and 2+ on the left side.   PPM site well healed   Pulmonary/Chest: Effort normal. No accessory muscle usage. No respiratory distress. He has decreased breath sounds in the right lower field and the left lower field. He has no wheezes. He has no rales.   Some degree of FLOWERS   Abdominal: Soft. Bowel sounds are normal. He exhibits no distension. There is no tenderness.   Musculoskeletal: Normal range of motion. He exhibits edema (trace BLE).        Right ankle: He exhibits swelling.        Left ankle: He exhibits swelling.   Neurological: He is alert and oriented to person, place, and time.   Skin: Skin is warm and dry. He is not diaphoretic. No cyanosis. Nails show no clubbing.   Psychiatric: He has a normal mood and affect. His speech is normal and behavior is normal. Judgment and thought content normal. Cognition and memory are normal.   Nursing note and vitals reviewed.      Lab Results   Component Value Date    CHOL 105 (L) 11/02/2018    CHOL 116 (L) 05/02/2018    CHOL 150 11/27/2017     Lab Results   Component Value Date    HDL 48 11/02/2018    HDL 52 05/02/2018    HDL 52 11/27/2017 "     Lab Results   Component Value Date    LDLCALC 46.4 (L) 11/02/2018    LDLCALC 46.4 (L) 05/02/2018    LDLCALC 81.6 11/27/2017     Lab Results   Component Value Date    TRIG 53 11/02/2018    TRIG 88 05/02/2018    TRIG 82 11/27/2017     Lab Results   Component Value Date    CHOLHDL 45.7 11/02/2018    CHOLHDL 44.8 05/02/2018    CHOLHDL 34.7 11/27/2017       Chemistry        Component Value Date/Time     03/27/2019 1217    K 3.8 03/27/2019 1217     03/27/2019 1217    CO2 26 03/27/2019 1217    BUN 14 03/27/2019 1217    CREATININE 1.1 03/27/2019 1217     03/27/2019 1217        Component Value Date/Time    CALCIUM 9.0 03/27/2019 1217    ALKPHOS 57 03/27/2019 1217    AST 18 03/27/2019 1217    ALT 11 03/27/2019 1217    BILITOT 1.6 (H) 03/27/2019 1217    ESTGFRAFRICA >60 03/27/2019 1217    EGFRNONAA >60 03/27/2019 1217          Lab Results   Component Value Date    TSH 2.9 10/18/2007     Lab Results   Component Value Date    INR 4.5 (A) 05/08/2019    INR 2.9 04/10/2019    INR 2.8 03/13/2019     Lab Results   Component Value Date    WBC 6.70 03/27/2019    HGB 12.0 (L) 03/27/2019    HCT 36.9 (L) 03/27/2019    MCV 93 03/27/2019     03/27/2019    Nuclear Quantitative Functional Analysis:   LVEF: 50 %    Impression: ABNORMAL MYOCARDIAL PERFUSION  1. The perfusion scan is free of evidence for myocardial ischemia.   2. There is moderate intensity fixed defect in the inferior wall of the left ventricle, consistent with myocardial injury.   3. Resting wall motion is physiologic.   4. Resting LV function is normal.   5. The ventricular volumes are normal at rest and stress.   6. The extracardiac distribution of radioactivity is normal.       This document has been electronically    SIGNED BY: Estevan Cosme MD On: 01/22/2016 15:58    CONCLUSIONS     1 - Moderate left atrial enlargement.     2 - Concentric hypertrophy.     3 - Normal left ventricular systolic function (EF 55%).     4 - Left ventricular diastolic  dysfunction.     5 - Normal right ventricular systolic function .     6 - Pulmonary hypertension.     7 - Mild aortic stenosis.     8 - Trivial aortic regurgitation.     9 - Mild mitral regurgitation.     10 - Mild tricuspid regurgitation.     11 - Mild pulmonic regurgitation.     12 - Intermediate central venous pressure.         This document has been electronically    SIGNED BY: Estevan Cosme MD On: 02/20/2015 12:27    Assessment:      1. Paroxysmal atrial fibrillation    2. Coronary artery disease of native artery of native heart with stable angina pectoris    3. PVD (peripheral vascular disease)    4. Essential hypertension    5. Cardiac pacemaker    6. S/P PTCA (percutaneous transluminal coronary angioplasty)    7. Long term current use of anticoagulant therapy    8. Mild obstructive sleep apnea      Patient presents to clinic for routine 6 month follow up and is doing well today.  BP on higher side but did not take his AM HTN medications prior to appt.  Home BP log reviewed, good BP control per log.  No chest pain or SOB issues. Does reports some degree of FLOWERS when he over does it.   Using walker for fall prevention post CVA.  NO CNS complaints to suggest TiA or CVA today.  No signs of abnormal bleeding on Coumadin.  Recent INR 4.5, managed by Coumadin clinic.   Plan:     Continue same CV meds for now  Needs device check completed soon  Dash diet, 2 gm sodium restriction  55-60 ounces fluid intake daily  Encourage regular physical activity as tolerated  Encourage weight loss  Monitor Bp at home, bring log to next visit  Daily weights  I have asked him to weigh daily and call clinic if increase in weight by 3 lbs in 1 day or 5 lbs in 1 week  ECHO prior to next appt  Follow up in 3 months or sooner if needed with device check and ECHO    KORTNEY Wooten

## 2019-05-14 NOTE — PATIENT INSTRUCTIONS
Eating Heart-Healthy Food: Using the DASH Plan    Eating for your heart doesnt have to be hard or boring. You just need to know how to make healthier choices. The DASH eating plan has been developed to help you do just that. DASH stands for Dietary Approaches to Stop Hypertension. It is a plan that has been proven to be healthier for your heart and to lower your risk for high blood pressure. It can also help lower your risk for cancer, heart disease, osteoporosis, and diabetes.  Choosing from each food group  Choose foods from each of the food groups below each day. Try to get the recommended number of servings for each food group. The serving numbers are based on a diet of 2,000 calories a day. Talk to your doctor if youre unsure about your calorie needs. Along with getting the correct servings, the DASH plan also recommends a sodium intake less than 2,300 mg per day.        Grains  Servings: 6 to 8 a day  A serving is:  · 1 slice bread  · 1 ounce dry cereal  · Half a cup cooked rice, pasta or cereal  Best choices: Whole grains and any grains high in fiber. Vegetables  Servings: 4 to 5 a day  A serving is:  · 1 cup raw leafy vegetable  · Half a cup cut-up raw or cooked vegetable  · Half a cup vegetable juice  Best choices: Fresh or frozen vegetables prepared without added salt or fat.   Fruits  Servings: 4 to 5 a day  A serving is:  · 1 medium fruit  · One-quarter cup dried fruit  · Half a cup fresh, frozen, or canned fruit  · Half a cup of 100% fruit juices  Best choices: A variety of fresh fruits of different colors. Whole fruits are a better choice than fruit juices. Low-fat or fat-free dairy  Servings: 2 to 3 a day  A serving is:  · 1 cup milk  · 1 cup yogurt  · One and a half ounces cheese  Best choices: Skim or 1% milk, low-fat or fat-free yogurt or buttermilk, and low-fat cheeses.         Lean meats, poultry, fish  Servings: 6 or fewer a day  A serving is:  · 1 ounce cooked meats, poultry, or fish  · 1  egg  Best choices: Lean poultry and fish. Trim away visible fat. Broil, grill, roast, or boil instead of frying. Remove skin from poultry before eating. Limit how much red meat you eat.  Nuts, seeds, beans  Servings: 4 to 5 a week  A serving is:  · One-third cup nuts (one and a half ounces)  · 2 tablespoons nut butter or seeds  · Half a cup cooked dry beans or legumes  Best choices: Dry roasted nuts with no salt added, lentils, kidney beans, garbanzo beans, and whole live beans.   Fats and oils  Servings: 2 to 3 a day  A serving is:  · 1 teaspoon vegetable oil  · 1 teaspoon soft margarine  · 1 tablespoon mayonnaise  · 2 tablespoons salad dressing  Best choices: Nut and vegetable oils (nontropical vegetable oils), such as olive and canola oil. Sweets  Servings: 5 a week or fewer  A serving is:  · 1 tablespoon sugar, maple syrup, or honey  · 1 tablespoon jam or jelly  · 1 half-ounce jelly beans (about 15)  · 1 cup lemonade  Best choices: Dried fruit can be a satisfying sweet. Choose low-fat sweets. And watch your serving sizes!      For more on the DASH eating plan, visit:  www.nhlbi.nih.gov/health/health-topics/topics/dash   Date Last Reviewed: 6/1/2016  © 0696-2843 [employer]. [address] [place] [address], [place] [address]. All rights reserved. This information is not intended as a substitute for professional medical care. Always follow your healthcare professional's instructions.

## 2019-05-14 NOTE — TELEPHONE ENCOUNTER
----- Message from Liberty Ambrose NP sent at 5/14/2019 11:13 AM CDT -----  Please call patient     Lipids well controlled  Kidney function and potassium normal  Keep scheduled follow up    Liberty

## 2019-05-22 ENCOUNTER — ANTI-COAG VISIT (OUTPATIENT)
Dept: CARDIOLOGY | Facility: CLINIC | Age: 84
End: 2019-05-22
Payer: MEDICARE

## 2019-05-22 DIAGNOSIS — Z79.01 LONG TERM (CURRENT) USE OF ANTICOAGULANTS: Primary | ICD-10-CM

## 2019-05-22 DIAGNOSIS — I48.91 ATRIAL FIBRILLATION, UNSPECIFIED TYPE: ICD-10-CM

## 2019-05-22 LAB — INR PPP: 1.7 (ref 2–3)

## 2019-05-22 PROCEDURE — 93793 PR ANTICOAGULANT MGMT FOR PT TAKING WARFARIN: ICD-10-PCS | Mod: ,,,

## 2019-05-22 PROCEDURE — 93793 ANTICOAG MGMT PT WARFARIN: CPT | Mod: ,,,

## 2019-05-22 PROCEDURE — 85610 PROTHROMBIN TIME: CPT | Mod: PBBFAC

## 2019-05-22 NOTE — PROGRESS NOTES
Patient's INR is sub-therapeutic at 1.7.  No missed doses or dietary changes reported.  Mr. Figueroa remains on prednisone 5mg daily; interactions discussed.  No abnormal numbness or weakness noted.  Instructions given for patient to take coumadin 10mg on today; then resume current dose of 7.5mg on Wednesdays, Fridays and 5mg on all other days of the week.  Patient voiced understanding.  Follow-up in 2 weeks.

## 2019-06-05 ENCOUNTER — ANTI-COAG VISIT (OUTPATIENT)
Dept: CARDIOLOGY | Facility: CLINIC | Age: 84
End: 2019-06-05
Payer: MEDICARE

## 2019-06-05 DIAGNOSIS — Z79.01 LONG TERM (CURRENT) USE OF ANTICOAGULANTS: Primary | ICD-10-CM

## 2019-06-05 DIAGNOSIS — I48.91 ATRIAL FIBRILLATION, UNSPECIFIED TYPE: ICD-10-CM

## 2019-06-05 LAB — INR PPP: 2.9 (ref 2–3)

## 2019-06-05 PROCEDURE — 93793 PR ANTICOAGULANT MGMT FOR PT TAKING WARFARIN: ICD-10-PCS | Mod: ,,, | Performed by: INTERNAL MEDICINE

## 2019-06-05 PROCEDURE — 93793 ANTICOAG MGMT PT WARFARIN: CPT | Mod: ,,, | Performed by: INTERNAL MEDICINE

## 2019-06-05 PROCEDURE — 85610 PROTHROMBIN TIME: CPT | Mod: PBBFAC

## 2019-06-05 NOTE — PROGRESS NOTES
Patient's INR is therapeutic at 2.9.  Remains on a low dose of Prednisone.  Will monitor closely.  Instructed to maintain current dose of Warfarin 7.5 mg every Wednesday and Friday; and 5 mg on all other days per week.  Dose calendar - given.  Recheck in 2 weeks.

## 2019-06-12 DIAGNOSIS — I10 ESSENTIAL HYPERTENSION: ICD-10-CM

## 2019-06-12 RX ORDER — HYDRALAZINE HYDROCHLORIDE 25 MG/1
TABLET, FILM COATED ORAL
Qty: 90 TABLET | Refills: 3 | Status: SHIPPED | OUTPATIENT
Start: 2019-06-12 | End: 2019-10-13 | Stop reason: SDUPTHER

## 2019-06-14 ENCOUNTER — HOSPITAL ENCOUNTER (OUTPATIENT)
Facility: HOSPITAL | Age: 84
Discharge: HOME | End: 2019-06-15
Attending: EMERGENCY MEDICINE | Admitting: INTERNAL MEDICINE
Payer: MEDICARE

## 2019-06-14 DIAGNOSIS — I50.9 ACUTE ON CHRONIC CONGESTIVE HEART FAILURE, UNSPECIFIED HEART FAILURE TYPE: Primary | ICD-10-CM

## 2019-06-14 DIAGNOSIS — J44.9 CHRONIC OBSTRUCTIVE PULMONARY DISEASE, UNSPECIFIED COPD TYPE: ICD-10-CM

## 2019-06-14 DIAGNOSIS — I10 HYPERTENSION, UNSPECIFIED TYPE: ICD-10-CM

## 2019-06-14 DIAGNOSIS — R06.02 SOB (SHORTNESS OF BREATH): ICD-10-CM

## 2019-06-14 DIAGNOSIS — I50.43: ICD-10-CM

## 2019-06-14 DIAGNOSIS — J44.9 COPD, SEVERE: ICD-10-CM

## 2019-06-14 LAB
ALBUMIN SERPL BCP-MCNC: 3.4 G/DL (ref 3.5–5.2)
ALP SERPL-CCNC: 53 U/L (ref 55–135)
ALT SERPL W/O P-5'-P-CCNC: 21 U/L (ref 10–44)
ANION GAP SERPL CALC-SCNC: 13 MMOL/L (ref 8–16)
AORTIC VALVE STENOSIS: ABNORMAL
APTT BLDCRRT: 47.5 SEC (ref 21–32)
AST SERPL-CCNC: 20 U/L (ref 10–40)
BASOPHILS # BLD AUTO: 0.01 K/UL (ref 0–0.2)
BASOPHILS NFR BLD: 0.1 % (ref 0–1.9)
BILIRUB SERPL-MCNC: 1.9 MG/DL (ref 0.1–1)
BNP SERPL-MCNC: 238 PG/ML (ref 0–99)
BUN SERPL-MCNC: 13 MG/DL (ref 8–23)
CALCIUM SERPL-MCNC: 9.4 MG/DL (ref 8.7–10.5)
CHLORIDE SERPL-SCNC: 104 MMOL/L (ref 95–110)
CO2 SERPL-SCNC: 23 MMOL/L (ref 23–29)
CREAT SERPL-MCNC: 0.9 MG/DL (ref 0.5–1.4)
DIASTOLIC DYSFUNCTION: YES
DIFFERENTIAL METHOD: ABNORMAL
EOSINOPHIL # BLD AUTO: 0.1 K/UL (ref 0–0.5)
EOSINOPHIL NFR BLD: 1.2 % (ref 0–8)
ERYTHROCYTE [DISTWIDTH] IN BLOOD BY AUTOMATED COUNT: 13.5 % (ref 11.5–14.5)
EST. GFR  (AFRICAN AMERICAN): >60 ML/MIN/1.73 M^2
EST. GFR  (NON AFRICAN AMERICAN): >60 ML/MIN/1.73 M^2
ESTIMATED PA SYSTOLIC PRESSURE: 47.62
GLUCOSE SERPL-MCNC: 154 MG/DL (ref 70–110)
HCT VFR BLD AUTO: 38.6 % (ref 40–54)
HGB BLD-MCNC: 12.5 G/DL (ref 14–18)
INR PPP: 2.5 (ref 0.8–1.2)
LYMPHOCYTES # BLD AUTO: 1.2 K/UL (ref 1–4.8)
LYMPHOCYTES NFR BLD: 14 % (ref 18–48)
MCH RBC QN AUTO: 30.3 PG (ref 27–31)
MCHC RBC AUTO-ENTMCNC: 32.4 G/DL (ref 32–36)
MCV RBC AUTO: 94 FL (ref 82–98)
MONOCYTES # BLD AUTO: 0.9 K/UL (ref 0.3–1)
MONOCYTES NFR BLD: 10.9 % (ref 4–15)
NEUTROPHILS # BLD AUTO: 6.2 K/UL (ref 1.8–7.7)
NEUTROPHILS NFR BLD: 73.8 % (ref 38–73)
PLATELET # BLD AUTO: 158 K/UL (ref 150–350)
PMV BLD AUTO: 11.2 FL (ref 9.2–12.9)
POCT GLUCOSE: 149 MG/DL (ref 70–110)
POTASSIUM SERPL-SCNC: 4 MMOL/L (ref 3.5–5.1)
PROT SERPL-MCNC: 6.6 G/DL (ref 6–8.4)
PROTHROMBIN TIME: 26.4 SEC (ref 9–12.5)
RBC # BLD AUTO: 4.12 M/UL (ref 4.6–6.2)
RETIRED EF AND QEF - SEE NOTES: 60 (ref 55–65)
SODIUM SERPL-SCNC: 140 MMOL/L (ref 136–145)
TRICUSPID VALVE REGURGITATION: ABNORMAL
TROPONIN I SERPL DL<=0.01 NG/ML-MCNC: 0.03 NG/ML (ref 0–0.03)
TROPONIN I SERPL DL<=0.01 NG/ML-MCNC: 0.03 NG/ML (ref 0–0.03)
WBC # BLD AUTO: 8.44 K/UL (ref 3.9–12.7)

## 2019-06-14 PROCEDURE — 99291 CRITICAL CARE FIRST HOUR: CPT | Mod: 25

## 2019-06-14 PROCEDURE — 80053 COMPREHEN METABOLIC PANEL: CPT

## 2019-06-14 PROCEDURE — 94760 N-INVAS EAR/PLS OXIMETRY 1: CPT

## 2019-06-14 PROCEDURE — 93010 ELECTROCARDIOGRAM REPORT: CPT | Mod: ,,, | Performed by: INTERNAL MEDICINE

## 2019-06-14 PROCEDURE — 25000242 PHARM REV CODE 250 ALT 637 W/ HCPCS: Performed by: NURSE PRACTITIONER

## 2019-06-14 PROCEDURE — 83880 ASSAY OF NATRIURETIC PEPTIDE: CPT

## 2019-06-14 PROCEDURE — 25000003 PHARM REV CODE 250: Performed by: INTERNAL MEDICINE

## 2019-06-14 PROCEDURE — 82962 GLUCOSE BLOOD TEST: CPT

## 2019-06-14 PROCEDURE — 85610 PROTHROMBIN TIME: CPT

## 2019-06-14 PROCEDURE — 96374 THER/PROPH/DIAG INJ IV PUSH: CPT | Mod: 59

## 2019-06-14 PROCEDURE — 25000003 PHARM REV CODE 250: Performed by: EMERGENCY MEDICINE

## 2019-06-14 PROCEDURE — 93306 2D ECHO WITH COLOR FLOW DOPPLER: ICD-10-PCS | Mod: 26,,, | Performed by: INTERNAL MEDICINE

## 2019-06-14 PROCEDURE — 63600175 PHARM REV CODE 636 W HCPCS: Performed by: EMERGENCY MEDICINE

## 2019-06-14 PROCEDURE — 93306 TTE W/DOPPLER COMPLETE: CPT | Mod: 26,,, | Performed by: INTERNAL MEDICINE

## 2019-06-14 PROCEDURE — 84484 ASSAY OF TROPONIN QUANT: CPT | Mod: 91

## 2019-06-14 PROCEDURE — 25000003 PHARM REV CODE 250: Performed by: NURSE PRACTITIONER

## 2019-06-14 PROCEDURE — 85730 THROMBOPLASTIN TIME PARTIAL: CPT

## 2019-06-14 PROCEDURE — 85025 COMPLETE CBC W/AUTO DIFF WBC: CPT

## 2019-06-14 PROCEDURE — G0378 HOSPITAL OBSERVATION PER HR: HCPCS

## 2019-06-14 PROCEDURE — 25000242 PHARM REV CODE 250 ALT 637 W/ HCPCS: Performed by: EMERGENCY MEDICINE

## 2019-06-14 PROCEDURE — 93010 EKG 12-LEAD: ICD-10-PCS | Mod: ,,, | Performed by: INTERNAL MEDICINE

## 2019-06-14 PROCEDURE — 63600175 PHARM REV CODE 636 W HCPCS: Performed by: NURSE PRACTITIONER

## 2019-06-14 PROCEDURE — 93005 ELECTROCARDIOGRAM TRACING: CPT

## 2019-06-14 PROCEDURE — 96375 TX/PRO/DX INJ NEW DRUG ADDON: CPT | Mod: 59

## 2019-06-14 PROCEDURE — 93306 TTE W/DOPPLER COMPLETE: CPT

## 2019-06-14 PROCEDURE — 94640 AIRWAY INHALATION TREATMENT: CPT

## 2019-06-14 PROCEDURE — 96376 TX/PRO/DX INJ SAME DRUG ADON: CPT

## 2019-06-14 RX ORDER — ISOSORBIDE DINITRATE 20 MG/1
20 TABLET ORAL 2 TIMES DAILY
Status: DISCONTINUED | OUTPATIENT
Start: 2019-06-14 | End: 2019-06-15 | Stop reason: HOSPADM

## 2019-06-14 RX ORDER — CARVEDILOL 12.5 MG/1
12.5 TABLET ORAL 2 TIMES DAILY WITH MEALS
Status: DISCONTINUED | OUTPATIENT
Start: 2019-06-14 | End: 2019-06-15 | Stop reason: HOSPADM

## 2019-06-14 RX ORDER — IPRATROPIUM BROMIDE AND ALBUTEROL SULFATE 2.5; .5 MG/3ML; MG/3ML
3 SOLUTION RESPIRATORY (INHALATION)
Status: DISCONTINUED | OUTPATIENT
Start: 2019-06-14 | End: 2019-06-15 | Stop reason: HOSPADM

## 2019-06-14 RX ORDER — LEVETIRACETAM 500 MG/1
500 TABLET ORAL 2 TIMES DAILY
Status: DISCONTINUED | OUTPATIENT
Start: 2019-06-14 | End: 2019-06-15 | Stop reason: HOSPADM

## 2019-06-14 RX ORDER — WARFARIN 2.5 MG/1
5 TABLET ORAL
Status: DISCONTINUED | OUTPATIENT
Start: 2019-06-18 | End: 2019-06-15 | Stop reason: HOSPADM

## 2019-06-14 RX ORDER — FUROSEMIDE 10 MG/ML
40 INJECTION INTRAMUSCULAR; INTRAVENOUS 2 TIMES DAILY
Status: DISCONTINUED | OUTPATIENT
Start: 2019-06-14 | End: 2019-06-15 | Stop reason: HOSPADM

## 2019-06-14 RX ORDER — PANTOPRAZOLE SODIUM 40 MG/1
40 TABLET, DELAYED RELEASE ORAL DAILY
Status: DISCONTINUED | OUTPATIENT
Start: 2019-06-15 | End: 2019-06-15 | Stop reason: HOSPADM

## 2019-06-14 RX ORDER — NITROGLYCERIN 6.5 MG/1
6.5 CAPSULE ORAL 2 TIMES DAILY
Status: DISCONTINUED | OUTPATIENT
Start: 2019-06-14 | End: 2019-06-15 | Stop reason: HOSPADM

## 2019-06-14 RX ORDER — GABAPENTIN 100 MG/1
100 CAPSULE ORAL 3 TIMES DAILY
Status: DISCONTINUED | OUTPATIENT
Start: 2019-06-14 | End: 2019-06-15 | Stop reason: HOSPADM

## 2019-06-14 RX ORDER — ATORVASTATIN CALCIUM 10 MG/1
20 TABLET, FILM COATED ORAL DAILY
Status: DISCONTINUED | OUTPATIENT
Start: 2019-06-14 | End: 2019-06-15 | Stop reason: HOSPADM

## 2019-06-14 RX ORDER — GUAIFENESIN 600 MG/1
1200 TABLET, EXTENDED RELEASE ORAL 2 TIMES DAILY
Status: DISCONTINUED | OUTPATIENT
Start: 2019-06-14 | End: 2019-06-15 | Stop reason: HOSPADM

## 2019-06-14 RX ORDER — WARFARIN 2.5 MG/1
5 TABLET ORAL
Status: DISCONTINUED | OUTPATIENT
Start: 2019-06-15 | End: 2019-06-15 | Stop reason: HOSPADM

## 2019-06-14 RX ORDER — WARFARIN 2.5 MG/1
5 TABLET ORAL
Status: DISCONTINUED | OUTPATIENT
Start: 2019-06-17 | End: 2019-06-15 | Stop reason: HOSPADM

## 2019-06-14 RX ORDER — METHYLPREDNISOLONE SOD SUCC 125 MG
125 VIAL (EA) INJECTION
Status: COMPLETED | OUTPATIENT
Start: 2019-06-14 | End: 2019-06-14

## 2019-06-14 RX ORDER — TRAMADOL HYDROCHLORIDE 50 MG/1
50 TABLET ORAL EVERY 8 HOURS
Status: DISCONTINUED | OUTPATIENT
Start: 2019-06-14 | End: 2019-06-15 | Stop reason: HOSPADM

## 2019-06-14 RX ORDER — TAMSULOSIN HYDROCHLORIDE 0.4 MG/1
0.4 CAPSULE ORAL DAILY
Status: DISCONTINUED | OUTPATIENT
Start: 2019-06-14 | End: 2019-06-15 | Stop reason: HOSPADM

## 2019-06-14 RX ORDER — IPRATROPIUM BROMIDE AND ALBUTEROL SULFATE 2.5; .5 MG/3ML; MG/3ML
3 SOLUTION RESPIRATORY (INHALATION)
Status: COMPLETED | OUTPATIENT
Start: 2019-06-14 | End: 2019-06-14

## 2019-06-14 RX ORDER — WARFARIN 2.5 MG/1
7.5 TABLET ORAL
Status: DISCONTINUED | OUTPATIENT
Start: 2019-06-14 | End: 2019-06-15 | Stop reason: HOSPADM

## 2019-06-14 RX ORDER — POTASSIUM CHLORIDE 20 MEQ/1
20 TABLET, EXTENDED RELEASE ORAL 2 TIMES DAILY
Status: DISCONTINUED | OUTPATIENT
Start: 2019-06-14 | End: 2019-06-15 | Stop reason: HOSPADM

## 2019-06-14 RX ORDER — HYDRALAZINE HYDROCHLORIDE 20 MG/ML
10 INJECTION INTRAMUSCULAR; INTRAVENOUS
Status: COMPLETED | OUTPATIENT
Start: 2019-06-14 | End: 2019-06-14

## 2019-06-14 RX ORDER — HYDRALAZINE HYDROCHLORIDE 25 MG/1
25 TABLET, FILM COATED ORAL EVERY 8 HOURS
Status: DISCONTINUED | OUTPATIENT
Start: 2019-06-14 | End: 2019-06-15 | Stop reason: HOSPADM

## 2019-06-14 RX ORDER — WARFARIN 2.5 MG/1
7.5 TABLET ORAL
Status: DISCONTINUED | OUTPATIENT
Start: 2019-06-19 | End: 2019-06-15 | Stop reason: HOSPADM

## 2019-06-14 RX ORDER — AMLODIPINE BESYLATE 10 MG/1
10 TABLET ORAL DAILY
Status: DISCONTINUED | OUTPATIENT
Start: 2019-06-14 | End: 2019-06-15 | Stop reason: HOSPADM

## 2019-06-14 RX ORDER — FUROSEMIDE 10 MG/ML
60 INJECTION INTRAMUSCULAR; INTRAVENOUS
Status: COMPLETED | OUTPATIENT
Start: 2019-06-14 | End: 2019-06-14

## 2019-06-14 RX ADMIN — IPRATROPIUM BROMIDE AND ALBUTEROL SULFATE 3 ML: .5; 3 SOLUTION RESPIRATORY (INHALATION) at 03:06

## 2019-06-14 RX ADMIN — WARFARIN SODIUM 7.5 MG: 2.5 TABLET ORAL at 04:06

## 2019-06-14 RX ADMIN — ATORVASTATIN CALCIUM 20 MG: 10 TABLET, FILM COATED ORAL at 04:06

## 2019-06-14 RX ADMIN — NITROGLYCERIN 6.5 MG: 6.5 CAPSULE ORAL at 04:06

## 2019-06-14 RX ADMIN — IPRATROPIUM BROMIDE AND ALBUTEROL SULFATE 3 ML: .5; 3 SOLUTION RESPIRATORY (INHALATION) at 07:06

## 2019-06-14 RX ADMIN — IPRATROPIUM BROMIDE AND ALBUTEROL SULFATE 3 ML: .5; 3 SOLUTION RESPIRATORY (INHALATION) at 10:06

## 2019-06-14 RX ADMIN — METHYLPREDNISOLONE SODIUM SUCCINATE 125 MG: 125 INJECTION, POWDER, FOR SOLUTION INTRAMUSCULAR; INTRAVENOUS at 10:06

## 2019-06-14 RX ADMIN — HYDRALAZINE HYDROCHLORIDE 10 MG: 20 INJECTION, SOLUTION INTRAMUSCULAR; INTRAVENOUS at 10:06

## 2019-06-14 RX ADMIN — POTASSIUM CHLORIDE 20 MEQ: 1500 TABLET, EXTENDED RELEASE ORAL at 10:06

## 2019-06-14 RX ADMIN — FUROSEMIDE 40 MG: 10 INJECTION, SOLUTION INTRAMUSCULAR; INTRAVENOUS at 09:06

## 2019-06-14 RX ADMIN — GABAPENTIN 100 MG: 100 CAPSULE ORAL at 10:06

## 2019-06-14 RX ADMIN — AMLODIPINE BESYLATE 10 MG: 10 TABLET ORAL at 04:06

## 2019-06-14 RX ADMIN — CARVEDILOL 12.5 MG: 12.5 TABLET, FILM COATED ORAL at 04:06

## 2019-06-14 RX ADMIN — GUAIFENESIN 1200 MG: 600 TABLET, EXTENDED RELEASE ORAL at 10:06

## 2019-06-14 RX ADMIN — FUROSEMIDE 60 MG: 10 INJECTION, SOLUTION INTRAMUSCULAR; INTRAVENOUS at 10:06

## 2019-06-14 RX ADMIN — HYDRALAZINE HYDROCHLORIDE 25 MG: 25 TABLET, FILM COATED ORAL at 04:06

## 2019-06-14 RX ADMIN — NITROGLYCERIN 1 INCH: 20 OINTMENT TOPICAL at 09:06

## 2019-06-14 RX ADMIN — ISOSORBIDE DINITRATE 20 MG: 20 TABLET ORAL at 04:06

## 2019-06-14 RX ADMIN — TAMSULOSIN HYDROCHLORIDE 0.4 MG: 0.4 CAPSULE ORAL at 04:06

## 2019-06-14 RX ADMIN — LEVETIRACETAM 500 MG: 500 TABLET ORAL at 09:06

## 2019-06-14 RX ADMIN — HYDRALAZINE HYDROCHLORIDE 25 MG: 25 TABLET, FILM COATED ORAL at 10:06

## 2019-06-14 RX ADMIN — NITROGLYCERIN 1 INCH: 20 OINTMENT TOPICAL at 02:06

## 2019-06-14 RX ADMIN — TRAMADOL HYDROCHLORIDE 50 MG: 50 TABLET, COATED ORAL at 09:06

## 2019-06-14 NOTE — H&P
[facility] Hospital Medicine  History & Physical    Patient Name: Bola Figueroa Sr.  MRN: [id number]  Admission Date: 6/14/2019  Attending Physician: Sonido Alston,*   Primary Care Provider: Naga Bhatti MD    Patient information was obtained from patient, spouse/SO, past medical records and ER records.     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 Wheezing noted in triage. Hx of COPD.         HPI: Bola Figueroa Sr. is a 84 y.o. male patient with a PMHx of Afib, COPD, CAD, HLD, HTN, CVA, who presented to the ER for SOB, onset 2 days PTA. Associated sxs included nonproductive cough, wheezing, and BLE edema. Patient denies any fever, chills, n/v, CP, weakness, numbness, dizziness, headache, and all other sxs at this time. His wife is in Surgical [facility] and he has been staying with her and missing over a week of his PM meds. In ER, Chest Xray showed cephalization. He received IV Lasix 60 mg and voided 1.8 liters. Labs: INR 2.5, troponin 0.034, . Daughter, Luz Maria Alexis, is the surrogate decision maker [phone]. He is Observation with acute on chronic CHF.     Past Medical History:   Diagnosis Date    *Atrial fibrillation     Atrial fibrillation     Bilateral carotid artery stenosis 1/15/2016    Cardiac pacemaker 8/7/2013    Congestive heart failure 4/6/2015    COPD (chronic obstructive pulmonary disease) 8/7/2013    Coronary artery disease     Hyperlipidemia     Hypertension     Long-term (current) use of anticoagulants 8/7/2013    PVD (peripheral vascular disease)     S/P PTCA (percutaneous transluminal coronary angioplasty) 8/7/2013    Sleep apnea 8/7/2013    Stroke        Past Surgical History:   Procedure Laterality Date     CATARACT EXTRACTION W/  INTRAOCULAR LENS IMPLANT      INSERT / REPLACE / REMOVE PACEMAKER  2005    REPLACEMENT, PULSE GENERATOR, CARDIAC PACEMAKER Left 3/20/2014    Performed by Behzad Sarmiento MD at [facility] CATH LAB       Review of patient's allergies indicates:   Allergen Reactions    No known drug allergies        No current facility-administered medications on file prior to encounter.      Current Outpatient Medications on File Prior to Encounter   Medication Sig    albuterol-ipratropium (DUO-NEB) 2.5 mg-0.5 mg/3 mL nebulizer solution Take 3 mLs by nebulization every 4 (four) hours as needed for Wheezing. Rescue    amLODIPine (NORVASC) 10 MG tablet Take 10 mg by mouth.    aspirin (ECOTRIN) 81 MG EC tablet Take 81 mg by mouth once daily.    atorvastatin (LIPITOR) 20 MG tablet Take 20 mg by mouth.    benzonatate (TESSALON) 200 MG capsule Take 1 capsule (200 mg total) by mouth 3 (three) times daily as needed for Cough.    carvedilol (COREG) 12.5 MG tablet Take 1 tablet (12.5 mg total) by mouth 2 (two) times daily with meals.    fluticasone-salmeterol diskus inhaler 250-50 mcg Inhale 1 puff into the lungs 2 (two) times daily. Wash out mouth after use.    furosemide (LASIX) 40 MG tablet Take 40 mg by mouth once daily.     gabapentin (NEURONTIN) 100 MG capsule Take 100 mg by mouth 3 (three) times daily.    guaiFENesin (MUCINEX) 600 mg 12 hr tablet Take 2 tablets (1,200 mg total) by mouth 2 (two) times daily as needed for Congestion. Take with water    hydrALAZINE (APRESOLINE) 25 MG tablet TAKE 1 TABLET BY MOUTH THREE TIMES DAILY    ipratropium (ATROVENT) 42 mcg (0.06 %) nasal spray instill 2 sprays into each nostril three times a day    isosorbide dinitrate (ISORDIL) 20 MG tablet take 1 tablet by mouth every 12 hours with HYDRALAZINE    latanoprost 0.005 % ophthalmic solution Place 1 drop into both eyes nightly.    levetiracetam (KEPPRA) 500 MG Tab Take 1 tablet by mouth Twice daily.     lubiprostone (AMITIZA) 8 MCG Cap Take 8 mcg by mouth 2 (two) times daily with meals.    nitroGLYCERIN (NITROBID) 6.5 MG CpSR Take 1 capsule (6.5 mg total) by mouth 2 (two) times daily.    pantoprazole (PROTONIX) 40 MG tablet Take 40 mg by mouth.    predniSONE (DELTASONE) 5 MG tablet Take 1 tablet (5 mg total) by mouth once daily. Start after high dose prednisone    promethazine-dextromethorphan (PROMETHAZINE-DM) 6.25-15 mg/5 mL Syrp Take 5 mLs by mouth every 6 (six) hours as needed.    tamsulosin (FLOMAX) 0.4 mg Cp24 Take 0.4 mg by mouth once daily.     traMADol (ULTRAM) 50 mg tablet take 1 tablet by mouth every 6 hours if needed for UP TO 10 days    warfarin (COUMADIN) 5 MG tablet TAKE 1.5 tablets every Wednesday and Friday; and 1 tablet all other days in the evening as directed by the Coumadin Clinic.     Family History     Problem Relation (Age of Onset)    Diabetes Sister, Maternal Grandfather    Fainting Sister    Heart attack Paternal Uncle    Heart disease Paternal Uncle    Hypertension Maternal Grandmother        Tobacco Use    Smoking status: Former Smoker     Packs/day: 1.50     Years: 35.00     Pack years: 52.50     Types: Cigarettes     Last attempt to quit: 1979     Years since quittin.6    Smokeless tobacco: Never Used   Substance and Sexual Activity    Alcohol use: No     Comment: QUIT 77    Drug use: No    Sexual activity: Not on file     Review of Systems   Constitutional: Positive for fatigue. Negative for activity change, appetite change, chills, diaphoresis, fever and unexpected weight change.   HENT: Negative for congestion, ear discharge, ear pain, facial swelling, hearing loss, postnasal drip, sore throat, tinnitus, trouble swallowing and voice change.    Eyes: Negative for photophobia, pain, discharge, redness, itching and visual disturbance.   Respiratory: Positive for cough and shortness of breath. Negative for choking, chest tightness, wheezing and stridor.     Cardiovascular: Positive for leg swelling. Negative for chest pain and palpitations.   Gastrointestinal: Negative for abdominal distention, abdominal pain, blood in stool, constipation, diarrhea, nausea and vomiting.   Endocrine: Negative for cold intolerance, heat intolerance, polydipsia, polyphagia and polyuria.   Genitourinary: Negative for difficulty urinating, flank pain, frequency, hematuria and urgency.   Musculoskeletal: Negative for arthralgias, back pain, gait problem and myalgias.   Skin: Negative for color change, pallor, rash and wound.   Neurological: Positive for weakness. Negative for dizziness, tremors, seizures, syncope, facial asymmetry, speech difficulty, light-headedness, numbness and headaches.   Hematological: Negative for adenopathy. Does not bruise/bleed easily.   Psychiatric/Behavioral: Positive for dysphoric mood. Negative for agitation, confusion, hallucinations and suicidal ideas. The patient is not nervous/anxious.    All other systems reviewed and are negative.    Objective:     Vital Signs (Most Recent):  Temp: 98.6 °F (37 °C) (06/14/19 1011)  Pulse: 69 (06/14/19 1100)  Resp: 20 (06/14/19 1100)  BP: (!) 172/77 (06/14/19 1100)  SpO2: 97 % (06/14/19 1100) Vital Signs (24h Range):  Temp:  [98.6 °F (37 °C)] 98.6 °F (37 °C)  Pulse:  [69-79] 69  Resp:  [10-24] 20  SpO2:  [92 %-98 %] 97 %  BP: (172-194)/(77-92) 172/77     Weight: 90.3 kg (199 lb 1.2 oz)  Body mass index is 30.27 kg/m².    Physical Exam   Constitutional: He is oriented to person, place, and time. He appears well-developed and well-nourished.   HENT:   Head: Normocephalic and atraumatic.   Nose: Nose normal.   Eyes: Pupils are equal, round, and reactive to light. Conjunctivae and EOM are normal.   Neck: Normal range of motion. Neck supple. No JVD present. No tracheal deviation present. No thyromegaly present.   Cardiovascular: Normal rate, regular rhythm, normal heart sounds and intact distal pulses. Exam reveals no gallop  and no friction rub.   No murmur heard.  Pulmonary/Chest: Effort normal. No stridor. No respiratory distress. He has wheezes. He has rales. He exhibits no tenderness.   Abdominal: Soft. Bowel sounds are normal. He exhibits no distension. There is no tenderness. There is no rebound and no guarding.   Musculoskeletal: Normal range of motion. He exhibits edema. He exhibits no tenderness or deformity.   Neurological: He is alert and oriented to person, place, and time. He has normal reflexes. No cranial nerve deficit. Coordination normal.   Skin: Skin is warm and dry. No rash noted. No erythema. No pallor.   Psychiatric: He has a normal mood and affect. His behavior is normal. Judgment and thought content normal.   Nursing note and vitals reviewed.        CRANIAL NERVES     CN III, IV, VI   Pupils are equal, round, and reactive to light.  Extraocular motions are normal.      Significant Labs:   CBC:   Recent Labs   Lab 06/14/19  1040   WBC 8.44   HGB 12.5*   HCT 38.6*        CMP:   Recent Labs   Lab 06/14/19  1040      K 4.0      CO2 23   *   BUN 13   CREATININE 0.9   CALCIUM 9.4   PROT 6.6   ALBUMIN 3.4*   BILITOT 1.9*   ALKPHOS 53*   AST 20   ALT 21   ANIONGAP 13   EGFRNONAA >60     Cardiac Markers:   Recent Labs   Lab 06/14/19  1040   *     Recent Labs   Lab 06/14/19  1248   TROPONINI 0.034*     Coagulation:   Recent Labs   Lab 06/14/19  1040   INR 2.5*   APTT 47.5*       Significant Imaging: I have reviewed all pertinent imaging results/findings within the past 24 hours.    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

## 2019-06-14 NOTE — ED NOTES
Patient c/o shortness of breath at rest and on exertion x 3-4 days.    Patient moved to ED room 16, patient assisted onto stretcher and changed into a gown. Patient placed on cardiac monitor, continuous pulse oximetry and automatic blood pressure cuff. Bed placed in low locked position, side rails up x 2, call light is within reach of patient or family, orientation to room and explanation of wait provided to family and patient, alarms set and turned on for monitor and pulse ox, awaiting MD evaluation and orders, will continue to monitor.    Patient identifies self as Bola Figueroa Sr.      LOC: The patient is awake, alert and aware of environment with an appropriate affect, the patient is oriented x 3 and speaking appropriately.  APPEARANCE: Patient resting comfortably and in no acute distress, patient is clean and well groomed, patient's clothing is properly fastened.  SKIN: The skin is warm and dry, color consistent with ethnicity, patient has normal skin turgor and moist mucus membranes, skin intact, no breakdown or bruising noted.  MUSCULOSKELETAL: Patient moving all extremities well, no obvious swelling or deformities noted.  RESPIRATORY: Increased work of breathing noted, expiratory wheezing noted to bilateral lung fields, no accessory muscle use noted.  CARDIAC: Patient has a paced rhythm currently at 91 BPM, +3 pitting edema to BLE, capillary refill < 3 seconds. Pacemaker noted to left upper chest.  ABDOMEN: Soft and non tender to palpation, no distention noted.  NEUROLOGIC: PERRL, 3 mm bilaterally, eyes open spontaneously, behavior appropriate to situation, follows commands, facial expression symmetrical, bilateral hand grasp equal and even, purposeful motor response noted, normal sensation in all extremities when touched with a finger.

## 2019-06-14 NOTE — HPI
Bola Figueroa Sr. is a 84 y.o. male patient with a PMHx of Afib, COPD, CAD, HLD, HTN, CVA, who presented to the ER for SOB, onset 2 days PTA. Associated sxs included nonproductive cough, wheezing, and BLE edema. Patient denies any fever, chills, n/v, CP, weakness, numbness, dizziness, headache, and all other sxs at this time. His wife is in [facility] and he has been staying with her and missing over a week of his PM meds. In ER, Chest Xray showed cephalization. He received IV Lasix 60 mg and voided 1.8 liters. Labs: INR 2.5, troponin 0.034, . Daughter, Luz Maria Alexis, is the surrogate decision maker [phone]. He is Observation with acute on chronic CHF.

## 2019-06-14 NOTE — ASSESSMENT & PLAN NOTE
Acute on chronic diastolic CHF  -IV lasix 40 mg BID  -cardiac diet with 1500 milliliter(s) fluid restriction  -daily weights  -strict I &O  -COREG  - start ACE if LV dysfunction  -spoke with Cardiology

## 2019-06-14 NOTE — PLAN OF CARE
Met with patient and family initial assessment completed. Patient is independent with adls and iadls with assistance from his daughters and using his assistive devices.  Patient denies any post hospital needs or services at this time.  Patient usually lives with his wife Adri, but [facility] at [facility].Patient's daughter Marcella stated that family provides his transportation. Updated white board with 's name and number. Transitional Care Folder, Discharge Planning Begins on Admission pamphlet, [facility] Delivery pamphlet, Advance Directive information given to patient along with the contact information given.Instructed patient or family to call with any questions or concerns.          [address] [address]  Phone: [phone] Fax: [phone]    [address] [address]  Phone: [phone] Fax: [phone]    [facility] [address] [place] [address] [address] AT [facility]  [address] [facility] [address]  Phone: [phone] Fax: [phone]    Naga Bhatti MD  Payor: [facility] / Plan: [facility] LOCAL PLUS / Product Type: Commercial /             06/14/19 1701   Discharge Assessment   Assessment Type Discharge Planning Assessment   Confirmed/corrected address and phone number on facesheet? Yes   Assessment information obtained from? Patient;Caregiver;Medical Record   Expected Length of Stay (days)   (tbd)   Communicated expected length of stay with patient/caregiver yes   Prior to hospitilization cognitive status: Alert/Oriented   Prior to hospitalization functional status: Assistive Equipment   Current cognitive status: Alert/Oriented   Current Functional Status: Needs Assistance   Facility Arrived From: home   Lives  With spouse   Able to Return to Prior Arrangements yes   Is patient able to care for self after discharge? Yes   Who are your caregiver(s) and their phone number(s)? Marcella Skelton ( daughter ) [phone]   Patient's perception of discharge disposition home or selfcare   Readmission Within the Last 30 Days no previous admission in last 30 days   Patient currently being followed by outpatient case management? No   Patient currently receives any other outside agency services? No   Equipment Currently Used at Home rollator;walker, standard   Do you have any problems affording any of your prescribed medications? No   Is the patient taking medications as prescribed? yes   Does the patient have transportation home? Yes   Transportation Anticipated family or friend will provide   Does the patient receive services at the Coumadin Clinic? No   Discharge Plan A Home;Home with family   DME Needed Upon Discharge  oxygen   Patient/Family in Agreement with Plan yes

## 2019-06-14 NOTE — SUBJECTIVE & OBJECTIVE
Past Medical History:   Diagnosis Date    *Atrial fibrillation     Atrial fibrillation     Bilateral carotid artery stenosis 1/15/2016    Cardiac pacemaker 8/7/2013    Congestive heart failure 4/6/2015    COPD (chronic obstructive pulmonary disease) 8/7/2013    Coronary artery disease     Hyperlipidemia     Hypertension     Long-term (current) use of anticoagulants 8/7/2013    PVD (peripheral vascular disease)     S/P PTCA (percutaneous transluminal coronary angioplasty) 8/7/2013    Sleep apnea 8/7/2013    Stroke        Past Surgical History:   Procedure Laterality Date    CATARACT EXTRACTION W/  INTRAOCULAR LENS IMPLANT      INSERT / REPLACE / REMOVE PACEMAKER  2005    REPLACEMENT, PULSE GENERATOR, CARDIAC PACEMAKER Left 3/20/2014    Performed by Behzad Sarmiento MD at [facility] CATH LAB       Review of patient's allergies indicates:   Allergen Reactions    No known drug allergies        No current facility-administered medications on file prior to encounter.      Current Outpatient Medications on File Prior to Encounter   Medication Sig    albuterol-ipratropium (DUO-NEB) 2.5 mg-0.5 mg/3 mL nebulizer solution Take 3 mLs by nebulization every 4 (four) hours as needed for Wheezing. Rescue    amLODIPine (NORVASC) 10 MG tablet Take 10 mg by mouth.    aspirin (ECOTRIN) 81 MG EC tablet Take 81 mg by mouth once daily.    atorvastatin (LIPITOR) 20 MG tablet Take 20 mg by mouth.    benzonatate (TESSALON) 200 MG capsule Take 1 capsule (200 mg total) by mouth 3 (three) times daily as needed for Cough.    carvedilol (COREG) 12.5 MG tablet Take 1 tablet (12.5 mg total) by mouth 2 (two) times daily with meals.    fluticasone-salmeterol diskus inhaler 250-50 mcg Inhale 1 puff into the lungs 2 (two) times daily. Wash out mouth after use.    furosemide (LASIX) 40 MG tablet Take 40 mg by mouth once daily.     gabapentin (NEURONTIN) 100 MG capsule Take 100 mg by mouth 3 (three) times daily.    guaiFENesin  (MUCINEX) 600 mg 12 hr tablet Take 2 tablets (1,200 mg total) by mouth 2 (two) times daily as needed for Congestion. Take with water    hydrALAZINE (APRESOLINE) 25 MG tablet TAKE 1 TABLET BY MOUTH THREE TIMES DAILY    ipratropium (ATROVENT) 42 mcg (0.06 %) nasal spray instill 2 sprays into each nostril three times a day    isosorbide dinitrate (ISORDIL) 20 MG tablet take 1 tablet by mouth every 12 hours with HYDRALAZINE    latanoprost 0.005 % ophthalmic solution Place 1 drop into both eyes nightly.    levetiracetam (KEPPRA) 500 MG Tab Take 1 tablet by mouth Twice daily.    lubiprostone (AMITIZA) 8 MCG Cap Take 8 mcg by mouth 2 (two) times daily with meals.    nitroGLYCERIN (NITROBID) 6.5 MG CpSR Take 1 capsule (6.5 mg total) by mouth 2 (two) times daily.    pantoprazole (PROTONIX) 40 MG tablet Take 40 mg by mouth.    predniSONE (DELTASONE) 5 MG tablet Take 1 tablet (5 mg total) by mouth once daily. Start after high dose prednisone    promethazine-dextromethorphan (PROMETHAZINE-DM) 6.25-15 mg/5 mL Syrp Take 5 mLs by mouth every 6 (six) hours as needed.    tamsulosin (FLOMAX) 0.4 mg Cp24 Take 0.4 mg by mouth once daily.     traMADol (ULTRAM) 50 mg tablet take 1 tablet by mouth every 6 hours if needed for UP TO 10 days    warfarin (COUMADIN) 5 MG tablet TAKE 1.5 tablets every Wednesday and Friday; and 1 tablet all other days in the evening as directed by the Coumadin Clinic.     Family History     Problem Relation (Age of Onset)    Diabetes Sister, Maternal Grandfather    Fainting Sister    Heart attack Paternal Uncle    Heart disease Paternal Uncle    Hypertension Maternal Grandmother        Tobacco Use    Smoking status: Former Smoker     Packs/day: 1.50     Years: 35.00     Pack years: 52.50     Types: Cigarettes     Last attempt to quit: 1979     Years since quittin.6    Smokeless tobacco: Never Used   Substance and Sexual Activity    Alcohol use: No     Comment: QUIT 77    Drug  use: No    Sexual activity: Not on file     Review of Systems   Constitutional: Positive for fatigue. Negative for activity change, appetite change, chills, diaphoresis, fever and unexpected weight change.   HENT: Negative for congestion, ear discharge, ear pain, facial swelling, hearing loss, postnasal drip, sore throat, tinnitus, trouble swallowing and voice change.    Eyes: Negative for photophobia, pain, discharge, redness, itching and visual disturbance.   Respiratory: Positive for cough and shortness of breath. Negative for choking, chest tightness, wheezing and stridor.    Cardiovascular: Positive for leg swelling. Negative for chest pain and palpitations.   Gastrointestinal: Negative for abdominal distention, abdominal pain, blood in stool, constipation, diarrhea, nausea and vomiting.   Endocrine: Negative for cold intolerance, heat intolerance, polydipsia, polyphagia and polyuria.   Genitourinary: Negative for difficulty urinating, flank pain, frequency, hematuria and urgency.   Musculoskeletal: Negative for arthralgias, back pain, gait problem and myalgias.   Skin: Negative for color change, pallor, rash and wound.   Neurological: Positive for weakness. Negative for dizziness, tremors, seizures, syncope, facial asymmetry, speech difficulty, light-headedness, numbness and headaches.   Hematological: Negative for adenopathy. Does not bruise/bleed easily.   Psychiatric/Behavioral: Positive for dysphoric mood. Negative for agitation, confusion, hallucinations and suicidal ideas. The patient is not nervous/anxious.    All other systems reviewed and are negative.    Objective:     Vital Signs (Most Recent):  Temp: 98.6 °F (37 °C) (06/14/19 1011)  Pulse: 69 (06/14/19 1100)  Resp: 20 (06/14/19 1100)  BP: (!) 172/77 (06/14/19 1100)  SpO2: 97 % (06/14/19 1100) Vital Signs (24h Range):  Temp:  [98.6 °F (37 °C)] 98.6 °F (37 °C)  Pulse:  [69-79] 69  Resp:  [10-24] 20  SpO2:  [92 %-98 %] 97 %  BP: (172-194)/(77-92)  172/77     Weight: 90.3 kg (199 lb 1.2 oz)  Body mass index is 30.27 kg/m².    Physical Exam   Constitutional: He is oriented to person, place, and time. He appears well-developed and well-nourished.   HENT:   Head: Normocephalic and atraumatic.   Nose: Nose normal.   Eyes: Pupils are equal, round, and reactive to light. Conjunctivae and EOM are normal.   Neck: Normal range of motion. Neck supple. No JVD present. No tracheal deviation present. No thyromegaly present.   Cardiovascular: Normal rate, regular rhythm, normal heart sounds and intact distal pulses. Exam reveals no gallop and no friction rub.   No murmur heard.  Pulmonary/Chest: Effort normal. No stridor. No respiratory distress. He has wheezes. He has rales. He exhibits no tenderness.   Abdominal: Soft. Bowel sounds are normal. He exhibits no distension. There is no tenderness. There is no rebound and no guarding.   Musculoskeletal: Normal range of motion. He exhibits edema. He exhibits no tenderness or deformity.   Neurological: He is alert and oriented to person, place, and time. He has normal reflexes. No cranial nerve deficit. Coordination normal.   Skin: Skin is warm and dry. No rash noted. No erythema. No pallor.   Psychiatric: He has a normal mood and affect. His behavior is normal. Judgment and thought content normal.   Nursing note and vitals reviewed.        CRANIAL NERVES     CN III, IV, VI   Pupils are equal, round, and reactive to light.  Extraocular motions are normal.      Significant Labs:   CBC:   Recent Labs   Lab 06/14/19  1040   WBC 8.44   HGB 12.5*   HCT 38.6*        CMP:   Recent Labs   Lab 06/14/19  1040      K 4.0      CO2 23   *   BUN 13   CREATININE 0.9   CALCIUM 9.4   PROT 6.6   ALBUMIN 3.4*   BILITOT 1.9*   ALKPHOS 53*   AST 20   ALT 21   ANIONGAP 13   EGFRNONAA >60     Cardiac Markers:   Recent Labs   Lab 06/14/19  1040   *     Recent Labs   Lab 06/14/19  1248   TROPONINI 0.034*     Coagulation:    Recent Labs   Lab 06/14/19  1040   INR 2.5*   APTT 47.5*       Significant Imaging: I have reviewed all pertinent imaging results/findings within the past 24 hours.

## 2019-06-14 NOTE — NURSING
.Pt arrived to unit from ED in no acute distress. BP elevated, already elevated in ED, orders in place. Tele monitor #[id number] applied to pt and verified with monitor tech. Phone report given from ED nurse to primary nurse BK Patterson. Bed low, side rails up x2, call light in reach, bed alarm armed. Spouse present at bedside. Pt instructed to call for assistance.

## 2019-06-14 NOTE — ED PROVIDER NOTES
SCRIBE #1 NOTE: I, Antwan Crawford, am scribing for, and in the presence of, Sonido Alston MD. I have scribed the entire note.      History      Chief Complaint   Patient presents with    Shortness of Breath     Pt c/o of SOB since Wednesday with nonproductive cough. Wheezing noted in triage. Hx of COPD.        Review of patient's allergies indicates:   Allergen Reactions    No known drug allergies         HPI   HPI    6/14/2019, 10:17 AM   History obtained from the patient      History of Present Illness: Bola Figueroa Sr. is a 84 y.o. male patient with a PMHx of Afib, COPD, CAD, HLD, HTN, and stroke who presents to the Emergency Department for SOB, onset 2 days PTA. Symptoms are constant and moderate in severity. No mitigating or exacerbating factors reported. Associated sxs include nonproductive cough and BLE edema. Patient denies any fever, chills, n/v, CP, weakness, numbness, dizziness, headache, and all other sxs at this time. No prior Tx reported. No further complaints or concerns at this time.     Arrival mode: Personal vehicle    PCP: Naga Bhatti MD       Past Medical History:  Past Medical History:   Diagnosis Date    *Atrial fibrillation     Atrial fibrillation     Bilateral carotid artery stenosis 1/15/2016    Cardiac pacemaker 8/7/2013    Congestive heart failure 4/6/2015    COPD (chronic obstructive pulmonary disease) 8/7/2013    Coronary artery disease     Hyperlipidemia     Hypertension     Long-term (current) use of anticoagulants 8/7/2013    PVD (peripheral vascular disease)     S/P PTCA (percutaneous transluminal coronary angioplasty) 8/7/2013    Sleep apnea 8/7/2013    Stroke        Past Surgical History:  Past Surgical History:   Procedure Laterality Date    CATARACT EXTRACTION W/  INTRAOCULAR LENS IMPLANT      INSERT / REPLACE / REMOVE PACEMAKER  2005    REPLACEMENT, PULSE GENERATOR, CARDIAC PACEMAKER Left 3/20/2014    Performed by Behzad Sarmiento MD at  [facility] CATH LAB         Family History:  Family History   Problem Relation Age of Onset    Diabetes Sister     Fainting Sister     Heart disease Paternal Uncle     Heart attack Paternal Uncle     Hypertension Maternal Grandmother     Diabetes Maternal Grandfather        Social History:  Social History     Tobacco Use    Smoking status: Former Smoker     Packs/day: 1.50     Years: 35.00     Pack years: 52.50     Types: Cigarettes     Last attempt to quit: 1979     Years since quittin.6    Smokeless tobacco: Never Used   Substance and Sexual Activity    Alcohol use: No     Comment: QUIT 77    Drug use: No    Sexual activity: Unknown     ROS   Review of Systems   Constitutional: Negative for chills, diaphoresis, fatigue and fever.   HENT: Negative for sore throat.    Respiratory: Positive for cough (nonproductive) and shortness of breath.    Cardiovascular: Positive for leg swelling (BLE). Negative for chest pain and palpitations.   Gastrointestinal: Negative for abdominal pain, nausea and vomiting.   Genitourinary: Negative for dysuria.   Musculoskeletal: Negative for back pain.   Skin: Negative for rash and wound.   Neurological: Negative for dizziness, weakness, light-headedness, numbness and headaches.   Hematological: Does not bruise/bleed easily.   All other systems reviewed and are negative.    Physical Exam      Initial Vitals [19 1011]   BP Pulse Resp Temp SpO2   (!) 194/84 79 (!) 24 98.6 °F (37 °C) (!) 92 %      MAP       --          Physical Exam  Nursing Notes and Vital Signs Reviewed.  Constitutional: Patient is in no acute distress. Well-developed and well-nourished.  Head: Atraumatic. Normocephalic.  Eyes: PERRL. EOM intact. Conjunctivae are not pale. No scleral icterus.  ENT: Mucous membranes are moist. Oropharynx is clear and symmetric.    Neck: Supple. Full ROM. No lymphadenopathy.  Cardiovascular: Regular rate. Regular rhythm. No murmurs, rubs, or gallops. Distal  pulses are 2+ and symmetric.  Pulmonary/Chest: No respiratory distress. Mild rales at bilateral bases. Occasional expiratory wheezes.  Abdominal: Soft and non-distended.  There is no tenderness.  No rebound, guarding, or rigidity. Good bowel sounds.  Musculoskeletal: Moves all extremities. No obvious deformities. Trace BLE edema. No calf tenderness.  Skin: Warm and dry.  Neurological:  Alert, awake, and appropriate.  Normal speech.  No acute focal neurological deficits are appreciated.  Psychiatric: Normal affect. Good eye contact. Appropriate in content.    ED Course    Critical Care  Date/Time: 6/14/2019 2:16 PM  Performed by: Sonido Alston MD  Authorized by: Sonido Alston MD   Direct patient critical care time: 15 minutes  Additional history critical care time: 5 minutes  Ordering / reviewing critical care time: 5 minutes  Documentation critical care time: 5 minutes  Consulting other physicians critical care time: 5 minutes  Consult with family critical care time: 5 minutes  Total critical care time (exclusive of procedural time) : 40 minutes  Critical care time was exclusive of separately billable procedures and treating other patients and teaching time.  Critical care was necessary to treat or prevent imminent or life-threatening deterioration of the following conditions: CHF.  Critical care was time spent personally by me on the following activities: blood draw for specimens, development of treatment plan with patient or surrogate, discussions with consultants, interpretation of cardiac output measurements, evaluation of patient's response to treatment, examination of patient, obtaining history from patient or surrogate, ordering and performing treatments and interventions, ordering and review of radiographic studies, ordering and review of laboratory studies, pulse oximetry and re-evaluation of patient's condition.        ED Vital Signs:  Vitals:    06/14/19 1011 06/14/19 1028 06/14/19  "1034 06/14/19 1050   BP: (!) 194/84   (!) 193/92   Pulse: 79 79 71    Resp: (!) 24  10    Temp: 98.6 °F (37 °C)      TempSrc: Oral      SpO2: (!) 92%  98%    Weight: 90.3 kg (199 lb 1.2 oz)      Height: 5' 8" (1.727 m)       06/14/19 1100   BP: (!) 172/77   Pulse: 69   Resp: 20   Temp:    TempSrc:    SpO2: 97%   Weight:    Height:        Abnormal Lab Results:  Labs Reviewed   B-TYPE NATRIURETIC PEPTIDE - Abnormal; Notable for the following components:       Result Value     (*)     All other components within normal limits   CBC W/ AUTO DIFFERENTIAL - Abnormal; Notable for the following components:    RBC 4.12 (*)     Hemoglobin 12.5 (*)     Hematocrit 38.6 (*)     Gran% 73.8 (*)     Lymph% 14.0 (*)     All other components within normal limits   COMPREHENSIVE METABOLIC PANEL - Abnormal; Notable for the following components:    Glucose 154 (*)     Albumin 3.4 (*)     Total Bilirubin 1.9 (*)     Alkaline Phosphatase 53 (*)     All other components within normal limits   TROPONIN I - Abnormal; Notable for the following components:    Troponin I 0.028 (*)     All other components within normal limits   PROTIME-INR - Abnormal; Notable for the following components:    Prothrombin Time 26.4 (*)     INR 2.5 (*)     All other components within normal limits   APTT - Abnormal; Notable for the following components:    aPTT 47.5 (*)     All other components within normal limits   TROPONIN I - Abnormal; Notable for the following components:    Troponin I 0.034 (*)     All other components within normal limits   POCT GLUCOSE - Abnormal; Notable for the following components:    POCT Glucose 149 (*)     All other components within normal limits   POCT GLUCOSE MONITORING CONTINUOUS        All Lab Results:  Results for orders placed or performed during the hospital encounter of 06/14/19   Brain natriuretic peptide   Result Value Ref Range     (H) 0 - 99 pg/mL   CBC auto differential   Result Value Ref Range    WBC 8.44 " 3.90 - 12.70 K/uL    RBC 4.12 (L) 4.60 - 6.20 M/uL    Hemoglobin 12.5 (L) 14.0 - 18.0 g/dL    Hematocrit 38.6 (L) 40.0 - 54.0 %    Mean Corpuscular Volume 94 82 - 98 fL    Mean Corpuscular Hemoglobin 30.3 27.0 - 31.0 pg    Mean Corpuscular Hemoglobin Conc 32.4 32.0 - 36.0 g/dL    RDW 13.5 11.5 - 14.5 %    Platelets 158 150 - 350 K/uL    MPV 11.2 9.2 - 12.9 fL    Gran # (ANC) 6.2 1.8 - 7.7 K/uL    Lymph # 1.2 1.0 - 4.8 K/uL    Mono # 0.9 0.3 - 1.0 K/uL    Eos # 0.1 0.0 - 0.5 K/uL    Baso # 0.01 0.00 - 0.20 K/uL    Gran% 73.8 (H) 38.0 - 73.0 %    Lymph% 14.0 (L) 18.0 - 48.0 %    Mono% 10.9 4.0 - 15.0 %    Eosinophil% 1.2 0.0 - 8.0 %    Basophil% 0.1 0.0 - 1.9 %    Differential Method Automated    Comprehensive metabolic panel   Result Value Ref Range    Sodium 140 136 - 145 mmol/L    Potassium 4.0 3.5 - 5.1 mmol/L    Chloride 104 95 - 110 mmol/L    CO2 23 23 - 29 mmol/L    Glucose 154 (H) 70 - 110 mg/dL    BUN, Bld 13 8 - 23 mg/dL    Creatinine 0.9 0.5 - 1.4 mg/dL    Calcium 9.4 8.7 - 10.5 mg/dL    Total Protein 6.6 6.0 - 8.4 g/dL    Albumin 3.4 (L) 3.5 - 5.2 g/dL    Total Bilirubin 1.9 (H) 0.1 - 1.0 mg/dL    Alkaline Phosphatase 53 (L) 55 - 135 U/L    AST 20 10 - 40 U/L    ALT 21 10 - 44 U/L    Anion Gap 13 8 - 16 mmol/L    eGFR if African American >60 >60 mL/min/1.73 m^2    eGFR if non African American >60 >60 mL/min/1.73 m^2   Troponin I   Result Value Ref Range    Troponin I 0.028 (H) 0.000 - 0.026 ng/mL   Protime-INR   Result Value Ref Range    Prothrombin Time 26.4 (H) 9.0 - 12.5 sec    INR 2.5 (H) 0.8 - 1.2   APTT   Result Value Ref Range    aPTT 47.5 (H) 21.0 - 32.0 sec   Troponin I   Result Value Ref Range    Troponin I 0.034 (H) 0.000 - 0.026 ng/mL   POCT glucose   Result Value Ref Range    POCT Glucose 149 (H) 70 - 110 mg/dL     Imaging Results:  Imaging Results          X-Ray Chest PA And Lateral (Final result)  Result time 06/14/19 12:07:56    Final result by Jean Marie Franklin MD (06/14/19 12:07:56)                  Impression:      No acute findings.      Electronically signed by: Jean Marie Franklin MD  Date:    06/14/2019  Time:    12:07             Narrative:    EXAMINATION:  XR CHEST PA AND LATERAL    CLINICAL HISTORY  Shortness of breath.,    COMPARISON:  04/16/2019.    FINDINGS:  Left pacemaker.  Several wire leads overlie the chest.  The heart size is nonenlarged.  The aortic arch is calcified.    All the lung volumes are decreased no definite acute infiltrate is seen.                               The EKG was ordered, reviewed, and independently interpreted by the ED provider.  Interpretation time: 10:14  Rate: 76 BPM  Rhythm: Ventricular-based rhythm  Interpretation: No acute ST changes. No STEMI.           The Emergency Provider reviewed the vital signs and test results, which are outlined above.    ED Discussion     2:15 PM: Discussed case with Felicitas Edward NP ([facility]). Dr. Yoo agrees with current care and management of pt and accepts admission.   Admitting Service: Hospital Medicine  Admitting Physician: Dr. Yoo  Admit to: Obs Tele    2:17 PM: Re-evaluated pt. I have discussed test results, shared treatment plan, and the need for admission with patient and family at bedside. Pt and family express understanding at this time and agree with all information. All questions answered. Pt and family have no further questions or concerns at this time. Pt is ready for admit.    ED Medication(s):  Medications   methylPREDNISolone sodium succinate injection 125 mg (125 mg Intravenous Given 6/14/19 1049)   albuterol-ipratropium 2.5 mg-0.5 mg/3 mL nebulizer solution 3 mL (3 mLs Nebulization Given 6/14/19 1034)   furosemide injection 60 mg (60 mg Intravenous Given 6/14/19 1049)   hydrALAZINE injection 10 mg (10 mg Intravenous Given 6/14/19 1050)        New Prescriptions    No medications on file         Medical Decision Making    Medical Decision Making:   Clinical Tests:   Lab Tests:  Ordered and Reviewed  Radiological Study: Ordered and Reviewed  Medical Tests: Ordered and Reviewed           Scribe Attestation:   Scribe #1: I performed the above scribed service and the documentation accurately describes the services I performed. I attest to the accuracy of the note.    Attending:   Physician Attestation Statement for Scribe #1: I, Sonido Alston MD, personally performed the services described in this documentation, as scribed by Antwan Crawford, in my presence, and it is both accurate and complete.          Clinical Impression       ICD-10-CM ICD-9-CM   1. Acute on chronic congestive heart failure, unspecified heart failure type I50.9 428.0   2. SOB (shortness of breath) R06.02 786.05   3. Chronic obstructive pulmonary disease, unspecified COPD type J44.9 496   4. Hypertension, unspecified type I10 401.9       Disposition:   Disposition: Placed in Observation  Condition: Fair         Sonido Alston MD  06/14/19 [id number]

## 2019-06-14 NOTE — PLAN OF CARE
Problem: Adult Inpatient Plan of Care  Goal: Plan of Care Review  Outcome: Ongoing (interventions implemented as appropriate)  Patient awake, alert and oriented x 4, calm, cooperative.  Patient able to make needs known.   Needs met by staff for shift.  No acute distress noted, respirations even and unlabored.   Patient denies pain at this time.   IV site patent and intact, no redness.  No other complaints voiced at this time.   Continue on telemetry monitoring.   Safety precautions remains in place.   Willi continue to monitor.

## 2019-06-15 VITALS
DIASTOLIC BLOOD PRESSURE: 94 MMHG | OXYGEN SATURATION: 97 % | TEMPERATURE: 98 F | SYSTOLIC BLOOD PRESSURE: 167 MMHG | WEIGHT: 191.81 LBS | BODY MASS INDEX: 29.07 KG/M2 | RESPIRATION RATE: 17 BRPM | HEIGHT: 68 IN | HEART RATE: 69 BPM

## 2019-06-15 LAB
INR PPP: 2.3 (ref 0.8–1.2)
PROTHROMBIN TIME: 23.9 SEC (ref 9–12.5)

## 2019-06-15 PROCEDURE — 94640 AIRWAY INHALATION TREATMENT: CPT

## 2019-06-15 PROCEDURE — 25000242 PHARM REV CODE 250 ALT 637 W/ HCPCS: Performed by: NURSE PRACTITIONER

## 2019-06-15 PROCEDURE — 25000003 PHARM REV CODE 250: Performed by: EMERGENCY MEDICINE

## 2019-06-15 PROCEDURE — 36415 COLL VENOUS BLD VENIPUNCTURE: CPT

## 2019-06-15 PROCEDURE — 94760 N-INVAS EAR/PLS OXIMETRY 1: CPT

## 2019-06-15 PROCEDURE — G0378 HOSPITAL OBSERVATION PER HR: HCPCS

## 2019-06-15 PROCEDURE — 96376 TX/PRO/DX INJ SAME DRUG ADON: CPT

## 2019-06-15 PROCEDURE — 85610 PROTHROMBIN TIME: CPT

## 2019-06-15 PROCEDURE — 63600175 PHARM REV CODE 636 W HCPCS: Performed by: NURSE PRACTITIONER

## 2019-06-15 PROCEDURE — 25000003 PHARM REV CODE 250: Performed by: NURSE PRACTITIONER

## 2019-06-15 RX ORDER — FUROSEMIDE 40 MG/1
40 TABLET ORAL 2 TIMES DAILY
Qty: 60 TABLET | Refills: 1 | Status: SHIPPED | OUTPATIENT
Start: 2019-06-15 | End: 2019-08-16 | Stop reason: SDUPTHER

## 2019-06-15 RX ADMIN — HYDRALAZINE HYDROCHLORIDE 25 MG: 25 TABLET, FILM COATED ORAL at 05:06

## 2019-06-15 RX ADMIN — GUAIFENESIN 1200 MG: 600 TABLET, EXTENDED RELEASE ORAL at 09:06

## 2019-06-15 RX ADMIN — PANTOPRAZOLE SODIUM 40 MG: 40 TABLET, DELAYED RELEASE ORAL at 09:06

## 2019-06-15 RX ADMIN — TAMSULOSIN HYDROCHLORIDE 0.4 MG: 0.4 CAPSULE ORAL at 09:06

## 2019-06-15 RX ADMIN — NITROGLYCERIN 6.5 MG: 6.5 CAPSULE ORAL at 09:06

## 2019-06-15 RX ADMIN — LEVETIRACETAM 500 MG: 500 TABLET ORAL at 09:06

## 2019-06-15 RX ADMIN — IPRATROPIUM BROMIDE AND ALBUTEROL SULFATE 3 ML: .5; 3 SOLUTION RESPIRATORY (INHALATION) at 07:06

## 2019-06-15 RX ADMIN — AMLODIPINE BESYLATE 10 MG: 10 TABLET ORAL at 09:06

## 2019-06-15 RX ADMIN — POTASSIUM CHLORIDE 20 MEQ: 1500 TABLET, EXTENDED RELEASE ORAL at 09:06

## 2019-06-15 RX ADMIN — TRAMADOL HYDROCHLORIDE 50 MG: 50 TABLET, COATED ORAL at 05:06

## 2019-06-15 RX ADMIN — GABAPENTIN 100 MG: 100 CAPSULE ORAL at 09:06

## 2019-06-15 RX ADMIN — ATORVASTATIN CALCIUM 20 MG: 10 TABLET, FILM COATED ORAL at 09:06

## 2019-06-15 RX ADMIN — ISOSORBIDE DINITRATE 20 MG: 20 TABLET ORAL at 09:06

## 2019-06-15 RX ADMIN — FUROSEMIDE 40 MG: 10 INJECTION, SOLUTION INTRAMUSCULAR; INTRAVENOUS at 09:06

## 2019-06-15 RX ADMIN — CARVEDILOL 12.5 MG: 12.5 TABLET, FILM COATED ORAL at 09:06

## 2019-06-15 RX ADMIN — NITROGLYCERIN 1 INCH: 20 OINTMENT TOPICAL at 05:06

## 2019-06-15 NOTE — PLAN OF CARE
Problem: Adult Inpatient Plan of Care  Goal: Plan of Care Review  Outcome: Ongoing (interventions implemented as appropriate)  Pt is on room air; tolerates txs well.

## 2019-06-15 NOTE — HOSPITAL COURSE
Overnight diuresed over 4 liters. Patient counseled on cutting down his water/fluid intake. Lasix 40 mg increased to 40 mg BID. He drinks more than 2-3 liters a day. Patient seen and examined and deemed stable for d/c.

## 2019-06-15 NOTE — DISCHARGE SUMMARY
[facility] Hospital Medicine  Discharge Summary      Patient Name: Bola Figueroa Sr.  MRN: [id number]  Admission Date: 6/14/2019  Hospital Length of Stay: 0 days  Discharge Date and Time:  06/15/2019 12:05 PM  Attending Physician: Dr. Lew Vanegas   Discharging Provider: Felicitas Edward NP  Primary Care Provider: Naga Bhatti MD      HPI:   Bola Figueroa Sr. is a 84 y.o. male patient with a PMHx of Afib, COPD, CAD, HLD, HTN, CVA, who presented to the ER for SOB, onset 2 days PTA. Associated sxs included nonproductive cough, wheezing, and BLE edema. Patient denies any fever, chills, n/v, CP, weakness, numbness, dizziness, headache, and all other sxs at this time. His wife is in [facility] and he has been staying with her and missing over a week of his PM meds. In ER, Chest Xray showed cephalization. He received IV Lasix 60 mg and voided 1.8 liters. Labs: INR 2.5, troponin 0.034, . Daughter, Luz Maria Alexis, is the surrogate decision maker [phone]. He is Observation with acute on chronic CHF.     * No surgery found *      Hospital Course:   Overnight diuresed over 4 liters. Patient counseled on cutting down his water/fluid intake. Lasix 40 mg increased to 40 mg BID. He drinks more than 2-3 liters a day. Patient seen and examined and deemed stable for d/c.        Consults:   Consults (From admission, onward)        Status Ordering Provider     IP consult to case management  Once     Provider:  (Not yet assigned)    Completed CASANDRA PAULA          No new Assessment & Plan notes have been filed under this hospital service since the last note was generated.  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 Schedule an appointment as soon as possible for a visit in 3 days.    Specialty:  Cardiology  Contact information:  [address]  [address]  [facility] [address] [phone]                 Patient Instructions:      Diet Cardiac   Order Comments: Low salt, 2000mg sodium daily     Activity as tolerated       Significant Diagnostic Studies: Labs:   CMP   Recent Labs   Lab 06/14/19  1040      K 4.0      CO2 23   *   BUN 13   CREATININE 0.9   CALCIUM 9.4   PROT 6.6   ALBUMIN 3.4*   BILITOT 1.9*   ALKPHOS 53*   AST 20   ALT 21   ANIONGAP 13   ESTGFRAFRICA >60   EGFRNONAA >60   , CBC   Recent Labs   Lab 06/14/19  1040   WBC 8.44   HGB 12.5*   HCT 38.6*      , Lipid Panel   Lab Results   Component Value Date    CHOL 142 05/14/2019    HDL 68 05/14/2019    LDLCALC 61.0 (L) 05/14/2019    TRIG 65 05/14/2019    CHOLHDL 47.9 05/14/2019   , Troponin   Recent Labs   Lab 06/14/19  1248   TROPONINI 0.034*    and A1C: No results for input(s): HGBA1C in the last 4320 hours.    Pending Diagnostic Studies:     None         Medications:  Reconciled Home Medications:      Medication List      CHANGE how you take these medications    furosemide 40 MG tablet  Commonly known as:  LASIX  Take 1 tablet (40 mg total) by mouth 2 (two) times daily.  What changed:  when to take this        CONTINUE taking these medications    albuterol-ipratropium 2.5 mg-0.5 mg/3 mL nebulizer solution  Commonly known as:  DUO-NEB  Take 3 mLs by nebulization every 4 (four) hours as needed for Wheezing. Rescue     AMITIZA 8 MCG Cap  Generic drug:  lubiprostone  Take 8 mcg by mouth 2 (two) times daily with meals.     amLODIPine 10 MG tablet  Commonly known as:  NORVASC  Take 10 mg by mouth.     aspirin 81 MG EC tablet  Commonly known as:   ECOTRIN  Take 81 mg by mouth once daily.     atorvastatin 20 MG tablet  Commonly known as:  LIPITOR  Take 20 mg by mouth.     benzonatate 200 MG capsule  Commonly known as:  TESSALON  Take 1 capsule (200 mg total) by mouth 3 (three) times daily as needed for Cough.     carvedilol 12.5 MG tablet  Commonly known as:  COREG  Take 1 tablet (12.5 mg total) by mouth 2 (two) times daily with meals.     fluticasone-salmeterol 250-50 mcg/dose 250-50 mcg/dose diskus inhaler  Commonly known as:  ADVAIR DISKUS  Inhale 1 puff into the lungs 2 (two) times daily. Wash out mouth after use.     gabapentin 100 MG capsule  Commonly known as:  NEURONTIN  Take 100 mg by mouth 3 (three) times daily.     guaiFENesin 600 mg 12 hr tablet  Commonly known as:  MUCINEX  Take 2 tablets (1,200 mg total) by mouth 2 (two) times daily as needed for Congestion. Take with water     hydrALAZINE 25 MG tablet  Commonly known as:  APRESOLINE  TAKE 1 TABLET BY MOUTH THREE TIMES DAILY     ipratropium 42 mcg (0.06 %) nasal spray  Commonly known as:  ATROVENT  instill 2 sprays into each nostril three times a day     isosorbide dinitrate 20 MG tablet  Commonly known as:  ISORDIL  take 1 tablet by mouth every 12 hours with HYDRALAZINE     latanoprost 0.005 % ophthalmic solution  Place 1 drop into both eyes nightly.     levETIRAcetam 500 MG Tab  Commonly known as:  KEPPRA  Take 1 tablet by mouth Twice daily.     nitroGLYCERIN 6.5 MG Cpsr  Commonly known as:  NITROBID  Take 1 capsule (6.5 mg total) by mouth 2 (two) times daily.     pantoprazole 40 MG tablet  Commonly known as:  PROTONIX  Take 40 mg by mouth.     predniSONE 5 MG tablet  Commonly known as:  DELTASONE  Take 1 tablet (5 mg total) by mouth once daily. Start after high dose prednisone     promethazine-dextromethorphan 6.25-15 mg/5 mL Syrp  Commonly known as:  PROMETHAZINE-DM  Take 5 mLs by mouth every 6 (six) hours as needed.     tamsulosin 0.4 mg Cap  Commonly known as:  FLOMAX  Take 0.4 mg by mouth  once daily.     traMADol 50 mg tablet  Commonly known as:  ULTRAM  take 1 tablet by mouth every 6 hours if needed for UP TO 10 days     warfarin 5 MG tablet  Commonly known as:  COUMADIN  TAKE 1.5 tablets every Wednesday and Friday; and 1 tablet all other days in the evening as directed by the [facility].            Indwelling Lines/Drains at time of discharge:   Lines/Drains/Airways          None          Time spent on the discharge of patient: 45 minutes  Patient was seen and examined on the date of discharge and determined to be suitable for discharge.         Felicitas Edward NP  Department of Hospital Medicine  [facility]

## 2019-06-15 NOTE — PLAN OF CARE
Problem: Adult Inpatient Plan of Care  Goal: Plan of Care Review  Outcome: Ongoing (interventions implemented as appropriate)  Pt AAOx4. VSS.Pt has chronic HTN. Pt remained free of falls this shift. No complaints of pain or discomfort. Medications administered as ordered. Pt is NSR on monitor. PIV intact, saline locked. Hourly rounding completed. Pt instructed to call for assistance. POC reviewed. Pt verbalized understanding. Will continue to monitor.

## 2019-06-15 NOTE — NURSING
Went over discharge instructions with patient and patient's spouse.   Stressed importance of making and keeping all follow ups as well as making prescribed medication changes.   Also stressed necessity of following ordered fluid restriction.  Patient and spouse verbalized understanding and have no questions in regards to discharge.  IV removed, catheter intact.  Telemetry box 8605 removed and returned to monitor tech.  Patient discharged via wheelchair by PCT to personal transportation.  Primary nurse notified of pt's discharge status.

## 2019-06-19 ENCOUNTER — ANTI-COAG VISIT (OUTPATIENT)
Dept: CARDIOLOGY | Facility: CLINIC | Age: 84
End: 2019-06-19
Payer: MEDICARE

## 2019-06-19 DIAGNOSIS — I48.91 ATRIAL FIBRILLATION, UNSPECIFIED TYPE: ICD-10-CM

## 2019-06-19 DIAGNOSIS — Z79.01 LONG TERM (CURRENT) USE OF ANTICOAGULANTS: Primary | ICD-10-CM

## 2019-06-19 LAB — INR PPP: 4.5 (ref 2–3)

## 2019-06-19 PROCEDURE — 85610 PROTHROMBIN TIME: CPT | Mod: PBBFAC

## 2019-06-19 PROCEDURE — 93793 PR ANTICOAGULANT MGMT FOR PT TAKING WARFARIN: ICD-10-PCS | Mod: ,,,

## 2019-06-19 PROCEDURE — 93793 ANTICOAG MGMT PT WARFARIN: CPT | Mod: ,,,

## 2019-06-19 NOTE — PROGRESS NOTES
Patient's INR is supra-therapeutic at 4.5.  Mr. Figueroa was seen in the emergency department on 6/08 with AMS and was admitted to the hospital 6/14-6/15 with CHF complications (SOB and wheezing).  His Lasix dose was increased to 40mg BID and patient has resumed prednisone 5mg daily and promethazine DM.  Interactions discussed. No signs of bleeding noted; advised patient to seek immediate medical attention if he notices any abnormal bleeding.  Instructions given for patient to hold dose of warfarin on today; then resume current dose of 7.5mg on Wednesdays, Fridays and 5mg on all other days of the week.  Patient/daughter voiced understanding.  Recheck in 1 week.

## 2019-06-26 ENCOUNTER — ANTI-COAG VISIT (OUTPATIENT)
Dept: CARDIOLOGY | Facility: CLINIC | Age: 84
End: 2019-06-26
Payer: MEDICARE

## 2019-06-26 ENCOUNTER — TELEPHONE (OUTPATIENT)
Dept: CARDIOLOGY | Facility: CLINIC | Age: 84
End: 2019-06-26

## 2019-06-26 DIAGNOSIS — Z79.01 LONG TERM (CURRENT) USE OF ANTICOAGULANTS: Primary | ICD-10-CM

## 2019-06-26 DIAGNOSIS — I48.91 ATRIAL FIBRILLATION, UNSPECIFIED TYPE: ICD-10-CM

## 2019-06-26 LAB — INR PPP: 6 (ref 2–3)

## 2019-06-26 PROCEDURE — 85610 PROTHROMBIN TIME: CPT | Mod: PBBFAC

## 2019-06-26 PROCEDURE — 93793 PR ANTICOAGULANT MGMT FOR PT TAKING WARFARIN: ICD-10-PCS | Mod: ,,,

## 2019-06-26 PROCEDURE — 93793 ANTICOAG MGMT PT WARFARIN: CPT | Mod: ,,,

## 2019-06-26 NOTE — TELEPHONE ENCOUNTER
----- Message from Brianna Oliveira sent at 6/26/2019 11:02 AM CDT -----  Contact: Luz Maria Alexis  States she would like to get an earlier appt schedule for him. She would like to speak to the nurse. States he was admitted on 6/21 and discharged on 6/22. Please call Luz Maria Alexis at [phone]. Thank you

## 2019-06-26 NOTE — PROGRESS NOTES
Patient's INR is supra-therapeutic at 6.0.  Mr. Figueroa has not taken the promethazine DM cough syrup but remains on prednisone 5mg daily.  Patient also complains about pain and weakness in his hand but has not taken any pain medications; he was advised to follow-up with his PCP and/or cardiology.   No signs of bleeding noted; advised patient to seek immediate medical attention if he notices any abnormal bleeding.  Instructions given for patient to hold dose of warfarin on today and tomorrow.  Recheck INR on Friday, 6/28/19.

## 2019-06-28 ENCOUNTER — ANTI-COAG VISIT (OUTPATIENT)
Dept: CARDIOLOGY | Facility: CLINIC | Age: 84
End: 2019-06-28
Payer: MEDICARE

## 2019-06-28 ENCOUNTER — OFFICE VISIT (OUTPATIENT)
Dept: PODIATRY | Facility: CLINIC | Age: 84
End: 2019-06-28
Payer: MEDICARE

## 2019-06-28 ENCOUNTER — LAB VISIT (OUTPATIENT)
Dept: LAB | Facility: HOSPITAL | Age: 84
End: 2019-06-28
Attending: INTERNAL MEDICINE
Payer: MEDICARE

## 2019-06-28 ENCOUNTER — CLINICAL SUPPORT (OUTPATIENT)
Dept: CARDIOLOGY | Facility: CLINIC | Age: 84
End: 2019-06-28
Attending: NURSE PRACTITIONER
Payer: COMMERCIAL

## 2019-06-28 VITALS
HEIGHT: 68 IN | DIASTOLIC BLOOD PRESSURE: 81 MMHG | SYSTOLIC BLOOD PRESSURE: 149 MMHG | WEIGHT: 191 LBS | BODY MASS INDEX: 28.95 KG/M2 | HEART RATE: 71 BPM

## 2019-06-28 DIAGNOSIS — Z79.01 LONG TERM (CURRENT) USE OF ANTICOAGULANTS: ICD-10-CM

## 2019-06-28 DIAGNOSIS — I27.20 PULMONARY HTN: ICD-10-CM

## 2019-06-28 DIAGNOSIS — J44.9 COPD, SEVERE: ICD-10-CM

## 2019-06-28 DIAGNOSIS — I48.91 ATRIAL FIBRILLATION, UNSPECIFIED TYPE: ICD-10-CM

## 2019-06-28 DIAGNOSIS — I73.9 PERIPHERAL VASCULAR DISEASE: ICD-10-CM

## 2019-06-28 DIAGNOSIS — B35.1 DERMATOPHYTOSIS OF NAIL: Primary | ICD-10-CM

## 2019-06-28 DIAGNOSIS — I48.0 PAROXYSMAL ATRIAL FIBRILLATION: ICD-10-CM

## 2019-06-28 LAB
INR PPP: 2.3 (ref 0.8–1.2)
PROTHROMBIN TIME: 23.9 SEC (ref 9–12.5)

## 2019-06-28 PROCEDURE — 99499 NO LOS: ICD-10-PCS | Mod: S$PBB,,, | Performed by: PODIATRIST

## 2019-06-28 PROCEDURE — 36415 COLL VENOUS BLD VENIPUNCTURE: CPT

## 2019-06-28 PROCEDURE — 11721 DEBRIDE NAIL 6 OR MORE: CPT | Mod: Q8,S$PBB,, | Performed by: PODIATRIST

## 2019-06-28 PROCEDURE — 11721 PR DEBRIDEMENT OF NAILS, 6 OR MORE: ICD-10-PCS | Mod: Q8,S$PBB,, | Performed by: PODIATRIST

## 2019-06-28 PROCEDURE — 99213 OFFICE O/P EST LOW 20 MIN: CPT | Mod: PBBFAC,25 | Performed by: PODIATRIST

## 2019-06-28 PROCEDURE — 11721 DEBRIDE NAIL 6 OR MORE: CPT | Mod: Q8,PBBFAC | Performed by: PODIATRIST

## 2019-06-28 PROCEDURE — 99999 PR PBB SHADOW E&M-EST. PATIENT-LVL III: ICD-10-PCS | Mod: PBBFAC,,, | Performed by: PODIATRIST

## 2019-06-28 PROCEDURE — 99499 UNLISTED E&M SERVICE: CPT | Mod: S$PBB,,, | Performed by: PODIATRIST

## 2019-06-28 PROCEDURE — 99999 PR PBB SHADOW E&M-EST. PATIENT-LVL III: CPT | Mod: PBBFAC,,, | Performed by: PODIATRIST

## 2019-06-28 PROCEDURE — 93793 ANTICOAG MGMT PT WARFARIN: CPT | Mod: ,,,

## 2019-06-28 PROCEDURE — 93793 PR ANTICOAGULANT MGMT FOR PT TAKING WARFARIN: ICD-10-PCS | Mod: ,,,

## 2019-06-28 PROCEDURE — 85610 PROTHROMBIN TIME: CPT

## 2019-06-28 NOTE — PROGRESS NOTES
Subjective:     Patient ID: Bola Figueroa Roro is a 84 y.o. male.    Chief Complaint: Nail Care (no c/o pain. wears casual shoes with socks. non-diabetic Pt. last seen in 06/18/19 with PCP Dr. Bhatti.)    Bola is a 84 y.o. male who presents to the clinic for evaluation and treatment of high risk feet. Bola has a past medical history of *Atrial fibrillation, Atrial fibrillation, Bilateral carotid artery stenosis (1/15/2016), Cardiac pacemaker (8/7/2013), Congestive heart failure (4/6/2015), COPD (chronic obstructive pulmonary disease) (8/7/2013), Coronary artery disease, Hyperlipidemia, Hypertension, Long-term (current) use of anticoagulants (8/7/2013), PVD (peripheral vascular disease), S/P PTCA (percutaneous transluminal coronary angioplasty) (8/7/2013), Sleep apnea (8/7/2013), and Stroke. The patient's chief complaint is long, thick toenails. This patient has documented high risk feet requiring routine maintenance secondary to peripheral vascular disease.    PCP: Naga Bhatti MD    Date Last Seen by PCP: 06/18/19    Current shoe gear:  Affected Foot: Casual shoes     Unaffected Foot: Casual shoes    Last encounter in this department: 4/11/2019      Patient Active Problem List   Diagnosis    A-fib    Coronary artery disease    PVD (peripheral vascular disease)    Essential hypertension    Long term current use of anticoagulant therapy    Cardiac pacemaker    Mild obstructive sleep apnea    S/P PTCA (percutaneous transluminal coronary angioplasty)    COPD (chronic obstructive pulmonary disease)    Elevated diaphragm    ASCVD (arteriosclerotic cardiovascular disease)    Still's murmur    Unspecified asthma(493.90)    Benign prostatic hyperplasia    CHF (congestive heart failure)    Status post coronary artery balloon dilation    COPD, severe    Presence of cardiac pacemaker    Bilateral carotid artery stenosis    Atherosclerotic PVD with intermittent claudication    Coronary  angioplasty status    Preop cardiovascular exam    Cerebrovascular accident (CVA) due to occlusion of precerebral artery    Shortness of breath    Restless legs syndrome (RLS)    Periodic limb movement disorder (PLMD)    Inadequate sleep hygiene    Chronic gout of right foot    Mild dementia    Seizure disorder    Chest pain    Acute on chronic combined systolic and diastolic HF (heart failure), NYHA class 3       Medication List with Changes/Refills   Current Medications    ALBUTEROL-IPRATROPIUM (DUO-NEB) 2.5 MG-0.5 MG/3 ML NEBULIZER SOLUTION    Take 3 mLs by nebulization every 4 (four) hours as needed for Wheezing. Rescue    AMLODIPINE (NORVASC) 10 MG TABLET    Take 10 mg by mouth.    ASPIRIN (ECOTRIN) 81 MG EC TABLET    Take 81 mg by mouth once daily.    ATORVASTATIN (LIPITOR) 20 MG TABLET    Take 20 mg by mouth.    BENZONATATE (TESSALON) 200 MG CAPSULE    Take 1 capsule (200 mg total) by mouth 3 (three) times daily as needed for Cough.    CARVEDILOL (COREG) 12.5 MG TABLET    Take 1 tablet (12.5 mg total) by mouth 2 (two) times daily with meals.    FLUTICASONE-SALMETEROL DISKUS INHALER 250-50 MCG    Inhale 1 puff into the lungs 2 (two) times daily. Wash out mouth after use.    FUROSEMIDE (LASIX) 40 MG TABLET    Take 1 tablet (40 mg total) by mouth 2 (two) times daily.    GABAPENTIN (NEURONTIN) 100 MG CAPSULE    Take 100 mg by mouth 3 (three) times daily.    GUAIFENESIN (MUCINEX) 600 MG 12 HR TABLET    Take 2 tablets (1,200 mg total) by mouth 2 (two) times daily as needed for Congestion. Take with water    HYDRALAZINE (APRESOLINE) 25 MG TABLET    TAKE 1 TABLET BY MOUTH THREE TIMES DAILY    IPRATROPIUM (ATROVENT) 42 MCG (0.06 %) NASAL SPRAY    instill 2 sprays into each nostril three times a day    ISOSORBIDE DINITRATE (ISORDIL) 20 MG TABLET    take 1 tablet by mouth every 12 hours with HYDRALAZINE    LATANOPROST 0.005 % OPHTHALMIC SOLUTION    Place 1 drop into both eyes nightly.    LEVETIRACETAM  (KEPPRA) 500 MG TAB    Take 1 tablet by mouth Twice daily.    LUBIPROSTONE (AMITIZA) 8 MCG CAP    Take 8 mcg by mouth 2 (two) times daily with meals.    NITROGLYCERIN (NITROBID) 6.5 MG CPSR    Take 1 capsule (6.5 mg total) by mouth 2 (two) times daily.    PANTOPRAZOLE (PROTONIX) 40 MG TABLET    Take 40 mg by mouth.    PREDNISONE (DELTASONE) 5 MG TABLET    Take 1 tablet (5 mg total) by mouth once daily. Start after high dose prednisone    PROMETHAZINE-DEXTROMETHORPHAN (PROMETHAZINE-DM) 6.25-15 MG/5 ML SYRP    Take 5 mLs by mouth every 6 (six) hours as needed.    TAMSULOSIN (FLOMAX) 0.4 MG CP24    Take 0.4 mg by mouth once daily.     TRAMADOL (ULTRAM) 50 MG TABLET    take 1 tablet by mouth every 6 hours if needed for UP TO 10 days    WARFARIN (COUMADIN) 5 MG TABLET    TAKE 1.5 tablets every Wednesday and Friday; and 1 tablet all other days in the evening as directed by the [facility].       Review of patient's allergies indicates:   Allergen Reactions    No known drug allergies        Past Surgical History:   Procedure Laterality Date    CATARACT EXTRACTION W/  INTRAOCULAR LENS IMPLANT      INSERT / REPLACE / REMOVE PACEMAKER  2005    REPLACEMENT, PULSE GENERATOR, CARDIAC PACEMAKER Left 3/20/2014    Performed by Behzad Sarmiento MD at [facility] CATH [facility]       Family History   Problem Relation Age of Onset    Diabetes Sister     Fainting Sister     Heart disease Paternal Uncle     Heart attack Paternal Uncle     Hypertension Maternal Grandmother     Diabetes Maternal Grandfather        Social History     Socioeconomic History    Marital status:      Spouse name: Not on file    Number of children: Not on file    Years of education: Not on file    Highest education level: Not on file   Occupational History    Not on file   Social Needs    Financial resource strain: Not on file    Food insecurity:     Worry: Not on file     Inability: Not on file    Transportation needs:     Medical: Not on  "file     Non-medical: Not on file   Tobacco Use    Smoking status: Former Smoker     Packs/day: 1.50     Years: 35.00     Pack years: 52.50     Types: Cigarettes     Last attempt to quit: 1979     Years since quittin.6    Smokeless tobacco: Never Used   Substance and Sexual Activity    Alcohol use: No     Comment: QUIT 77    Drug use: No    Sexual activity: Not on file   Lifestyle    Physical activity:     Days per week: Not on file     Minutes per session: Not on file    Stress: Not on file   Relationships    Social connections:     Talks on phone: Not on file     Gets together: Not on file     Attends [employer] service: Not on file     Active member of club or organization: Not on file     Attends meetings of clubs or organizations: Not on file     Relationship status: Not on file   Other Topics Concern    Not on file   Social History Narrative    Daughter, Luz Maria Alexis, is the surrogate decision [phone].        Vitals:    19 0904   BP: (!) 149/81   Pulse: 71   Weight: 86.6 kg (191 lb)   Height: 5' 8" (1.727 m)   PainSc: 0-No pain   PainLoc: Foot       Review of Systems   Constitutional: Negative for chills and fever.   Respiratory: Negative for shortness of breath.    Cardiovascular: Negative for chest pain, palpitations, orthopnea, claudication and leg swelling.   Gastrointestinal: Negative for diarrhea, nausea and vomiting.   Musculoskeletal: Negative for joint pain.   Skin: Negative for rash.   Neurological: Negative for dizziness, tingling, sensory change, focal weakness and weakness.   Psychiatric/Behavioral: Negative.            Objective:   PHYSICAL EXAM: Apperance: Alert and orient in no distress,well developed, and with good attention to grooming and body habits  Patient presents ambulating in casual shoes.   LOWER EXTREMITY EXAM:  VASCULAR: Dorsalis pedis pulses 0/4 bilateral and Posterior Tibial pulses 0/4 bilateral. Capillary fill time <4 seconds bilateral. " No edema observed bilateral. Varicosities absent bilateral. Skin temperature of the lower extremities is warm to cool, proximal to distal. Hair growth absent bilateral.  DERMATOLOGICAL: No skin rashes, subcutaneous nodules, lesions, or ulcers observed bilateral. Nails 1,2,3,4,5 bilateral elongated, thickened, and discolored with subungual debris. Webspaces 1,2,3,4 bilateral clean, dry and without evidence of break in skin integrity.   NEUROLOGICAL: Light touch, sharp-dull, proprioception all present and equal bilaterally.  Protective sensation decreased sites as tested with a Pickens-Latia 5.07 monofilament.   MUSCULOSKELETAL: Muscle strength is 5/5 for foot inverters, everters, plantarflexors, and dorsiflexors. Muscle tone is normal.     Assessment:   Dermatophytosis of nail    Peripheral vascular disease          Plan:   Dermatophytosis of nail    Peripheral vascular disease      I counseled the patient on his conditions, regarding findings of my examination, my impressions, and usual treatment plan.   Greater than 20 minutes spent on education about the PVD, and prevention of limb loss.  Shoe inspection. Patient instructed on proper foot hygeine. We discussed wearing proper shoe gear, daily foot inspections, never walking without protective shoe gear, never putting sharp instruments to feet.    With patient's permission, nails 1-5 bilateral were trimmed in length and thickness aggressively to their soft tissue attachment mechanically and with electric , removing all offending nail and debris. Patient relates relief following the procedure.  Patient  will continue to monitor the areas daily, inspect feet, wear protective shoe gear when ambulatory, moisturizer to maintain skin integrity.   Patient to return 3 months or sooner if needed.                Maricel Silva DPM  [facility]

## 2019-07-01 NOTE — PROGRESS NOTES
Patient's INR is therapeutic at 2.3 after dose of warfarin was held x 2 days.  Daughter contacted.  No upcoming procedures or changes reported.  Dose will be lowered to prevent future elevated readings.  Instructions given to take warfarin 2.5mg on Fridays, Saturdays and Sundays; and 5mg on all other days of the week.  Caregiver voiced understanding.  Recheck in 1 week at [facility].  Please call should you have any questions or concerns at [phone] or [phone].

## 2019-07-03 ENCOUNTER — OFFICE VISIT (OUTPATIENT)
Dept: CARDIOLOGY | Facility: CLINIC | Age: 84
End: 2019-07-03
Payer: MEDICARE

## 2019-07-03 ENCOUNTER — CLINICAL SUPPORT (OUTPATIENT)
Dept: CARDIOLOGY | Facility: CLINIC | Age: 84
End: 2019-07-03
Payer: MEDICARE

## 2019-07-03 VITALS
HEIGHT: 68 IN | DIASTOLIC BLOOD PRESSURE: 72 MMHG | SYSTOLIC BLOOD PRESSURE: 142 MMHG | WEIGHT: 196.44 LBS | HEART RATE: 82 BPM | BODY MASS INDEX: 29.77 KG/M2

## 2019-07-03 DIAGNOSIS — Z98.61 S/P PTCA (PERCUTANEOUS TRANSLUMINAL CORONARY ANGIOPLASTY): ICD-10-CM

## 2019-07-03 DIAGNOSIS — I48.91 ATRIAL FIBRILLATION WITH SLOW VENTRICULAR RESPONSE: ICD-10-CM

## 2019-07-03 DIAGNOSIS — I10 ESSENTIAL HYPERTENSION: ICD-10-CM

## 2019-07-03 DIAGNOSIS — I48.21 PERMANENT ATRIAL FIBRILLATION: ICD-10-CM

## 2019-07-03 DIAGNOSIS — R79.89 ELEVATED TROPONIN: ICD-10-CM

## 2019-07-03 DIAGNOSIS — Z79.01 LONG TERM CURRENT USE OF ANTICOAGULANT THERAPY: ICD-10-CM

## 2019-07-03 DIAGNOSIS — Z95.0 CARDIAC PACEMAKER: ICD-10-CM

## 2019-07-03 DIAGNOSIS — I27.20 PULMONARY HTN: ICD-10-CM

## 2019-07-03 DIAGNOSIS — I25.118 CORONARY ARTERY DISEASE OF NATIVE ARTERY OF NATIVE HEART WITH STABLE ANGINA PECTORIS: ICD-10-CM

## 2019-07-03 DIAGNOSIS — G47.33 MILD OBSTRUCTIVE SLEEP APNEA: ICD-10-CM

## 2019-07-03 DIAGNOSIS — I50.33 ACUTE ON CHRONIC DIASTOLIC CONGESTIVE HEART FAILURE: Primary | ICD-10-CM

## 2019-07-03 PROCEDURE — 99215 OFFICE O/P EST HI 40 MIN: CPT | Mod: S$PBB,,, | Performed by: NURSE PRACTITIONER

## 2019-07-03 PROCEDURE — 99999 PR PBB SHADOW E&M-EST. PATIENT-LVL IV: CPT | Mod: PBBFAC,,, | Performed by: NURSE PRACTITIONER

## 2019-07-03 PROCEDURE — 99999 PR PBB SHADOW E&M-EST. PATIENT-LVL IV: ICD-10-PCS | Mod: PBBFAC,,, | Performed by: NURSE PRACTITIONER

## 2019-07-03 PROCEDURE — 93280 PACEMAKER PROGRAMMING: ICD-10-PCS | Mod: 26,S$PBB,, | Performed by: INTERNAL MEDICINE

## 2019-07-03 PROCEDURE — 99215 PR OFFICE/OUTPT VISIT, EST, LEVL V, 40-54 MIN: ICD-10-PCS | Mod: S$PBB,,, | Performed by: NURSE PRACTITIONER

## 2019-07-03 PROCEDURE — 93280 PM DEVICE PROGR EVAL DUAL: CPT | Mod: PBBFAC | Performed by: INTERNAL MEDICINE

## 2019-07-03 PROCEDURE — 99214 OFFICE O/P EST MOD 30 MIN: CPT | Mod: PBBFAC | Performed by: NURSE PRACTITIONER

## 2019-07-03 RX ORDER — AMLODIPINE BESYLATE 5 MG/1
5 TABLET ORAL DAILY
Qty: 30 TABLET | Refills: 5 | Status: ON HOLD | OUTPATIENT
Start: 2019-07-03 | End: 2019-08-24 | Stop reason: HOSPADM

## 2019-07-03 NOTE — PROGRESS NOTES
Subjective:   Patient ID:  Bola Figueroa Sr. is a 84 y.o. male who presents for evaluation of Atrial Fibrillation; Coronary Artery Disease; and Hypertension      HPI     Bola Figueroa Sr. is a 84 year old male who presents to clinic for 3 month follow up. He was recently admitted to [facility] due to shortness of breath and LE edema. He was diuresed with IV lasix during admission and diuresed over 4 liters. He was counseled extensively on fluid restriction and dietary compliance. His lasix was increased to 40 mg BID upon discharge. He was discharged home and returns today. Labs during admission did reveal elevated troponin.  His current medical conditions include AFIB on coumadin, S/P PPM, COPD, CAD, HTN, HLP, CVA, Chronic diastolic CHF, HFpEF, AS. He returns today and states he is doing ok. Denies chest pain or anginal equivalents. No shortness of breath or palpitations. He does endorse some degree of FLOWERS issues. He continues to have LE edema today on exam. He is not using his CPAP as recommended due to issues with the mask and is working with Dr. Wallace to have this addressed. No orthopnea, PND or abdominal bloating. Reports compliance with medications and dietary restrictions. NO CNS complaints to suggest TIA or CVA today. NO signs of abnormal bleeding on ASA and Coumadin.        Past Medical History:   Diagnosis Date    *Atrial fibrillation     Atrial fibrillation     Bilateral carotid artery stenosis 1/15/2016    Cardiac pacemaker 8/7/2013    Congestive heart failure 4/6/2015    COPD (chronic obstructive pulmonary disease) 8/7/2013    Coronary artery disease     Hyperlipidemia     Hypertension     Long-term (current) use of anticoagulants 8/7/2013    PVD (peripheral vascular disease)     S/P PTCA (percutaneous transluminal coronary angioplasty) 8/7/2013    Sleep apnea 8/7/2013    Stroke        Past Surgical History:   Procedure Laterality Date    CATARACT EXTRACTION W/  INTRAOCULAR LENS IMPLANT       INSERT / REPLACE / REMOVE PACEMAKER  2005    REPLACEMENT, PULSE GENERATOR, CARDIAC PACEMAKER Left 3/20/2014    Performed by Behzad Sarmiento MD at [facility] CATH LAB       Social History     Tobacco Use    Smoking status: Former Smoker     Packs/day: 1.50     Years: 35.00     Pack years: 52.50     Types: Cigarettes     Last attempt to quit: 1979     Years since quittin.6    Smokeless tobacco: Never Used   Substance Use Topics    Alcohol use: No     Comment: QUIT 77    Drug use: No       Family History   Problem Relation Age of Onset    Diabetes Sister     Fainting Sister     Heart disease Paternal Uncle     Heart attack Paternal Uncle     Hypertension Maternal Grandmother     Diabetes Maternal Grandfather      Wt Readings from Last 3 Encounters:   19 89.1 kg (196 lb 6.9 oz)   19 86.6 kg (191 lb)   06/15/19 87 kg (191 lb 12.8 oz)     Temp Readings from Last 3 Encounters:   06/15/19 97.9 °F (36.6 °C) (Oral)   19 98.4 °F (36.9 °C) (Oral)   18 98 °F (36.7 °C) (Oral)     BP Readings from Last 3 Encounters:   19 (!) 142/72   19 (!) 149/81   06/15/19 (!) 167/94     Pulse Readings from Last 3 Encounters:   19 82   19 71   06/15/19 69     Current Outpatient Medications on File Prior to Visit   Medication Sig Dispense Refill    albuterol-ipratropium (DUO-NEB) 2.5 mg-0.5 mg/3 mL nebulizer solution Take 3 mLs by nebulization every 4 (four) hours as needed for Wheezing. Rescue 360 mL 11    amLODIPine (NORVASC) 10 MG tablet Take 10 mg by mouth.      aspirin (ECOTRIN) 81 MG EC tablet Take 81 mg by mouth once daily.      atorvastatin (LIPITOR) 20 MG tablet Take 20 mg by mouth.      carvedilol (COREG) 12.5 MG tablet Take 1 tablet (12.5 mg total) by mouth 2 (two) times daily with meals. 60 tablet 11    fluticasone-salmeterol diskus inhaler 250-50 mcg Inhale 1 puff into the lungs 2 (two) times daily. Wash out mouth after use. 60 each 12    furosemide  (LASIX) 40 MG tablet Take 1 tablet (40 mg total) by mouth 2 (two) times daily. 60 tablet 1    gabapentin (NEURONTIN) 100 MG capsule Take 100 mg by mouth 3 (three) times daily.  0    guaiFENesin (MUCINEX) 600 mg 12 hr tablet Take 2 tablets (1,200 mg total) by mouth 2 (two) times daily as needed for Congestion. Take with water 60 tablet prn    hydrALAZINE (APRESOLINE) 25 MG tablet TAKE 1 TABLET BY MOUTH THREE TIMES DAILY 90 tablet 3    ipratropium (ATROVENT) 42 mcg (0.06 %) nasal spray instill 2 sprays into each nostril three times a day 45 mL 4    isosorbide dinitrate (ISORDIL) 20 MG tablet take 1 tablet by mouth every 12 hours with HYDRALAZINE 60 tablet 6    latanoprost 0.005 % ophthalmic solution Place 1 drop into both eyes nightly.  0    levetiracetam (KEPPRA) 500 MG Tab Take 1 tablet by mouth Twice daily.      lubiprostone (AMITIZA) 8 MCG Cap Take 8 mcg by mouth 2 (two) times daily with meals.      nitroGLYCERIN (NITROBID) 6.5 MG CpSR Take 1 capsule (6.5 mg total) by mouth 2 (two) times daily. 30 capsule 6    pantoprazole (PROTONIX) 40 MG tablet Take 40 mg by mouth.      predniSONE (DELTASONE) 5 MG tablet Take 1 tablet (5 mg total) by mouth once daily. Start after high dose prednisone 30 tablet 5    promethazine-dextromethorphan (PROMETHAZINE-DM) 6.25-15 mg/5 mL Syrp Take 5 mLs by mouth every 6 (six) hours as needed. 473 mL 5    tamsulosin (FLOMAX) 0.4 mg Cp24 Take 0.4 mg by mouth once daily.       traMADol (ULTRAM) 50 mg tablet take 1 tablet by mouth every 6 hours if needed for UP TO 10 days  0    warfarin (COUMADIN) 5 MG tablet TAKE 1.5 tablets every Wednesday and Friday; and 1 tablet all other days in the evening as directed by the [facility] Clinic. (Patient taking differently: Take 1/2 tablet by mouth on Fridays, Saturdays, and Sundays; and 1 tablet all other days in the evening as directed by the Coumadin Clinic.) 30 tablet 3    benzonatate (TESSALON) 200 MG capsule Take 1 capsule (200 mg  "total) by mouth 3 (three) times daily as needed for Cough. 90 capsule 12     No current facility-administered medications on file prior to visit.        Review of Systems   Constitution: Positive for malaise/fatigue.   HENT: Negative for hearing loss and hoarse voice.    Eyes: Negative for blurred vision and visual disturbance.   Cardiovascular: Positive for dyspnea on exertion and leg swelling. Negative for chest pain, claudication, irregular heartbeat, near-syncope, orthopnea, palpitations, paroxysmal nocturnal dyspnea and syncope.   Respiratory: Positive for snoring. Negative for cough, hemoptysis, shortness of breath, sleep disturbances due to breathing and wheezing.         +RUTH   Endocrine: Negative for cold intolerance and heat intolerance.   Hematologic/Lymphatic: Bruises/bleeds easily (on ASA and Coumadin).   Skin: Negative for color change, dry skin and nail changes.   Musculoskeletal: Positive for arthritis and back pain. Negative for joint pain and myalgias.   Gastrointestinal: Negative for bloating, abdominal pain, constipation, nausea and vomiting.   Genitourinary: Negative for dysuria, flank pain, hematuria and hesitancy.   Neurological: Positive for weakness. Negative for headaches, light-headedness, loss of balance, numbness and paresthesias.   Psychiatric/Behavioral: Negative for altered mental status.   Allergic/Immunologic: Negative for environmental allergies.     BP (!) 142/72   Pulse 82   Ht 5' 8" (1.727 m)   Wt 89.1 kg (196 lb 6.9 oz)   BMI 29.87 kg/m²     Objective:   Physical Exam   Constitutional: He is oriented to person, place, and time. He appears well-developed and well-nourished. No distress.   HENT:   Head: Normocephalic and atraumatic.   Eyes:   +right eye patch   Neck: Normal range of motion and full passive range of motion without pain. Neck supple. No JVD present.   Cardiovascular: Normal rate, regular rhythm, S1 normal, S2 normal and intact distal pulses. PMI is not " displaced. Exam reveals no distant heart sounds.   No murmur heard.  Pulses:       Radial pulses are 2+ on the right side, and 2+ on the left side.        Dorsalis pedis pulses are 2+ on the right side, and 2+ on the left side.   PPM site well healed   Pulmonary/Chest: Effort normal. No accessory muscle usage. No respiratory distress. He has decreased breath sounds in the right lower field and the left lower field. He has no wheezes. He has no rales.   Some degree of FLOWERS   Abdominal: Soft. Bowel sounds are normal. He exhibits no distension. There is no tenderness.   Musculoskeletal: Normal range of motion. He exhibits edema (trace BLE).        Right ankle: He exhibits swelling.        Left ankle: He exhibits swelling.   Neurological: He is alert and oriented to person, place, and time.   Skin: Skin is warm and dry. He is not diaphoretic. No cyanosis. Nails show no clubbing.   Psychiatric: He has a normal mood and affect. His speech is normal and behavior is normal. Judgment and thought content normal. Cognition and memory are normal.   Nursing note and vitals reviewed.      Lab Results   Component Value Date    CHOL 142 05/14/2019    CHOL 105 (L) 11/02/2018    CHOL 116 (L) 05/02/2018     Lab Results   Component Value Date    HDL 68 05/14/2019    HDL 48 11/02/2018    HDL 52 05/02/2018     Lab Results   Component Value Date    LDLCALC 61.0 (L) 05/14/2019    LDLCALC 46.4 (L) 11/02/2018    LDLCALC 46.4 (L) 05/02/2018     Lab Results   Component Value Date    TRIG 65 05/14/2019    TRIG 53 11/02/2018    TRIG 88 05/02/2018     Lab Results   Component Value Date    CHOLHDL 47.9 05/14/2019    CHOLHDL 45.7 11/02/2018    CHOLHDL 44.8 05/02/2018       Chemistry        Component Value Date/Time     06/14/2019 1040    K 4.0 06/14/2019 1040     06/14/2019 1040    CO2 23 06/14/2019 1040    BUN 13 06/14/2019 1040    CREATININE 0.9 06/14/2019 1040     (H) 06/14/2019 1040        Component Value Date/Time    CALCIUM  9.4 06/14/2019 1040    ALKPHOS 53 (L) 06/14/2019 1040    AST 20 06/14/2019 1040    ALT 21 06/14/2019 1040    BILITOT 1.9 (H) 06/14/2019 1040    ESTGFRAFRICA >60 06/14/2019 1040    EGFRNONAA >60 06/14/2019 1040          Lab Results   Component Value Date    TSH 2.9 10/18/2007     Lab Results   Component Value Date    INR 2.3 (H) 06/28/2019    INR 6.0 (A) 06/26/2019    INR 4.5 (A) 06/19/2019     Lab Results   Component Value Date    WBC 8.44 06/14/2019    HGB 12.5 (L) 06/14/2019    HCT 38.6 (L) 06/14/2019    MCV 94 06/14/2019     06/14/2019        TEST DESCRIPTION   Technical Quality: This is a portable study performed at the patient's bedside. This is a technically challenging study. There is poor endocardial definition.     General: A catheter is present in the right-sided cardiac chambers.     Aorta: The aortic root is normal in size, measuring 2.9 cm at sinotubular junction and 3.2 cm at Sinuses of Valsalva. The proximal ascending aorta is normal in size, measuring 2.8 cm across.     Left Atrium: The left atrial volume index is normal, measuring 27.25 cc/m2.     Left Ventricle: The left ventricle is normal in size, with an end-diastolic diameter of 4.1 cm, and an end-systolic diameter of 2.7 cm. Wall thickness is markedly increased, with the septum measuring 1.7 cm and the posterior wall measuring 1.5 cm across.   Relative wall thickness was increased at 0.73, and the LV mass index was increased at 156.5 g/m2 consistent with concentric left ventricular hypertrophy. There are no regional wall motion abnormalities. Left ventricular systolic function appears normal.   Visually estimated ejection fraction is 60-65%. The LV Doppler derived stroke volume equals 56.0 ccs.     Diastolic indices: E wave velocity 1.2 m/s, E/A ratio 2.3,  msec., E/e' ratio(avg) 15. There is diastolic dysfunction secondary to restrictive abnormality.     Right Atrium: The right atrium is normal in size, measuring 3.8 cm in length  and 2.4 cm in width in the apical view.     Right Ventricle: The right ventricle is normal in size. Global right ventricular systolic function appears normal. Tricuspid annular plane systolic excursion (TAPSE) is .8 cm. The estimated PA systolic pressure is 48 mmHg.     Aortic Valve:  The aortic valve is moderately sclerotic. The aortic valve is tri-leaflet in structure. The peak velocity obtained across the aortic valve is 2.06 m/s, which translates to a peak gradient of 17 mmHg. The mean gradient is 9 mmHg. Using a   left ventricular outflow tract diameter of 1.9 cm, a left ventricular outflow tract velocity time integral of 20 cm, and a peak instantaneous transvalvular velocity time integral of 48 cm, the calculated aortic valve area is 1.17 cm2(AVAi is 0.57   cm2/m2), consistent with mild to moderate aortic stenosis.     Mitral Valve:  The pressure half time is 69 msec. The calculated mitral valve area is 3.19 cm2. Mitral valve is normal in structure with normal leaflet mobility.     Tricuspid Valve:  There is mild to moderate tricuspid regurgitation. Tricuspid valve is normal in structure with normal leaflet mobility.     Pulmonary Valve:  Pulmonary valve is normal in structure with normal leaflet mobility.     IVC: IVC is normal in size and collapses > 50% with a sniff, suggesting normal right atrial pressure of 3 mmHg.     Atrial Septum: The atrial septum is intact.     Intracavitary: There is no evidence of pericardial effusion, intracavity mass, thrombi, or vegetation.         CONCLUSIONS     1 - Concentric hypertrophy.     2 - No wall motion abnormalities.     3 - Normal left ventricular systolic function (EF 60-65%).     4 - Restrictive LV filling pattern, indicating markedly elevated LAP (grade 3 diastolic dysfunction).     5 - Normal right ventricular systolic function .     6 - Pulmonary hypertension. The estimated PA systolic pressure is 48 mmHg.     7 - Mild to moderate aortic stenosis, BRITTANY = 1.17  cm2, AVAi = 0.57 cm2/m2, peak velocity = 2.06 m/s, mean gradient = 9 mmHg.     8 - Mild to moderate tricuspid regurgitation.             This document has been electronically    SIGNED BY: Caroline Mcdonough MD On: 06/14/2019 16:30          Assessment:      1. Acute on chronic diastolic congestive heart failure    2. Pulmonary HTN    3. Permanent atrial fibrillation    4. Coronary artery disease of native artery of native heart with stable angina pectoris    5. Essential hypertension    6. Cardiac pacemaker    7. S/P PTCA (percutaneous transluminal coronary angioplasty)    8. Long term current use of anticoagulant therapy    9. Mild obstructive sleep apnea      Patient presents to clinic for hospital follow up and is doing ok today.   PPM check completed today during office visit as well.  No chest pain or SOB today.   No signs of ADHF on exam today.   Plan:     Decrease Norvasc to 5 mg daily  Continue all other CV meds for now  Dash diet, 2 gm sodium restriction  50-60 ounces fluid restriction daily  Daily weights  I have asked him to weigh daily and call clinic if increase in weight by 3 lbs in 1 day or 5 lbs in 1 week  Monitor BP at home  Bring BP log to next visit  Encourage compression socks daily  Elevate legs as much as possible  Stress test to further evaluate elevated troponin during hospital stay  BMP and BNP today with phone review  RTC in 4-6 weeks after tests    KORTNEY Wooten

## 2019-07-05 ENCOUNTER — ANTI-COAG VISIT (OUTPATIENT)
Dept: CARDIOLOGY | Facility: CLINIC | Age: 84
End: 2019-07-05
Payer: MEDICARE

## 2019-07-05 ENCOUNTER — LAB VISIT (OUTPATIENT)
Dept: LAB | Facility: HOSPITAL | Age: 84
End: 2019-07-05
Payer: MEDICARE

## 2019-07-05 DIAGNOSIS — I50.33 ACUTE ON CHRONIC DIASTOLIC CONGESTIVE HEART FAILURE: ICD-10-CM

## 2019-07-05 DIAGNOSIS — I48.21 PERMANENT ATRIAL FIBRILLATION: ICD-10-CM

## 2019-07-05 DIAGNOSIS — E87.6 HYPOKALEMIA: Primary | ICD-10-CM

## 2019-07-05 DIAGNOSIS — Z79.01 LONG TERM (CURRENT) USE OF ANTICOAGULANTS: Primary | ICD-10-CM

## 2019-07-05 LAB
ANION GAP SERPL CALC-SCNC: 11 MMOL/L (ref 8–16)
BNP SERPL-MCNC: 317 PG/ML (ref 0–99)
BUN SERPL-MCNC: 12 MG/DL (ref 8–23)
CALCIUM SERPL-MCNC: 8.7 MG/DL (ref 8.7–10.5)
CHLORIDE SERPL-SCNC: 102 MMOL/L (ref 95–110)
CO2 SERPL-SCNC: 33 MMOL/L (ref 23–29)
CREAT SERPL-MCNC: 1.2 MG/DL (ref 0.5–1.4)
EST. GFR  (AFRICAN AMERICAN): >60 ML/MIN/1.73 M^2
EST. GFR  (NON AFRICAN AMERICAN): 55 ML/MIN/1.73 M^2
GLUCOSE SERPL-MCNC: 147 MG/DL (ref 70–110)
INR PPP: 2.2 (ref 2–3)
POTASSIUM SERPL-SCNC: 3.1 MMOL/L (ref 3.5–5.1)
SODIUM SERPL-SCNC: 146 MMOL/L (ref 136–145)

## 2019-07-05 PROCEDURE — 93793 PR ANTICOAGULANT MGMT FOR PT TAKING WARFARIN: ICD-10-PCS | Mod: ,,,

## 2019-07-05 PROCEDURE — 80048 BASIC METABOLIC PNL TOTAL CA: CPT

## 2019-07-05 PROCEDURE — 36415 COLL VENOUS BLD VENIPUNCTURE: CPT

## 2019-07-05 PROCEDURE — 85610 PROTHROMBIN TIME: CPT | Mod: PBBFAC

## 2019-07-05 PROCEDURE — 93793 ANTICOAG MGMT PT WARFARIN: CPT | Mod: ,,,

## 2019-07-05 PROCEDURE — 83880 ASSAY OF NATRIURETIC PEPTIDE: CPT

## 2019-07-05 RX ORDER — POTASSIUM CHLORIDE 750 MG/1
10 TABLET, EXTENDED RELEASE ORAL 2 TIMES DAILY
Qty: 60 TABLET | Refills: 5 | Status: SHIPPED | OUTPATIENT
Start: 2019-07-05

## 2019-07-05 NOTE — TELEPHONE ENCOUNTER
Please phone patient. Make sure he is not having any concerning symptoms-SOB, orthopnea, etc.    Labs reviewed. His BNP is slightly up from previous.    CMP stable, but K is low. Please send in prescription request for 10 mEQ BID, needs to start today.    Repeat BMP next week

## 2019-07-05 NOTE — PROGRESS NOTES
Patient's INR is therapeutic at 2.2.   No upcoming procedures reported; amlodipine dose was decreased to 5mg daily on 7/3.  No change in dose at this time.   Instructions given for patient to resume current dose of warfarin 2.5mg on Fridays, Saturdays and Sundays; and 5mg on all other days of the week. Patient/Caregiver voiced understanding.  Recheck in 1 week.  Please call should you have any questions or concerns at [phone] or [phone].

## 2019-07-05 NOTE — TELEPHONE ENCOUNTER
pts mobile # listed, # for leslie sosa was full. unable to leave a message    No answer on pts home # listed, it just rang, no VM to leave a message,  Also no answer on emergency contacts # listed

## 2019-07-09 ENCOUNTER — HOSPITAL ENCOUNTER (OUTPATIENT)
Dept: RADIOLOGY | Facility: HOSPITAL | Age: 84
Discharge: HOME | End: 2019-07-09
Attending: NURSE PRACTITIONER
Payer: MEDICARE

## 2019-07-09 ENCOUNTER — CLINICAL SUPPORT (OUTPATIENT)
Dept: CARDIOLOGY | Facility: CLINIC | Age: 84
End: 2019-07-09
Attending: NURSE PRACTITIONER
Payer: MEDICARE

## 2019-07-09 DIAGNOSIS — I25.118 CORONARY ARTERY DISEASE OF NATIVE ARTERY OF NATIVE HEART WITH STABLE ANGINA PECTORIS: ICD-10-CM

## 2019-07-09 DIAGNOSIS — Z98.61 S/P PTCA (PERCUTANEOUS TRANSLUMINAL CORONARY ANGIOPLASTY): ICD-10-CM

## 2019-07-09 DIAGNOSIS — R79.89 ELEVATED TROPONIN: ICD-10-CM

## 2019-07-09 LAB — DIASTOLIC DYSFUNCTION: NO

## 2019-07-09 PROCEDURE — 78452 HT MUSCLE IMAGE SPECT MULT: CPT | Mod: 26,,, | Performed by: NUCLEAR MEDICINE

## 2019-07-09 PROCEDURE — 93018 CV STRESS TEST I&R ONLY: CPT | Mod: S$PBB,,, | Performed by: NUCLEAR MEDICINE

## 2019-07-09 PROCEDURE — 93016 NM MULTI PHARM STRESS CARDIAC COMPONENT: ICD-10-PCS | Mod: S$PBB,,, | Performed by: NUCLEAR MEDICINE

## 2019-07-09 PROCEDURE — 93018 NM MULTI PHARM STRESS CARDIAC COMPONENT: ICD-10-PCS | Mod: S$PBB,,, | Performed by: NUCLEAR MEDICINE

## 2019-07-09 PROCEDURE — A9502 TC99M TETROFOSMIN: HCPCS

## 2019-07-09 PROCEDURE — 93016 CV STRESS TEST SUPVJ ONLY: CPT | Mod: S$PBB,,, | Performed by: NUCLEAR MEDICINE

## 2019-07-09 PROCEDURE — 78452 NM MULTI PHARM STRESS CARDIAC COMPONENT: ICD-10-PCS | Mod: 26,,, | Performed by: NUCLEAR MEDICINE

## 2019-07-10 ENCOUNTER — ANTI-COAG VISIT (OUTPATIENT)
Dept: CARDIOLOGY | Facility: CLINIC | Age: 84
End: 2019-07-10
Payer: MEDICARE

## 2019-07-10 DIAGNOSIS — I48.21 PERMANENT ATRIAL FIBRILLATION: ICD-10-CM

## 2019-07-10 DIAGNOSIS — Z79.01 LONG TERM (CURRENT) USE OF ANTICOAGULANTS: ICD-10-CM

## 2019-07-10 DIAGNOSIS — Z79.01 LONG TERM (CURRENT) USE OF ANTICOAGULANTS: Primary | ICD-10-CM

## 2019-07-10 LAB — INR PPP: 1.3 (ref 2–3)

## 2019-07-10 PROCEDURE — 93793 PR ANTICOAGULANT MGMT FOR PT TAKING WARFARIN: ICD-10-PCS | Mod: ,,,

## 2019-07-10 PROCEDURE — 85610 PROTHROMBIN TIME: CPT | Mod: PBBFAC

## 2019-07-10 PROCEDURE — 93793 ANTICOAG MGMT PT WARFARIN: CPT | Mod: ,,,

## 2019-07-10 NOTE — PROGRESS NOTES
Patient's INR is sub-therapeutic at 1.3.  Mr. Figueroa' daughter reports patient has been drinking a glass of V8 juice daily since 2017, normally later in the day but today he had his V8 juice right before the appt.  No other changes or missed doses reported.  No abnormal numbness or weakness noted.  Instructions given for patient to take warfarin 7.5mg on today; then resume current dose of 2.5mg on Fridays, Saturdays, Sundays and 5mg on all other days of the week.  Patient/caregiver voiced understanding.  Follow-up in 1 week.

## 2019-07-11 RX ORDER — WARFARIN SODIUM 5 MG/1
TABLET ORAL
Qty: 30 TABLET | Refills: 0 | Status: SHIPPED | OUTPATIENT
Start: 2019-07-11 | End: 2019-07-31 | Stop reason: SDUPTHER

## 2019-07-17 ENCOUNTER — ANTI-COAG VISIT (OUTPATIENT)
Dept: CARDIOLOGY | Facility: CLINIC | Age: 84
End: 2019-07-17
Payer: MEDICARE

## 2019-07-17 DIAGNOSIS — I48.21 PERMANENT ATRIAL FIBRILLATION: ICD-10-CM

## 2019-07-17 DIAGNOSIS — Z79.01 LONG TERM (CURRENT) USE OF ANTICOAGULANTS: Primary | ICD-10-CM

## 2019-07-17 LAB — INR PPP: 1.3 (ref 2–3)

## 2019-07-17 PROCEDURE — 93793 PR ANTICOAGULANT MGMT FOR PT TAKING WARFARIN: ICD-10-PCS | Mod: ,,,

## 2019-07-17 PROCEDURE — 93793 ANTICOAG MGMT PT WARFARIN: CPT | Mod: ,,,

## 2019-07-17 PROCEDURE — 85610 PROTHROMBIN TIME: CPT | Mod: PBBFAC

## 2019-07-17 NOTE — PROGRESS NOTES
Patient's INR is sub-therapeutic at 1.3.  No missed doses reported.  Patient has tried to avoid all greens as well as V8 juice.  Mr. Figueroa has been under a lot of stress due to his ailing wife.  No abnormal weakness or numbness noted.  Instructions given for patient to take warfarin 10mg on today; then increase to 2.5mg on Saturdays, Sundays and 5mg  on all other days of the week.  Patient/daughter voiced understanding.  Follow-up on 7/25/19 with labs.

## 2019-07-25 ENCOUNTER — OFFICE VISIT (OUTPATIENT)
Dept: PULMONOLOGY | Facility: CLINIC | Age: 84
End: 2019-07-25
Payer: MEDICARE

## 2019-07-25 ENCOUNTER — LAB VISIT (OUTPATIENT)
Dept: LAB | Facility: HOSPITAL | Age: 84
End: 2019-07-25
Attending: INTERNAL MEDICINE
Payer: MEDICARE

## 2019-07-25 ENCOUNTER — ANTI-COAG VISIT (OUTPATIENT)
Dept: CARDIOLOGY | Facility: CLINIC | Age: 84
End: 2019-07-25
Payer: MEDICARE

## 2019-07-25 ENCOUNTER — CLINICAL SUPPORT (OUTPATIENT)
Dept: PULMONOLOGY | Facility: CLINIC | Age: 84
End: 2019-07-25
Payer: MEDICARE

## 2019-07-25 VITALS
DIASTOLIC BLOOD PRESSURE: 79 MMHG | SYSTOLIC BLOOD PRESSURE: 186 MMHG | WEIGHT: 191 LBS | OXYGEN SATURATION: 96 % | HEIGHT: 69 IN | BODY MASS INDEX: 28.29 KG/M2 | RESPIRATION RATE: 18 BRPM | HEART RATE: 71 BPM

## 2019-07-25 VITALS — WEIGHT: 191.81 LBS | HEIGHT: 69 IN | BODY MASS INDEX: 28.41 KG/M2

## 2019-07-25 DIAGNOSIS — R09.02 EXERCISE HYPOXEMIA: ICD-10-CM

## 2019-07-25 DIAGNOSIS — G47.33 MILD OBSTRUCTIVE SLEEP APNEA: ICD-10-CM

## 2019-07-25 DIAGNOSIS — J44.9 COPD, SEVERE: ICD-10-CM

## 2019-07-25 DIAGNOSIS — I27.20 PULMONARY HTN: ICD-10-CM

## 2019-07-25 DIAGNOSIS — Z79.01 LONG TERM (CURRENT) USE OF ANTICOAGULANTS: ICD-10-CM

## 2019-07-25 DIAGNOSIS — J98.6 ELEVATED DIAPHRAGM: Primary | ICD-10-CM

## 2019-07-25 DIAGNOSIS — I50.42 CHRONIC COMBINED SYSTOLIC AND DIASTOLIC HF (HEART FAILURE), NYHA CLASS 3: ICD-10-CM

## 2019-07-25 DIAGNOSIS — J44.9 CHRONIC OBSTRUCTIVE PULMONARY DISEASE, UNSPECIFIED COPD TYPE: ICD-10-CM

## 2019-07-25 DIAGNOSIS — I48.91 ATRIAL FIBRILLATION, UNSPECIFIED TYPE: ICD-10-CM

## 2019-07-25 LAB
INR PPP: 2 (ref 0.8–1.2)
PROTHROMBIN TIME: 20.7 SEC (ref 9–12.5)

## 2019-07-25 PROCEDURE — 99999 PR PBB SHADOW E&M-EST. PATIENT-LVL V: CPT | Mod: PBBFAC,,, | Performed by: NURSE PRACTITIONER

## 2019-07-25 PROCEDURE — 99214 PR OFFICE/OUTPT VISIT, EST, LEVL IV, 30-39 MIN: ICD-10-PCS | Mod: 25,S$PBB,, | Performed by: NURSE PRACTITIONER

## 2019-07-25 PROCEDURE — 94618 PULMONARY STRESS TESTING: CPT | Mod: PBBFAC

## 2019-07-25 PROCEDURE — 36415 COLL VENOUS BLD VENIPUNCTURE: CPT

## 2019-07-25 PROCEDURE — 93793 ANTICOAG MGMT PT WARFARIN: CPT | Mod: ,,,

## 2019-07-25 PROCEDURE — 85610 PROTHROMBIN TIME: CPT

## 2019-07-25 PROCEDURE — 94618 PULMONARY STRESS TESTING: ICD-10-PCS | Mod: 26,S$PBB,, | Performed by: INTERNAL MEDICINE

## 2019-07-25 PROCEDURE — 99215 OFFICE O/P EST HI 40 MIN: CPT | Mod: PBBFAC,25 | Performed by: NURSE PRACTITIONER

## 2019-07-25 PROCEDURE — 99214 OFFICE O/P EST MOD 30 MIN: CPT | Mod: 25,S$PBB,, | Performed by: NURSE PRACTITIONER

## 2019-07-25 PROCEDURE — 99999 PR PBB SHADOW E&M-EST. PATIENT-LVL V: ICD-10-PCS | Mod: PBBFAC,,, | Performed by: NURSE PRACTITIONER

## 2019-07-25 PROCEDURE — 93793 PR ANTICOAGULANT MGMT FOR PT TAKING WARFARIN: ICD-10-PCS | Mod: ,,,

## 2019-07-25 PROCEDURE — 94618 PULMONARY STRESS TESTING: CPT | Mod: 26,S$PBB,, | Performed by: INTERNAL MEDICINE

## 2019-07-25 RX ORDER — FLUTICASONE FUROATE AND VILANTEROL 100; 25 UG/1; UG/1
1 POWDER RESPIRATORY (INHALATION) DAILY
Qty: 1 EACH | Refills: 11 | Status: SHIPPED | OUTPATIENT
Start: 2019-07-25 | End: 2019-07-25

## 2019-07-25 NOTE — PROGRESS NOTES
Patient's INR is therapeutic at 2.0.  Daughter contacted.  All previous instructions followed.  No upcoming procedures or changes reported.  Will increase dose slightly.   Instructions given for patient to take warfarin 2.5mg on Sundays and 5mg on all other days of the week.  Caregiver repeated directions and voiced understanding.  Recheck in 2 weeks at [facility].  Please call should you have any questions or concerns at [phone] or [phone].

## 2019-07-25 NOTE — PROCEDURES
"[facility] - Pulmonary Function Sv  Six Minute Walk     SUMMARY     Ordering Provider: Dr. Wallace   Interpreting Provider: Dr. Wallace  Performing nurse/tech/RT: Jimbo RRT  Diagnosis: COPD(Exercise Hypoxemia)  Height: 5' 9" (175.3 cm)  Weight: 87 kg (191 lb 12.8 oz)  BMI (Calculated): 28.4   Patient Race:             Phase Oxygen Assessment Supplemental O2 Heart   Rate Blood Pressure Ramya Dyspnea Scale Rating   Resting 96 % Room Air 71 bpm 186/79 4   Exercise        Minute        1 96 % Room Air 100 bpm     2 96 % Room Air 106 bpm     3 97 % Room Air 107 bpm     4 96 % Room Air 106 bpm     5 96 % Room Air 104 bpm     6  100 % Room Air 98 bpm (!) 191/91 5-6   Recovery        Minute        1 100 % Room Air 88 bpm     2 100 % Room Air 79 bpm     3 100 % Room Air 73 bpm     4 100 % Room Air 73 bpm (!) 186/91 4     Six Minute Walk Summary  6MWT Status: completed without stopping  Patient Reported: No complaints     Interpretation:  Did the patient stop or pause?: No                  Total Time Walked (Calculated): 360 seconds  Final Partial Lap Distance (feet): 0 feet  Total Distance Meters (Calculated): 243.84 meters  Predicted Distance Meters (Calculated): 443.22 meters  Percentage of Predicted (Calculated): 55.02  Peak VO2 (Calculated): 11.3  Mets: 3.23  Has The Patient Had a Previous Six Minute Walk Test?: Yes       Previous 6MWT Results  Has The Patient Had a Previous Six Minute Walk Test?: Yes  Date of Previous Test: 03/12/18  Total Time Walked: 360 seconds  Total Distance (meters): 213.36  Predicted Distance (meters): 448.09 meters  Percentage of Predicted: 47.62  Percent Change (Calculated): -0.14     Interpretation:  Total distance walked in six minutes is moderately reduced indicating a reduction in overall  functional capacity.   There was no exercise induced hypoxemia with significant oxygen desaturation.    Oxygen desaturation did not meet criteria for supplemental oxygen " prescription.    Clinical correlation suggested.    Juanjo Wallace MD    Mild exercise-induced hypoxemia described as an arterial oxygen saturation of 93-95% (or 3-4% less than at rest), moderate exercise-induced hypoxemia as 89-93%, and severe exercise induced hypoxemia as < 89% O2 saturation.  Medicare Criteria Comments:   When arterial oxygen saturation is at or below 88% during exercise (severe exercise induced hypoxemia) then the patient falls under Medicare Group 1 criteria for supplemental oxygen.  Details about Medicare Group Criteria coverage can be found at http://www.cms.hhs.gov/manuals/downloads/

## 2019-07-25 NOTE — LETTER
July 25, 2019      Juanjo Wallace MD  [address]  [place] [address]           [facility]  [address]  [place] [address]  Phone: [phone]  Fax: [phone]          Patient: Bola Figueroa Sr.   MR Number: [id number]   YOB: 1934   Date of Visit: 7/25/2019       Dear Dr. Juanjo Wallace:    Thank you for referring Bola Figueroa to me for evaluation. Attached you will find relevant portions of my assessment and plan of care.    If you have questions, please do not hesitate to call me. I look forward to following Bola Figueroa along with you.    Sincerely,    Elizabeth Lejeune, NP    Enclosure  CC:  No Recipients    If you would like to receive this communication electronically, please contact [email or web address] or [phone] to request more information on [employer] access.    For providers and/or their staff who would like to refer a patient to [facility], please contact us through our one-stop-shop provider referral line, [facility] , at [phone].    If you feel you have received this communication in error or would no longer like to receive these types of communications, please e-mail [email or web address]

## 2019-07-25 NOTE — PROGRESS NOTES
"Subjective:      Patient ID: Bola Figueroa Sr. is a 84 y.o. male.    Chief Complaint: COPD    HPI  Patient here with COPD, RUTH, elevated diaphragm, and Diastolic dysfunction, aortic stenosis. PA systolic estimated pressure on recent echo 48mmHg, stable. Sleep study borderline/mild, 5.7 events/hr.   Recent hospital admission,6/15/19 and diuresed 4 L.  He is on fluid restriction and Lasix twice a day.  They present today oxygen evaluation.  Pulmonary inhalers include Advair with 57% compliance  Duo nebs as needed  5 mg prednisone  He has a chronic cough with thick mucus.  Also associated postnasal drip.      BP (!) 186/79 Comment: 160 90  Pulse 71   Resp 18   Ht 5' 9" (1.753 m)   Wt 86.6 kg (191 lb)   SpO2 96%   BMI 28.21 kg/m²   Body mass index is 28.21 kg/m².    Review of Systems   Constitutional: Negative.    HENT: Positive for congestion.    Respiratory: Positive for cough and dyspnea on extertion.    Cardiovascular: Negative.    Musculoskeletal: Positive for arthralgias and gait problem.   Gastrointestinal: Negative.    Psychiatric/Behavioral: Negative.      Objective:      Physical Exam  Personal Diagnostic Review  CONCLUSIONS     1 - Concentric hypertrophy.     2 - No wall motion abnormalities.     3 - Normal left ventricular systolic function (EF 60-65%).     4 - Restrictive LV filling pattern, indicating markedly elevated LAP (grade 3 diastolic dysfunction).     5 - Normal right ventricular systolic function .     6 - Pulmonary hypertension. The estimated PA systolic pressure is 48 mmHg.     7 - Mild to moderate aortic stenosis, BRITTANY = 1.17 cm2, AVAi = 0.57 cm2/m2, peak velocity = 2.06 m/s, mean gradient = 9 mmHg.     8 - Mild to moderate tricuspid regurgitation.     Walk today:  Phase Oxygen Assessment Supplemental O2 Heart   Rate Blood Pressure Ramya Dyspnea Scale Rating   Resting 96 % Room Air 71 bpm 186/79 4   Exercise        Minute        1 96 % Room Air 100 bpm     2 96 % Room Air 106 bpm     3 97 % " Room Air 107 bpm     4 96 % Room Air 106 bpm     5 96 % Room Air 104 bpm     6  100 % Room Air 98 bpm (!) 191/91 5-6   Recovery        Minute        1 100 % Room Air 88 bpm     2 100 % Room Air 79 bpm     3 100 % Room Air 73 bpm     4 100 % Room Air 73 bpm (!) 186/91 4   Results for orders placed during the hospital encounter of 06/14/19   X-Ray Chest PA And Lateral    Narrative EXAMINATION:  XR CHEST PA AND LATERAL    CLINICAL HISTORY  Shortness of breath.,    COMPARISON:  04/16/2019.    FINDINGS:  Left pacemaker.  Several wire leads overlie the chest.  The heart size is nonenlarged.  The aortic arch is calcified.    All the lung volumes are decreased no definite acute infiltrate is seen.      Impression No acute findings.      Electronically signed by: Jean Marie Franklin MD  Date:    06/14/2019  Time:    12:07         Assessment:       1. Elevated diaphragm    2. Mild obstructive sleep apnea    3. Chronic obstructive pulmonary disease, unspecified COPD type    4. Pulmonary HTN    5. 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 Rescue 360 mL 11    amLODIPine (NORVASC) 5 MG tablet Take 1 tablet (5 mg total) by mouth once daily. 30 tablet 5    aspirin (ECOTRIN) 81 MG EC tablet Take 81 mg by mouth once daily.      benzonatate (TESSALON) 200 MG capsule Take 1 capsule (200 mg total) by mouth 3 (three) times daily as needed for Cough. 90 capsule 12    carvedilol (COREG) 12.5 MG tablet Take 1 tablet (12.5 mg total) by mouth 2 (two) times daily with meals. 60 tablet 11    furosemide (LASIX) 40 MG tablet Take 1 tablet (40 mg total) by mouth 2 (two) times daily. 60 tablet 1    gabapentin (NEURONTIN) 100 MG capsule Take 100 mg by mouth 3 (three) times daily.  0     guaiFENesin (MUCINEX) 600 mg 12 hr tablet Take 2 tablets (1,200 mg total) by mouth 2 (two) times daily as needed for Congestion. Take with water 60 tablet prn    hydrALAZINE (APRESOLINE) 25 MG tablet TAKE 1 TABLET BY MOUTH THREE TIMES DAILY 90 tablet 3    ipratropium (ATROVENT) 42 mcg (0.06 %) nasal spray instill 2 sprays into each nostril three times a day 45 mL 4    isosorbide dinitrate (ISORDIL) 20 MG tablet take 1 tablet by mouth every 12 hours with HYDRALAZINE 60 tablet 6    latanoprost 0.005 % ophthalmic solution Place 1 drop into both eyes nightly.  0    levetiracetam (KEPPRA) 500 MG Tab Take 1 tablet by mouth Twice daily.      lubiprostone (AMITIZA) 8 MCG Cap Take 8 mcg by mouth 2 (two) times daily with meals.      nitroGLYCERIN (NITROBID) 6.5 MG CpSR Take 1 capsule (6.5 mg total) by mouth 2 (two) times daily. 30 capsule 6    pantoprazole (PROTONIX) 40 MG tablet Take 40 mg by mouth.      potassium chloride (KLOR-CON) 10 MEQ TbSR Take 1 tablet (10 mEq total) by mouth 2 (two) times daily. 60 tablet 5    predniSONE (DELTASONE) 5 MG tablet Take 1 tablet (5 mg total) by mouth once daily. Start after high dose prednisone 30 tablet 5    promethazine-dextromethorphan (PROMETHAZINE-DM) 6.25-15 mg/5 mL Syrp Take 5 mLs by mouth every 6 (six) hours as needed. 473 mL 5    tamsulosin (FLOMAX) 0.4 mg Cp24 Take 0.4 mg by mouth once daily.       traMADol (ULTRAM) 50 mg tablet take 1 tablet by mouth every 6 hours if needed for UP TO 10 days  0    warfarin (COUMADIN) 5 MG tablet Take 1/2 tablet by mouth on Fridays, Saturdays, and Sundays; and 1 tablet all other days in the evening as directed by the [facility]. (Patient taking differently: Take 1/2 tablet by mouth on Saturdays, and Sundays; and 1 tablet all other days in the evening as directed by the [facility].) 30 tablet 0    [DISCONTINUED] fluticasone-salmeterol diskus inhaler 250-50 mcg Inhale 1 puff into the lungs 2 (two) times daily. Wash  out mouth after use. 60 each 12    [] atorvastatin (LIPITOR) 20 MG tablet Take 20 mg by mouth.      fluticasone-umeclidin-vilanter (TRELEGY ELLIPTA) 100-62.5-25 mcg DsDv Inhale 1 puff into the lungs once daily. 1 each 11    [DISCONTINUED] amLODIPine (NORVASC) 10 MG tablet Take 10 mg by mouth.      [DISCONTINUED] fluticasone furoate-vilanterol (BREO ELLIPTA) 100-25 mcg/dose diskus inhaler Inhale 1 puff into the lungs once daily. 1 each 11    [DISCONTINUED] furosemide (LASIX) 40 MG tablet Take 40 mg by mouth once daily.       [DISCONTINUED] warfarin (COUMADIN) 5 MG tablet TAKE 1.5 tablets every Wednesday and Friday; and 1 tablet all other days in the evening as directed by the [facility]. (Patient taking differently: Take 1/2 tablet by mouth on , , and Sundays; and 1 tablet all other days in the evening as directed by the [facility].) 30 tablet 3    albuterol-ipratropium 2.5 mg-0.5 mg/3 mL nebulizer solution 3 mL       furosemide injection 60 mg       hydrALAZINE injection 10 mg       [] methylPREDNISolone sodium succinate injection 125 mg       [DISCONTINUED] nitroGLYCERIN 2% TD oint ointment 1 inch        No facility-administered encounter medications on file as of 2019.      Orders Placed This Encounter   Procedures    Spirometry without Bronchodilator     Standing Status:   Future     Standing Expiration Date:   2020     Plan:   He does not qualify for oxygen at rest, exertion or sleep on last polysomnogram in a chronic state.   Change Advair to Trelegy.   Follow-up 4 months with spirometry are call earlier if any problems     Problem List Items Addressed This Visit        Pulmonary    COPD (chronic obstructive pulmonary disease)    Overview     FEV1 63% of predicted. Trelegy daily.          Current Assessment & Plan     Mucinex, duoneb if needed. 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 Oxygen saturations were ? 88 % for  3.1 minutes of the time spent asleep.  The RERA index was 4.0 events /hr asleep.  - Respiratory events had no supine positional tendency (supine AHI = 4.4; right-side AHI = 6.1 /hr; left-side AHI = 6.3 /hr).  - Respiratory events had a strong REM sleep tendency (REM AHI = 38.2; NREM AHI = 3.3).           Current Assessment & Plan     Returned due to noncompliance         Relevant Orders    Spirometry without Bronchodilator

## 2019-07-31 DIAGNOSIS — Z79.01 LONG TERM (CURRENT) USE OF ANTICOAGULANTS: ICD-10-CM

## 2019-07-31 RX ORDER — WARFARIN SODIUM 5 MG/1
TABLET ORAL
Qty: 30 TABLET | Refills: 0 | Status: SHIPPED | OUTPATIENT
Start: 2019-07-31 | End: 2019-09-01 | Stop reason: SDUPTHER

## 2019-08-07 ENCOUNTER — ANTI-COAG VISIT (OUTPATIENT)
Dept: CARDIOLOGY | Facility: CLINIC | Age: 84
End: 2019-08-07
Payer: MEDICARE

## 2019-08-07 DIAGNOSIS — Z79.01 LONG TERM (CURRENT) USE OF ANTICOAGULANTS: Primary | ICD-10-CM

## 2019-08-07 DIAGNOSIS — I48.91 ATRIAL FIBRILLATION, UNSPECIFIED TYPE: ICD-10-CM

## 2019-08-07 LAB — INR PPP: 1.7 (ref 2–3)

## 2019-08-07 PROCEDURE — 85610 PROTHROMBIN TIME: CPT | Mod: PBBFAC

## 2019-08-07 PROCEDURE — 93793 PR ANTICOAGULANT MGMT FOR PT TAKING WARFARIN: ICD-10-PCS | Mod: S$GLB,,,

## 2019-08-07 PROCEDURE — 93793 ANTICOAG MGMT PT WARFARIN: CPT | Mod: S$GLB,,,

## 2019-08-07 NOTE — PROGRESS NOTES
Patient's INR is sub-therapeutic at 1.7.  No missed doses or significant changes reported.  No abnormal numbness or weakness noted.  Instructions given for patient to increase dose of warfarin to 5mg every evening.  Patient/daughter voiced understanding.  Follow-up in 2 weeks.

## 2019-08-13 ENCOUNTER — TELEPHONE (OUTPATIENT)
Dept: CARDIOLOGY | Facility: CLINIC | Age: 84
End: 2019-08-13

## 2019-08-13 NOTE — TELEPHONE ENCOUNTER
----- Message from Evin Winston sent at 8/13/2019  9:54 AM CDT -----  Contact: Pt/Daughter (Luz Maria)  Please give pt daughter a call at  [phone] she is calling to see if pt still needs to keep this appt if he just saw the provider

## 2019-08-14 ENCOUNTER — ANTI-COAG VISIT (OUTPATIENT)
Dept: CARDIOLOGY | Facility: CLINIC | Age: 84
End: 2019-08-14
Payer: MEDICARE

## 2019-08-14 ENCOUNTER — LAB VISIT (OUTPATIENT)
Dept: LAB | Facility: HOSPITAL | Age: 84
End: 2019-08-14
Attending: INTERNAL MEDICINE
Payer: MEDICARE

## 2019-08-14 DIAGNOSIS — Z79.01 LONG TERM (CURRENT) USE OF ANTICOAGULANTS: ICD-10-CM

## 2019-08-14 DIAGNOSIS — I48.0 PAROXYSMAL ATRIAL FIBRILLATION: ICD-10-CM

## 2019-08-14 DIAGNOSIS — E87.6 HYPOKALEMIA: ICD-10-CM

## 2019-08-14 LAB
ANION GAP SERPL CALC-SCNC: 12 MMOL/L (ref 8–16)
BUN SERPL-MCNC: 16 MG/DL (ref 8–23)
CALCIUM SERPL-MCNC: 9.2 MG/DL (ref 8.7–10.5)
CHLORIDE SERPL-SCNC: 103 MMOL/L (ref 95–110)
CO2 SERPL-SCNC: 30 MMOL/L (ref 23–29)
CREAT SERPL-MCNC: 1.4 MG/DL (ref 0.5–1.4)
EST. GFR  (AFRICAN AMERICAN): 53 ML/MIN/1.73 M^2
EST. GFR  (NON AFRICAN AMERICAN): 46 ML/MIN/1.73 M^2
GLUCOSE SERPL-MCNC: 208 MG/DL (ref 70–110)
INR PPP: 2.8 (ref 0.8–1.2)
POTASSIUM SERPL-SCNC: 4 MMOL/L (ref 3.5–5.1)
PROTHROMBIN TIME: 29 SEC (ref 9–12.5)
SODIUM SERPL-SCNC: 145 MMOL/L (ref 136–145)

## 2019-08-14 PROCEDURE — 93793 ANTICOAG MGMT PT WARFARIN: CPT | Mod: ,,,

## 2019-08-14 PROCEDURE — 80048 BASIC METABOLIC PNL TOTAL CA: CPT

## 2019-08-14 PROCEDURE — 93793 PR ANTICOAGULANT MGMT FOR PT TAKING WARFARIN: ICD-10-PCS | Mod: ,,,

## 2019-08-14 PROCEDURE — 85610 PROTHROMBIN TIME: CPT

## 2019-08-14 PROCEDURE — 36415 COLL VENOUS BLD VENIPUNCTURE: CPT

## 2019-08-14 NOTE — PROGRESS NOTES
Patient's INR is therapeutic at 2.8.  Patient contacted.  Spoke with his daughter, Mrs. Alexis.  No upcoming procedures or changes reported.  No changes in dose at this time.  Instructions given for patient to continue current dose of warfarin 5mg every evening. Caregiver repeated directions and voiced understanding.   Recheck in 2 weeks, at [facility].  Please call should you have any questions or concerns at [phone] or [phone].

## 2019-08-16 ENCOUNTER — OFFICE VISIT (OUTPATIENT)
Dept: CARDIOLOGY | Facility: CLINIC | Age: 84
End: 2019-08-16
Payer: MEDICARE

## 2019-08-16 VITALS
BODY MASS INDEX: 29.36 KG/M2 | SYSTOLIC BLOOD PRESSURE: 153 MMHG | WEIGHT: 198.19 LBS | DIASTOLIC BLOOD PRESSURE: 81 MMHG | HEIGHT: 69 IN | HEART RATE: 76 BPM

## 2019-08-16 DIAGNOSIS — I10 ESSENTIAL HYPERTENSION: Primary | ICD-10-CM

## 2019-08-16 DIAGNOSIS — I73.9 PVD (PERIPHERAL VASCULAR DISEASE): ICD-10-CM

## 2019-08-16 DIAGNOSIS — Z98.61 S/P PTCA (PERCUTANEOUS TRANSLUMINAL CORONARY ANGIOPLASTY): ICD-10-CM

## 2019-08-16 DIAGNOSIS — I51.89 GRADE III DIASTOLIC DYSFUNCTION: ICD-10-CM

## 2019-08-16 DIAGNOSIS — I27.20 PULMONARY HTN: ICD-10-CM

## 2019-08-16 DIAGNOSIS — G47.33 MILD OBSTRUCTIVE SLEEP APNEA: ICD-10-CM

## 2019-08-16 DIAGNOSIS — Z95.0 PRESENCE OF CARDIAC PACEMAKER: ICD-10-CM

## 2019-08-16 DIAGNOSIS — I50.33 ACUTE ON CHRONIC DIASTOLIC CONGESTIVE HEART FAILURE: ICD-10-CM

## 2019-08-16 DIAGNOSIS — I25.118 CORONARY ARTERY DISEASE OF NATIVE ARTERY OF NATIVE HEART WITH STABLE ANGINA PECTORIS: ICD-10-CM

## 2019-08-16 DIAGNOSIS — I48.0 PAROXYSMAL ATRIAL FIBRILLATION: ICD-10-CM

## 2019-08-16 PROCEDURE — 99999 PR PBB SHADOW E&M-EST. PATIENT-LVL IV: ICD-10-PCS | Mod: PBBFAC,,, | Performed by: NURSE PRACTITIONER

## 2019-08-16 PROCEDURE — 99214 OFFICE O/P EST MOD 30 MIN: CPT | Mod: S$PBB,,, | Performed by: NURSE PRACTITIONER

## 2019-08-16 PROCEDURE — 99214 PR OFFICE/OUTPT VISIT, EST, LEVL IV, 30-39 MIN: ICD-10-PCS | Mod: S$PBB,,, | Performed by: NURSE PRACTITIONER

## 2019-08-16 PROCEDURE — 99214 OFFICE O/P EST MOD 30 MIN: CPT | Mod: PBBFAC | Performed by: NURSE PRACTITIONER

## 2019-08-16 PROCEDURE — 99999 PR PBB SHADOW E&M-EST. PATIENT-LVL IV: CPT | Mod: PBBFAC,,, | Performed by: NURSE PRACTITIONER

## 2019-08-16 RX ORDER — FUROSEMIDE 40 MG/1
40 TABLET ORAL 2 TIMES DAILY
Qty: 60 TABLET | Refills: 6 | Status: ON HOLD | OUTPATIENT
Start: 2019-08-16 | End: 2019-10-10 | Stop reason: SDUPTHER

## 2019-08-16 NOTE — PROGRESS NOTES
Subjective:   Patient ID:  Bola Figueroa Sr. is a 84 y.o. male who presents for evaluation of Congestive Heart Failure      HPI    Bola Figueroa Sr. is a 84 year old male who presents to clinic for follow up. His current medical conditions include CAD s/p PTCA, S/P PPM, HTN, HLP, AFIB on Coumadin, RUTH, Old CVA.   He returns today and states he is doing ok. Denies chest pain or anginal equivalents. He does endorse some degree of SOB and FLOWERS with exertional activities. Denies PND or abdominal bloating. He does endorse 2 pillow orthopnea. Reports regular walking without any issues lately but is using walker for fall prevention. Trace leg swelling. No recent falls, syncope or near syncopal events. Reports compliance with medications and dietary restrictions. NO CNS complaints to suggest TIA or CVA today. No signs of abnormal bleeding on ASA daily.         Past Medical History:   Diagnosis Date    *Atrial fibrillation     Atrial fibrillation     Bilateral carotid artery stenosis 1/15/2016    Cardiac pacemaker 2013    Congestive heart failure 2015    COPD (chronic obstructive pulmonary disease) 2013    Coronary artery disease     Hyperlipidemia     Hypertension     Long-term (current) use of anticoagulants 2013    PVD (peripheral vascular disease)     S/P PTCA (percutaneous transluminal coronary angioplasty) 2013    Sleep apnea 2013    Stroke        Past Surgical History:   Procedure Laterality Date    CATARACT EXTRACTION W/  INTRAOCULAR LENS IMPLANT      INSERT / REPLACE / REMOVE PACEMAKER  2005    REPLACEMENT, PULSE GENERATOR, CARDIAC PACEMAKER Left 3/20/2014    Performed by Behzad Sarmiento MD at [facility] CATH LAB       Social History     Tobacco Use    Smoking status: Former Smoker     Packs/day: 1.50     Years: 35.00     Pack years: 52.50     Types: Cigarettes     Last attempt to quit: 1979     Years since quittin.7    Smokeless tobacco: Never Used   Substance Use  Topics    Alcohol use: No     Comment: QUIT 08/13/77    Drug use: No       Family History   Problem Relation Age of Onset    Diabetes Sister     Fainting Sister     Heart disease Paternal Uncle     Heart attack Paternal Uncle     Hypertension Maternal Grandmother     Diabetes Maternal Grandfather      Wt Readings from Last 3 Encounters:   08/16/19 89.9 kg (198 lb 3.1 oz)   07/25/19 86.6 kg (191 lb)   07/25/19 87 kg (191 lb 12.8 oz)     Temp Readings from Last 3 Encounters:   06/15/19 97.9 °F (36.6 °C) (Oral)   03/27/19 98.4 °F (36.9 °C) (Oral)   09/21/18 98 °F (36.7 °C) (Oral)     BP Readings from Last 3 Encounters:   08/16/19 (!) 153/81   07/25/19 (!) 186/79   07/03/19 (!) 142/72     Pulse Readings from Last 3 Encounters:   08/16/19 76   07/25/19 71   07/03/19 82     Current Outpatient Medications on File Prior to Visit   Medication Sig Dispense Refill    albuterol-ipratropium (DUO-NEB) 2.5 mg-0.5 mg/3 mL nebulizer solution Take 3 mLs by nebulization every 4 (four) hours as needed for Wheezing. Rescue 360 mL 11    amLODIPine (NORVASC) 5 MG tablet Take 1 tablet (5 mg total) by mouth once daily. 30 tablet 5    aspirin (ECOTRIN) 81 MG EC tablet Take 81 mg by mouth once daily.      benzonatate (TESSALON) 200 MG capsule Take 1 capsule (200 mg total) by mouth 3 (three) times daily as needed for Cough. 90 capsule 12    carvedilol (COREG) 12.5 MG tablet Take 1 tablet (12.5 mg total) by mouth 2 (two) times daily with meals. 60 tablet 11    fluticasone-umeclidin-vilanter (TRELEGY ELLIPTA) 100-62.5-25 mcg DsDv Inhale 1 puff into the lungs once daily. 1 each 11    gabapentin (NEURONTIN) 100 MG capsule Take 100 mg by mouth 3 (three) times daily.  0    guaiFENesin (MUCINEX) 600 mg 12 hr tablet Take 2 tablets (1,200 mg total) by mouth 2 (two) times daily as needed for Congestion. Take with water 60 tablet prn    hydrALAZINE (APRESOLINE) 25 MG tablet TAKE 1 TABLET BY MOUTH THREE TIMES DAILY 90 tablet 3     ipratropium (ATROVENT) 42 mcg (0.06 %) nasal spray instill 2 sprays into each nostril three times a day 45 mL 4    isosorbide dinitrate (ISORDIL) 20 MG tablet take 1 tablet by mouth every 12 hours with HYDRALAZINE 60 tablet 6    latanoprost 0.005 % ophthalmic solution Place 1 drop into both eyes nightly.  0    levetiracetam (KEPPRA) 500 MG Tab Take 1 tablet by mouth Twice daily.      lubiprostone (AMITIZA) 8 MCG Cap Take 8 mcg by mouth 2 (two) times daily with meals.      nitroGLYCERIN (NITROBID) 6.5 MG CpSR Take 1 capsule (6.5 mg total) by mouth 2 (two) times daily. 30 capsule 6    pantoprazole (PROTONIX) 40 MG tablet Take 40 mg by mouth.      potassium chloride (KLOR-CON) 10 MEQ TbSR Take 1 tablet (10 mEq total) by mouth 2 (two) times daily. 60 tablet 5    predniSONE (DELTASONE) 5 MG tablet Take 1 tablet (5 mg total) by mouth once daily. Start after high dose prednisone 30 tablet 5    promethazine-dextromethorphan (PROMETHAZINE-DM) 6.25-15 mg/5 mL Syrp Take 5 mLs by mouth every 6 (six) hours as needed. 473 mL 5    tamsulosin (FLOMAX) 0.4 mg Cp24 Take 0.4 mg by mouth once daily.       traMADol (ULTRAM) 50 mg tablet take 1 tablet by mouth every 6 hours if needed for UP TO 10 days  0    warfarin (COUMADIN) 5 MG tablet Take 1/2 tablet by mouth on Sundays; and 1 tablet all other days in the evening as directed by the Coumadin Clinic. (Patient taking differently: Take 1 tablet by mouth every evening as directed by the Coumadin Clinic.) 30 tablet 0    [DISCONTINUED] furosemide (LASIX) 40 MG tablet Take 1 tablet (40 mg total) by mouth 2 (two) times daily. 60 tablet 1     No current facility-administered medications on file prior to visit.          Review of Systems   Constitution: Positive for malaise/fatigue.   HENT: Negative for hearing loss and hoarse voice.    Eyes: Negative for blurred vision and visual disturbance.   Cardiovascular: Positive for dyspnea on exertion and orthopnea (2 pillow). Negative for  "chest pain, claudication, irregular heartbeat, leg swelling, near-syncope, palpitations, paroxysmal nocturnal dyspnea and syncope.   Respiratory: Positive for shortness of breath. Negative for cough, hemoptysis, sleep disturbances due to breathing, snoring and wheezing.    Endocrine: Negative for cold intolerance and heat intolerance.   Hematologic/Lymphatic: Bruises/bleeds easily (on ASA daily).   Skin: Negative for color change, dry skin and nail changes.   Musculoskeletal: Positive for arthritis and back pain. Negative for joint pain and myalgias.   Gastrointestinal: Negative for bloating, abdominal pain, constipation, nausea and vomiting.   Genitourinary: Negative for dysuria, flank pain, hematuria and hesitancy.   Neurological: Negative for headaches, light-headedness, loss of balance, numbness, paresthesias and weakness.   Psychiatric/Behavioral: Negative for altered mental status. The patient has insomnia.    Allergic/Immunologic: Negative for environmental allergies.     BP (!) 153/81 (BP Location: Right arm, Patient Position: Sitting)   Pulse 76   Ht 5' 9" (1.753 m)   Wt 89.9 kg (198 lb 3.1 oz)   BMI 29.27 kg/m²     Objective:   Physical Exam   Constitutional: He is oriented to person, place, and time. He appears well-developed and well-nourished. No distress.   HENT:   Head: Normocephalic and atraumatic.   Eyes: Pupils are equal, round, and reactive to light.   Neck: Normal range of motion and full passive range of motion without pain. Neck supple. No JVD present.   Cardiovascular: Normal rate, regular rhythm, S1 normal, S2 normal and intact distal pulses. PMI is not displaced. Exam reveals no distant heart sounds.   No murmur heard.  Pulses:       Radial pulses are 2+ on the right side, and 2+ on the left side.        Dorsalis pedis pulses are 2+ on the right side, and 2+ on the left side.   Pulmonary/Chest: Effort normal. No accessory muscle usage. No respiratory distress. He has decreased breath " sounds in the right lower field and the left lower field. He has no wheezes. He has no rales.   +sob   Abdominal: Soft. Bowel sounds are normal. He exhibits no distension. There is no tenderness.   Musculoskeletal: Normal range of motion. He exhibits edema (trace BLE).        Right ankle: He exhibits swelling.        Left ankle: He exhibits swelling.   Neurological: He is alert and oriented to person, place, and time.   Skin: Skin is warm and dry. He is not diaphoretic. No cyanosis. Nails show no clubbing.   Psychiatric: He has a normal mood and affect. His speech is normal and behavior is normal. Judgment and thought content normal. Cognition and memory are normal.   Nursing note and vitals reviewed.      Lab Results   Component Value Date    CHOL 142 05/14/2019    CHOL 105 (L) 11/02/2018    CHOL 116 (L) 05/02/2018     Lab Results   Component Value Date    HDL 68 05/14/2019    HDL 48 11/02/2018    HDL 52 05/02/2018     Lab Results   Component Value Date    LDLCALC 61.0 (L) 05/14/2019    LDLCALC 46.4 (L) 11/02/2018    LDLCALC 46.4 (L) 05/02/2018     Lab Results   Component Value Date    TRIG 65 05/14/2019    TRIG 53 11/02/2018    TRIG 88 05/02/2018     Lab Results   Component Value Date    CHOLHDL 47.9 05/14/2019    CHOLHDL 45.7 11/02/2018    CHOLHDL 44.8 05/02/2018       Chemistry        Component Value Date/Time     08/14/2019 1126    K 4.0 08/14/2019 1126     08/14/2019 1126    CO2 30 (H) 08/14/2019 1126    BUN 16 08/14/2019 1126    CREATININE 1.4 08/14/2019 1126     (H) 08/14/2019 1126        Component Value Date/Time    CALCIUM 9.2 08/14/2019 1126    ALKPHOS 53 (L) 06/14/2019 1040    AST 20 06/14/2019 1040    ALT 21 06/14/2019 1040    BILITOT 1.9 (H) 06/14/2019 1040    ESTGFRAFRICA 53 (A) 08/14/2019 1126    EGFRNONAA 46 (A) 08/14/2019 1126          Lab Results   Component Value Date    TSH 2.9 10/18/2007     Lab Results   Component Value Date    INR 2.8 (H) 08/14/2019    INR 1.7 (A)  08/07/2019    INR 2.0 (H) 07/25/2019     Lab Results   Component Value Date    WBC 8.44 06/14/2019    HGB 12.5 (L) 06/14/2019    HCT 38.6 (L) 06/14/2019    MCV 94 06/14/2019     06/14/2019      TEST DESCRIPTION   Technical Quality: This is a portable study performed at the patient's bedside. This is a technically challenging study. There is poor endocardial definition.     General: A catheter is present in the right-sided cardiac chambers.     Aorta: The aortic root is normal in size, measuring 2.9 cm at sinotubular junction and 3.2 cm at Sinuses of Valsalva. The proximal ascending aorta is normal in size, measuring 2.8 cm across.     Left Atrium: The left atrial volume index is normal, measuring 27.25 cc/m2.     Left Ventricle: The left ventricle is normal in size, with an end-diastolic diameter of 4.1 cm, and an end-systolic diameter of 2.7 cm. Wall thickness is markedly increased, with the septum measuring 1.7 cm and the posterior wall measuring 1.5 cm across.   Relative wall thickness was increased at 0.73, and the LV mass index was increased at 156.5 g/m2 consistent with concentric left ventricular hypertrophy. There are no regional wall motion abnormalities. Left ventricular systolic function appears normal.   Visually estimated ejection fraction is 60-65%. The LV Doppler derived stroke volume equals 56.0 ccs.     Diastolic indices: E wave velocity 1.2 m/s, E/A ratio 2.3,  msec., E/e' ratio(avg) 15. There is diastolic dysfunction secondary to restrictive abnormality.     Right Atrium: The right atrium is normal in size, measuring 3.8 cm in length and 2.4 cm in width in the apical view.     Right Ventricle: The right ventricle is normal in size. Global right ventricular systolic function appears normal. Tricuspid annular plane systolic excursion (TAPSE) is .8 cm. The estimated PA systolic pressure is 48 mmHg.     Aortic Valve:  The aortic valve is moderately sclerotic. The aortic valve is  tri-leaflet in structure. The peak velocity obtained across the aortic valve is 2.06 m/s, which translates to a peak gradient of 17 mmHg. The mean gradient is 9 mmHg. Using a   left ventricular outflow tract diameter of 1.9 cm, a left ventricular outflow tract velocity time integral of 20 cm, and a peak instantaneous transvalvular velocity time integral of 48 cm, the calculated aortic valve area is 1.17 cm2(AVAi is 0.57   cm2/m2), consistent with mild to moderate aortic stenosis.     Mitral Valve:  The pressure half time is 69 msec. The calculated mitral valve area is 3.19 cm2. Mitral valve is normal in structure with normal leaflet mobility.     Tricuspid Valve:  There is mild to moderate tricuspid regurgitation. Tricuspid valve is normal in structure with normal leaflet mobility.     Pulmonary Valve:  Pulmonary valve is normal in structure with normal leaflet mobility.     IVC: IVC is normal in size and collapses > 50% with a sniff, suggesting normal right atrial pressure of 3 mmHg.     Atrial Septum: The atrial septum is intact.     Intracavitary: There is no evidence of pericardial effusion, intracavity mass, thrombi, or vegetation.         CONCLUSIONS     1 - Concentric hypertrophy.     2 - No wall motion abnormalities.     3 - Normal left ventricular systolic function (EF 60-65%).     4 - Restrictive LV filling pattern, indicating markedly elevated LAP (grade 3 diastolic dysfunction).     5 - Normal right ventricular systolic function .     6 - Pulmonary hypertension. The estimated PA systolic pressure is 48 mmHg.     7 - Mild to moderate aortic stenosis, BRITTANY = 1.17 cm2, AVAi = 0.57 cm2/m2, peak velocity = 2.06 m/s, mean gradient = 9 mmHg.     8 - Mild to moderate tricuspid regurgitation.             This document has been electronically    SIGNED BY: Caroline Mcdonough MD On: 06/14/2019 16:30  Assessment:      1. Essential hypertension    2. Coronary artery disease of native artery of native heart with stable  angina pectoris    3. Presence of cardiac pacemaker    4. S/P PTCA (percutaneous transluminal coronary angioplasty)    5. PVD (peripheral vascular disease)    6. Paroxysmal atrial fibrillation    7. Acute on chronic diastolic congestive heart failure    8. Mild obstructive sleep apnea    9. Pulmonary HTN    10. Grade III diastolic dysfunction      Patient presents to clinic for 1 month follow up and is doing ok today.  BP remains elevated today.  He has gained 7 lbs since last visit today.   No chest pain or SOB today on exam.  Plan:     Continue same CV meds for now  Needs to be more compliant with dietary/fluid restrictions  Encourage regular physical activity as tolerated  Use walker for fall prevention  Monitor BP at home   Bring BP log to next visit  Dash diet, 2 gm sodium restriction  50-60 ozs fluid restriction  RTC in 3 months with BMP and BNP    KORTNEY Wooten

## 2019-08-22 ENCOUNTER — HOSPITAL ENCOUNTER (INPATIENT)
Facility: HOSPITAL | Age: 84
LOS: 2 days | Discharge: HOME | DRG: 189 | End: 2019-08-24
Attending: EMERGENCY MEDICINE | Admitting: INTERNAL MEDICINE
Payer: MEDICARE

## 2019-08-22 DIAGNOSIS — R06.02 SHORTNESS OF BREATH: ICD-10-CM

## 2019-08-22 DIAGNOSIS — Z98.61 S/P PTCA (PERCUTANEOUS TRANSLUMINAL CORONARY ANGIOPLASTY): ICD-10-CM

## 2019-08-22 DIAGNOSIS — J96.21 ACUTE ON CHRONIC RESPIRATORY FAILURE WITH HYPOXIA AND HYPERCAPNIA: Primary | ICD-10-CM

## 2019-08-22 DIAGNOSIS — J44.1 COPD EXACERBATION: ICD-10-CM

## 2019-08-22 DIAGNOSIS — I50.9 CHF (CONGESTIVE HEART FAILURE): ICD-10-CM

## 2019-08-22 DIAGNOSIS — J96.22 ACUTE ON CHRONIC RESPIRATORY FAILURE WITH HYPOXIA AND HYPERCAPNIA: Primary | ICD-10-CM

## 2019-08-22 LAB
ALBUMIN SERPL BCP-MCNC: 3.6 G/DL (ref 3.5–5.2)
ALLENS TEST: ABNORMAL
ALP SERPL-CCNC: 88 U/L (ref 55–135)
ALT SERPL W/O P-5'-P-CCNC: 28 U/L (ref 10–44)
ANION GAP SERPL CALC-SCNC: 14 MMOL/L (ref 8–16)
AST SERPL-CCNC: 40 U/L (ref 10–40)
BASOPHILS # BLD AUTO: 0.02 K/UL (ref 0–0.2)
BASOPHILS NFR BLD: 0.1 % (ref 0–1.9)
BILIRUB SERPL-MCNC: 1.1 MG/DL (ref 0.1–1)
BNP SERPL-MCNC: 147 PG/ML (ref 0–99)
BUN SERPL-MCNC: 16 MG/DL (ref 8–23)
CALCIUM SERPL-MCNC: 8.8 MG/DL (ref 8.7–10.5)
CHLORIDE SERPL-SCNC: 105 MMOL/L (ref 95–110)
CO2 SERPL-SCNC: 25 MMOL/L (ref 23–29)
CREAT SERPL-MCNC: 1.3 MG/DL (ref 0.5–1.4)
DELSYS: ABNORMAL
DIFFERENTIAL METHOD: ABNORMAL
EOSINOPHIL # BLD AUTO: 0.2 K/UL (ref 0–0.5)
EOSINOPHIL NFR BLD: 1.2 % (ref 0–8)
EP: 6
ERYTHROCYTE [DISTWIDTH] IN BLOOD BY AUTOMATED COUNT: 13.6 % (ref 11.5–14.5)
ERYTHROCYTE [SEDIMENTATION RATE] IN BLOOD BY WESTERGREN METHOD: 16 MM/H
EST. GFR  (AFRICAN AMERICAN): 58 ML/MIN/1.73 M^2
EST. GFR  (NON AFRICAN AMERICAN): 50 ML/MIN/1.73 M^2
FIO2: 50
GLUCOSE SERPL-MCNC: 187 MG/DL (ref 70–110)
HCO3 UR-SCNC: 29.4 MMOL/L (ref 24–28)
HCT VFR BLD AUTO: 36.4 % (ref 40–54)
HGB BLD-MCNC: 11.7 G/DL (ref 14–18)
INR PPP: 2.2 (ref 0.8–1.2)
IP: 14
LYMPHOCYTES # BLD AUTO: 1.7 K/UL (ref 1–4.8)
LYMPHOCYTES NFR BLD: 11.3 % (ref 18–48)
MCH RBC QN AUTO: 30.8 PG (ref 27–31)
MCHC RBC AUTO-ENTMCNC: 32.1 G/DL (ref 32–36)
MCV RBC AUTO: 96 FL (ref 82–98)
MODE: ABNORMAL
MONOCYTES # BLD AUTO: 1.1 K/UL (ref 0.3–1)
MONOCYTES NFR BLD: 7.3 % (ref 4–15)
NEUTROPHILS # BLD AUTO: 11.8 K/UL (ref 1.8–7.7)
NEUTROPHILS NFR BLD: 81.1 % (ref 38–73)
PCO2 BLDA: 52.6 MMHG (ref 35–45)
PH SMN: 7.36 [PH] (ref 7.35–7.45)
PLATELET # BLD AUTO: 184 K/UL (ref 150–350)
PMV BLD AUTO: 11.4 FL (ref 9.2–12.9)
PO2 BLDA: 209 MMHG (ref 80–100)
POC BE: 4 MMOL/L
POC SATURATED O2: 100 % (ref 95–100)
POCT GLUCOSE: 297 MG/DL (ref 70–110)
POTASSIUM SERPL-SCNC: 3.5 MMOL/L (ref 3.5–5.1)
PROT SERPL-MCNC: 7.2 G/DL (ref 6–8.4)
PROTHROMBIN TIME: 23 SEC (ref 9–12.5)
RBC # BLD AUTO: 3.8 M/UL (ref 4.6–6.2)
SAMPLE: ABNORMAL
SITE: ABNORMAL
SODIUM SERPL-SCNC: 144 MMOL/L (ref 136–145)
TROPONIN I SERPL DL<=0.01 NG/ML-MCNC: 0.02 NG/ML (ref 0–0.03)
TROPONIN I SERPL DL<=0.01 NG/ML-MCNC: 0.03 NG/ML (ref 0–0.03)
WBC # BLD AUTO: 14.68 K/UL (ref 3.9–12.7)

## 2019-08-22 PROCEDURE — 85025 COMPLETE CBC W/AUTO DIFF WBC: CPT

## 2019-08-22 PROCEDURE — 63600175 PHARM REV CODE 636 W HCPCS: Performed by: EMERGENCY MEDICINE

## 2019-08-22 PROCEDURE — 93010 ELECTROCARDIOGRAM REPORT: CPT | Mod: ,,, | Performed by: INTERNAL MEDICINE

## 2019-08-22 PROCEDURE — 96375 TX/PRO/DX INJ NEW DRUG ADDON: CPT

## 2019-08-22 PROCEDURE — 93005 ELECTROCARDIOGRAM TRACING: CPT

## 2019-08-22 PROCEDURE — 94640 AIRWAY INHALATION TREATMENT: CPT

## 2019-08-22 PROCEDURE — 21400001 HC TELEMETRY ROOM

## 2019-08-22 PROCEDURE — 93010 EKG 12-LEAD: ICD-10-PCS | Mod: ,,, | Performed by: INTERNAL MEDICINE

## 2019-08-22 PROCEDURE — 25000003 PHARM REV CODE 250: Performed by: EMERGENCY MEDICINE

## 2019-08-22 PROCEDURE — 63600175 PHARM REV CODE 636 W HCPCS: Performed by: INTERNAL MEDICINE

## 2019-08-22 PROCEDURE — 36600 WITHDRAWAL OF ARTERIAL BLOOD: CPT

## 2019-08-22 PROCEDURE — 36415 COLL VENOUS BLD VENIPUNCTURE: CPT

## 2019-08-22 PROCEDURE — 99900035 HC TECH TIME PER 15 MIN (STAT)

## 2019-08-22 PROCEDURE — 83880 ASSAY OF NATRIURETIC PEPTIDE: CPT

## 2019-08-22 PROCEDURE — 25000003 PHARM REV CODE 250: Performed by: INTERNAL MEDICINE

## 2019-08-22 PROCEDURE — 27000221 HC OXYGEN, UP TO 24 HOURS

## 2019-08-22 PROCEDURE — 80053 COMPREHEN METABOLIC PANEL: CPT

## 2019-08-22 PROCEDURE — 82803 BLOOD GASES ANY COMBINATION: CPT

## 2019-08-22 PROCEDURE — 25000242 PHARM REV CODE 250 ALT 637 W/ HCPCS: Performed by: INTERNAL MEDICINE

## 2019-08-22 PROCEDURE — 84484 ASSAY OF TROPONIN QUANT: CPT

## 2019-08-22 PROCEDURE — 94761 N-INVAS EAR/PLS OXIMETRY MLT: CPT

## 2019-08-22 PROCEDURE — 96374 THER/PROPH/DIAG INJ IV PUSH: CPT

## 2019-08-22 PROCEDURE — 84484 ASSAY OF TROPONIN QUANT: CPT | Mod: 91

## 2019-08-22 PROCEDURE — 85610 PROTHROMBIN TIME: CPT

## 2019-08-22 PROCEDURE — 99291 CRITICAL CARE FIRST HOUR: CPT | Mod: 25

## 2019-08-22 RX ORDER — HYDRALAZINE HYDROCHLORIDE 20 MG/ML
10 INJECTION INTRAMUSCULAR; INTRAVENOUS EVERY 8 HOURS PRN
Status: DISCONTINUED | OUTPATIENT
Start: 2019-08-22 | End: 2019-08-24 | Stop reason: HOSPADM

## 2019-08-22 RX ORDER — HYDRALAZINE HYDROCHLORIDE 25 MG/1
25 TABLET, FILM COATED ORAL 3 TIMES DAILY
Status: DISCONTINUED | OUTPATIENT
Start: 2019-08-22 | End: 2019-08-24 | Stop reason: HOSPADM

## 2019-08-22 RX ORDER — ACETAMINOPHEN 325 MG/1
650 TABLET ORAL EVERY 6 HOURS PRN
Status: DISCONTINUED | OUTPATIENT
Start: 2019-08-22 | End: 2019-08-24 | Stop reason: HOSPADM

## 2019-08-22 RX ORDER — ISOSORBIDE DINITRATE 10 MG/1
20 TABLET ORAL 2 TIMES DAILY
Status: DISCONTINUED | OUTPATIENT
Start: 2019-08-22 | End: 2019-08-24 | Stop reason: HOSPADM

## 2019-08-22 RX ORDER — METHYLPREDNISOLONE SOD SUCC 125 MG
40 VIAL (EA) INJECTION EVERY 8 HOURS
Status: DISCONTINUED | OUTPATIENT
Start: 2019-08-22 | End: 2019-08-24 | Stop reason: HOSPADM

## 2019-08-22 RX ORDER — CARVEDILOL 12.5 MG/1
12.5 TABLET ORAL 2 TIMES DAILY WITH MEALS
Status: DISCONTINUED | OUTPATIENT
Start: 2019-08-22 | End: 2019-08-24 | Stop reason: HOSPADM

## 2019-08-22 RX ORDER — LEVOFLOXACIN 5 MG/ML
500 INJECTION, SOLUTION INTRAVENOUS
Status: DISCONTINUED | OUTPATIENT
Start: 2019-08-22 | End: 2019-08-23

## 2019-08-22 RX ORDER — BUDESONIDE 0.5 MG/2ML
0.5 INHALANT ORAL EVERY 12 HOURS
Status: DISCONTINUED | OUTPATIENT
Start: 2019-08-22 | End: 2019-08-24 | Stop reason: HOSPADM

## 2019-08-22 RX ORDER — ASPIRIN 81 MG/1
81 TABLET ORAL DAILY
Status: DISCONTINUED | OUTPATIENT
Start: 2019-08-22 | End: 2019-08-24 | Stop reason: HOSPADM

## 2019-08-22 RX ORDER — WARFARIN SODIUM 5 MG/1
5 TABLET ORAL DAILY
Status: DISCONTINUED | OUTPATIENT
Start: 2019-08-22 | End: 2019-08-24 | Stop reason: HOSPADM

## 2019-08-22 RX ORDER — ARFORMOTEROL TARTRATE 15 UG/2ML
15 SOLUTION RESPIRATORY (INHALATION) 2 TIMES DAILY
Status: DISCONTINUED | OUTPATIENT
Start: 2019-08-22 | End: 2019-08-24 | Stop reason: HOSPADM

## 2019-08-22 RX ORDER — FUROSEMIDE 10 MG/ML
40 INJECTION INTRAMUSCULAR; INTRAVENOUS
Status: COMPLETED | OUTPATIENT
Start: 2019-08-22 | End: 2019-08-22

## 2019-08-22 RX ORDER — IPRATROPIUM BROMIDE AND ALBUTEROL SULFATE 2.5; .5 MG/3ML; MG/3ML
3 SOLUTION RESPIRATORY (INHALATION) EVERY 4 HOURS PRN
Status: DISCONTINUED | OUTPATIENT
Start: 2019-08-22 | End: 2019-08-24 | Stop reason: HOSPADM

## 2019-08-22 RX ORDER — TAMSULOSIN HYDROCHLORIDE 0.4 MG/1
0.4 CAPSULE ORAL DAILY
Status: DISCONTINUED | OUTPATIENT
Start: 2019-08-22 | End: 2019-08-24 | Stop reason: HOSPADM

## 2019-08-22 RX ORDER — FUROSEMIDE 40 MG/1
40 TABLET ORAL 2 TIMES DAILY
Status: DISCONTINUED | OUTPATIENT
Start: 2019-08-22 | End: 2019-08-23

## 2019-08-22 RX ORDER — MAG HYDROX/ALUMINUM HYD/SIMETH 200-200-20
30 SUSPENSION, ORAL (FINAL DOSE FORM) ORAL EVERY 6 HOURS PRN
Status: DISCONTINUED | OUTPATIENT
Start: 2019-08-22 | End: 2019-08-24 | Stop reason: HOSPADM

## 2019-08-22 RX ORDER — FUROSEMIDE 10 MG/ML
80 INJECTION INTRAMUSCULAR; INTRAVENOUS
Status: DISCONTINUED | OUTPATIENT
Start: 2019-08-22 | End: 2019-08-22

## 2019-08-22 RX ORDER — PANTOPRAZOLE SODIUM 40 MG/1
40 TABLET, DELAYED RELEASE ORAL DAILY
Status: DISCONTINUED | OUTPATIENT
Start: 2019-08-22 | End: 2019-08-24 | Stop reason: HOSPADM

## 2019-08-22 RX ORDER — GUAIFENESIN 100 MG/5ML
200 SOLUTION ORAL EVERY 4 HOURS PRN
Status: DISCONTINUED | OUTPATIENT
Start: 2019-08-22 | End: 2019-08-24 | Stop reason: HOSPADM

## 2019-08-22 RX ORDER — DIPHENHYDRAMINE HCL 25 MG
25 CAPSULE ORAL EVERY 6 HOURS PRN
Status: DISCONTINUED | OUTPATIENT
Start: 2019-08-22 | End: 2019-08-24 | Stop reason: HOSPADM

## 2019-08-22 RX ORDER — AMLODIPINE BESYLATE 5 MG/1
5 TABLET ORAL DAILY
Status: DISCONTINUED | OUTPATIENT
Start: 2019-08-22 | End: 2019-08-23

## 2019-08-22 RX ORDER — ONDANSETRON 2 MG/ML
4 INJECTION INTRAMUSCULAR; INTRAVENOUS EVERY 8 HOURS PRN
Status: DISCONTINUED | OUTPATIENT
Start: 2019-08-22 | End: 2019-08-24 | Stop reason: HOSPADM

## 2019-08-22 RX ADMIN — AMLODIPINE BESYLATE 5 MG: 5 TABLET ORAL at 08:08

## 2019-08-22 RX ADMIN — CARVEDILOL 12.5 MG: 12.5 TABLET, FILM COATED ORAL at 05:08

## 2019-08-22 RX ADMIN — ISOSORBIDE DINITRATE 20 MG: 10 TABLET ORAL at 08:08

## 2019-08-22 RX ADMIN — WARFARIN SODIUM 5 MG: 5 TABLET ORAL at 05:08

## 2019-08-22 RX ADMIN — NITROGLYCERIN 1 INCH: 20 OINTMENT TOPICAL at 03:08

## 2019-08-22 RX ADMIN — METHYLPREDNISOLONE SODIUM SUCCINATE 40 MG: 125 INJECTION, POWDER, FOR SOLUTION INTRAMUSCULAR; INTRAVENOUS at 02:08

## 2019-08-22 RX ADMIN — HYDRALAZINE HYDROCHLORIDE 25 MG: 25 TABLET, FILM COATED ORAL at 08:08

## 2019-08-22 RX ADMIN — FUROSEMIDE 40 MG: 40 TABLET ORAL at 11:08

## 2019-08-22 RX ADMIN — ARFORMOTEROL TARTRATE 15 MCG: 15 SOLUTION RESPIRATORY (INHALATION) at 07:08

## 2019-08-22 RX ADMIN — FUROSEMIDE 40 MG: 10 INJECTION, SOLUTION INTRAMUSCULAR; INTRAVENOUS at 03:08

## 2019-08-22 RX ADMIN — HYDRALAZINE HYDROCHLORIDE 25 MG: 25 TABLET, FILM COATED ORAL at 02:08

## 2019-08-22 RX ADMIN — CARVEDILOL 12.5 MG: 12.5 TABLET, FILM COATED ORAL at 08:08

## 2019-08-22 RX ADMIN — ISOSORBIDE DINITRATE 20 MG: 10 TABLET ORAL at 11:08

## 2019-08-22 RX ADMIN — METHYLPREDNISOLONE SODIUM SUCCINATE 40 MG: 125 INJECTION, POWDER, FOR SOLUTION INTRAMUSCULAR; INTRAVENOUS at 11:08

## 2019-08-22 RX ADMIN — BUDESONIDE 0.5 MG: 0.5 SUSPENSION RESPIRATORY (INHALATION) at 07:08

## 2019-08-22 RX ADMIN — LEVOFLOXACIN 500 MG: 500 INJECTION, SOLUTION INTRAVENOUS at 06:08

## 2019-08-22 RX ADMIN — HYDRALAZINE HYDROCHLORIDE 25 MG: 25 TABLET, FILM COATED ORAL at 11:08

## 2019-08-22 RX ADMIN — PANTOPRAZOLE SODIUM 40 MG: 40 TABLET, DELAYED RELEASE ORAL at 08:08

## 2019-08-22 RX ADMIN — ASPIRIN 81 MG: 81 TABLET, COATED ORAL at 09:08

## 2019-08-22 RX ADMIN — GUAIFENESIN 200 MG: 200 SOLUTION ORAL at 11:08

## 2019-08-22 RX ADMIN — FUROSEMIDE 40 MG: 40 TABLET ORAL at 08:08

## 2019-08-22 RX ADMIN — TAMSULOSIN HYDROCHLORIDE 0.4 MG: 0.4 CAPSULE ORAL at 08:08

## 2019-08-22 NOTE — ASSESSMENT & PLAN NOTE
Currently rate controlled.  Continue home dose Coreg.  Continue warfarin, per pharmacy protocol.  INR 2.2.

## 2019-08-22 NOTE — SUBJECTIVE & OBJECTIVE
Past Medical History:   Diagnosis Date    *Atrial fibrillation     Atrial fibrillation     Bilateral carotid artery stenosis 1/15/2016    Cardiac pacemaker 8/7/2013    Congestive heart failure 4/6/2015    COPD (chronic obstructive pulmonary disease) 8/7/2013    Coronary artery disease     Hyperlipidemia     Hypertension     Long-term (current) use of anticoagulants 8/7/2013    PVD (peripheral vascular disease)     S/P PTCA (percutaneous transluminal coronary angioplasty) 8/7/2013    Sleep apnea 8/7/2013    Stroke        Past Surgical History:   Procedure Laterality Date    CATARACT EXTRACTION W/  INTRAOCULAR LENS IMPLANT      INSERT / REPLACE / REMOVE PACEMAKER  2005    REPLACEMENT, PULSE GENERATOR, CARDIAC PACEMAKER Left 3/20/2014    Performed by Behzad Sarmiento MD at [facility] CATH LAB       Review of patient's allergies indicates:   Allergen Reactions    No known drug allergies        No current facility-administered medications on file prior to encounter.      Current Outpatient Medications on File Prior to Encounter   Medication Sig    albuterol-ipratropium (DUO-NEB) 2.5 mg-0.5 mg/3 mL nebulizer solution Take 3 mLs by nebulization every 4 (four) hours as needed for Wheezing. Rescue    amLODIPine (NORVASC) 5 MG tablet Take 1 tablet (5 mg total) by mouth once daily.    aspirin (ECOTRIN) 81 MG EC tablet Take 81 mg by mouth once daily.    benzonatate (TESSALON) 200 MG capsule Take 1 capsule (200 mg total) by mouth 3 (three) times daily as needed for Cough.    carvedilol (COREG) 12.5 MG tablet Take 1 tablet (12.5 mg total) by mouth 2 (two) times daily with meals.    fluticasone-umeclidin-vilanter (TRELEGY ELLIPTA) 100-62.5-25 mcg DsDv Inhale 1 puff into the lungs once daily.    furosemide (LASIX) 40 MG tablet Take 1 tablet (40 mg total) by mouth 2 (two) times daily.    gabapentin (NEURONTIN) 100 MG capsule Take 100 mg by mouth 3 (three) times daily.    guaiFENesin (MUCINEX) 600 mg 12 hr  tablet Take 2 tablets (1,200 mg total) by mouth 2 (two) times daily as needed for Congestion. Take with water    hydrALAZINE (APRESOLINE) 25 MG tablet TAKE 1 TABLET BY MOUTH THREE TIMES DAILY    ipratropium (ATROVENT) 42 mcg (0.06 %) nasal spray instill 2 sprays into each nostril three times a day    isosorbide dinitrate (ISORDIL) 20 MG tablet take 1 tablet by mouth every 12 hours with HYDRALAZINE    latanoprost 0.005 % ophthalmic solution Place 1 drop into both eyes nightly.    levetiracetam (KEPPRA) 500 MG Tab Take 1 tablet by mouth Twice daily.    lubiprostone (AMITIZA) 8 MCG Cap Take 8 mcg by mouth 2 (two) times daily with meals.    nitroGLYCERIN (NITROBID) 6.5 MG CpSR Take 1 capsule (6.5 mg total) by mouth 2 (two) times daily.    pantoprazole (PROTONIX) 40 MG tablet Take 40 mg by mouth.    potassium chloride (KLOR-CON) 10 MEQ TbSR Take 1 tablet (10 mEq total) by mouth 2 (two) times daily.    predniSONE (DELTASONE) 5 MG tablet Take 1 tablet (5 mg total) by mouth once daily. Start after high dose prednisone    promethazine-dextromethorphan (PROMETHAZINE-DM) 6.25-15 mg/5 mL Syrp Take 5 mLs by mouth every 6 (six) hours as needed.    tamsulosin (FLOMAX) 0.4 mg Cp24 Take 0.4 mg by mouth once daily.     traMADol (ULTRAM) 50 mg tablet take 1 tablet by mouth every 6 hours if needed for UP TO 10 days    warfarin (COUMADIN) 5 MG tablet Take 1/2 tablet by mouth on Sundays; and 1 tablet all other days in the evening as directed by the Coumadin Clinic. (Patient taking differently: Take 1 tablet by mouth every evening as directed by the [facility]madin Clinic.)     Family History     Problem Relation (Age of Onset)    Diabetes Sister, Maternal Grandfather    Fainting Sister    Heart attack Paternal Uncle    Heart disease Paternal Uncle    Hypertension Maternal Grandmother        Tobacco Use    Smoking status: Former Smoker     Packs/day: 1.50     Years: 35.00     Pack years: 52.50     Types: Cigarettes     Last  attempt to quit: 1979     Years since quittin.8    Smokeless tobacco: Never Used   Substance and Sexual Activity    Alcohol use: No     Comment: QUIT 77    Drug use: No    Sexual activity: Not on file     Review of Systems   Constitutional: Negative.  Negative for appetite change, fatigue and fever.   HENT: Negative.  Negative for congestion, nosebleeds and sore throat.    Eyes: Negative.  Negative for visual disturbance.   Respiratory: Positive for cough (Dry nonproductive), shortness of breath and wheezing.    Cardiovascular: Positive for chest pain. Negative for palpitations and leg swelling.   Gastrointestinal: Negative.  Negative for abdominal pain, constipation, diarrhea, nausea and vomiting.   Endocrine: Negative.  Negative for polyuria.   Genitourinary: Negative.  Negative for dysuria, flank pain, frequency and urgency.   Musculoskeletal: Negative.  Negative for arthralgias, back pain and joint swelling.   Skin: Negative.  Negative for color change, pallor and rash.   Allergic/Immunologic: Negative.  Negative for immunocompromised state.   Neurological: Negative.  Negative for dizziness, syncope, weakness, light-headedness, numbness and headaches.   Hematological: Negative.    Psychiatric/Behavioral: Negative.  Negative for confusion and hallucinations. The patient is not nervous/anxious.    All other systems reviewed and are negative.    Objective:     Vital Signs (Most Recent):  Temp: 97.8 °F (36.6 °C) (19 0344)  Pulse: 76 (19 0550)  Resp: (!) 24 (19 0550)  BP: (!) 146/105 (19 0549)  SpO2: (!) 94 % (19 0550) Vital Signs (24h Range):  Temp:  [97.8 °F (36.6 °C)] 97.8 °F (36.6 °C)  Pulse:  [70-78] 76  Resp:  [20-39] 24  SpO2:  [87 %-99 %] 94 %  BP: (146-186)/() 146/105     Weight: 90.4 kg (199 lb 6.4 oz)  Body mass index is 29.45 kg/m².    Physical Exam   Constitutional: He is oriented to person, place, and time. No distress.   Elderly pleasant   American male, in mild respiratory distress, just taken off BiPAP.  Spouse at the bedside.   HENT:   Head: Normocephalic and atraumatic.   Eyes: Pupils are equal, round, and reactive to light. Conjunctivae are normal. No scleral icterus.   Neck: Normal range of motion. Neck supple. No thyromegaly present.   Cardiovascular: Normal rate, regular rhythm and normal heart sounds.   No murmur heard.  Pulmonary/Chest: He is in respiratory distress (Mild, currently off BiPAP, mild intercostal muscle retractions). He has wheezes. He exhibits no tenderness.   Abdominal: Soft. Bowel sounds are normal. There is no tenderness.   Musculoskeletal: Normal range of motion. He exhibits no edema or tenderness.   Neurological: He is alert and oriented to person, place, and time. No cranial nerve deficit. He exhibits normal muscle tone. Coordination normal.   Skin: Skin is warm and dry. He is not diaphoretic.   Psychiatric: He has a normal mood and affect. His behavior is normal. Thought content normal.   Nursing note and vitals reviewed.        CRANIAL NERVES     CN III, IV, VI   Pupils are equal, round, and reactive to light.       Significant Labs:   ABGs:   Recent Labs   Lab 08/22/19  0341   PH 7.355   PCO2 52.6*   HCO3 29.4*   POCSATURATED 100   BE 4     BMP:   Recent Labs   Lab 08/22/19  0300   *      K 3.5      CO2 25   BUN 16   CREATININE 1.3   CALCIUM 8.8     CBC:   Recent Labs   Lab 08/22/19  0300   WBC 14.68*   HGB 11.7*   HCT 36.4*        CMP:   Recent Labs   Lab 08/22/19  0300      K 3.5      CO2 25   *   BUN 16   CREATININE 1.3   CALCIUM 8.8   PROT 7.2   ALBUMIN 3.6   BILITOT 1.1*   ALKPHOS 88   AST 40   ALT 28   ANIONGAP 14   EGFRNONAA 50*     Cardiac Markers:   Recent Labs   Lab 08/22/19  0300   *     Troponin:   Recent Labs   Lab 08/22/19  0300   TROPONINI 0.021     All pertinent labs within the past 24 hours have been reviewed.    Significant Imaging: I have reviewed  and interpreted all pertinent imaging results/findings within the past 24 hours.     Imaging Results          X-Ray Chest AP Portable (In process)                 I have independently reviewed and interpreted the EKG.     I have independently reviewed all pertinent labs within the past 24 hours.    I have independently reviewed, visualized and interpreted all pertinent imaging results within the past 24 hours and discussed the findings with the ED physician, Dr. Harper.

## 2019-08-22 NOTE — ASSESSMENT & PLAN NOTE
Mild wheezing with mild use of intercostal muscles.  Solu-Medrol 40 mg IV every 8 hr.  Bronchodilators every 6 hr scheduled.  Supplemental oxygen to maintain saturations greater than 90%.

## 2019-08-22 NOTE — PROGRESS NOTES
Clinical Pharmacy Progress Note: Patient Education    Pharmacist educated patient on warfarin indication, side effects, and drug interactions. Pharmacist discussed importance of medication compliance and INR monitoring and reviewed signs of abnormal bleeding. Pharmacist gave patient warfarin educational handout. Instructed patient to contact the Coumadin Clinic at [phone] for follow-up after discharge. Patient expressed understanding and had no further questions.    Thank you for allowing us to participate in this patient's care.     Elena Fink 8/22/2019 3:05 PM

## 2019-08-22 NOTE — ED PROVIDER NOTES
SCRIBE #1 NOTE: I, Jhonny Cochran, am scribing for, and in the presence of, Edd Harper MD. I have scribed the entire note.       History     Chief Complaint   Patient presents with    Shortness of Breath     Review of patient's allergies indicates:   Allergen Reactions    No known drug allergies          History of Present Illness     HPI    8/22/2019, 3:31 AM  History obtained from the patient      History of Present Illness: Bola Figueroa Sr. is a 84 y.o. male patient with a PMHx of CHF, A-fib, COPD, CAD, and cardiac pacemaker who presents to the Emergency Department for evaluation of SOB which onset gradually 8.5 hours ago. Per EMS, pt's SpO2 was 71% on RA at the scene and 100% on CPAP en route. Symptoms are constant and moderate in severity. Sxs are exacerbated by lying down. Associated sxs include productive cough (mucus). Patient denies any fever, n/v, CP, abd pain, and all other sxs at this time. Prior Tx includes duoneb, solu-medrol and NTG drip administered en route by EMS with some effect. No further complaints or concerns at this time.     HPI and ROS limited due to respiratory distress    Arrival mode:  EMS      PCP: Naga Bhatti MD        Past Medical History:  Past Medical History:   Diagnosis Date    *Atrial fibrillation     Atrial fibrillation     Bilateral carotid artery stenosis 1/15/2016    Cardiac pacemaker 8/7/2013    Congestive heart failure 4/6/2015    COPD (chronic obstructive pulmonary disease) 8/7/2013    Coronary artery disease     Hyperlipidemia     Hypertension     Long-term (current) use of anticoagulants 8/7/2013    PVD (peripheral vascular disease)     S/P PTCA (percutaneous transluminal coronary angioplasty) 8/7/2013    Sleep apnea 8/7/2013    Stroke        Past Surgical History:  Past Surgical History:   Procedure Laterality Date    CATARACT EXTRACTION W/  INTRAOCULAR LENS IMPLANT      INSERT / REPLACE / REMOVE PACEMAKER  2005    REPLACEMENT, PULSE  GENERATOR, CARDIAC PACEMAKER Left 3/20/2014    Performed by Behzad Sarmiento MD at [facility] CATH LAB         Family History:  Family History   Problem Relation Age of Onset    Diabetes Sister     Fainting Sister     Heart disease Paternal Uncle     Heart attack Paternal Uncle     Hypertension Maternal Grandmother     Diabetes Maternal Grandfather        Social History:  Social History     Tobacco Use    Smoking status: Former Smoker     Packs/day: 1.50     Years: 35.00     Pack years: 52.50     Types: Cigarettes     Last attempt to quit: 1979     Years since quittin.8    Smokeless tobacco: Never Used   Substance and Sexual Activity    Alcohol use: No     Comment: QUIT 77    Drug use: No    Sexual activity: unknown        Review of Systems     Review of Systems   Unable to perform ROS: Severe respiratory distress        Physical Exam     Initial Vitals   BP Pulse Resp Temp SpO2   19 0344 19 0330 19 0344 19 0344 19 0344   (!) 172/78 70 (!) 30 97.8 °F (36.6 °C) 99 %      MAP       --                 Physical Exam  Nursing Notes and Vital Signs Reviewed.  Constitutional: Patient is in moderate distress. Well-developed and well-nourished.  Head: Atraumatic. Normocephalic.  Eyes: PERRL. EOM intact. Conjunctivae are not pale. No scleral icterus.  ENT: Mucous membranes are moist. Oropharynx is clear and symmetric.    Neck: Supple. Full ROM. No lymphadenopathy.  Cardiovascular: Regular rate. Regular rhythm. No murmurs, rubs, or gallops. Distal pulses are 2+ and symmetric. JVD noted.  Pulmonary/Chest: Pt is in severe respiratory distress. No wheezing or rales. Speaking in 1-2 word sentences. Tachypneic.  Abdominal: Soft and non-distended.  There is no tenderness.  No rebound, guarding, or rigidity. Good bowel sounds.  Genitourinary: No CVA tenderness  Musculoskeletal: Moves all extremities. No obvious deformities. 1+ edema to the bilateral lower extremities. No calf  tenderness.  Skin: Warm and dry.  Neurological:  Alert, awake, and appropriate.  Normal speech.  No acute focal neurological deficits are appreciated.  Psychiatric: Normal affect. Good eye contact. Appropriate in content.     ED Course   Critical Care  Date/Time: 8/22/2019 5:42 AM  Performed by: Edd Harper MD  Authorized by: Edd Harper MD   Direct patient critical care time: 17 minutes  Additional history critical care time: 13 minutes  Ordering / reviewing critical care time: 7 minutes  Documentation critical care time: 7 minutes  Total critical care time (exclusive of procedural time) : 44 minutes  Critical care time was exclusive of separately billable procedures and treating other patients and teaching time.  Critical care was necessary to treat or prevent imminent or life-threatening deterioration of the following conditions: respiratory failure.  Critical care was time spent personally by me on the following activities: blood draw for specimens, development of treatment plan with patient or surrogate, interpretation of cardiac output measurements, evaluation of patient's response to treatment, examination of patient, obtaining history from patient or surrogate, ordering and performing treatments and interventions, ordering and review of laboratory studies, ordering and review of radiographic studies, pulse oximetry, re-evaluation of patient's condition, review of old charts and discussions with consultants.        ED Vital Signs:  Vitals:    08/23/19 0900 08/23/19 1100 08/23/19 1300 08/23/19 1550   BP:    (!) 167/77   Pulse: 69 75 69 64   Resp:    20   Temp:    98.5 °F (36.9 °C)   TempSrc:    Oral   SpO2:    98%   Weight:       Height:        08/23/19 1700 08/23/19 1716 08/23/19 1947 08/23/19 1953   BP:  (!) 182/82 (!) 177/80    Pulse: 75 70 73 75   Resp:   20 18   Temp:  98.9 °F (37.2 °C) 98.4 °F (36.9 °C)    TempSrc:  Oral Oral    SpO2:  96% 100% 99%   Weight:       Height:        08/23/19 2105  08/23/19 2305 08/24/19 0025 08/24/19 0105   BP:   (!) 176/81    Pulse: 69 70 73 79   Resp:   18    Temp:   98.2 °F (36.8 °C)    TempSrc:   Oral    SpO2:   96%    Weight:       Height:        08/24/19 0305 08/24/19 0500 08/24/19 0518   BP:   (!) 169/73   Pulse: 80  70   Resp:   18   Temp:   98 °F (36.7 °C)   TempSrc:   Oral   SpO2:   97%   Weight:  88 kg (194 lb 0.1 oz)    Height:          Abnormal Lab Results:  Labs Reviewed   CBC W/ AUTO DIFFERENTIAL - Abnormal; Notable for the following components:       Result Value    WBC 14.68 (*)     RBC 3.80 (*)     Hemoglobin 11.7 (*)     Hematocrit 36.4 (*)     Gran # (ANC) 11.8 (*)     Mono # 1.1 (*)     Gran% 81.1 (*)     Lymph% 11.3 (*)     All other components within normal limits   COMPREHENSIVE METABOLIC PANEL - Abnormal; Notable for the following components:    Glucose 187 (*)     Total Bilirubin 1.1 (*)     eGFR if  58 (*)     eGFR if non  50 (*)     All other components within normal limits   B-TYPE NATRIURETIC PEPTIDE - Abnormal; Notable for the following components:     (*)     All other components within normal limits   PROTIME-INR - Abnormal; Notable for the following components:    Prothrombin Time 23.0 (*)     INR 2.2 (*)     All other components within normal limits   TROPONIN I - Abnormal; Notable for the following components:    Troponin I 0.033 (*)     All other components within normal limits   ISTAT PROCEDURE - Abnormal; Notable for the following components:    POC PCO2 52.6 (*)     POC PO2 209 (*)     POC HCO3 29.4 (*)     All other components within normal limits   TROPONIN I        All Lab Results:  Results for orders placed or performed during the hospital encounter of 08/22/19   CBC auto differential   Result Value Ref Range    WBC 14.68 (H) 3.90 - 12.70 K/uL    RBC 3.80 (L) 4.60 - 6.20 M/uL    Hemoglobin 11.7 (L) 14.0 - 18.0 g/dL    Hematocrit 36.4 (L) 40.0 - 54.0 %    Mean Corpuscular Volume 96 82 - 98 fL     Mean Corpuscular Hemoglobin 30.8 27.0 - 31.0 pg    Mean Corpuscular Hemoglobin Conc 32.1 32.0 - 36.0 g/dL    RDW 13.6 11.5 - 14.5 %    Platelets 184 150 - 350 K/uL    MPV 11.4 9.2 - 12.9 fL    Gran # (ANC) 11.8 (H) 1.8 - 7.7 K/uL    Lymph # 1.7 1.0 - 4.8 K/uL    Mono # 1.1 (H) 0.3 - 1.0 K/uL    Eos # 0.2 0.0 - 0.5 K/uL    Baso # 0.02 0.00 - 0.20 K/uL    Gran% 81.1 (H) 38.0 - 73.0 %    Lymph% 11.3 (L) 18.0 - 48.0 %    Mono% 7.3 4.0 - 15.0 %    Eosinophil% 1.2 0.0 - 8.0 %    Basophil% 0.1 0.0 - 1.9 %    Differential Method Automated    Comprehensive metabolic panel   Result Value Ref Range    Sodium 144 136 - 145 mmol/L    Potassium 3.5 3.5 - 5.1 mmol/L    Chloride 105 95 - 110 mmol/L    CO2 25 23 - 29 mmol/L    Glucose 187 (H) 70 - 110 mg/dL    BUN, Bld 16 8 - 23 mg/dL    Creatinine 1.3 0.5 - 1.4 mg/dL    Calcium 8.8 8.7 - 10.5 mg/dL    Total Protein 7.2 6.0 - 8.4 g/dL    Albumin 3.6 3.5 - 5.2 g/dL    Total Bilirubin 1.1 (H) 0.1 - 1.0 mg/dL    Alkaline Phosphatase 88 55 - 135 U/L    AST 40 10 - 40 U/L    ALT 28 10 - 44 U/L    Anion Gap 14 8 - 16 mmol/L    eGFR if African American 58 (A) >60 mL/min/1.73 m^2    eGFR if non African American 50 (A) >60 mL/min/1.73 m^2   Troponin I   Result Value Ref Range    Troponin I 0.021 0.000 - 0.026 ng/mL   Brain natriuretic peptide   Result Value Ref Range     (H) 0 - 99 pg/mL   Protime-INR   Result Value Ref Range    Prothrombin Time 23.0 (H) 9.0 - 12.5 sec    INR 2.2 (H) 0.8 - 1.2   Troponin I   Result Value Ref Range    Troponin I 0.033 (H) 0.000 - 0.026 ng/mL   Troponin I   Result Value Ref Range    Troponin I 0.030 (H) 0.000 - 0.026 ng/mL   Troponin I   Result Value Ref Range    Troponin I 0.026 0.000 - 0.026 ng/mL   CBC auto differential   Result Value Ref Range    WBC 13.77 (H) 3.90 - 12.70 K/uL    RBC 3.75 (L) 4.60 - 6.20 M/uL    Hemoglobin 11.3 (L) 14.0 - 18.0 g/dL    Hematocrit 35.4 (L) 40.0 - 54.0 %    Mean Corpuscular Volume 94 82 - 98 fL    Mean Corpuscular  Hemoglobin 30.1 27.0 - 31.0 pg    Mean Corpuscular Hemoglobin Conc 31.9 (L) 32.0 - 36.0 g/dL    RDW 13.2 11.5 - 14.5 %    Platelets 169 150 - 350 K/uL    MPV 11.4 9.2 - 12.9 fL    Gran # (ANC) 12.7 (H) 1.8 - 7.7 K/uL    Lymph # 0.8 (L) 1.0 - 4.8 K/uL    Mono # 0.3 0.3 - 1.0 K/uL    Eos # 0.0 0.0 - 0.5 K/uL    Baso # 0.00 0.00 - 0.20 K/uL    Gran% 92.3 (H) 38.0 - 73.0 %    Lymph% 5.7 (L) 18.0 - 48.0 %    Mono% 2.4 (L) 4.0 - 15.0 %    Eosinophil% 0.0 0.0 - 8.0 %    Basophil% 0.0 0.0 - 1.9 %    Differential Method Automated    Basic metabolic panel   Result Value Ref Range    Sodium 142 136 - 145 mmol/L    Potassium 3.6 3.5 - 5.1 mmol/L    Chloride 102 95 - 110 mmol/L    CO2 27 23 - 29 mmol/L    Glucose 184 (H) 70 - 110 mg/dL    BUN, Bld 21 8 - 23 mg/dL    Creatinine 1.1 0.5 - 1.4 mg/dL    Calcium 9.2 8.7 - 10.5 mg/dL    Anion Gap 13 8 - 16 mmol/L    eGFR if African American >60 >60 mL/min/1.73 m^2    eGFR if non African American >60 >60 mL/min/1.73 m^2   Magnesium   Result Value Ref Range    Magnesium 1.9 1.6 - 2.6 mg/dL   Phosphorus   Result Value Ref Range    Phosphorus 3.3 2.7 - 4.5 mg/dL   Protime-INR   Result Value Ref Range    Prothrombin Time 18.9 (H) 9.0 - 12.5 sec    INR 1.8 (H) 0.8 - 1.2   D dimer, quantitative   Result Value Ref Range    D-Dimer <0.19 <0.50 mg/L FEU   Brain natriuretic peptide   Result Value Ref Range     (H) 0 - 99 pg/mL   Troponin I   Result Value Ref Range    Troponin I 0.031 (H) 0.000 - 0.026 ng/mL   CBC auto differential   Result Value Ref Range    WBC 18.55 (H) 3.90 - 12.70 K/uL    RBC 3.91 (L) 4.60 - 6.20 M/uL    Hemoglobin 11.8 (L) 14.0 - 18.0 g/dL    Hematocrit 37.2 (L) 40.0 - 54.0 %    Mean Corpuscular Volume 95 82 - 98 fL    Mean Corpuscular Hemoglobin 30.2 27.0 - 31.0 pg    Mean Corpuscular Hemoglobin Conc 31.7 (L) 32.0 - 36.0 g/dL    RDW 13.3 11.5 - 14.5 %    Platelets 202 150 - 350 K/uL    MPV 12.1 9.2 - 12.9 fL    Gran # (ANC) 16.8 (H) 1.8 - 7.7 K/uL    Lymph # 0.9 (L)  1.0 - 4.8 K/uL    Mono # 0.8 0.3 - 1.0 K/uL    Eos # 0.0 0.0 - 0.5 K/uL    Baso # 0.01 0.00 - 0.20 K/uL    Gran% 91.2 (H) 38.0 - 73.0 %    Lymph% 4.9 (L) 18.0 - 48.0 %    Mono% 4.5 4.0 - 15.0 %    Eosinophil% 0.0 0.0 - 8.0 %    Basophil% 0.1 0.0 - 1.9 %    Differential Method Automated    Basic metabolic panel   Result Value Ref Range    Sodium 141 136 - 145 mmol/L    Potassium 3.6 3.5 - 5.1 mmol/L    Chloride 99 95 - 110 mmol/L    CO2 26 23 - 29 mmol/L    Glucose 194 (H) 70 - 110 mg/dL    BUN, Bld 28 (H) 8 - 23 mg/dL    Creatinine 1.3 0.5 - 1.4 mg/dL    Calcium 9.4 8.7 - 10.5 mg/dL    Anion Gap 16 8 - 16 mmol/L    eGFR if African American 58 (A) >60 mL/min/1.73 m^2    eGFR if non African American 50 (A) >60 mL/min/1.73 m^2   Protime-INR   Result Value Ref Range    Prothrombin Time 20.2 (H) 9.0 - 12.5 sec    INR 1.9 (H) 0.8 - 1.2   ISTAT PROCEDURE   Result Value Ref Range    POC PH 7.355 7.35 - 7.45    POC PCO2 52.6 (H) 35 - 45 mmHg    POC PO2 209 (H) 80 - 100 mmHg    POC HCO3 29.4 (H) 24 - 28 mmol/L    POC BE 4 -2 to 2 mmol/L    POC SATURATED O2 100 95 - 100 %    Rate 16     Sample ARTERIAL     Site LR     Allens Test Pass     DelSys CPAP/BiPAP     Mode BiPAP     FiO2 50     IP 14     EP 6    POCT glucose   Result Value Ref Range    POCT Glucose 297 (H) 70 - 110 mg/dL         Imaging Results:  Imaging Results          X-Ray Chest AP Portable (Final result)  Result time 08/22/19 07:58:59    Final result by Jean Marie Franklin MD (08/22/19 07:58:59)                 Impression:      Stable chest x-ray.      Electronically signed by: Jean Marie Franklin MD  Date:    08/22/2019  Time:    07:58             Narrative:    EXAMINATION:  XR CHEST AP PORTABLE    CLINICAL HISTORY:  CHF., CHF;    COMPARISON:  06/14/2019.    FINDINGS:  Left dual lead pacemaker.  Mild cardiomegaly with aortic atherosclerosis.    Elevation of the right diaphragm.  No acute infiltrate.                               RADIOLOGY REPORT (Final result)   Result time 08/23/19 16:34:50    Final result by Unknown User (08/23/19 16:34:50)                                 5:01 AM: Per STAT radiology, pt's CXR results: stable hypoventilatory exam.            2D Echo with color flow doppler from 6/14/19:    Date of Procedure: 06/14/2019    TEST DESCRIPTION   Technical Quality: This is a portable study performed at the patient's bedside. This is a technically challenging study. There is poor endocardial definition.     General: A catheter is present in the right-sided cardiac chambers.     Aorta: The aortic root is normal in size, measuring 2.9 cm at sinotubular junction and 3.2 cm at Sinuses of Valsalva. The proximal ascending aorta is normal in size, measuring 2.8 cm across.     Left Atrium: The left atrial volume index is normal, measuring 27.25 cc/m2.     Left Ventricle: The left ventricle is normal in size, with an end-diastolic diameter of 4.1 cm, and an end-systolic diameter of 2.7 cm. Wall thickness is markedly increased, with the septum measuring 1.7 cm and the posterior wall measuring 1.5 cm across.  Relative wall thickness was increased at 0.73, and the LV mass index was increased at 156.5 g/m2 consistent with concentric left ventricular hypertrophy. There are no regional wall motion abnormalities. Left ventricular systolic function appears normal.  Visually estimated ejection fraction is 60-65%. The LV Doppler derived stroke volume equals 56.0 ccs.     Diastolic indices: E wave velocity 1.2 m/s, E/A ratio 2.3,  msec., E/e' ratio(avg) 15. There is diastolic dysfunction secondary to restrictive abnormality.     Right Atrium: The right atrium is normal in size, measuring 3.8 cm in length and 2.4 cm in width in the apical view.     Right Ventricle: The right ventricle is normal in size. Global right ventricular systolic function appears normal. Tricuspid annular plane systolic excursion (TAPSE) is .8 cm. The estimated PA systolic pressure is 48 mmHg.     Aortic  Valve: The aortic valve is moderately sclerotic. The aortic valve is tri-leaflet in structure. The peak velocity obtained across the aortic valve is 2.06 m/s, which translates to a peak gradient of 17 mmHg. The mean gradient is 9 mmHg. Using a   left ventricular outflow tract diameter of 1.9 cm, a left ventricular outflow tract velocity time integral of 20 cm, and a peak instantaneous transvalvular velocity time integral of 48 cm, the calculated aortic valve area is 1.17 cm2(AVAi is 0.57   cm2/m2), consistent with mild to moderate aortic stenosis.     Mitral Valve: The pressure half time is 69 msec. The calculated mitral valve area is 3.19 cm2. Mitral valve is normal in structure with normal leaflet mobility.     Tricuspid Valve: There is mild to moderate tricuspid regurgitation. Tricuspid valve is normal in structure with normal leaflet mobility.     Pulmonary Valve: Pulmonary valve is normal in structure with normal leaflet mobility.     IVC: IVC is normal in size and collapses > 50% with a sniff, suggesting normal right atrial pressure of 3 mmHg.     Atrial Septum: The atrial septum is intact.     Intracavitary: There is no evidence of pericardial effusion, intracavity mass, thrombi, or vegetation.     CONCLUSIONS   1 - Concentric hypertrophy.   2 - No wall motion abnormalities.   3 - Normal left ventricular systolic function (EF 60-65%).   4 - Restrictive LV filling pattern, indicating markedly elevated LAP (grade 3 diastolic dysfunction).   5 - Normal right ventricular systolic function .   6 - Pulmonary hypertension. The estimated PA systolic pressure is 48 mmHg.   7 - Mild to moderate aortic stenosis, BRITTANY = 1.17 cm2, AVAi = 0.57 cm2/m2, peak velocity = 2.06 m/s, mean gradient = 9 mmHg.   8 - Mild to moderate tricuspid regurgitation.     This document has been electronically   SIGNED BY: Caroline Mcdonough MD On: 06/14/2019 16:30          The EKG was ordered, reviewed, and independently interpreted by the ED  provider.  Interpretation time: 0329  Rate: 83 BPM  Rhythm: Electronic ventricular pacemaker           The Emergency Provider reviewed the vital signs and test results, which are outlined above.     ED Discussion     5:48 AM: Pt states that he is not on home O2. Pt's SpO2 fell to 85% on RA after taken off NC.    5:49 AM: Discussed case with Dr. Buckner ([facility]). Dr. Buckner agrees with current care and management of pt and accepts admission.   Admitting Service: [facility]  Admitting Physician: Dr. Buckner  Admit to: Inpatient tele    5:53 AM: Re-evaluated pt. I have discussed test results, shared treatment plan, and the need for admission with patient and family at bedside. Pt and family express understanding at this time and agree with all information. All questions answered. Pt and family have no further questions or concerns at this time. Pt is ready for admit.          ED Medication(s):  Medications   hydrALAZINE injection 10 mg (has no administration in time range)   acetaminophen tablet 650 mg (has no administration in time range)   ondansetron injection 4 mg (has no administration in time range)   diphenhydrAMINE capsule 25 mg (has no administration in time range)   guaifenesin 100 mg/5 ml syrup 200 mg (200 mg Oral Given 8/23/19 1527)   aluminum-magnesium hydroxide-simethicone 200-200-20 mg/5 mL suspension 30 mL (has no administration in time range)   albuterol-ipratropium 2.5 mg-0.5 mg/3 mL nebulizer solution 3 mL ( Nebulization Canceled Entry 8/23/19 2120)   methylPREDNISolone sodium succinate injection 40 mg (40 mg Intravenous Not Given 8/24/19 0600)   aspirin EC tablet 81 mg (81 mg Oral Given 8/23/19 0811)   carvedilol tablet 12.5 mg (12.5 mg Oral Given 8/23/19 1712)   hydrALAZINE tablet 25 mg (25 mg Oral Given 8/23/19 2136)   isosorbide dinitrate tablet 20 mg (20 mg Oral Given 8/23/19 2136)   pantoprazole EC tablet 40 mg (40 mg Oral Given 8/23/19 0811)   tamsulosin 24 hr capsule 0.4 mg  (0.4 mg Oral Given 8/23/19 0811)   arformoterol nebulizer solution 15 mcg (15 mcg Nebulization Given 8/23/19 1953)     And   budesonide nebulizer solution 0.5 mg (0.5 mg Nebulization Given 8/23/19 1953)   warfarin (COUMADIN) tablet 5 mg (5 mg Oral Given 8/23/19 1712)   cefTRIAXone (ROCEPHIN) 2 g in dextrose 5 % 50 mL IVPB (2 g Intravenous New Bag 8/23/19 1327)   furosemide injection 40 mg (40 mg Intravenous Given 8/23/19 1712)   enoxaparin injection 90 mg (90 mg Subcutaneous Not Given 8/24/19 0415)   amLODIPine tablet 10 mg (has no administration in time range)   nitroGLYCERIN 2% TD oint ointment 1 inch (1 inch Topical (Top) Given 8/22/19 0347)   furosemide injection 40 mg (40 mg Intravenous Given 8/22/19 0347)   QUEtiapine tablet 25 mg (25 mg Oral Given 8/24/19 0102)       Current Discharge Medication List                    Medical Decision Making:   Clinical Tests:   Lab Tests: Ordered and Reviewed  Radiological Study: Ordered and Reviewed  Medical Tests: Reviewed and Ordered             Scribe Attestation:   Scribe #1: I performed the above scribed service and the documentation accurately describes the services I performed. I attest to the accuracy of the note.     Attending:   Physician Attestation Statement for Scribe #1: I, Edd Harper MD, personally performed the services described in this documentation, as scribed by Jhonny Cochran, in my presence, and it is both accurate and complete.           Clinical Impression       ICD-10-CM ICD-9-CM   1. Acute on chronic respiratory failure with hypoxia and hypercapnia J96.21 518.84    J96.22 786.09     799.02   2. Shortness of breath R06.02 786.05   3. CHF (congestive heart failure) I50.9 428.0       Disposition:   Disposition: Admitted  Condition: Serious         Edd Harper MD  08/24/19 3747

## 2019-08-22 NOTE — ASSESSMENT & PLAN NOTE
Secondary to COPD exacerbation.  Patient not currently home oxygen dependent.  ABG revealed pH 7.355, pCO2 52, PO2 209, FiO2 50%.  Just came off of BiPAP.  Continue supplemental oxygen to maintain saturations greater than 90%.

## 2019-08-22 NOTE — HPI
Mr. Figueroa he is an elderly 84-year-old pleasant  male with PMH significant for AFib on oral anticoagulation with warfarin, peripheral arterial disease, COPD, former tobacco user, hypertension, presented to the ED complaining of shortness of breath, wheezing, chest pain. He was found to be in rate respiratory distress initially, was placed on BiPAP.  Patient is not home oxygen-dependent.  He quickly improved on BiPAP, eventually placed on nasal cannula, but continued to have wheezing and mild intercostal muscular retractions.  Laboratory workup revealed WBC 88641, INR 2.2.  ABG pH 7.355, pCO2 52.6, PO2 two nine on 50% FiO2.  Chest x-ray without infiltrates, masses or effusions.  Admitting diagnosis mild COPD exacerbation.

## 2019-08-22 NOTE — PLAN OF CARE
"Met with patient initial assessment completed. Patient is independent with adls and iadls by using his assistive devices. 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 She was released from a skilled facility. Patient's son and daughter who live close assist as needed. Patient had oxygen but stated that it was picked up . Patient would benefit from a home oxygen evaluation. Patient denies any post hospital needs or services at this time. CM to follow.  Updated white board with 's name and number. Transitional Care Folder, Discharge Planning Begins on Admission pamphlet, [facility] Bedside Delivery pamphlet, Advance Directive information given to patient along with the contact information given.Instructed patient or family to call with any questions or concerns.          D/C Plan: Home  PCP:Naga Bhatti MD  Preferred Pharmacy:[facility]   Discharge transportation:pov  My [facility]:declined  Pharmacy Bedside Delivery: unsure         08/22/19 4350   Discharge Assessment   Assessment Type Discharge Planning Assessment   Confirmed/corrected address and phone number on facesheet? Yes   Assessment information obtained from? Patient;Medical Record   Expected Length of Stay (days)   (tbd)   Communicated expected length of stay with patient/caregiver yes   Prior to hospitilization cognitive status: Alert/Oriented   Prior to hospitalization functional status: Independent   Current cognitive status: Alert/Oriented   Current Functional Status: Needs Assistance   Facility Arrived From: home   Lives With spouse   Able to Return to Prior Arrangements yes   Is patient able to care for self after discharge? Yes   Who are your caregiver(s) and their phone number(s)? Luz Maria Hodgson " Maribel" Reuben ( daughter ) [phone]   Patient's perception of discharge disposition home or selfcare   Readmission Within the Last 30 Days no previous admission in last 30 days "   Patient currently being followed by outpatient case management? No   Patient currently receives any other outside agency services? No   Equipment Currently Used at Home walker, rolling;walker, standard   Do you have any problems affording any of your prescribed medications? No   Is the patient taking medications as prescribed? yes   Does the patient have transportation home? Yes   Transportation Anticipated family or friend will provide   Does the patient receive services at the [facility]? No   Discharge Plan A Home;Home with family   Discharge Plan B Home;Home Health;Home with family   DME Needed Upon Discharge  oxygen   Patient/Family in Agreement with Plan yes

## 2019-08-22 NOTE — PROGRESS NOTES
Clinical Pharmacy Consult Note: Coumadin Dosing and Monitoring     Bola Figueroa Sr. 's Coumadin will be dosed and monitored by Pharmacy at the request of Dr. Buckner.      Indication: Atrial fibrillation  Target INR is 2-3    Lab Results   Component Value Date    INR 2.2 (H) 08/22/2019    INR 2.8 (H) 08/14/2019    INR 1.7 (A) 08/07/2019     Patient will be continued on a dose of 5 mg per day according to home regimen.  PT/INR will be monitored daily. Dose adjustments will be made accordingly.      Thank you for allowing us to participate in this patient's care.     Elena Fink, Erin 8/22/2019 2:35 PM

## 2019-08-22 NOTE — H&P
[facility] Hospital Medicine  History & Physical    Patient Name: Bola Figueroa Sr.  MRN: [id number]  Admission Date: 8/22/2019  Attending Physician: Dhaval Buckner MD  Primary Care Provider: Naga Bhatti MD         Patient information was obtained from patient, spouse/SO, past medical records and ER records.     Subjective:     Principal Problem:Acute on chronic respiratory failure with hypoxia and hypercapnia    Chief Complaint:   Chief Complaint   Patient presents with    Shortness of Breath        HPI: Mr. Figueroa he is an elderly 84-year-old pleasant  male with PMH significant for AFib on oral anticoagulation with warfarin, peripheral arterial disease, COPD, former tobacco user, hypertension, presented to the ED complaining of shortness of breath, wheezing, chest pain. He was found to be in rate respiratory distress initially, was placed on BiPAP.  Patient is not home oxygen-dependent.  He quickly improved on BiPAP, eventually placed on nasal cannula, but continued to have wheezing and mild intercostal muscular retractions.  Laboratory workup revealed WBC 58059, INR 2.2.  ABG pH 7.355, pCO2 52.6, PO2 two nine on 50% FiO2.  Chest x-ray without infiltrates, masses or effusions.  Admitting diagnosis mild COPD exacerbation.    Past Medical History:   Diagnosis Date    *Atrial fibrillation     Atrial fibrillation     Bilateral carotid artery stenosis 1/15/2016    Cardiac pacemaker 8/7/2013    Congestive heart failure 4/6/2015    COPD (chronic obstructive pulmonary disease) 8/7/2013    Coronary artery disease     Hyperlipidemia     Hypertension     Long-term (current) use of anticoagulants 8/7/2013    PVD (peripheral vascular disease)     S/P PTCA (percutaneous transluminal coronary angioplasty) 8/7/2013    Sleep apnea 8/7/2013    Stroke        Past Surgical History:   Procedure Laterality Date    CATARACT EXTRACTION W/  INTRAOCULAR LENS IMPLANT      INSERT /  REPLACE / REMOVE PACEMAKER  2005    REPLACEMENT, PULSE GENERATOR, CARDIAC PACEMAKER Left 3/20/2014    Performed by Behzad Sarmiento MD at [facility] C[facility]       Review of patient's allergies indicates:   Allergen Reactions    No known drug allergies        No current facility-administered medications on file prior to encounter.      Current Outpatient Medications on File Prior to Encounter   Medication Sig    albuterol-ipratropium (DUO-NEB) 2.5 mg-0.5 mg/3 mL nebulizer solution Take 3 mLs by nebulization every 4 (four) hours as needed for Wheezing. Rescue    amLODIPine (NORVASC) 5 MG tablet Take 1 tablet (5 mg total) by mouth once daily.    aspirin (ECOTRIN) 81 MG EC tablet Take 81 mg by mouth once daily.    benzonatate (TESSALON) 200 MG capsule Take 1 capsule (200 mg total) by mouth 3 (three) times daily as needed for Cough.    carvedilol (COREG) 12.5 MG tablet Take 1 tablet (12.5 mg total) by mouth 2 (two) times daily with meals.    fluticasone-umeclidin-vilanter (TRELEGY ELLIPTA) 100-62.5-25 mcg DsDv Inhale 1 puff into the lungs once daily.    furosemide (LASIX) 40 MG tablet Take 1 tablet (40 mg total) by mouth 2 (two) times daily.    gabapentin (NEURONTIN) 100 MG capsule Take 100 mg by mouth 3 (three) times daily.    guaiFENesin (MUCINEX) 600 mg 12 hr tablet Take 2 tablets (1,200 mg total) by mouth 2 (two) times daily as needed for Congestion. Take with water    hydrALAZINE (APRESOLINE) 25 MG tablet TAKE 1 TABLET BY MOUTH THREE TIMES DAILY    ipratropium (ATROVENT) 42 mcg (0.06 %) nasal spray instill 2 sprays into each nostril three times a day    isosorbide dinitrate (ISORDIL) 20 MG tablet take 1 tablet by mouth every 12 hours with HYDRALAZINE    latanoprost 0.005 % ophthalmic solution Place 1 drop into both eyes nightly.    levetiracetam (KEPPRA) 500 MG Tab Take 1 tablet by mouth Twice daily.    lubiprostone (AMITIZA) 8 MCG Cap Take 8 mcg by mouth 2 (two) times daily with meals.     nitroGLYCERIN (NITROBID) 6.5 MG CpSR Take 1 capsule (6.5 mg total) by mouth 2 (two) times daily.    pantoprazole (PROTONIX) 40 MG tablet Take 40 mg by mouth.    potassium chloride (KLOR-CON) 10 MEQ TbSR Take 1 tablet (10 mEq total) by mouth 2 (two) times daily.    predniSONE (DELTASONE) 5 MG tablet Take 1 tablet (5 mg total) by mouth once daily. Start after high dose prednisone    promethazine-dextromethorphan (PROMETHAZINE-DM) 6.25-15 mg/5 mL Syrp Take 5 mLs by mouth every 6 (six) hours as needed.    tamsulosin (FLOMAX) 0.4 mg Cp24 Take 0.4 mg by mouth once daily.     traMADol (ULTRAM) 50 mg tablet take 1 tablet by mouth every 6 hours if needed for UP TO 10 days    warfarin (COUMADIN) 5 MG tablet Take 1/2 tablet by mouth on Sundays; and 1 tablet all other days in the evening as directed by the [facility] Clinic. (Patient taking differently: Take 1 tablet by mouth every evening as directed by the [facility].)     Family History     Problem Relation (Age of Onset)    Diabetes Sister, Maternal Grandfather    Fainting Sister    Heart attack Paternal Uncle    Heart disease Paternal Uncle    Hypertension Maternal Grandmother        Tobacco Use    Smoking status: Former Smoker     Packs/day: 1.50     Years: 35.00     Pack years: 52.50     Types: Cigarettes     Last attempt to quit: 1979     Years since quittin.8    Smokeless tobacco: Never Used   Substance and Sexual Activity    Alcohol use: No     Comment: QUIT 77    Drug use: No    Sexual activity: Not on file     Review of Systems   Constitutional: Negative.  Negative for appetite change, fatigue and fever.   HENT: Negative.  Negative for congestion, nosebleeds and sore throat.    Eyes: Negative.  Negative for visual disturbance.   Respiratory: Positive for cough (Dry nonproductive), shortness of breath and wheezing.    Cardiovascular: Positive for chest pain. Negative for palpitations and leg swelling.   Gastrointestinal: Negative.   Negative for abdominal pain, constipation, diarrhea, nausea and vomiting.   Endocrine: Negative.  Negative for polyuria.   Genitourinary: Negative.  Negative for dysuria, flank pain, frequency and urgency.   Musculoskeletal: Negative.  Negative for arthralgias, back pain and joint swelling.   Skin: Negative.  Negative for color change, pallor and rash.   Allergic/Immunologic: Negative.  Negative for immunocompromised state.   Neurological: Negative.  Negative for dizziness, syncope, weakness, light-headedness, numbness and headaches.   Hematological: Negative.    Psychiatric/Behavioral: Negative.  Negative for confusion and hallucinations. The patient is not nervous/anxious.    All other systems reviewed and are negative.    Objective:     Vital Signs (Most Recent):  Temp: 97.8 °F (36.6 °C) (08/22/19 0344)  Pulse: 76 (08/22/19 0550)  Resp: (!) 24 (08/22/19 0550)  BP: (!) 146/105 (08/22/19 0549)  SpO2: (!) 94 % (08/22/19 0550) Vital Signs (24h Range):  Temp:  [97.8 °F (36.6 °C)] 97.8 °F (36.6 °C)  Pulse:  [70-78] 76  Resp:  [20-39] 24  SpO2:  [87 %-99 %] 94 %  BP: (146-186)/() 146/105     Weight: 90.4 kg (199 lb 6.4 oz)  Body mass index is 29.45 kg/m².    Physical Exam   Constitutional: He is oriented to person, place, and time. No distress.   Elderly pleasant  male, in mild respiratory distress, just taken off BiPAP.  Spouse at the bedside.   HENT:   Head: Normocephalic and atraumatic.   Eyes: Pupils are equal, round, and reactive to light. Conjunctivae are normal. No scleral icterus.   Neck: Normal range of motion. Neck supple. No thyromegaly present.   Cardiovascular: Normal rate, regular rhythm and normal heart sounds.   No murmur heard.  Pulmonary/Chest: He is in respiratory distress (Mild, currently off BiPAP, mild intercostal muscle retractions). He has wheezes. He exhibits no tenderness.   Abdominal: Soft. Bowel sounds are normal. There is no tenderness.   Musculoskeletal: Normal range  of motion. He exhibits no edema or tenderness.   Neurological: He is alert and oriented to person, place, and time. No cranial nerve deficit. He exhibits normal muscle tone. Coordination normal.   Skin: Skin is warm and dry. He is not diaphoretic.   Psychiatric: He has a normal mood and affect. His behavior is normal. Thought content normal.   Nursing note and vitals reviewed.        CRANIAL NERVES     CN III, IV, VI   Pupils are equal, round, and reactive to light.       Significant Labs:   ABGs:   Recent Labs   Lab 08/22/19  0341   PH 7.355   PCO2 52.6*   HCO3 29.4*   POCSATURATED 100   BE 4     BMP:   Recent Labs   Lab 08/22/19  0300   *      K 3.5      CO2 25   BUN 16   CREATININE 1.3   CALCIUM 8.8     CBC:   Recent Labs   Lab 08/22/19 0300   WBC 14.68*   HGB 11.7*   HCT 36.4*        CMP:   Recent Labs   Lab 08/22/19 0300      K 3.5      CO2 25   *   BUN 16   CREATININE 1.3   CALCIUM 8.8   PROT 7.2   ALBUMIN 3.6   BILITOT 1.1*   ALKPHOS 88   AST 40   ALT 28   ANIONGAP 14   EGFRNONAA 50*     Cardiac Markers:   Recent Labs   Lab 08/22/19 0300   *     Troponin:   Recent Labs   Lab 08/22/19 0300   TROPONINI 0.021     All pertinent labs within the past 24 hours have been reviewed.    Significant Imaging: I have reviewed and interpreted all pertinent imaging results/findings within the past 24 hours.     Imaging Results          X-Ray Chest AP Portable (In process)                 I have independently reviewed and interpreted the EKG.     I have independently reviewed all pertinent labs within the past 24 hours.    I have independently reviewed, visualized and interpreted all pertinent imaging results within the past 24 hours and discussed the findings with the ED physician, Dr. Harper.            Assessment/Plan:     * Acute on chronic respiratory failure with hypoxia and hypercapnia  Secondary to COPD exacerbation.  Patient not currently home oxygen  dependent.  ABG revealed pH 7.355, pCO2 52, PO2 209, FiO2 50%.  Just came off of BiPAP.  Continue supplemental oxygen to maintain saturations greater than 90%.    COPD exacerbation  Mild wheezing with mild use of intercostal muscles.  Solu-Medrol 40 mg IV every 8 hr.  Bronchodilators every 6 hr scheduled.  Supplemental oxygen to maintain saturations greater than 90%.      Chest pain  Complains of left-sided chest pain, atypical in nature.  Troponin 0.021  Trend cardiac enzymes.      A-fib  Currently rate controlled.  Continue home dose Coreg.  Continue warfarin, per pharmacy protocol.  INR 2.2.      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        VTE Risk Mitigation (From admission, onward)        Ordered     Place sequential compression device  Until discontinued      08/22/19 0635             Dhaval Buckner MD  Department of Hospital Medicine   [facility]

## 2019-08-22 NOTE — PLAN OF CARE
Problem: Adult Inpatient Plan of Care  Goal: Rounds/Family Conference  Outcome: Ongoing (interventions implemented as appropriate)  Plan of care reviewed with patient and family    Comments: Patient A&OX4.   Currently Room air.  Paced rhythm on monitor.  Cardiac diet.  Skin intact.  IV steroids given.  No complaints of pain.  All questions answered.  Will continue to monitor.    Jaci Nguyen RN

## 2019-08-22 NOTE — PROGRESS NOTES
Pt admitted to Telemetry Unit for acute on chronic respiratory failure with hypoxia and hypercapnia in the setting of COPD exacerbation.  Pt treated with IV antibiotics, Duonebs prn, supplemental oxygen, and IV steroids.  Pt is followed outpatient by Dr. Bhatti (PCP) and Dr. Wallace (Pulmonology).  Pt seen and examined today with current findings synonymous with previous results.  Pt verbalized mild symptom improvement.  Current plan of care continued.  Pt may benefit from home oxygen evaluation prior to discharge.  CM assessment performed to assist with discharge planning.

## 2019-08-23 LAB
ANION GAP SERPL CALC-SCNC: 13 MMOL/L (ref 8–16)
BASOPHILS # BLD AUTO: 0 K/UL (ref 0–0.2)
BASOPHILS NFR BLD: 0 % (ref 0–1.9)
BNP SERPL-MCNC: 328 PG/ML (ref 0–99)
BUN SERPL-MCNC: 21 MG/DL (ref 8–23)
CALCIUM SERPL-MCNC: 9.2 MG/DL (ref 8.7–10.5)
CHLORIDE SERPL-SCNC: 102 MMOL/L (ref 95–110)
CO2 SERPL-SCNC: 27 MMOL/L (ref 23–29)
CREAT SERPL-MCNC: 1.1 MG/DL (ref 0.5–1.4)
D DIMER PPP IA.FEU-MCNC: <0.19 MG/L FEU
DIFFERENTIAL METHOD: ABNORMAL
EOSINOPHIL # BLD AUTO: 0 K/UL (ref 0–0.5)
EOSINOPHIL NFR BLD: 0 % (ref 0–8)
ERYTHROCYTE [DISTWIDTH] IN BLOOD BY AUTOMATED COUNT: 13.2 % (ref 11.5–14.5)
EST. GFR  (AFRICAN AMERICAN): >60 ML/MIN/1.73 M^2
EST. GFR  (NON AFRICAN AMERICAN): >60 ML/MIN/1.73 M^2
GLUCOSE SERPL-MCNC: 184 MG/DL (ref 70–110)
HCT VFR BLD AUTO: 35.4 % (ref 40–54)
HGB BLD-MCNC: 11.3 G/DL (ref 14–18)
INR PPP: 1.8 (ref 0.8–1.2)
LYMPHOCYTES # BLD AUTO: 0.8 K/UL (ref 1–4.8)
LYMPHOCYTES NFR BLD: 5.7 % (ref 18–48)
MAGNESIUM SERPL-MCNC: 1.9 MG/DL (ref 1.6–2.6)
MCH RBC QN AUTO: 30.1 PG (ref 27–31)
MCHC RBC AUTO-ENTMCNC: 31.9 G/DL (ref 32–36)
MCV RBC AUTO: 94 FL (ref 82–98)
MONOCYTES # BLD AUTO: 0.3 K/UL (ref 0.3–1)
MONOCYTES NFR BLD: 2.4 % (ref 4–15)
NEUTROPHILS # BLD AUTO: 12.7 K/UL (ref 1.8–7.7)
NEUTROPHILS NFR BLD: 92.3 % (ref 38–73)
PHOSPHATE SERPL-MCNC: 3.3 MG/DL (ref 2.7–4.5)
PLATELET # BLD AUTO: 169 K/UL (ref 150–350)
PMV BLD AUTO: 11.4 FL (ref 9.2–12.9)
POTASSIUM SERPL-SCNC: 3.6 MMOL/L (ref 3.5–5.1)
PROTHROMBIN TIME: 18.9 SEC (ref 9–12.5)
RBC # BLD AUTO: 3.75 M/UL (ref 4.6–6.2)
SODIUM SERPL-SCNC: 142 MMOL/L (ref 136–145)
TROPONIN I SERPL DL<=0.01 NG/ML-MCNC: 0.03 NG/ML (ref 0–0.03)
WBC # BLD AUTO: 13.77 K/UL (ref 3.9–12.7)

## 2019-08-23 PROCEDURE — 21400001 HC TELEMETRY ROOM

## 2019-08-23 PROCEDURE — 80048 BASIC METABOLIC PNL TOTAL CA: CPT

## 2019-08-23 PROCEDURE — 85025 COMPLETE CBC W/AUTO DIFF WBC: CPT

## 2019-08-23 PROCEDURE — 27000221 HC OXYGEN, UP TO 24 HOURS

## 2019-08-23 PROCEDURE — 85379 FIBRIN DEGRADATION QUANT: CPT

## 2019-08-23 PROCEDURE — 94640 AIRWAY INHALATION TREATMENT: CPT

## 2019-08-23 PROCEDURE — 63600175 PHARM REV CODE 636 W HCPCS: Performed by: NURSE PRACTITIONER

## 2019-08-23 PROCEDURE — 84100 ASSAY OF PHOSPHORUS: CPT

## 2019-08-23 PROCEDURE — 36415 COLL VENOUS BLD VENIPUNCTURE: CPT

## 2019-08-23 PROCEDURE — 25000242 PHARM REV CODE 250 ALT 637 W/ HCPCS: Performed by: INTERNAL MEDICINE

## 2019-08-23 PROCEDURE — 84484 ASSAY OF TROPONIN QUANT: CPT

## 2019-08-23 PROCEDURE — 99900035 HC TECH TIME PER 15 MIN (STAT)

## 2019-08-23 PROCEDURE — 94761 N-INVAS EAR/PLS OXIMETRY MLT: CPT

## 2019-08-23 PROCEDURE — 97802 MEDICAL NUTRITION INDIV IN: CPT

## 2019-08-23 PROCEDURE — 83735 ASSAY OF MAGNESIUM: CPT

## 2019-08-23 PROCEDURE — 83880 ASSAY OF NATRIURETIC PEPTIDE: CPT

## 2019-08-23 PROCEDURE — 25000003 PHARM REV CODE 250: Performed by: INTERNAL MEDICINE

## 2019-08-23 PROCEDURE — 63600175 PHARM REV CODE 636 W HCPCS: Performed by: INTERNAL MEDICINE

## 2019-08-23 PROCEDURE — 85610 PROTHROMBIN TIME: CPT

## 2019-08-23 RX ORDER — ENOXAPARIN SODIUM 100 MG/ML
1 INJECTION SUBCUTANEOUS
Status: DISCONTINUED | OUTPATIENT
Start: 2019-08-23 | End: 2019-08-24 | Stop reason: HOSPADM

## 2019-08-23 RX ORDER — FUROSEMIDE 10 MG/ML
40 INJECTION INTRAMUSCULAR; INTRAVENOUS 2 TIMES DAILY
Status: DISCONTINUED | OUTPATIENT
Start: 2019-08-23 | End: 2019-08-24 | Stop reason: HOSPADM

## 2019-08-23 RX ORDER — AMLODIPINE BESYLATE 10 MG/1
10 TABLET ORAL DAILY
Status: DISCONTINUED | OUTPATIENT
Start: 2019-08-24 | End: 2019-08-24 | Stop reason: HOSPADM

## 2019-08-23 RX ADMIN — FUROSEMIDE 40 MG: 10 INJECTION, SOLUTION INTRAMUSCULAR; INTRAVENOUS at 05:08

## 2019-08-23 RX ADMIN — CARVEDILOL 12.5 MG: 12.5 TABLET, FILM COATED ORAL at 08:08

## 2019-08-23 RX ADMIN — METHYLPREDNISOLONE SODIUM SUCCINATE 40 MG: 125 INJECTION, POWDER, FOR SOLUTION INTRAMUSCULAR; INTRAVENOUS at 09:08

## 2019-08-23 RX ADMIN — IPRATROPIUM BROMIDE AND ALBUTEROL SULFATE 3 ML: .5; 2.5 SOLUTION RESPIRATORY (INHALATION) at 05:08

## 2019-08-23 RX ADMIN — ARFORMOTEROL TARTRATE 15 MCG: 15 SOLUTION RESPIRATORY (INHALATION) at 07:08

## 2019-08-23 RX ADMIN — HYDRALAZINE HYDROCHLORIDE 25 MG: 25 TABLET, FILM COATED ORAL at 09:08

## 2019-08-23 RX ADMIN — GUAIFENESIN 200 MG: 200 SOLUTION ORAL at 03:08

## 2019-08-23 RX ADMIN — ISOSORBIDE DINITRATE 20 MG: 10 TABLET ORAL at 08:08

## 2019-08-23 RX ADMIN — FUROSEMIDE 40 MG: 40 TABLET ORAL at 08:08

## 2019-08-23 RX ADMIN — WARFARIN SODIUM 5 MG: 5 TABLET ORAL at 05:08

## 2019-08-23 RX ADMIN — ASPIRIN 81 MG: 81 TABLET, COATED ORAL at 08:08

## 2019-08-23 RX ADMIN — CARVEDILOL 12.5 MG: 12.5 TABLET, FILM COATED ORAL at 05:08

## 2019-08-23 RX ADMIN — ISOSORBIDE DINITRATE 20 MG: 10 TABLET ORAL at 09:08

## 2019-08-23 RX ADMIN — METHYLPREDNISOLONE SODIUM SUCCINATE 40 MG: 125 INJECTION, POWDER, FOR SOLUTION INTRAMUSCULAR; INTRAVENOUS at 02:08

## 2019-08-23 RX ADMIN — PANTOPRAZOLE SODIUM 40 MG: 40 TABLET, DELAYED RELEASE ORAL at 08:08

## 2019-08-23 RX ADMIN — METHYLPREDNISOLONE SODIUM SUCCINATE 40 MG: 125 INJECTION, POWDER, FOR SOLUTION INTRAMUSCULAR; INTRAVENOUS at 06:08

## 2019-08-23 RX ADMIN — BUDESONIDE 0.5 MG: 0.5 SUSPENSION RESPIRATORY (INHALATION) at 08:08

## 2019-08-23 RX ADMIN — AMLODIPINE BESYLATE 5 MG: 5 TABLET ORAL at 08:08

## 2019-08-23 RX ADMIN — CEFTRIAXONE 2 G: 2 INJECTION, SOLUTION INTRAVENOUS at 01:08

## 2019-08-23 RX ADMIN — TAMSULOSIN HYDROCHLORIDE 0.4 MG: 0.4 CAPSULE ORAL at 08:08

## 2019-08-23 RX ADMIN — HYDRALAZINE HYDROCHLORIDE 25 MG: 25 TABLET, FILM COATED ORAL at 08:08

## 2019-08-23 RX ADMIN — BUDESONIDE 0.5 MG: 0.5 SUSPENSION RESPIRATORY (INHALATION) at 07:08

## 2019-08-23 RX ADMIN — HYDRALAZINE HYDROCHLORIDE 25 MG: 25 TABLET, FILM COATED ORAL at 03:08

## 2019-08-23 RX ADMIN — ARFORMOTEROL TARTRATE 15 MCG: 15 SOLUTION RESPIRATORY (INHALATION) at 08:08

## 2019-08-23 RX ADMIN — ENOXAPARIN SODIUM 90 MG: 100 INJECTION SUBCUTANEOUS at 05:08

## 2019-08-23 NOTE — PLAN OF CARE
Call received from patient's daughter, Luz Maria Hodgson. She voiced concerns regarding her father's status and requiring home oxygen but not meeting criteria. She was told if he does not qualify they would need to pay out of pocket. Discussed possibly testing for nocturnal oxygen or a NIV / Trilogy. Explained that I would speak with the physician and ask if he would discuss with patient's pulmonologist. Also discussed following up with patient's pulmonologist. Patient's daughter verbalized understanding.

## 2019-08-23 NOTE — ASSESSMENT & PLAN NOTE
Currently rate controlled.  Continue home dose Coreg.  Continue warfarin, per pharmacy protocol.  INR 2.2>1.8

## 2019-08-23 NOTE — ASSESSMENT & PLAN NOTE
Mild wheezing with mild use of intercostal muscles-improved   Solu-Medrol 40 mg IV every 8 hr.  Bronchodilators every 6 hr scheduled.  -IV Rocephin   Supplemental oxygen to maintain saturations greater than 90%.  -Mucinex

## 2019-08-23 NOTE — SUBJECTIVE & OBJECTIVE
Interval History: pt reports mild improvement today with episode of SOB with exertion noted with spontaneous resolution.  Home oxygen evaluation completed with no home oxygen needed based on results.  D-dimer, BNP, and Troponin results pending.     Review of Systems   Constitutional: Positive for fatigue. Negative for appetite change and fever.   HENT: Negative.  Negative for congestion, nosebleeds and sore throat.    Eyes: Negative.  Negative for visual disturbance.   Respiratory: Positive for cough (Dry nonproductive), shortness of breath and wheezing (improved).    Cardiovascular: Negative for chest pain, palpitations and leg swelling.   Gastrointestinal: Negative.  Negative for abdominal pain, constipation, diarrhea, nausea and vomiting.   Endocrine: Negative.  Negative for polyuria.   Genitourinary: Negative.  Negative for dysuria, flank pain, frequency and urgency.   Musculoskeletal: Negative.  Negative for arthralgias, back pain and joint swelling.   Skin: Negative.  Negative for color change, pallor and rash.   Allergic/Immunologic: Negative.  Negative for immunocompromised state.   Neurological: Negative.  Negative for dizziness, syncope, weakness, light-headedness, numbness and headaches.   Hematological: Negative.    Psychiatric/Behavioral: Negative.  Negative for confusion and hallucinations. The patient is not nervous/anxious.    All other systems reviewed and are negative.    Objective:     Vital Signs (Most Recent):  Temp: 98.2 °F (36.8 °C) (08/23/19 0724)  Pulse: 75 (08/23/19 1100)  Resp: 20 (08/23/19 0819)  BP: (!) 176/75 (08/23/19 0724)  SpO2: 99 % (08/23/19 0819) Vital Signs (24h Range):  Temp:  [98.2 °F (36.8 °C)-98.6 °F (37 °C)] 98.2 °F (36.8 °C)  Pulse:  [69-85] 75  Resp:  [18-22] 20  SpO2:  [97 %-100 %] 99 %  BP: (152-187)/(75-88) 176/75     Weight: 90 kg (198 lb 6.6 oz)  Body mass index is 29.3 kg/m².    Intake/Output Summary (Last 24 hours) at 8/23/2019 8839  Last data filed at 8/23/2019  0600  Gross per 24 hour   Intake 238 ml   Output 800 ml   Net -562 ml      Physical Exam   Constitutional: He is oriented to person, place, and time. No distress.   Elderly pleasant  male, in mild respiratory distress, just taken off BiPAP.  Spouse at the bedside.   HENT:   Head: Normocephalic and atraumatic.   Nose: Nose normal.   Eyes: Pupils are equal, round, and reactive to light. Conjunctivae are normal. No scleral icterus.   Neck: Normal range of motion. Neck supple. No thyromegaly present.   Cardiovascular: Normal rate, regular rhythm and normal heart sounds.   No murmur heard.  Pulmonary/Chest: Effort normal and breath sounds normal. No respiratory distress. He has no wheezes. He exhibits no tenderness.   Abdominal: Soft. Bowel sounds are normal. There is no tenderness.   Genitourinary:   Genitourinary Comments: Deferred    Musculoskeletal: Normal range of motion. He exhibits no edema or tenderness.   Neurological: He is alert and oriented to person, place, and time. No cranial nerve deficit. He exhibits normal muscle tone. Coordination normal.   Skin: Skin is warm and dry. He is not diaphoretic.   Psychiatric: He has a normal mood and affect. His behavior is normal. Thought content normal.   Nursing note and vitals reviewed.      Significant Labs:   CBC:   Recent Labs   Lab 08/22/19  0300 08/23/19  0436   WBC 14.68* 13.77*   HGB 11.7* 11.3*   HCT 36.4* 35.4*    169     CMP:   Recent Labs   Lab 08/22/19  0300 08/23/19  0435    142   K 3.5 3.6    102   CO2 25 27   * 184*   BUN 16 21   CREATININE 1.3 1.1   CALCIUM 8.8 9.2   PROT 7.2  --    ALBUMIN 3.6  --    BILITOT 1.1*  --    ALKPHOS 88  --    AST 40  --    ALT 28  --    ANIONGAP 14 13   EGFRNONAA 50* >60     Troponin:   Recent Labs   Lab 08/22/19  0638 08/22/19  1205 08/22/19  1702   TROPONINI 0.033* 0.030* 0.026       Significant Imaging:   Imaging Results          X-Ray Chest AP Portable (Final result)  Result  time 08/22/19 07:58:59    Final result by Jean Marie Franklin MD (08/22/19 07:58:59)                 Impression:      Stable chest x-ray.      Electronically signed by: Jean Marie Franklin MD  Date:    08/22/2019  Time:    07:58             Narrative:    EXAMINATION:  XR CHEST AP PORTABLE    CLINICAL HISTORY:  CHF., CHF;    COMPARISON:  06/14/2019.    FINDINGS:  Left dual lead pacemaker.  Mild cardiomegaly with aortic atherosclerosis.    Elevation of the right diaphragm.  No acute infiltrate.

## 2019-08-23 NOTE — PROGRESS NOTES
[facility] Hospital Medicine  Progress Note    Patient Name: Bola Figueroa Sr.  MRN: [id number]  Patient Class: IP- Inpatient   Admission Date: 8/22/2019  Length of Stay: 1 days  Attending Physician: David Francis MD  Primary Care Provider: Naga Bhatti MD        Subjective:     Principal Problem:Acute on chronic respiratory failure with hypoxia and hypercapnia        HPI:  Mr. Figueroa he is an elderly 84-year-old pleasant  male with PMH significant for AFib on oral anticoagulation with warfarin, peripheral arterial disease, COPD, former tobacco user, hypertension, presented to the ED complaining of shortness of breath, wheezing, chest pain. He was found to be in rate respiratory distress initially, was placed on BiPAP.  Patient is not home oxygen-dependent.  He quickly improved on BiPAP, eventually placed on nasal cannula, but continued to have wheezing and mild intercostal muscular retractions.  Laboratory workup revealed WBC 42124, INR 2.2.  ABG pH 7.355, pCO2 52.6, PO2 two nine on 50% FiO2.  Chest x-ray without infiltrates, masses or effusions.  Admitting diagnosis mild COPD exacerbation.    Overview/Hospital Course:  Pt admitted to Telemetry Unit for acute on chronic respiratory failure with hypoxia and hypercapnia in the setting of COPD exacerbation.  Pt treated with IV steroids, Duonebs prn, and IV antibiotics.  Leukocytosis mildly improved.  Home oxygen evaluation completed and no home oxygen required at this time.  H/H stable.  .  Troponin elevated with downward trend noted.  Echo in 06/2019 showed EF 60% showed diastolic dysfunction, mild to moderate TVR/AVS, and PAP 47.6.  Stress test in 7/2019 showed normal myocardial perfusion.       Interval History: pt reports mild improvement today with episode of SOB with exertion noted with spontaneous resolution.  Home oxygen evaluation completed with no home oxygen needed based on results.  D-dimer, BNP, and  Troponin results pending.     Review of Systems   Constitutional: Positive for fatigue. Negative for appetite change and fever.   HENT: Negative.  Negative for congestion, nosebleeds and sore throat.    Eyes: Negative.  Negative for visual disturbance.   Respiratory: Positive for cough (Dry nonproductive), shortness of breath and wheezing (improved).    Cardiovascular: Negative for chest pain, palpitations and leg swelling.   Gastrointestinal: Negative.  Negative for abdominal pain, constipation, diarrhea, nausea and vomiting.   Endocrine: Negative.  Negative for polyuria.   Genitourinary: Negative.  Negative for dysuria, flank pain, frequency and urgency.   Musculoskeletal: Negative.  Negative for arthralgias, back pain and joint swelling.   Skin: Negative.  Negative for color change, pallor and rash.   Allergic/Immunologic: Negative.  Negative for immunocompromised state.   Neurological: Negative.  Negative for dizziness, syncope, weakness, light-headedness, numbness and headaches.   Hematological: Negative.    Psychiatric/Behavioral: Negative.  Negative for confusion and hallucinations. The patient is not nervous/anxious.    All other systems reviewed and are negative.    Objective:     Vital Signs (Most Recent):  Temp: 98.2 °F (36.8 °C) (08/23/19 0724)  Pulse: 75 (08/23/19 1100)  Resp: 20 (08/23/19 0819)  BP: (!) 176/75 (08/23/19 0724)  SpO2: 99 % (08/23/19 0819) Vital Signs (24h Range):  Temp:  [98.2 °F (36.8 °C)-98.6 °F (37 °C)] 98.2 °F (36.8 °C)  Pulse:  [69-85] 75  Resp:  [18-22] 20  SpO2:  [97 %-100 %] 99 %  BP: (152-187)/(75-88) 176/75     Weight: 90 kg (198 lb 6.6 oz)  Body mass index is 29.3 kg/m².    Intake/Output Summary (Last 24 hours) at 8/23/2019 1432  Last data filed at 8/23/2019 0600  Gross per 24 hour   Intake 238 ml   Output 800 ml   Net -562 ml      Physical Exam   Constitutional: He is oriented to person, place, and time. No distress.   Elderly pleasant  male, in mild  respiratory distress, just taken off BiPAP.  Spouse at the bedside.   HENT:   Head: Normocephalic and atraumatic.   Nose: Nose normal.   Eyes: Pupils are equal, round, and reactive to light. Conjunctivae are normal. No scleral icterus.   Neck: Normal range of motion. Neck supple. No thyromegaly present.   Cardiovascular: Normal rate, regular rhythm and normal heart sounds.   No murmur heard.  Pulmonary/Chest: Effort normal and breath sounds normal. No respiratory distress. He has no wheezes. He exhibits no tenderness.   Abdominal: Soft. Bowel sounds are normal. There is no tenderness.   Genitourinary:   Genitourinary Comments: Deferred    Musculoskeletal: Normal range of motion. He exhibits no edema or tenderness.   Neurological: He is alert and oriented to person, place, and time. No cranial nerve deficit. He exhibits normal muscle tone. Coordination normal.   Skin: Skin is warm and dry. He is not diaphoretic.   Psychiatric: He has a normal mood and affect. His behavior is normal. Thought content normal.   Nursing note and vitals reviewed.      Significant Labs:   CBC:   Recent Labs   Lab 08/22/19  0300 08/23/19  0436   WBC 14.68* 13.77*   HGB 11.7* 11.3*   HCT 36.4* 35.4*    169     CMP:   Recent Labs   Lab 08/22/19  0300 08/23/19  0435    142   K 3.5 3.6    102   CO2 25 27   * 184*   BUN 16 21   CREATININE 1.3 1.1   CALCIUM 8.8 9.2   PROT 7.2  --    ALBUMIN 3.6  --    BILITOT 1.1*  --    ALKPHOS 88  --    AST 40  --    ALT 28  --    ANIONGAP 14 13   EGFRNONAA 50* >60     Troponin:   Recent Labs   Lab 08/22/19  0638 08/22/19  1205 08/22/19  1702   TROPONINI 0.033* 0.030* 0.026       Significant Imaging:   Imaging Results          X-Ray Chest AP Portable (Final result)  Result time 08/22/19 07:58:59    Final result by Jean Marie Franklin MD (08/22/19 07:58:59)                 Impression:      Stable chest x-ray.      Electronically signed by: Jean Marie Franklin  MD  Date:    08/22/2019  Time:    07:58             Narrative:    EXAMINATION:  XR CHEST AP PORTABLE    CLINICAL HISTORY:  CHF., CHF;    COMPARISON:  06/14/2019.    FINDINGS:  Left dual lead pacemaker.  Mild cardiomegaly with aortic atherosclerosis.    Elevation of the right diaphragm.  No acute infiltrate.                                Assessment/Plan:      * Acute on chronic respiratory failure with hypoxia and hypercapnia  Secondary to COPD exacerbation.  Patient not currently home oxygen dependent.  ABG revealed pH 7.355, pCO2 52, PO2 209, FiO2 50%.  Pt weaned from BIPAP to supplemental oxygen   Continue supplemental oxygen to maintain saturations greater than 90%.  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  Troponin 0.021>0.030>>0.033  Trend cardiac enzymes.  -CP resolved-   -recent Echo and stress test in 07/2019 reviewed       COPD exacerbation  Mild wheezing with mild use of intercostal muscles-improved   Solu-Medrol 40 mg IV every 8 hr.  Bronchodilators every 6 hr scheduled.  -IV Rocephin   Supplemental oxygen to maintain saturations greater than 90%.  -Mucinex      Essential hypertension  -Lasix, Hydralazine, Coreg, and CCB resumed       Coronary artery disease  -ASA, Coreg, and Imdur continued   -troponin trended  -Echo results reviewed   -Stress test results reviewed      A-fib  Currently rate controlled.  Continue home dose Coreg.  Continue warfarin, per pharmacy protocol.  INR 2.2>1.8        VTE Risk Mitigation (From admission, onward)        Ordered     warfarin (COUMADIN) tablet 5 mg  Daily      08/22/19 1434     Place sequential compression device  Until  discontinued      08/22/19 0611                Karla Sorto NP  Department of Hospital Medicine   [facility]

## 2019-08-23 NOTE — PLAN OF CARE
"Problem: Adult Inpatient Plan of Care  Goal: Readiness for Transition of Care    Intervention: Mutually Develop Transition Plan     08/22/19 1558 08/22/19 1600 08/23/19 1146   (RETIRED) Discharge Needs Assessment   How many people do you have in your home that can help with your care after discharge?  --   --  other (see comments)  (he is primary caregiver for bedbound patient)   OTHER   Communicated expected length of stay with patient/caregiver yes  --   --    Is patient able to care for self after discharge? Yes  --   --    Who are your caregiver(s) and their phone number(s)? Luz Maria Hodgson " Maribel" Reuben ( daughter ) [phone]  --   --    Patient currently receives home health services?  --   --  No   Discharge Needs Assessment   Readmission Within the Last 30 Days no previous admission in last 30 days  --   --    Equipment Currently Used at Home  --  walker, rolling;shower chair  --    Transportation Anticipated family or friend will provide  --   --    Social Work Plan   Patient/Family in Agreement with Plan yes  --   --    Living Environment   Able to Return to Prior Arrangements yes  --   --    (RETIRED) Current Health   Expected Length of Stay (days)   (tbd)  --   --    (RETIRED) Social Work Plan   Patient's perception of discharge disposition home or selfcare  --   --            "

## 2019-08-23 NOTE — CONSULTS
Food & Nutrition  Education    Diet Education: Vitamin K + Medications Interactions  Time Spent:15 minutes  Learners:pt      Nutrition Education provided with handouts: Vitamin K + Medications Interactions      Comments: Educated pt on above diet with handout from the nutrition care manual. Pt verbalized understanding.  All questions and concerns answered. Dietitian's contact information provided.   Pt w/ good appetite/intake PTA and no recent wt loss. NFPE=well nourished.       Please Re-consult as needed        Thanks!  Shira ESPINALN

## 2019-08-23 NOTE — ASSESSMENT & PLAN NOTE
-ASA, Coreg, and Imdur continued   -troponin trended  -Echo results reviewed   -Stress test results reviewed

## 2019-08-23 NOTE — ASSESSMENT & PLAN NOTE
Secondary to COPD exacerbation.  Patient not currently home oxygen dependent.  ABG revealed pH 7.355, pCO2 52, PO2 209, FiO2 50%.  Pt weaned from BIPAP to supplemental oxygen   Continue supplemental oxygen to maintain saturations greater than 90%.  -home oxygen evaluation completed- pt does not qualify based on results

## 2019-08-23 NOTE — ASSESSMENT & PLAN NOTE
Complains of left-sided chest pain, atypical in nature.  Troponin 0.021>0.030>>0.033  Trend cardiac enzymes.

## 2019-08-23 NOTE — HOSPITAL COURSE
Pt admitted to Telemetry Unit for acute on chronic respiratory failure with hypoxia and hypercapnia in the setting of COPD exacerbation.  Pt treated with IV steroids, Duonebs prn, and IV antibiotics.  Leukocytosis mildly improved.  Home oxygen evaluation completed and no home oxygen required at this time.  H/H stable.  .  Troponin elevated with downward trend noted.  Echo in 06/2019 showed EF 60% showed diastolic dysfunction, mild to moderate TVR/AVS, and PAP 47.6.  Stress test in 7/2019 showed normal myocardial perfusion.       8/24/19-  Pt is seen at bedside . States doing well. Currently on RA and SpO2 98%. No acute distress is noted. Pt is placed back on his home diuretic regimen following good response to IV lasix. He was treated with IV solumedrol , IV antibiotic and bronchodilator treatment for COPD exacerbation and responded appropriately .   This morning pt is clinically stable with clear lungs and euvolemic and deemed suitable to discharge home. He will be given prescription for oral antibiotic and oral prednisone with taper. He will resume his home diuretic regimen. Pt will follow up with his PCP and Pulmonologist in a week .

## 2019-08-23 NOTE — PROGRESS NOTES
Home Oxygen Evaluation    Date Performed: 2019    1) Patient's Home O2 Sat on room air, while at rest: 96        If O2 sats on room air at rest are 88% or below, patient qualifies. No additional testing needed. Document N/A in steps 2 and 3. If 89% or above, complete steps 2.      2) Patient's O2 Sat on room air while exercisin        If O2 sats on room air while exercising remain 89% or above patient does not qualify, no further testing needed Document N/A in step 3. If O2 sats on room air while exercising are 88% or below, continue to step 3.      3) Patient's O2 Sat while exercising on O2: na   at  na LPM         (Must show improvement from #2 for patients to qualify)    If O2 sats improve on oxygen, patient qualifies for portable oxygen. If not, the patient does not qualify.

## 2019-08-23 NOTE — PROGRESS NOTES
Clinical Pharmacy Progress Note: Coumadin Dosing and Monitoring        Indication: Atrial fibrillation  Target INR is 2-3    Lab Results   Component Value Date    INR 1.8 (H) 08/23/2019    INR 2.2 (H) 08/22/2019    INR 2.8 (H) 08/14/2019       Patient has been educated by pharmacist this admission, however will reinforce today with daughter at bedside.     Current dose: 5 mg QD     Plan: continue current dose. Paced rhythm on monitor.   PT/INR will be monitored daily. Dose adjustments will be made accordingly.      Thank you for allowing us to participate in this patient's care.    Katiuska Velásquez, PharmD 8/23/2019 8:23 AM

## 2019-08-24 VITALS
WEIGHT: 194 LBS | HEIGHT: 69 IN | TEMPERATURE: 98 F | DIASTOLIC BLOOD PRESSURE: 79 MMHG | SYSTOLIC BLOOD PRESSURE: 177 MMHG | BODY MASS INDEX: 28.73 KG/M2 | OXYGEN SATURATION: 96 % | RESPIRATION RATE: 20 BRPM | HEART RATE: 71 BPM

## 2019-08-24 PROBLEM — J96.21 ACUTE ON CHRONIC RESPIRATORY FAILURE WITH HYPOXIA AND HYPERCAPNIA: Status: RESOLVED | Noted: 2019-08-22 | Resolved: 2019-08-24

## 2019-08-24 PROBLEM — R07.9 CHEST PAIN: Status: RESOLVED | Noted: 2018-07-23 | Resolved: 2019-08-24

## 2019-08-24 PROBLEM — J96.22 ACUTE ON CHRONIC RESPIRATORY FAILURE WITH HYPOXIA AND HYPERCAPNIA: Status: RESOLVED | Noted: 2019-08-22 | Resolved: 2019-08-24

## 2019-08-24 LAB
ANION GAP SERPL CALC-SCNC: 16 MMOL/L (ref 8–16)
BASOPHILS # BLD AUTO: 0.01 K/UL (ref 0–0.2)
BASOPHILS NFR BLD: 0.1 % (ref 0–1.9)
BUN SERPL-MCNC: 28 MG/DL (ref 8–23)
CALCIUM SERPL-MCNC: 9.4 MG/DL (ref 8.7–10.5)
CHLORIDE SERPL-SCNC: 99 MMOL/L (ref 95–110)
CO2 SERPL-SCNC: 26 MMOL/L (ref 23–29)
CREAT SERPL-MCNC: 1.3 MG/DL (ref 0.5–1.4)
DIFFERENTIAL METHOD: ABNORMAL
EOSINOPHIL # BLD AUTO: 0 K/UL (ref 0–0.5)
EOSINOPHIL NFR BLD: 0 % (ref 0–8)
ERYTHROCYTE [DISTWIDTH] IN BLOOD BY AUTOMATED COUNT: 13.3 % (ref 11.5–14.5)
EST. GFR  (AFRICAN AMERICAN): 58 ML/MIN/1.73 M^2
EST. GFR  (NON AFRICAN AMERICAN): 50 ML/MIN/1.73 M^2
GLUCOSE SERPL-MCNC: 194 MG/DL (ref 70–110)
HCT VFR BLD AUTO: 37.2 % (ref 40–54)
HGB BLD-MCNC: 11.8 G/DL (ref 14–18)
INR PPP: 1.9 (ref 0.8–1.2)
LYMPHOCYTES # BLD AUTO: 0.9 K/UL (ref 1–4.8)
LYMPHOCYTES NFR BLD: 4.9 % (ref 18–48)
MCH RBC QN AUTO: 30.2 PG (ref 27–31)
MCHC RBC AUTO-ENTMCNC: 31.7 G/DL (ref 32–36)
MCV RBC AUTO: 95 FL (ref 82–98)
MONOCYTES # BLD AUTO: 0.8 K/UL (ref 0.3–1)
MONOCYTES NFR BLD: 4.5 % (ref 4–15)
NEUTROPHILS # BLD AUTO: 16.8 K/UL (ref 1.8–7.7)
NEUTROPHILS NFR BLD: 91.2 % (ref 38–73)
PLATELET # BLD AUTO: 202 K/UL (ref 150–350)
PMV BLD AUTO: 12.1 FL (ref 9.2–12.9)
POTASSIUM SERPL-SCNC: 3.6 MMOL/L (ref 3.5–5.1)
PROTHROMBIN TIME: 20.2 SEC (ref 9–12.5)
RBC # BLD AUTO: 3.91 M/UL (ref 4.6–6.2)
SODIUM SERPL-SCNC: 141 MMOL/L (ref 136–145)
WBC # BLD AUTO: 18.55 K/UL (ref 3.9–12.7)

## 2019-08-24 PROCEDURE — 63600175 PHARM REV CODE 636 W HCPCS: Performed by: NURSE PRACTITIONER

## 2019-08-24 PROCEDURE — 36415 COLL VENOUS BLD VENIPUNCTURE: CPT

## 2019-08-24 PROCEDURE — 80048 BASIC METABOLIC PNL TOTAL CA: CPT

## 2019-08-24 PROCEDURE — 25000242 PHARM REV CODE 250 ALT 637 W/ HCPCS: Performed by: INTERNAL MEDICINE

## 2019-08-24 PROCEDURE — 85610 PROTHROMBIN TIME: CPT

## 2019-08-24 PROCEDURE — 99900035 HC TECH TIME PER 15 MIN (STAT)

## 2019-08-24 PROCEDURE — 94761 N-INVAS EAR/PLS OXIMETRY MLT: CPT

## 2019-08-24 PROCEDURE — 25000003 PHARM REV CODE 250: Performed by: INTERNAL MEDICINE

## 2019-08-24 PROCEDURE — 25000003 PHARM REV CODE 250: Performed by: NURSE PRACTITIONER

## 2019-08-24 PROCEDURE — 94640 AIRWAY INHALATION TREATMENT: CPT

## 2019-08-24 PROCEDURE — 85025 COMPLETE CBC W/AUTO DIFF WBC: CPT

## 2019-08-24 RX ORDER — AMLODIPINE BESYLATE 10 MG/1
10 TABLET ORAL DAILY
Qty: 30 TABLET | Refills: 0 | Status: SHIPPED | OUTPATIENT
Start: 2019-08-25 | End: 2019-10-04

## 2019-08-24 RX ORDER — FUROSEMIDE 40 MG/1
40 TABLET ORAL ONCE
Status: COMPLETED | OUTPATIENT
Start: 2019-08-24 | End: 2019-08-24

## 2019-08-24 RX ORDER — DOXYCYCLINE 100 MG/1
100 CAPSULE ORAL EVERY 12 HOURS
Qty: 14 CAPSULE | Refills: 0 | Status: SHIPPED | OUTPATIENT
Start: 2019-08-24 | End: 2019-08-31

## 2019-08-24 RX ORDER — QUETIAPINE FUMARATE 25 MG/1
25 TABLET, FILM COATED ORAL ONCE
Status: DISCONTINUED | OUTPATIENT
Start: 2019-08-24 | End: 2019-08-24

## 2019-08-24 RX ORDER — PREDNISONE 5 MG/1
5 TABLET ORAL DAILY
Qty: 30 TABLET | Refills: 5 | Status: ON HOLD
Start: 2019-08-24 | End: 2019-10-10 | Stop reason: SDUPTHER

## 2019-08-24 RX ORDER — QUETIAPINE FUMARATE 25 MG/1
25 TABLET, FILM COATED ORAL ONCE
Status: COMPLETED | OUTPATIENT
Start: 2019-08-24 | End: 2019-08-24

## 2019-08-24 RX ORDER — PREDNISONE 20 MG/1
TABLET ORAL
Qty: 11 TABLET | Refills: 0 | Status: SHIPPED | OUTPATIENT
Start: 2019-08-24 | End: 2019-09-05 | Stop reason: SDUPTHER

## 2019-08-24 RX ADMIN — BUDESONIDE 0.5 MG: 0.5 SUSPENSION RESPIRATORY (INHALATION) at 08:08

## 2019-08-24 RX ADMIN — ARFORMOTEROL TARTRATE 15 MCG: 15 SOLUTION RESPIRATORY (INHALATION) at 08:08

## 2019-08-24 RX ADMIN — PANTOPRAZOLE SODIUM 40 MG: 40 TABLET, DELAYED RELEASE ORAL at 08:08

## 2019-08-24 RX ADMIN — AMLODIPINE BESYLATE 10 MG: 10 TABLET ORAL at 08:08

## 2019-08-24 RX ADMIN — QUETIAPINE FUMARATE 25 MG: 25 TABLET, FILM COATED ORAL at 01:08

## 2019-08-24 RX ADMIN — FUROSEMIDE 40 MG: 40 TABLET ORAL at 11:08

## 2019-08-24 RX ADMIN — ISOSORBIDE DINITRATE 20 MG: 10 TABLET ORAL at 08:08

## 2019-08-24 RX ADMIN — CARVEDILOL 12.5 MG: 12.5 TABLET, FILM COATED ORAL at 08:08

## 2019-08-24 RX ADMIN — ASPIRIN 81 MG: 81 TABLET, COATED ORAL at 08:08

## 2019-08-24 RX ADMIN — HYDRALAZINE HYDROCHLORIDE 25 MG: 25 TABLET, FILM COATED ORAL at 08:08

## 2019-08-24 RX ADMIN — TAMSULOSIN HYDROCHLORIDE 0.4 MG: 0.4 CAPSULE ORAL at 08:08

## 2019-08-24 NOTE — NURSING
Attempted to obtain IV access in order to administer IV lasix prior to the patient being discharged, as per Dr Epperson. Patient's family became non-cooperative during IV insertion. Spoke with BK Sexton, charge nurse. Per Carlie, Dr. Epperson updated on lack of cooperation from the family. Per Dr. Epperson, order for IV lasix is to be changed from IV to PO form and provided prior to discharge, and she will come talk to the family. Family updated that doctor will come speak with them. Patient's family verbalized agreement with this plan.

## 2019-08-24 NOTE — PROGRESS NOTES
Clinical Pharmacy Progress Note: Coumadin Dosing and Monitoring     Indication: Atrial fibrillation  Target INR is 2-3  Lab Results   Component Value Date    INR 1.9 (H) 08/24/2019    INR 1.8 (H) 08/23/2019    INR 2.2 (H) 08/22/2019   Patient has been educated by pharmacist this admission, however will reattempt to reinforce today with daughter at bedside.   Current dose: 5 mg QD w/ Lovenox 90 mg Q12H bridge.     Plan: Continue current dose. Anticipate able to dc bridge tomorrow. Will reattempt to reinforce w/ daughter today.   PT/INR will be monitored daily. Dose adjustments will be made accordingly.      Thank you for allowing us to participate in this patient's care.    Katiuska Velásquez, PharmD 8/24/2019 9:14 AM

## 2019-08-24 NOTE — NURSING
Went over discharge instructions with patient.   Stressed importance of making and keeping all follow ups as well as making prescribed medication changes.   Prescriptions sent to pt's requested pharmacy.  Patient verbalized understanding and has no questions in regards to discharge.  No IV and tele monitor in place at time of discharge teaching.  Family at bedside.  PCT sent to pt's room to discharge pt via wheelchair to personal transportation.  Primary nurse notified of pt's discharge status.

## 2019-08-24 NOTE — NURSING
Lost IV access. Patient refuses to be stuck at this time. Dr. Petersen notified, will try again when patient is more calm and cooperative.

## 2019-08-24 NOTE — NURSING
Patient caught walking down hallway to nurses station. Re-directed to room and asked to sit in bed, patient started to remove monitor and walk around the room seeming to look for something. Patient refused to answer any questions he was asked so it is difficult to determine if and how confused he was. Patient made multiple attempts to leave his room but was easily re-directed back to the bedside chair or to sit on the bed. Patient eventually got in bed and went to sleep with no falls or aggression toward staff.

## 2019-08-24 NOTE — PLAN OF CARE
08/24/19 1233   BANUELOS Message   Medicare Outpatient and Observation Notification regarding financial responsibility Given to patient/caregiver;Explained to patient/caregiver;Signed/date by patient/caregiver   Date BANUELOS was signed 08/24/19   Time BANUELOS was signed 1232

## 2019-08-24 NOTE — PLAN OF CARE
Discharge Planning:    Discharge orders reviewed by CM.  Patient ready for discharge from CM standpoint.      Patient will discharge home with no needs.

## 2019-08-24 NOTE — PLAN OF CARE
08/24/19 1233   BANUELOS Message   Medicare Outpatient and Observation Notification regarding financial responsibility Given to patient/caregiver;Explained to patient/caregiver;Signed/date by patient/caregiver   Date BANUELOS was signed 08/24/19   Time BANUELOS was signed 1237

## 2019-08-26 ENCOUNTER — TELEPHONE (OUTPATIENT)
Dept: CARDIOLOGY | Facility: CLINIC | Age: 84
End: 2019-08-26

## 2019-08-26 ENCOUNTER — TELEPHONE (OUTPATIENT)
Dept: PULMONOLOGY | Facility: CLINIC | Age: 84
End: 2019-08-26

## 2019-08-26 NOTE — TELEPHONE ENCOUNTER
----- Message from Rocio Hernandez sent at 8/26/2019  3:49 PM CDT -----  Contact: Daughter - Luz Maria Alexis- [phone]  .Type:  Sooner Apoointment Request    Caller is requesting a sooner appointment.  Caller declined first available appointment listed below.  Caller will not accept being placed on the waitlist and is requesting a message be sent to doctor.  Name of Caller:Luz Maria Alexis   When is the first available appointment?9/18/19  Symptoms:1 week hospital follow up   Would the patient rather a call back or a response via [facility]? Call back   Best Call Back Number:[phone]  Additional Information:

## 2019-08-27 ENCOUNTER — PATIENT OUTREACH (OUTPATIENT)
Dept: ADMINISTRATIVE | Facility: CLINIC | Age: 84
End: 2019-08-27

## 2019-08-27 DIAGNOSIS — J44.9 COPD, SEVERE: Primary | ICD-10-CM

## 2019-08-27 NOTE — PATIENT INSTRUCTIONS
Discharge Instructions: COPD  You have been diagnosed with chronic obstructive pulmonary disease (COPD). This is a name given to a group of diseases that limit the flow of air in and out of your lungs. This makes it harder to breathe. With COPD, you are also more likely to get lung infections. COPD includes chronic bronchitis and emphysema. COPD is most often caused by heavy, long-term cigarette smoking.  Home care  Quit smoking  · If you smoke, quit. It is the best thing you can do for your COPD and your overall health.  · Join a stop-smoking program. There are even telephone, text message, and Internet programs to help you quit.  · Ask your healthcare provider about medicines or other methods to help you quit.  · Ask family members to quit smoking as well.  · Don't allow people to smoke in your home, in your car, or when they are around you.  Protect yourself from infection  · Wash your hands often. Do your best to keep your hands away from your face. Most germs are spread from your hands to your mouth.  · Get a flu shot every year. Also ask your provider about pneumonia vaccines.  · Avoid crowds. It's especially important to do this in the winter when more people have colds and flu.  · To stay healthy, get enough sleep, exercise regularly, and eat a balanced diet. You should:  ¨ Get about 8 hours of sleep every night.  ¨ Try to exercise for at least 30 minutes on most days.  ¨ Have healthy foods including fruits and vegetables, 100% whole grains, lean meats and fish, and low-fat dairy products. Try to stay away from foods high in fats and sugar.  Take your medicines  Take your medicines exactly as directed. Don't skip doses.  Manage your stress  Stress can make COPD worse. Use this stress management technique:  · Find a quiet place and sit or lie in a comfortable position.  · Close your eyes and perform breathing exercises for several minutes. Ask your provider about the best way to breathe.  Pulmonary  rehabilitation  · Pulmonary rehab can help you feel better. These programs include exercise, breathing techniques, information about COPD, counseling, and help for smokers.  · Ask your provider or your local hospital about programs in your area.  When to call your healthcare provider  Call your provider immediately if you have any of the following:  · Shortness of breath, wheezing, or coughing  · Increased mucus  · Yellow, green, bloody, or smelly mucus  · Fever or chills  · Tightness in your chest that does not go away with rest or medicine  · An irregular heartbeat or a feeling that your heart is beating very fast  · Swollen ankles   Date Last Reviewed: 5/1/2016  © 7779-2247 [employer]. [address] [place] [address], [place] [address]. All rights reserved. This information is not intended as a substitute for professional medical care. Always follow your healthcare professional's instructions.

## 2019-08-27 NOTE — PLAN OF CARE
08/27/19 1553   Final Note   Assessment Type Final Discharge Note   Anticipated Discharge Disposition Home   Right Care Referral Info   Post Acute Recommendation No Care

## 2019-08-28 ENCOUNTER — ANTI-COAG VISIT (OUTPATIENT)
Dept: CARDIOLOGY | Facility: CLINIC | Age: 84
End: 2019-08-28
Payer: MEDICARE

## 2019-08-28 DIAGNOSIS — I48.91 ATRIAL FIBRILLATION, UNSPECIFIED TYPE: ICD-10-CM

## 2019-08-28 DIAGNOSIS — Z79.01 LONG TERM (CURRENT) USE OF ANTICOAGULANTS: Primary | ICD-10-CM

## 2019-08-28 LAB — INR PPP: 2.4 (ref 2–3)

## 2019-08-28 PROCEDURE — 85610 PROTHROMBIN TIME: CPT | Mod: PBBFAC

## 2019-08-28 PROCEDURE — 93793 ANTICOAG MGMT PT WARFARIN: CPT | Mod: ,,,

## 2019-08-28 PROCEDURE — 93793 PR ANTICOAGULANT MGMT FOR PT TAKING WARFARIN: ICD-10-PCS | Mod: ,,,

## 2019-08-28 NOTE — PROGRESS NOTES
Patient's INR is therapeutic at 2.4.  Mr. Figueroa was admitted to the hospital 8/22-8/24 with Respiratory failure, SOB and CHF complications.  Patient was started on  doxycycline 100mg BID x 7 days (4 days remaining) and prednisone taper (7 days remaining).  Lovenox was given in the hospital but not continued outpatient.  Warfarin home dose was given in the hospital. Instructions given for patient to take warfarin 2.5mg on today due to potential increase in INR while completing antibiotics and steroids; then resume current dose of warfarin 5mg every evening.  Patient/caregiver voiced understanding.   Recheck on 9/05/19 with labs.  Please call should you have any questions or concerns at [phone] or [phone].

## 2019-08-28 NOTE — DISCHARGE SUMMARY
[facility] Hospital Medicine  Discharge Summary      Patient Name: Bola Figueroa Sr.  MRN: [id number]  Admission Date: 8/22/2019  Hospital Length of Stay: 2 days  Discharge Date and Time:  08/28/2019 6:00 AM  Attending Physician: No att. providers found   Discharging Provider: April Epperson MD  Primary Care Provider: Naga Bhatti MD      HPI:   Mr. Figueroa he is an elderly 84-year-old pleasant  male with PMH significant for AFib on oral anticoagulation with warfarin, peripheral arterial disease, COPD, former tobacco user, hypertension, presented to the ED complaining of shortness of breath, wheezing, chest pain. He was found to be in rate respiratory distress initially, was placed on BiPAP.  Patient is not home oxygen-dependent.  He quickly improved on BiPAP, eventually placed on nasal cannula, but continued to have wheezing and mild intercostal muscular retractions.  Laboratory workup revealed WBC 59941, INR 2.2.  ABG pH 7.355, pCO2 52.6, PO2 two nine on 50% FiO2.  Chest x-ray without infiltrates, masses or effusions.  Admitting diagnosis mild COPD exacerbation.    * No surgery found *      Hospital Course:   Pt admitted to Telemetry Unit for acute on chronic respiratory failure with hypoxia and hypercapnia in the setting of COPD exacerbation.  Pt treated with IV steroids, Duonebs prn, and IV antibiotics.  Leukocytosis mildly improved.  Home oxygen evaluation completed and no home oxygen required at this time.  H/H stable.  .  Troponin elevated with downward trend noted.  Echo in 06/2019 showed EF 60% showed diastolic dysfunction, mild to moderate TVR/AVS, and PAP 47.6.  Stress test in 7/2019 showed normal myocardial perfusion.       8/24/19-  Pt is seen at bedside . States doing well. Currently on RA and SpO2 98%. No acute distress is noted. Pt is placed back on his home diuretic regimen following good response to IV lasix. He was treated with IV solumedrol , IV  antibiotic and bronchodilator treatment for COPD exacerbation and responded appropriately .   This morning pt is clinically stable with clear lungs and euvolemic and deemed suitable to discharge home. He will be given prescription for oral antibiotic and oral prednisone with taper. He will resume his home diuretic regimen. Pt will follow up with his PCP and Pulmonologist in a week .      Consults:   Consults (From admission, onward)        Status Ordering Provider     IP consult to case management  Once     Provider:  (Not yet assigned)    Completed WINSTON CUNNINGHAM     Nutrition Services Referral  Once     Provider:  (Not yet assigned)    Completed WINSTON CUNNINGHAM          No new Assessment & Plan notes have been filed under this hospital service since the last note was generated.  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), NYHA class 3 [I50.42] 06/14/2019 Yes    A-fib [I48.91]  Yes    Coronary artery disease [I25.10]  Yes    Essential hypertension [I10]  Yes      Problems Resolved During this Admission:    Diagnosis Date Noted Date Resolved POA    PRINCIPAL PROBLEM:  Acute on chronic respiratory failure with hypoxia and hypercapnia [J96.21, J96.22] 08/22/2019 08/24/2019 Yes    Chest pain [R07.9] 07/23/2018 08/24/2019 Yes    COPD exacerbation [J44.1] 08/07/2013 08/24/2019 Yes       Discharged Condition: stable    Disposition: Home or Self Care    Follow Up:  Follow-up Information     Naga Bhatti MD In 3 days.    Specialty:  Cardiology  Why:  discharge follow up   Contact information:  [address]  [address]  [place] [address] [phone]             Juanjo Wallace MD In 1 week.    Specialty:  Pulmonary Disease  Why:  discharge follow up   Contact information:  [address]  [place] [address] [phone]                 Patient Instructions:      Diet Cardiac     Activity as  tolerated       Significant Diagnostic Studies: Labs: BMP: No results for input(s): GLU, NA, K, CL, CO2, BUN, CREATININE, CALCIUM, MG in the last 48 hours., CMP No results for input(s): NA, K, CL, CO2, GLU, BUN, CREATININE, CALCIUM, PROT, ALBUMIN, BILITOT, ALKPHOS, AST, ALT, ANIONGAP, ESTGFRAFRICA, EGFRNONAA in the last 48 hours. and CBC No results for input(s): WBC, HGB, HCT, PLT in the last 48 hours.    Pending Diagnostic Studies:     None         Medications:  Reconciled Home Medications:      Medication List      START taking these medications    doxycycline 100 MG Cap  Commonly known as:  VIBRAMYCIN  Take 1 capsule (100 mg total) by mouth every 12 (twelve) hours. for 7 days        CHANGE how you take these medications    amLODIPine 10 MG tablet  Commonly known as:  NORVASC  Take 1 tablet (10 mg total) by mouth once daily. Was 5 mg , now increase to 10 mg  What changed:    · medication strength  · how much to take  · additional instructions     * predniSONE 5 MG tablet  Commonly known as:  DELTASONE  Take 1 tablet (5 mg total) by mouth once daily. Start after completion of  high dose prednisone  What changed:  additional instructions     * predniSONE 20 MG tablet  Commonly known as:  DELTASONE  Take two tab for 3 days , then one tab for 3 days , then 1/2 tab for 4 days , then start daily 5 mg as previously prescribed  What changed:  You were already taking a medication with the same name, and this prescription was added. Make sure you understand how and when to take each.     warfarin 5 MG tablet  Commonly known as:  COUMADIN  Take 1/2 tablet by mouth on Sundays; and 1 tablet all other days in the evening as directed by the [facility].  What changed:  additional instructions         * This list has 2 medication(s) that are the same as other medications prescribed for you. Read the directions carefully, and ask your doctor or other care provider to review them with you.            CONTINUE taking these  medications    albuterol-ipratropium 2.5 mg-0.5 mg/3 mL nebulizer solution  Commonly known as:  DUO-NEB  Take 3 mLs by nebulization every 4 (four) hours as needed for Wheezing. Rescue     AMITIZA 8 MCG Cap  Generic drug:  lubiprostone  Take 8 mcg by mouth 2 (two) times daily with meals.     aspirin 81 MG EC tablet  Commonly known as:  ECOTRIN  Take 81 mg by mouth once daily.     benzonatate 200 MG capsule  Commonly known as:  TESSALON  Take 1 capsule (200 mg total) by mouth 3 (three) times daily as needed for Cough.     carvedilol 12.5 MG tablet  Commonly known as:  COREG  Take 1 tablet (12.5 mg total) by mouth 2 (two) times daily with meals.     fluticasone-umeclidin-vilanter 100-62.5-25 mcg Dsdv  Commonly known as:  TRELEGY ELLIPTA  Inhale 1 puff into the lungs once daily.     furosemide 40 MG tablet  Commonly known as:  LASIX  Take 1 tablet (40 mg total) by mouth 2 (two) times daily.     gabapentin 100 MG capsule  Commonly known as:  NEURONTIN  Take 100 mg by mouth 3 (three) times daily.     hydrALAZINE 25 MG tablet  Commonly known as:  APRESOLINE  TAKE 1 TABLET BY MOUTH THREE TIMES DAILY     ipratropium 42 mcg (0.06 %) nasal spray  Commonly known as:  ATROVENT  instill 2 sprays into each nostril three times a day     isosorbide dinitrate 20 MG tablet  Commonly known as:  ISORDIL  take 1 tablet by mouth every 12 hours with HYDRALAZINE     latanoprost 0.005 % ophthalmic solution  Place 1 drop into both eyes nightly.     levETIRAcetam 500 MG Tab  Commonly known as:  KEPPRA  Take 1 tablet by mouth Twice daily.     nitroGLYCERIN 6.5 MG Cpsr  Commonly known as:  NITROBID  Take 1 capsule (6.5 mg total) by mouth 2 (two) times daily.     pantoprazole 40 MG tablet  Commonly known as:  PROTONIX  Take 40 mg by mouth.     potassium chloride 10 MEQ Tbsr  Commonly known as:  KLOR-CON  Take 1 tablet (10 mEq total) by mouth 2 (two) times daily.     promethazine-dextromethorphan 6.25-15 mg/5 mL Syrp  Commonly known as:   PROMETHAZINE-DM  Take 5 mLs by mouth every 6 (six) hours as needed.     tamsulosin 0.4 mg Cap  Commonly known as:  FLOMAX  Take 0.4 mg by mouth once daily.     traMADol 50 mg tablet  Commonly known as:  ULTRAM  take 1 tablet by mouth every 6 hours if needed for UP TO 10 days        STOP taking these medications    guaiFENesin 600 mg 12 hr tablet  Commonly known as:  MUCINEX            Indwelling Lines/Drains at time of discharge:   Lines/Drains/Airways          None          Time spent on the discharge of patient: 30 minutes  Patient was seen and examined on the date of discharge and determined to be suitable for discharge.         April Epperson MD  Department of Hospital Medicine  [facility]

## 2019-08-30 ENCOUNTER — HOSPITAL ENCOUNTER (EMERGENCY)
Facility: HOSPITAL | Age: 84
Discharge: HOME | End: 2019-08-30
Attending: EMERGENCY MEDICINE
Payer: MEDICARE

## 2019-08-30 VITALS
RESPIRATION RATE: 18 BRPM | HEART RATE: 75 BPM | WEIGHT: 195.13 LBS | HEIGHT: 70 IN | TEMPERATURE: 99 F | OXYGEN SATURATION: 97 % | SYSTOLIC BLOOD PRESSURE: 200 MMHG | BODY MASS INDEX: 27.94 KG/M2 | DIASTOLIC BLOOD PRESSURE: 84 MMHG

## 2019-08-30 DIAGNOSIS — Z78.9 DIPHTHERIA-PERTUSSIS-TETANUS (DPT) VACCINATION ADMINISTERED AT CURRENT VISIT: ICD-10-CM

## 2019-08-30 DIAGNOSIS — I10 HYPERTENSION, UNSPECIFIED TYPE: ICD-10-CM

## 2019-08-30 DIAGNOSIS — S05.31XA: Primary | ICD-10-CM

## 2019-08-30 PROCEDURE — 25000003 PHARM REV CODE 250: Performed by: PHYSICIAN ASSISTANT

## 2019-08-30 PROCEDURE — 90715 TDAP VACCINE 7 YRS/> IM: CPT | Performed by: PHYSICIAN ASSISTANT

## 2019-08-30 PROCEDURE — 99284 EMERGENCY DEPT VISIT MOD MDM: CPT | Mod: 25

## 2019-08-30 PROCEDURE — 90471 IMMUNIZATION ADMIN: CPT | Performed by: PHYSICIAN ASSISTANT

## 2019-08-30 PROCEDURE — 63600175 PHARM REV CODE 636 W HCPCS: Performed by: PHYSICIAN ASSISTANT

## 2019-08-30 RX ORDER — GENTAMICIN SULFATE 0.3 %
0.5 OINTMENT (GRAM) OPHTHALMIC (EYE)
Status: DISCONTINUED | OUTPATIENT
Start: 2019-08-30 | End: 2019-08-30

## 2019-08-30 RX ORDER — ERYTHROMYCIN 5 MG/G
OINTMENT OPHTHALMIC
Status: COMPLETED | OUTPATIENT
Start: 2019-08-30 | End: 2019-08-30

## 2019-08-30 RX ORDER — ERYTHROMYCIN 5 MG/G
OINTMENT OPHTHALMIC
Qty: 1 TUBE | Refills: 0 | Status: SHIPPED | OUTPATIENT
Start: 2019-08-30

## 2019-08-30 RX ADMIN — FLUORESCEIN SODIUM AND BENOXINATE HYDROCHLORIDE 2 DROP: 2.5; 4 SOLUTION OPHTHALMIC at 07:08

## 2019-08-30 RX ADMIN — CLOSTRIDIUM TETANI TOXOID ANTIGEN (FORMALDEHYDE INACTIVATED), CORYNEBACTERIUM DIPHTHERIAE TOXOID ANTIGEN (FORMALDEHYDE INACTIVATED), BORDETELLA PERTUSSIS TOXOID ANTIGEN (GLUTARALDEHYDE INACTIVATED), BORDETELLA PERTUSSIS FILAMENTOUS HEMAGGLUTININ ANTIGEN (FORMALDEHYDE INACTIVATED), BORDETELLA PERTUSSIS PERTACTIN ANTIGEN, AND BORDETELLA PERTUSSIS FIMBRIAE 2/3 ANTIGEN 0.5 ML: 5; 2; 2.5; 5; 3; 5 INJECTION, SUSPENSION INTRAMUSCULAR at 07:08

## 2019-08-30 RX ADMIN — ERYTHROMYCIN 1 INCH: 5 OINTMENT OPHTHALMIC at 08:08

## 2019-08-31 NOTE — ED PROVIDER NOTES
History      Chief Complaint   Patient presents with    Eye Pain     sts was bending down and palm tree branch stuck him in the eye.c/o irritation/pain and bleeding. no acitve bleeding at this time       Review of patient's allergies indicates:   Allergen Reactions    No known drug allergies         HPI   HPI    8/30/2019, 8:20 PM   History obtained from the patient and son      History of Present Illness: Bola Figueroa Sr. is a 84 y.o. male patient who presents to the Emergency Department for eye injury today.  He says a palm tree stuck corner of eye and it briefly bled.  He denies pain or change in vision.  Denies f/n/v. Symptoms are constant and mild in severity.  No further complaints or concerns at this time.           PCP: Naga Bhatti MD       Past Medical History:  Past Medical History:   Diagnosis Date    *Atrial fibrillation     Atrial fibrillation     Bilateral carotid artery stenosis 1/15/2016    Cardiac pacemaker 8/7/2013    Congestive heart failure 4/6/2015    COPD (chronic obstructive pulmonary disease) 8/7/2013    Coronary artery disease     Hyperlipidemia     Hypertension     Long-term (current) use of anticoagulants 8/7/2013    PVD (peripheral vascular disease)     S/P PTCA (percutaneous transluminal coronary angioplasty) 8/7/2013    Sleep apnea 8/7/2013    Stroke          Past Surgical History:  Past Surgical History:   Procedure Laterality Date    CATARACT EXTRACTION W/  INTRAOCULAR LENS IMPLANT      INSERT / REPLACE / REMOVE PACEMAKER  2005    REPLACEMENT, PULSE GENERATOR, CARDIAC PACEMAKER Left 3/20/2014    Performed by Behzad Sarmiento MD at [facility] CATH LAB           Family History:  Family History   Problem Relation Age of Onset    Diabetes Sister     Fainting Sister     Heart disease Paternal Uncle     Heart attack Paternal Uncle     Hypertension Maternal Grandmother     Diabetes Maternal Grandfather            Social History:  Social History     Tobacco  Use    Smoking status: Former Smoker     Packs/day: 1.50     Years: 35.00     Pack years: 52.50     Types: Cigarettes     Last attempt to quit: 1979     Years since quittin.8    Smokeless tobacco: Never Used   Substance and Sexual Activity    Alcohol use: No     Comment: QUIT 77    Drug use: No    Sexual activity: Not on file       ROS   Review of Systems   Constitutional: Negative for activity change, chills and fever.   HENT: Negative for ear discharge and trouble swallowing.    Eyes: Positive for redness.   Respiratory: Negative for cough.    Cardiovascular: Negative for palpitations and leg swelling.   Gastrointestinal: Negative for diarrhea and vomiting.   Genitourinary: Negative for decreased urine volume and hematuria.   Musculoskeletal: Negative for joint swelling.   Skin: Negative for pallor, rash and wound.   Neurological: Negative for seizures, weakness and headaches.   Hematological: Does not bruise/bleed easily.   All other systems reviewed and are negative.            Review of Systems    Physical Exam      Initial Vitals [19]   BP Pulse Resp Temp SpO2   (S) (!) 200/84 75 18 98.9 °F (37.2 °C) 97 %      MAP       --         Physical Exam  Vital signs and nursing notes reviewed.  Constitutional: Patient is in NAD. Awake and alert. Well-developed and well-nourished.  Head: Atraumatic. Normocephalic.  Eyes: PERRL. EOM intact.  No scleral icterus.  Left eye with tiny scleral laceration 0.25 cm horizontal 3 at o'clock.  Fluorescein uptake. NEGATIVE MENG SIGN. Eyelid everted.  No fb, facial edema or proptosis.  ENT: Mucous membranes are moist. Oropharynx is clear.  Neck: Supple. No JVD. No lymphadenopathy.  No meningismus  Cardiovascular: Regular rate and rhythm. No murmurs, rubs, or gallops. Distal pulses are 2+ and symmetric.  Pulmonary/Chest: No respiratory distress. Clear to auscultation bilaterally. No wheezing, rales, or rhonchi.  Abdominal: Soft. Non-distended. No  "TTP. No rebound, guarding, or rigidity. Good bowel sounds.  Genitourinary: No CVA tenderness  Musculoskeletal: Moves all extremities. No edema.   Skin: Warm and dry.  Neurological: Awake and alert. No acute focal neurological deficits are appreciated.  Psychiatric: Normal affect. Good eye contact. Appropriate in content.      ED Course      Procedures  ED Vital Signs:  Vitals:    08/30/19 1927   BP: (S) (!) 200/84   Pulse: 75   Resp: 18   Temp: 98.9 °F (37.2 °C)   TempSrc: Oral   SpO2: 97%   Weight: 88.5 kg (195 lb 1.7 oz)   Height: 5' 10" (1.778 m)                 Imaging Results:  Imaging Results    None            The Emergency Provider reviewed the vital signs and test results, which are outlined above.    ED Discussion             Medication(s) given in the ER:  Medications   fluorescein-benoxinate 0.25-0.4% ophthalmic solution 2 drop (2 drops Right Eye Given 8/30/19 1952)   Tdap vaccine injection 0.5 mL (0.5 mLs Intramuscular Given 8/30/19 1951)   erythromycin 5 mg/gram (0.5 %) ophthalmic ointment (1 inch Right Eye Given 8/30/19 2040)          Medication List      START taking these medications    erythromycin ophthalmic ointment  Commonly known as:  ROMYCIN  Place a 1/2 inch ribbon of ointment into the lower eyelid q 8 hours for 5 days        ASK your doctor about these medications    albuterol-ipratropium 2.5 mg-0.5 mg/3 mL nebulizer solution  Commonly known as:  DUO-NEB  Take 3 mLs by nebulization every 4 (four) hours as needed for Wheezing. Rescue     AMITIZA 8 MCG Cap  Generic drug:  lubiprostone     amLODIPine 10 MG tablet  Commonly known as:  NORVASC  Take 1 tablet (10 mg total) by mouth once daily. Was 5 mg , now increase to 10 mg     aspirin 81 MG EC tablet  Commonly known as:  ECOTRIN     benzonatate 200 MG capsule  Commonly known as:  TESSALON  Take 1 capsule (200 mg total) by mouth 3 (three) times daily as needed for Cough.     carvedilol 12.5 MG tablet  Commonly known as:  COREG  Take 1 tablet " (12.5 mg total) by mouth 2 (two) times daily with meals.     doxycycline 100 MG Cap  Commonly known as:  VIBRAMYCIN  Take 1 capsule (100 mg total) by mouth every 12 (twelve) hours. for 7 days     fluticasone-umeclidin-vilanter 100-62.5-25 mcg Dsdv  Commonly known as:  TRELEGY ELLIPTA  Inhale 1 puff into the lungs once daily.     furosemide 40 MG tablet  Commonly known as:  LASIX  Take 1 tablet (40 mg total) by mouth 2 (two) times daily.     gabapentin 100 MG capsule  Commonly known as:  NEURONTIN     hydrALAZINE 25 MG tablet  Commonly known as:  APRESOLINE  TAKE 1 TABLET BY MOUTH THREE TIMES DAILY     ipratropium 42 mcg (0.06 %) nasal spray  Commonly known as:  ATROVENT  instill 2 sprays into each nostril three times a day     isosorbide dinitrate 20 MG tablet  Commonly known as:  ISORDIL  take 1 tablet by mouth every 12 hours with HYDRALAZINE     latanoprost 0.005 % ophthalmic solution     levETIRAcetam 500 MG Tab  Commonly known as:  KEPPRA     nitroGLYCERIN 6.5 MG Cpsr  Commonly known as:  NITROBID  Take 1 capsule (6.5 mg total) by mouth 2 (two) times daily.     pantoprazole 40 MG tablet  Commonly known as:  PROTONIX     potassium chloride 10 MEQ Tbsr  Commonly known as:  KLOR-CON  Take 1 tablet (10 mEq total) by mouth 2 (two) times daily.     * predniSONE 5 MG tablet  Commonly known as:  DELTASONE  Take 1 tablet (5 mg total) by mouth once daily. Start after completion of  high dose prednisone     * predniSONE 20 MG tablet  Commonly known as:  DELTASONE  Take two tab for 3 days , then one tab for 3 days , then 1/2 tab for 4 days , then start daily 5 mg as previously prescribed     promethazine-dextromethorphan 6.25-15 mg/5 mL Syrp  Commonly known as:  PROMETHAZINE-DM  Take 5 mLs by mouth every 6 (six) hours as needed.     tamsulosin 0.4 mg Cap  Commonly known as:  FLOMAX     traMADol 50 mg tablet  Commonly known as:  ULTRAM     warfarin 5 MG tablet  Commonly known as:  COUMADIN  Take 1/2 tablet by mouth on  Sundays; and 1 tablet all other days in the evening as directed by the [facility].         * This list has 2 medication(s) that are the same as other medications prescribed for you. Read the directions carefully, and ask your doctor or other care provider to review them with you.               Where to Get Your Medications      You can get these medications from any pharmacy    Bring a paper prescription for each of these medications  · erythromycin ophthalmic ointment           Follow-up Information     YOUR OPHTHALMOLOGIST In 1 day.    Why:  CALL HIS OFFICE IN MORNING                      Medication List      START taking these medications    erythromycin ophthalmic ointment  Commonly known as:  ROMYCIN  Place a 1/2 inch ribbon of ointment into the lower eyelid q 8 hours for 5 days        ASK your doctor about these medications    albuterol-ipratropium 2.5 mg-0.5 mg/3 mL nebulizer solution  Commonly known as:  DUO-NEB  Take 3 mLs by nebulization every 4 (four) hours as needed for Wheezing. Rescue     AMITIZA 8 MCG Cap  Generic drug:  lubiprostone     amLODIPine 10 MG tablet  Commonly known as:  NORVASC  Take 1 tablet (10 mg total) by mouth once daily. Was 5 mg , now increase to 10 mg     aspirin 81 MG EC tablet  Commonly known as:  ECOTRIN     benzonatate 200 MG capsule  Commonly known as:  TESSALON  Take 1 capsule (200 mg total) by mouth 3 (three) times daily as needed for Cough.     carvedilol 12.5 MG tablet  Commonly known as:  COREG  Take 1 tablet (12.5 mg total) by mouth 2 (two) times daily with meals.     doxycycline 100 MG Cap  Commonly known as:  VIBRAMYCIN  Take 1 capsule (100 mg total) by mouth every 12 (twelve) hours. for 7 days     fluticasone-umeclidin-vilanter 100-62.5-25 mcg Dsdv  Commonly known as:  TRELEGY ELLIPTA  Inhale 1 puff into the lungs once daily.     furosemide 40 MG tablet  Commonly known as:  LASIX  Take 1 tablet (40 mg total) by mouth 2 (two) times daily.     gabapentin 100 MG  capsule  Commonly known as:  NEURONTIN     hydrALAZINE 25 MG tablet  Commonly known as:  APRESOLINE  TAKE 1 TABLET BY MOUTH THREE TIMES DAILY     ipratropium 42 mcg (0.06 %) nasal spray  Commonly known as:  ATROVENT  instill 2 sprays into each nostril three times a day     isosorbide dinitrate 20 MG tablet  Commonly known as:  ISORDIL  take 1 tablet by mouth every 12 hours with HYDRALAZINE     latanoprost 0.005 % ophthalmic solution     levETIRAcetam 500 MG Tab  Commonly known as:  KEPPRA     nitroGLYCERIN 6.5 MG Cpsr  Commonly known as:  NITROBID  Take 1 capsule (6.5 mg total) by mouth 2 (two) times daily.     pantoprazole 40 MG tablet  Commonly known as:  PROTONIX     potassium chloride 10 MEQ Tbsr  Commonly known as:  KLOR-CON  Take 1 tablet (10 mEq total) by mouth 2 (two) times daily.     * predniSONE 5 MG tablet  Commonly known as:  DELTASONE  Take 1 tablet (5 mg total) by mouth once daily. Start after completion of  high dose prednisone     * predniSONE 20 MG tablet  Commonly known as:  DELTASONE  Take two tab for 3 days , then one tab for 3 days , then 1/2 tab for 4 days , then start daily 5 mg as previously prescribed     promethazine-dextromethorphan 6.25-15 mg/5 mL Syrp  Commonly known as:  PROMETHAZINE-DM  Take 5 mLs by mouth every 6 (six) hours as needed.     tamsulosin 0.4 mg Cap  Commonly known as:  FLOMAX     traMADol 50 mg tablet  Commonly known as:  ULTRAM     warfarin 5 MG tablet  Commonly known as:  COUMADIN  Take 1/2 tablet by mouth on Sundays; and 1 tablet all other days in the evening as directed by the [facility].         * This list has 2 medication(s) that are the same as other medications prescribed for you. Read the directions carefully, and ask your doctor or other care provider to review them with you.               Where to Get Your Medications      You can get these medications from any pharmacy    Bring a paper prescription for each of these medications  · erythromycin ophthalmic  ointment             Medical Decision Making      Discussed case with dr farias.  He recommended treating with topical abx.  Pt and son anderson remember ophthalmologist's name but have it at home.  They agree to call first thing in am.  He still denies pain or vision change.      All findings were reviewed with the patient/family in detail.   All remaining questions and concerns were addressed at that time.  Patient/family has been counseled regarding the need for follow-up as well as the indication to return to the emergency room should new or worrisome developments occur.              MDM               Clinical Impression:        ICD-10-CM ICD-9-CM   1. Scleral laceration of right eye, initial encounter S05.31XA 871.9   2. Diphtheria-pertussis-tetanus (DPT) vaccination administered at current visit Z78.9 V49.89   3. Hypertension, unspecified type I10 401.9             Alisha Dunlap PA-C  08/30/19 2100

## 2019-09-01 DIAGNOSIS — Z79.01 LONG TERM (CURRENT) USE OF ANTICOAGULANTS: ICD-10-CM

## 2019-09-03 RX ORDER — WARFARIN SODIUM 5 MG/1
TABLET ORAL
Qty: 30 TABLET | Refills: 0 | Status: SHIPPED | OUTPATIENT
Start: 2019-09-03 | End: 2019-10-01 | Stop reason: SDUPTHER

## 2019-09-05 ENCOUNTER — ANTI-COAG VISIT (OUTPATIENT)
Dept: CARDIOLOGY | Facility: CLINIC | Age: 84
End: 2019-09-05
Payer: MEDICARE

## 2019-09-05 ENCOUNTER — OFFICE VISIT (OUTPATIENT)
Dept: PULMONOLOGY | Facility: CLINIC | Age: 84
End: 2019-09-05
Payer: MEDICARE

## 2019-09-05 ENCOUNTER — LAB VISIT (OUTPATIENT)
Dept: LAB | Facility: HOSPITAL | Age: 84
End: 2019-09-05
Attending: INTERNAL MEDICINE
Payer: MEDICARE

## 2019-09-05 VITALS
HEIGHT: 70 IN | HEART RATE: 53 BPM | DIASTOLIC BLOOD PRESSURE: 60 MMHG | BODY MASS INDEX: 27.4 KG/M2 | RESPIRATION RATE: 18 BRPM | OXYGEN SATURATION: 95 % | SYSTOLIC BLOOD PRESSURE: 122 MMHG | WEIGHT: 191.38 LBS

## 2019-09-05 DIAGNOSIS — I48.91 ATRIAL FIBRILLATION, UNSPECIFIED TYPE: ICD-10-CM

## 2019-09-05 DIAGNOSIS — J44.1 COPD EXACERBATION: ICD-10-CM

## 2019-09-05 DIAGNOSIS — Z79.01 LONG TERM (CURRENT) USE OF ANTICOAGULANTS: ICD-10-CM

## 2019-09-05 DIAGNOSIS — I27.20 PULMONARY HTN: ICD-10-CM

## 2019-09-05 DIAGNOSIS — J44.9 COPD, SEVERE: ICD-10-CM

## 2019-09-05 DIAGNOSIS — J98.6 ELEVATED DIAPHRAGM: ICD-10-CM

## 2019-09-05 DIAGNOSIS — R09.02 EXERCISE HYPOXEMIA: Primary | ICD-10-CM

## 2019-09-05 DIAGNOSIS — J30.0 VASOMOTOR RHINITIS: ICD-10-CM

## 2019-09-05 DIAGNOSIS — R05.9 COUGH: ICD-10-CM

## 2019-09-05 LAB
INR PPP: 2.1 (ref 0.8–1.2)
PROTHROMBIN TIME: 21.5 SEC (ref 9–12.5)

## 2019-09-05 PROCEDURE — 85610 PROTHROMBIN TIME: CPT

## 2019-09-05 PROCEDURE — 99214 OFFICE O/P EST MOD 30 MIN: CPT | Mod: PBBFAC | Performed by: INTERNAL MEDICINE

## 2019-09-05 PROCEDURE — 93793 ANTICOAG MGMT PT WARFARIN: CPT | Mod: ,,,

## 2019-09-05 PROCEDURE — 99999 PR PBB SHADOW E&M-EST. PATIENT-LVL IV: ICD-10-PCS | Mod: PBBFAC,,, | Performed by: INTERNAL MEDICINE

## 2019-09-05 PROCEDURE — 93793 PR ANTICOAGULANT MGMT FOR PT TAKING WARFARIN: ICD-10-PCS | Mod: ,,,

## 2019-09-05 PROCEDURE — 99999 PR PBB SHADOW E&M-EST. PATIENT-LVL IV: CPT | Mod: PBBFAC,,, | Performed by: INTERNAL MEDICINE

## 2019-09-05 PROCEDURE — 99215 OFFICE O/P EST HI 40 MIN: CPT | Mod: S$PBB,,, | Performed by: INTERNAL MEDICINE

## 2019-09-05 PROCEDURE — 36415 COLL VENOUS BLD VENIPUNCTURE: CPT

## 2019-09-05 PROCEDURE — 99215 PR OFFICE/OUTPT VISIT, EST, LEVL V, 40-54 MIN: ICD-10-PCS | Mod: S$PBB,,, | Performed by: INTERNAL MEDICINE

## 2019-09-05 RX ORDER — METFORMIN HYDROCHLORIDE 500 MG/1
500 TABLET ORAL
COMMUNITY
Start: 2019-08-29 | End: 2020-02-25

## 2019-09-05 RX ORDER — AZITHROMYCIN 250 MG/1
TABLET, FILM COATED ORAL
Qty: 6 TABLET | Refills: 0 | Status: SHIPPED | OUTPATIENT
Start: 2019-09-05 | End: 2019-10-10

## 2019-09-05 RX ORDER — PREDNISONE 20 MG/1
TABLET ORAL
Qty: 11 TABLET | Refills: 0 | Status: SHIPPED | OUTPATIENT
Start: 2019-09-05

## 2019-09-05 RX ORDER — IPRATROPIUM BROMIDE AND ALBUTEROL SULFATE 2.5; .5 MG/3ML; MG/3ML
3 SOLUTION RESPIRATORY (INHALATION) EVERY 4 HOURS PRN
Qty: 360 ML | Refills: 11 | Status: SHIPPED | OUTPATIENT
Start: 2019-09-05 | End: 2019-09-26 | Stop reason: SDUPTHER

## 2019-09-05 RX ORDER — PROMETHAZINE HYDROCHLORIDE AND DEXTROMETHORPHAN HYDROBROMIDE 6.25; 15 MG/5ML; MG/5ML
5 SYRUP ORAL EVERY 6 HOURS PRN
Qty: 473 ML | Refills: 5 | Status: SHIPPED | OUTPATIENT
Start: 2019-09-05

## 2019-09-05 RX ORDER — BENZONATATE 200 MG/1
200 CAPSULE ORAL 3 TIMES DAILY PRN
Qty: 90 CAPSULE | Refills: 12 | Status: SHIPPED | OUTPATIENT
Start: 2019-09-05 | End: 2020-09-04

## 2019-09-05 RX ORDER — IPRATROPIUM BROMIDE 42 UG/1
SPRAY, METERED NASAL
Qty: 45 ML | Refills: 4 | Status: SHIPPED | OUTPATIENT
Start: 2019-09-05

## 2019-09-05 NOTE — PROGRESS NOTES
Patient's INR is therapeutic at 2.1.  Daughter, Mrs. Alexis contacted.  Mr. Figueroa was prescribed a Zpak, promethazine DM cough syrup PRN and benzonatate PRN on yesterday (still taking prednisone).  Metformin was started 8/29/19.  No changes in dose at this time, will monitor closely.  Patient will continue warfarin 5mg every evening.  Caregiver voiced understanding.   Recheck on 9/11/19 at [facility].  Please call should you have any questions or concerns at [phone] or [phone].

## 2019-09-05 NOTE — PROGRESS NOTES
Subjective:       Patient ID: Bola Figueroa Sr. is a 84 y.o. male.    Chief Complaint:   He   presents for evaluation and treatment of exacerbation of chronic obstructive pulmonary disease  after being discharged from the hospital  8  days ago. Since discharge symptoms have gradually improving course since that time. Patient denies fever or chills. Symptoms are aggravated by activity. Symptoms improve with rest.  Respiratory: positive for cough, dyspnea on exertion, pleurisy/chest pain, sputum and wheezing; Cardiovascular: no chest pain or palpitations.  Patient currently is not on home oxygen therapy..  MEDICAL RECORDS FROM THE HOSPITAL REVIEWED:  [facility]  Discharge Summary    Patient Name: Bola Figueroa Sr.  MRN: [id number]  Admission Date: 8/22/2019  Hospital Length of Stay: 2 days  Discharge Date and Time:  08/28/2019 6:00 AM  Attending Physician: Viky att. providers found   Discharging Provider: April Epperson MD  Primary Care Provider: Naga Bhatti MD     HPI:   Mr. Figueroa he is an elderly 84-year-old pleasant  male with PMH significant for AFib on oral anticoagulation with warfarin, peripheral arterial disease, COPD, former tobacco user, hypertension, presented to the ED complaining of shortness of breath, wheezing, chest pain. He was found to be in rate respiratory distress initially, was placed on BiPAP.  Patient is not home oxygen-dependent.  He quickly improved on BiPAP, eventually placed on nasal cannula, but continued to have wheezing and mild intercostal muscular retractions.  Laboratory workup revealed WBC 43176, INR 2.2.  ABG pH 7.355, pCO2 52.6, PO2 two nine on 50% FiO2.  Chest x-ray without infiltrates, masses or effusions.  Admitting diagnosis mild COPD exacerbation.   * No surgery found *       Hospital Course:   Pt admitted to Telemetry Unit for acute on chronic respiratory failure with hypoxia and hypercapnia in the setting of COPD  exacerbation.  Pt treated with IV steroids, Duonebs prn, and IV antibiotics.  Leukocytosis mildly improved.  Home oxygen evaluation completed and no home oxygen required at this time.  H/H stable.  .  Troponin elevated with downward trend noted.  Echo in 06/2019 showed EF 60% showed diastolic dysfunction, mild to moderate TVR/AVS, and PAP 47.6.  Stress test in 7/2019 showed normal myocardial perfusion.        8/24/19-  Pt is seen at bedside . States doing well. Currently on RA and SpO2 98%. No acute distress is noted. Pt is placed back on his home diuretic regimen following good response to IV lasix. He was treated with IV solumedrol , IV antibiotic and bronchodilator treatment for COPD exacerbation and responded appropriately .   This morning pt is clinically stable with clear lungs and euvolemic and deemed suitable to discharge home. He will be given prescription for oral antibiotic and oral prednisone with taper. He will resume his home diuretic regimen. Pt will follow up with his PCP and Pulmonologist in a week .       Consults:           Consults (From admission, onward)         Status Ordering Provider       IP consult to case management  Once     Provider:  (Not yet assigned)    Completed [facility] CUNN[facility]AM       Nutrition Services Referral  Once     Provider:  (Not yet assigned)    Completed WINSTON CUNNINGHAM             No new Assessment & Plan notes have been filed under this hospital service since the last note was generated.  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), NYHA class 3 [I50.42] 06/14/2019 Yes    A-fib [I48.91]   Yes    Coronary artery disease [I25.10]   Yes    Essential hypertension [I10]   Yes       Problems Resolved During this Admission:     Diagnosis Date Noted Date Resolved POA    PRINCIPAL PROBLEM:  Acute on chronic respiratory failure with hypoxia and hypercapnia [J96.21,  J96.22] 08/22/2019 08/24/2019 Yes    Chest pain [R07.9] 07/23/2018 08/24/2019 Yes    COPD exacerbation [J44.1] 08/07/2013 08/24/2019 Yes         Discharged Condition: stable     Disposition: Home or Self Care     Follow Up:      Follow-up Information      Naga Bhatti MD In 3 days.    Specialty:  Cardiology  Why:  discharge follow up   Contact information:  [address]  [address]  [facility] [id number] [phone]                 Juanjo Wallace MD In 1 week.    Specialty:  Pulmonary Disease  Why:  discharge follow up   Contact information:  [address] [facility] [place] [address] [phone]          COPD    HPI  Past Medical History:   Diagnosis Date    *Atrial fibrillation     Atrial fibrillation     Bilateral carotid artery stenosis 1/15/2016    Cardiac pacemaker 8/7/2013    Congestive heart failure 4/6/2015    COPD (chronic obstructive pulmonary disease) 8/7/2013    Coronary artery disease     Hyperlipidemia     Hypertension     Long-term (current) use of anticoagulants 8/7/2013    PVD (peripheral vascular disease)     S/P PTCA (percutaneous transluminal coronary angioplasty) 8/7/2013    Sleep apnea 8/7/2013    Stroke      Past Surgical History:   Procedure Laterality Date    CATARACT EXTRACTION W/  INTRAOCULAR LENS IMPLANT      INSERT / REPLACE / REMOVE PACEMAKER  2005    REPLACEMENT, PULSE GENERATOR, CARDIAC PACEMAKER Left 3/20/2014    Performed by Behzad Sarmiento MD at [facility] CATH LAB     Social History     Socioeconomic History    Marital status:      Spouse name: Not on file    Number of children: Not on file    Years of education: Not on file    Highest education level: Not on file   Occupational History    Not on file   Social Needs    Financial resource strain: Not on file    Food insecurity:     Worry: Not on file     Inability: Not on file    Transportation needs:     Medical: Not on file     Non-medical: Not on file   Tobacco Use    Smoking  status: Former Smoker     Packs/day: 1.50     Years: 35.00     Pack years: 52.50     Types: Cigarettes     Last attempt to quit: 1979     Years since quittin.8    Smokeless tobacco: Never Used   Substance and Sexual Activity    Alcohol use: No     Comment: QUIT 77    Drug use: No    Sexual activity: Not on file   Lifestyle    Physical activity:     Days per week: Not on file     Minutes per session: Not on file    Stress: Not on file   Relationships    Social connections:     Talks on phone: Not on file     Gets together: Not on file     Attends Oriental orthodox service: Not on file     Active member of club or organization: Not on file     Attends meetings of clubs or organizations: Not on file     Relationship status: Not on file   Other Topics Concern    Not on file   Social History Narrative    Daughter, Luz Maria Alexis, is the surrogate decision maker [phone].      Review of Systems   Constitutional: Positive for fatigue. Negative for fever.   HENT: Positive for postnasal drip, rhinorrhea and congestion.    Eyes: Negative for redness and itching.   Respiratory: Positive for cough, sputum production, shortness of breath, dyspnea on extertion, use of rescue inhaler and Paroxysmal Nocturnal Dyspnea.    Cardiovascular: Negative for chest pain, palpitations and leg swelling.   Genitourinary: Negative for difficulty urinating and hematuria.   Endocrine: Negative for cold intolerance and heat intolerance.    Skin: Negative for rash.   Gastrointestinal: Negative for nausea and abdominal pain.   Neurological: Negative for dizziness, syncope, weakness and light-headedness.   Hematological: Negative for adenopathy. Does not bruise/bleed easily.   Psychiatric/Behavioral: Negative for sleep disturbance. The patient is not nervous/anxious.        Objective:      Physical Exam   Constitutional: He is oriented to person, place, and time. He appears well-developed and well-nourished.   HENT:   Head:  Normocephalic and atraumatic.   Mouth/Throat: Oropharyngeal exudate present.   Eyes: Pupils are equal, round, and reactive to light. Conjunctivae are normal.   Neck: Neck supple. No JVD present. No tracheal deviation present. No thyromegaly present.   Cardiovascular: Normal rate, regular rhythm and normal heart sounds.   No murmur heard.  Pulmonary/Chest: Effort normal. He has decreased breath sounds. He has wheezes in the right lower field and the left lower field. He has no rhonchi. He has no rales.   Abdominal: Soft. Bowel sounds are normal.   Musculoskeletal: Normal range of motion. He exhibits no edema or tenderness.   Lymphadenopathy:     He has no cervical adenopathy.   Neurological: He is alert and oriented to person, place, and time.   Skin: Skin is warm and dry.   Nursing note and vitals reviewed.    Personal Diagnostic Review  Chest x-ray:  Cardiomegaly, hyperinflation lung fields, elevated right diaphragm  .[id number]  No flowsheet data found.        Assessment:       1. Exercise hypoxemia    2. COPD, severe    3. Cough    4. Vasomotor rhinitis    5. COPD exacerbation    6. Pulmonary HTN    7. Elevated diaphragm        Outpatient Encounter Medications as of 9/5/2019   Medication Sig Dispense Refill    albuterol-ipratropium (DUO-NEB) 2.5 mg-0.5 mg/3 mL nebulizer solution Take 3 mLs by nebulization every 4 (four) hours as needed for Wheezing. Rescue 360 mL 11    amLODIPine (NORVASC) 10 MG tablet Take 1 tablet (10 mg total) by mouth once daily. Was 5 mg , now increase to 10 mg 30 tablet 0    aspirin (ECOTRIN) 81 MG EC tablet Take 81 mg by mouth once daily.      benzonatate (TESSALON) 200 MG capsule Take 1 capsule (200 mg total) by mouth 3 (three) times daily as needed for Cough. 90 capsule 12    carvedilol (COREG) 12.5 MG tablet Take 1 tablet (12.5 mg total) by mouth 2 (two) times daily with meals. 60 tablet 11    erythromycin (ROMYCIN) ophthalmic ointment Place a 1/2 inch ribbon of ointment into the  lower eyelid q 8 hours for 5 days 1 Tube 0    fluticasone-umeclidin-vilanter (TRELEGY ELLIPTA) 100-62.5-25 mcg DsDv Inhale 1 puff into the lungs once daily. 1 each 11    furosemide (LASIX) 40 MG tablet Take 1 tablet (40 mg total) by mouth 2 (two) times daily. 60 tablet 6    gabapentin (NEURONTIN) 100 MG capsule Take 100 mg by mouth 3 (three) times daily.  0    hydrALAZINE (APRESOLINE) 25 MG tablet TAKE 1 TABLET BY MOUTH THREE TIMES DAILY 90 tablet 3    ipratropium (ATROVENT) 42 mcg (0.06 %) nasal spray instill 2 sprays into each nostril three times a day 45 mL 4    isosorbide dinitrate (ISORDIL) 20 MG tablet take 1 tablet by mouth every 12 hours with HYDRALAZINE 60 tablet 6    latanoprost 0.005 % ophthalmic solution Place 1 drop into both eyes nightly.  0    levetiracetam (KEPPRA) 500 MG Tab Take 1 tablet by mouth Twice daily.      lubiprostone (AMITIZA) 8 MCG Cap Take 8 mcg by mouth 2 (two) times daily with meals.       metFORMIN (GLUCOPHAGE) 500 MG tablet Take 500 mg by mouth.      nitroGLYCERIN (NITROBID) 6.5 MG CpSR Take 1 capsule (6.5 mg total) by mouth 2 (two) times daily. 30 capsule 6    pantoprazole (PROTONIX) 40 MG tablet Take 40 mg by mouth.      potassium chloride (KLOR-CON) 10 MEQ TbSR Take 1 tablet (10 mEq total) by mouth 2 (two) times daily. 60 tablet 5    predniSONE (DELTASONE) 20 MG tablet Take two tab for 3 days , then one tab for 3 days , then 1/2 tab for 4 days , then start daily 5 mg as previously prescribed 11 tablet 0    predniSONE (DELTASONE) 5 MG tablet Take 1 tablet (5 mg total) by mouth once daily. Start after completion of  high dose prednisone 30 tablet 5    promethazine-dextromethorphan (PROMETHAZINE-DM) 6.25-15 mg/5 mL Syrp Take 5 mLs by mouth every 6 (six) hours as needed. 473 mL 5    tamsulosin (FLOMAX) 0.4 mg Cp24 Take 0.4 mg by mouth once daily.       traMADol (ULTRAM) 50 mg tablet take 1 tablet by mouth every 6 hours if needed for UP TO 10 days  0    warfarin  (COUMADIN) 5 MG tablet Take 1 tablet by mouth every evening as directed by the [facility]. 30 tablet 0    [DISCONTINUED] albuterol-ipratropium (DUO-NEB) 2.5 mg-0.5 mg/3 mL nebulizer solution Take 3 mLs by nebulization every 4 (four) hours as needed for Wheezing. Rescue 360 mL 11    [DISCONTINUED] benzonatate (TESSALON) 200 MG capsule Take 1 capsule (200 mg total) by mouth 3 (three) times daily as needed for Cough. 90 capsule 12    [DISCONTINUED] fluticasone-umeclidin-vilanter (TRELEGY ELLIPTA) 100-62.5-25 mcg DsDv Inhale 1 puff into the lungs once daily. 1 each 11    [DISCONTINUED] ipratropium (ATROVENT) 42 mcg (0.06 %) nasal spray instill 2 sprays into each nostril three times a day 45 mL 4    [DISCONTINUED] predniSONE (DELTASONE) 20 MG tablet Take two tab for 3 days , then one tab for 3 days , then 1/2 tab for 4 days , then start daily 5 mg as previously prescribed 11 tablet 0    [DISCONTINUED] promethazine-dextromethorphan (PROMETHAZINE-DM) 6.25-15 mg/5 mL Syrp Take 5 mLs by mouth every 6 (six) hours as needed. 473 mL 5    azithromycin (ZITHROMAX Z-ROGELIO) 250 MG tablet 500 mg on day 1 (two tablets) followed by 250 mg once daily on days 2-5 6 tablet 0     No facility-administered encounter medications on file as of 9/5/2019.      Orders Placed This Encounter   Procedures    Six Minute Walk Test to qualify for Home Oxygen     Standing Status:   Future     Standing Expiration Date:   9/5/2020     Plan:       Requested Prescriptions     Signed Prescriptions Disp Refills    albuterol-ipratropium (DUO-NEB) 2.5 mg-0.5 mg/3 mL nebulizer solution 360 mL 11     Sig: Take 3 mLs by nebulization every 4 (four) hours as needed for Wheezing. Rescue    benzonatate (TESSALON) 200 MG capsule 90 capsule 12     Sig: Take 1 capsule (200 mg total) by mouth 3 (three) times daily as needed for Cough.    fluticasone-umeclidin-vilanter (TRELEGY ELLIPTA) 100-62.5-25 mcg DsDv 1 each 11     Sig: Inhale 1 puff into the lungs  once daily.    ipratropium (ATROVENT) 42 mcg (0.06 %) nasal spray 45 mL 4     Sig: instill 2 sprays into each nostril three times a day    promethazine-dextromethorphan (PROMETHAZINE-DM) 6.25-15 mg/5 mL Syrp 473 mL 5     Sig: Take 5 mLs by mouth every 6 (six) hours as needed.    predniSONE (DELTASONE) 20 MG tablet 11 tablet 0     Sig: Take two tab for 3 days , then one tab for 3 days , then 1/2 tab for 4 days , then start daily 5 mg as previously prescribed    azithromycin (ZITHROMAX Z-ROGELIO) 250 MG tablet 6 tablet 0     Si mg on day 1 (two tablets) followed by 250 mg once daily on days 2-5     Exercise hypoxemia  -     Six Minute Walk Test to qualify for Home Oxygen; Future    COPD, severe  -     Six Minute Walk Test to qualify for Home Oxygen; Future  -     albuterol-ipratropium (DUO-NEB) 2.5 mg-0.5 mg/3 mL nebulizer solution; Take 3 mLs by nebulization every 4 (four) hours as needed for Wheezing. Rescue  Dispense: 360 mL; Refill: 11  -     fluticasone-umeclidin-vilanter (TRELEGY ELLIPTA) 100-62.5-25 mcg DsDv; Inhale 1 puff into the lungs once daily.  Dispense: 1 each; Refill: 11    Cough  -     benzonatate (TESSALON) 200 MG capsule; Take 1 capsule (200 mg total) by mouth 3 (three) times daily as needed for Cough.  Dispense: 90 capsule; Refill: 12  -     promethazine-dextromethorphan (PROMETHAZINE-DM) 6.25-15 mg/5 mL Syrp; Take 5 mLs by mouth every 6 (six) hours as needed.  Dispense: 473 mL; Refill: 5    Vasomotor rhinitis  -     ipratropium (ATROVENT) 42 mcg (0.06 %) nasal spray; instill 2 sprays into each nostril three times a day  Dispense: 45 mL; Refill: 4    COPD exacerbation  -     predniSONE (DELTASONE) 20 MG tablet; Take two tab for 3 days , then one tab for 3 days , then 1/2 tab for 4 days , then start daily 5 mg as previously prescribed  Dispense: 11 tablet; Refill: 0  -     azithromycin (ZITHROMAX Z-ROGELIO) 250 MG tablet; 500 mg on day 1 (two tablets) followed by 250 mg once daily on days 2-5   Dispense: 6 tablet; Refill: 0    Pulmonary HTN    Elevated diaphragm                Follow up in about 3 months (around 12/5/2019) for 6 min walk.    Review of medical records from hospital. Medical records from hospital were reviewed during office visit - these included but were not limited to review of radiographic studies and /or radiologists reports, laboratory studies, discharge summaries, procedure notes, consultations and other transcribed notes.    MEDICAL DECISION MAKING: Moderate to high complexity.  Overall, the multiple problems listed are of moderate to high severity that may impact quality of life and activities of daily living. Side effects of medications, treatment plan as well as options and alternatives reviewed and discussed with patient. There was counseling of patient concerning these issues.    Total time spent in face to face counseling and coordination of care - 40 minutes over 50% of time was used in discussion of prognosis, risks, benefits of treatment, instructions and compliance with regimen . Discussion with other physicians or health care providers (homehealth, durable medical equipment providers).

## 2019-09-13 ENCOUNTER — TELEPHONE (OUTPATIENT)
Dept: CARDIOLOGY | Facility: CLINIC | Age: 84
End: 2019-09-13

## 2019-09-16 ENCOUNTER — TELEPHONE (OUTPATIENT)
Dept: CARDIOLOGY | Facility: CLINIC | Age: 84
End: 2019-09-16

## 2019-09-16 NOTE — TELEPHONE ENCOUNTER
Spoke with patient's daughter, Mrs. Alexis.  Mr. Figueroa is not able to make it to coumadin appt on today as he is caring for his ailing wife.  Explained importance of having INR monitored, considering patient recently completed a course of antibiotics and steroids.    Caregiver voiced understanding and agreed to a lab appt on tomorrow, 9/17/19.

## 2019-09-17 ENCOUNTER — HOSPITAL ENCOUNTER (EMERGENCY)
Facility: HOSPITAL | Age: 84
Discharge: HOME | End: 2019-09-17
Attending: FAMILY MEDICINE
Payer: MEDICARE

## 2019-09-17 VITALS
OXYGEN SATURATION: 96 % | HEART RATE: 72 BPM | RESPIRATION RATE: 18 BRPM | DIASTOLIC BLOOD PRESSURE: 71 MMHG | BODY MASS INDEX: 27.46 KG/M2 | TEMPERATURE: 98 F | SYSTOLIC BLOOD PRESSURE: 186 MMHG | HEIGHT: 70 IN

## 2019-09-17 DIAGNOSIS — R06.02 SHORTNESS OF BREATH: ICD-10-CM

## 2019-09-17 DIAGNOSIS — R06.02 SOB (SHORTNESS OF BREATH): Primary | ICD-10-CM

## 2019-09-17 LAB
ALBUMIN SERPL BCP-MCNC: 3.6 G/DL (ref 3.5–5.2)
ALP SERPL-CCNC: 54 U/L (ref 55–135)
ALT SERPL W/O P-5'-P-CCNC: 26 U/L (ref 10–44)
ANION GAP SERPL CALC-SCNC: 11 MMOL/L (ref 8–16)
APTT BLDCRRT: 40.7 SEC (ref 21–32)
AST SERPL-CCNC: 26 U/L (ref 10–40)
BASOPHILS # BLD AUTO: 0.01 K/UL (ref 0–0.2)
BASOPHILS NFR BLD: 0.1 % (ref 0–1.9)
BILIRUB SERPL-MCNC: 1.3 MG/DL (ref 0.1–1)
BILIRUB UR QL STRIP: NEGATIVE
BNP SERPL-MCNC: 434 PG/ML (ref 0–99)
BUN SERPL-MCNC: 17 MG/DL (ref 8–23)
CALCIUM SERPL-MCNC: 9.1 MG/DL (ref 8.7–10.5)
CHLORIDE SERPL-SCNC: 103 MMOL/L (ref 95–110)
CLARITY UR: CLEAR
CO2 SERPL-SCNC: 29 MMOL/L (ref 23–29)
COLOR UR: YELLOW
CREAT SERPL-MCNC: 1.1 MG/DL (ref 0.5–1.4)
DIFFERENTIAL METHOD: ABNORMAL
EOSINOPHIL # BLD AUTO: 0.1 K/UL (ref 0–0.5)
EOSINOPHIL NFR BLD: 0.8 % (ref 0–8)
ERYTHROCYTE [DISTWIDTH] IN BLOOD BY AUTOMATED COUNT: 13.3 % (ref 11.5–14.5)
EST. GFR  (AFRICAN AMERICAN): >60 ML/MIN/1.73 M^2
EST. GFR  (NON AFRICAN AMERICAN): >60 ML/MIN/1.73 M^2
GLUCOSE SERPL-MCNC: 150 MG/DL (ref 70–110)
GLUCOSE UR QL STRIP: NEGATIVE
HCT VFR BLD AUTO: 36.5 % (ref 40–54)
HGB BLD-MCNC: 11.9 G/DL (ref 14–18)
HGB UR QL STRIP: NEGATIVE
INR PPP: 2.4 (ref 0.8–1.2)
KETONES UR QL STRIP: NEGATIVE
LEUKOCYTE ESTERASE UR QL STRIP: NEGATIVE
LYMPHOCYTES # BLD AUTO: 1.7 K/UL (ref 1–4.8)
LYMPHOCYTES NFR BLD: 18.2 % (ref 18–48)
MCH RBC QN AUTO: 30.4 PG (ref 27–31)
MCHC RBC AUTO-ENTMCNC: 32.6 G/DL (ref 32–36)
MCV RBC AUTO: 93 FL (ref 82–98)
MONOCYTES # BLD AUTO: 0.8 K/UL (ref 0.3–1)
MONOCYTES NFR BLD: 8 % (ref 4–15)
NEUTROPHILS # BLD AUTO: 6.9 K/UL (ref 1.8–7.7)
NEUTROPHILS NFR BLD: 73.5 % (ref 38–73)
NITRITE UR QL STRIP: NEGATIVE
PH UR STRIP: 7 [PH] (ref 5–8)
PLATELET # BLD AUTO: 172 K/UL (ref 150–350)
PMV BLD AUTO: 11.3 FL (ref 9.2–12.9)
POTASSIUM SERPL-SCNC: 3.2 MMOL/L (ref 3.5–5.1)
PROT SERPL-MCNC: 6.8 G/DL (ref 6–8.4)
PROT UR QL STRIP: NEGATIVE
PROTHROMBIN TIME: 25.6 SEC (ref 9–12.5)
RBC # BLD AUTO: 3.91 M/UL (ref 4.6–6.2)
SODIUM SERPL-SCNC: 143 MMOL/L (ref 136–145)
SP GR UR STRIP: 1.01 (ref 1–1.03)
TROPONIN I SERPL DL<=0.01 NG/ML-MCNC: 0.03 NG/ML (ref 0–0.03)
URN SPEC COLLECT METH UR: NORMAL
UROBILINOGEN UR STRIP-ACNC: 1 EU/DL
WBC # BLD AUTO: 9.45 K/UL (ref 3.9–12.7)

## 2019-09-17 PROCEDURE — 83880 ASSAY OF NATRIURETIC PEPTIDE: CPT

## 2019-09-17 PROCEDURE — 99285 EMERGENCY DEPT VISIT HI MDM: CPT | Mod: 25

## 2019-09-17 PROCEDURE — 85025 COMPLETE CBC W/AUTO DIFF WBC: CPT

## 2019-09-17 PROCEDURE — 93005 ELECTROCARDIOGRAM TRACING: CPT

## 2019-09-17 PROCEDURE — 36000 PLACE NEEDLE IN VEIN: CPT

## 2019-09-17 PROCEDURE — 85610 PROTHROMBIN TIME: CPT

## 2019-09-17 PROCEDURE — 80053 COMPREHEN METABOLIC PANEL: CPT

## 2019-09-17 PROCEDURE — 93010 EKG 12-LEAD: ICD-10-PCS | Mod: ,,, | Performed by: INTERNAL MEDICINE

## 2019-09-17 PROCEDURE — 84484 ASSAY OF TROPONIN QUANT: CPT

## 2019-09-17 PROCEDURE — 36415 COLL VENOUS BLD VENIPUNCTURE: CPT

## 2019-09-17 PROCEDURE — 81003 URINALYSIS AUTO W/O SCOPE: CPT

## 2019-09-17 PROCEDURE — 25000003 PHARM REV CODE 250: Performed by: FAMILY MEDICINE

## 2019-09-17 PROCEDURE — 85730 THROMBOPLASTIN TIME PARTIAL: CPT

## 2019-09-17 PROCEDURE — 93010 ELECTROCARDIOGRAM REPORT: CPT | Mod: ,,, | Performed by: INTERNAL MEDICINE

## 2019-09-17 RX ORDER — POTASSIUM CHLORIDE 20 MEQ/15ML
20 SOLUTION ORAL ONCE
Status: COMPLETED | OUTPATIENT
Start: 2019-09-17 | End: 2019-09-17

## 2019-09-17 RX ADMIN — POTASSIUM CHLORIDE 20 MEQ: 20 SOLUTION ORAL at 01:09

## 2019-09-17 NOTE — ED NOTES
Pt reports to ED today with c/o SOB. Associated symptoms include productive cough. Patient denies any chest pain, fever, chills, sore throat, diarrhea, n/v, HA, syncope, weakness, light-headedness, numbness, seizures, and all other sxs at this time.    Patient identifiers verified and correct for Bola Clipps.    Level of Consciousness: The patient is awake, alert and aware of environment with an appropriate affect, the patient is oriented x 3 and speaking appropriately.  Appearance: Patient resting comfortably and in no acute distress, patient is clean and well groomed, patient's clothing is properly fastened.  Skin: The skin is warm and dry, color consistent with ethnicity, patient has normal skin turgor and moist mucus membranes, skin intact, no breakdown or bruising noted.  Musculoskeletal: Moves all extremities well in full range of motion. No obvious deformities or swelling noted.  Respiratory: Airway open and patent, respirations spontaneous, even and unlabored. No accessory muscles in use. Breath sounds diminished +productive cough  Cardiac: Patient has a normal rate, no periphreal edema noted, capillary refill < 3 seconds. good pulses palpated peripherally. BP elevated- pt reports he has not taken any of his home meds this morning  Abdomen: Soft, non-tender to palpation. No distention noted.  Neurologic: PERRLA, face exhibits symmetrical expression, hand grasps equal and even bilaterally, reports normal sensation to all extremities and face.  Psychosocial: Normal affect. Good eye contact. Appropriate in content.    Patient verbalized understanding of status and plan of care. Patient changed into hospital gown with assistance. Side rails up x 2, call light in reach, bed low and locked. Cardiac monitor applied to patient; alarms on & audible. WCTM.

## 2019-09-17 NOTE — ED PROVIDER NOTES
SCRIBE #1 NOTE: ILucia, am scribing for, and in the presence of, Amanda Bhakta MD. I have scribed the entire note.       History     Chief Complaint   Patient presents with    Shortness of Breath     hx of COPD, noncompliant with recent meds r/t dementia per family; given duobeb, 2 albuterol, 125 mg solumedrol in route     Review of patient's allergies indicates:   Allergen Reactions    No known drug allergies          History of Present Illness     HPI    9/17/2019, 10:59 AM  History obtained from the family member and patient      History of Present Illness: Bola Figueroa Sr. is a 84 y.o. male patient with a PMHx of COPD, Afib, CHF, CAD, HLD, HTN, and dementia who presents to the Emergency Department for evaluation of SOB which onset gradually yesterday. Symptoms are constant and moderate in severity. No mitigating or exacerbating factors reported. Associated sxs include productive cough (1 week) and pain at pacemaker site. Patient denies any CP, diaphoresis, palpitations, extremity weakness/numbness, leg swelling, dizziness, N/V, and all other sxs at this time. Prior Tx includes duoneb, 2 albuterol, and solumedrol 125 mg en route via EMS. Per family, patient has been noncompliant with recent meds. He is not on O2 at home. No further complaints or concerns at this time.         Arrival mode: EMS    PCP: Naga Bhatti MD        Past Medical History:  Past Medical History:   Diagnosis Date    *Atrial fibrillation     Atrial fibrillation     Bilateral carotid artery stenosis 1/15/2016    Cardiac pacemaker 8/7/2013    Congestive heart failure 4/6/2015    COPD (chronic obstructive pulmonary disease) 8/7/2013    Coronary artery disease     Hyperlipidemia     Hypertension     Long-term (current) use of anticoagulants 8/7/2013    PVD (peripheral vascular disease)     S/P PTCA (percutaneous transluminal coronary angioplasty) 8/7/2013    Sleep apnea 8/7/2013    Stroke        Past  Surgical History:  Past Surgical History:   Procedure Laterality Date    CATARACT EXTRACTION W/  INTRAOCULAR LENS IMPLANT      INSERT / REPLACE / REMOVE PACEMAKER  2005    REPLACEMENT, PULSE GENERATOR, CARDIAC PACEMAKER Left 3/20/2014    Performed by Behzad Sarmiento MD at [facility] CATH LAB         Family History:  Family History   Problem Relation Age of Onset    Diabetes Sister     Fainting Sister     Heart disease Paternal Uncle     Heart attack Paternal Uncle     Hypertension Maternal Grandmother     Diabetes Maternal Grandfather        Social History:  Social History     Tobacco Use    Smoking status: Former Smoker     Packs/day: 1.50     Years: 35.00     Pack years: 52.50     Types: Cigarettes     Last attempt to quit: 1979     Years since quittin.8    Smokeless tobacco: Never Used   Substance and Sexual Activity    Alcohol use: No     Comment: QUIT 77    Drug use: No    Sexual activity: unknown        Review of Systems     Review of Systems   Constitutional: Negative for activity change, chills, diaphoresis and fever.   HENT: Negative for congestion, rhinorrhea, sneezing, sore throat and trouble swallowing.    Eyes: Negative for pain.   Respiratory: Positive for cough and shortness of breath. Negative for chest tightness, wheezing and stridor.    Cardiovascular: Negative for chest pain, palpitations and leg swelling.   Gastrointestinal: Negative for abdominal distention, abdominal pain, constipation, diarrhea, nausea and vomiting.   Genitourinary: Negative for difficulty urinating, dysuria, frequency and urgency.   Musculoskeletal: Negative for arthralgias, back pain, myalgias, neck pain and neck stiffness.   Skin: Negative for pallor, rash and wound.   Neurological: Negative for dizziness, syncope, weakness, light-headedness, numbness and headaches.   Hematological: Does not bruise/bleed easily.   All other systems reviewed and are negative.     Physical Exam     Initial Vitals  "[09/17/19 1037]   BP Pulse Resp Temp SpO2   (!) 124/90 94 20 98.1 °F (36.7 °C) 100 %      MAP       --          Physical Exam  Nursing Notes and Vital Signs Reviewed.  Constitutional: Patient is in no acute distress. Well-developed and well-nourished.  Head: Atraumatic. Normocephalic.  Eyes: PERRL. EOM intact. Conjunctivae are not pale. No scleral icterus.  ENT: Mucous membranes are moist. Oropharynx is clear and symmetric.    Neck: Supple. Full ROM. No lymphadenopathy.  Cardiovascular: Regular rate. Regular rhythm. No murmurs, rubs, or gallops. Distal pulses are 2+ and symmetric.  Pulmonary/Chest: No respiratory distress. Expiratory wheezing to bilateral bases with diminished breath sounds.  Abdominal: Soft and non-distended.  There is no tenderness.  No rebound, guarding, or rigidity. Good bowel sounds.  Genitourinary: No CVA tenderness  Musculoskeletal: Moves all extremities. No obvious deformities. No edema. No calf tenderness.  Skin: Warm and dry.  Neurological:  Alert, awake, and appropriate.  Normal speech.  No acute focal neurological deficits are appreciated.  Psychiatric: Normal affect. Good eye contact. Appropriate in content.     ED Course   Procedures  ED Vital Signs:  Vitals:    09/17/19 1037 09/17/19 1053 09/17/19 1206 09/17/19 1341   BP: (!) 124/90  (!) 195/89 (!) 186/71   Pulse: 94 86 69 72   Resp: 20   18   Temp: 98.1 °F (36.7 °C)   98.1 °F (36.7 °C)   TempSrc: Oral   Oral   SpO2: 100%  97% 96%   Height: 5' 10" (1.778 m)          Abnormal Lab Results:  Labs Reviewed   CBC W/ AUTO DIFFERENTIAL - Abnormal; Notable for the following components:       Result Value    RBC 3.91 (*)     Hemoglobin 11.9 (*)     Hematocrit 36.5 (*)     Gran% 73.5 (*)     All other components within normal limits   COMPREHENSIVE METABOLIC PANEL - Abnormal; Notable for the following components:    Potassium 3.2 (*)     Glucose 150 (*)     Total Bilirubin 1.3 (*)     Alkaline Phosphatase 54 (*)     All other components within " normal limits   TROPONIN I - Abnormal; Notable for the following components:    Troponin I 0.031 (*)     All other components within normal limits   B-TYPE NATRIURETIC PEPTIDE - Abnormal; Notable for the following components:     (*)     All other components within normal limits   PROTIME-INR - Abnormal; Notable for the following components:    Prothrombin Time 25.6 (*)     INR 2.4 (*)     All other components within normal limits   APTT - Abnormal; Notable for the following components:    aPTT 40.7 (*)     All other components within normal limits   URINALYSIS   APTT   PROTIME-INR        All Lab Results:  Results for orders placed or performed during the hospital encounter of 09/17/19   CBC auto differential   Result Value Ref Range    WBC 9.45 3.90 - 12.70 K/uL    RBC 3.91 (L) 4.60 - 6.20 M/uL    Hemoglobin 11.9 (L) 14.0 - 18.0 g/dL    Hematocrit 36.5 (L) 40.0 - 54.0 %    Mean Corpuscular Volume 93 82 - 98 fL    Mean Corpuscular Hemoglobin 30.4 27.0 - 31.0 pg    Mean Corpuscular Hemoglobin Conc 32.6 32.0 - 36.0 g/dL    RDW 13.3 11.5 - 14.5 %    Platelets 172 150 - 350 K/uL    MPV 11.3 9.2 - 12.9 fL    Gran # (ANC) 6.9 1.8 - 7.7 K/uL    Lymph # 1.7 1.0 - 4.8 K/uL    Mono # 0.8 0.3 - 1.0 K/uL    Eos # 0.1 0.0 - 0.5 K/uL    Baso # 0.01 0.00 - 0.20 K/uL    Gran% 73.5 (H) 38.0 - 73.0 %    Lymph% 18.2 18.0 - 48.0 %    Mono% 8.0 4.0 - 15.0 %    Eosinophil% 0.8 0.0 - 8.0 %    Basophil% 0.1 0.0 - 1.9 %    Differential Method Automated    Comprehensive metabolic panel   Result Value Ref Range    Sodium 143 136 - 145 mmol/L    Potassium 3.2 (L) 3.5 - 5.1 mmol/L    Chloride 103 95 - 110 mmol/L    CO2 29 23 - 29 mmol/L    Glucose 150 (H) 70 - 110 mg/dL    BUN, Bld 17 8 - 23 mg/dL    Creatinine 1.1 0.5 - 1.4 mg/dL    Calcium 9.1 8.7 - 10.5 mg/dL    Total Protein 6.8 6.0 - 8.4 g/dL    Albumin 3.6 3.5 - 5.2 g/dL    Total Bilirubin 1.3 (H) 0.1 - 1.0 mg/dL    Alkaline Phosphatase 54 (L) 55 - 135 U/L    AST 26 10 - 40 U/L     ALT 26 10 - 44 U/L    Anion Gap 11 8 - 16 mmol/L    eGFR if African American >60 >60 mL/min/1.73 m^2    eGFR if non African American >60 >60 mL/min/1.73 m^2   Urinalysis - Clean Catch   Result Value Ref Range    Specimen UA Urine, Clean Catch     Color, UA Yellow Yellow, Straw, Marie    Appearance, UA Clear Clear    pH, UA 7.0 5.0 - 8.0    Specific Gravity, UA 1.010 1.005 - 1.030    Protein, UA Negative Negative    Glucose, UA Negative Negative    Ketones, UA Negative Negative    Bilirubin (UA) Negative Negative    Occult Blood UA Negative Negative    Nitrite, UA Negative Negative    Urobilinogen, UA 1.0 <2.0 EU/dL    Leukocytes, UA Negative Negative   Troponin I   Result Value Ref Range    Troponin I 0.031 (H) 0.000 - 0.026 ng/mL   B-Type natriuretic peptide (BNP)   Result Value Ref Range     (H) 0 - 99 pg/mL   Protime-INR   Result Value Ref Range    Prothrombin Time 25.6 (H) 9.0 - 12.5 sec    INR 2.4 (H) 0.8 - 1.2   APTT   Result Value Ref Range    aPTT 40.7 (H) 21.0 - 32.0 sec         Imaging Results:  Imaging Results          X-Ray Chest AP Portable (Final result)  Result time 09/17/19 11:32:17    Final result by Braxton Devries MD (09/17/19 11:32:17)                 Impression:      No acute radiographic abnormality in the chest.      Electronically signed by: Braxton Devries  Date:    09/17/2019  Time:    11:32             Narrative:    EXAMINATION:  XR CHEST AP PORTABLE    CLINICAL HISTORY:  Chest Pain;    TECHNIQUE:  Single frontal view of the chest was performed.    COMPARISON:  Chest radiograph 08/22/2019 with multiple priors    FINDINGS:  Pacer device with to transvenous pacer wires in stable position.  Cardiomediastinal silhouette unchanged enlarged.  Continued elevation of the right hemidiaphragm.  No acute or new airspace opacity.  No significant effusion.  No pneumothorax.  Visualized osseous structures appear intact.                                 The EKG was ordered, reviewed, and  independently interpreted by the ED provider.  Interpretation time: 10:49  Rate: 71 BPM  Rhythm: Ventricular-paced rhythm  Interpretation: No acute ST changes. No STEMI.           The Emergency Provider reviewed the vital signs and test results, which are outlined above.     ED Discussion     1:02 PM: Reassessed pt at this time.  Pt states his condition has improved at this time. Discussed with pt all pertinent ED information and results. Discussed pt dx and plan of tx. Gave pt all f/u and return to the ED instructions. All questions and concerns were addressed at this time. Pt expresses understanding of information and instructions, and is comfortable with plan to discharge. Pt is stable for discharge.    I discussed with patient and/or family/caretaker that evaluation in the ED does not suggest any emergent or life threatening medical conditions requiring immediate intervention beyond what was provided in the ED, and I believe patient is safe for discharge.  Regardless, an unremarkable evaluation in the ED does not preclude the development or presence of a serious of life threatening condition. As such, patient was instructed to return immediately for any worsening or change in current symptoms.         Medical Decision Making:   Clinical Tests:   Lab Tests: Ordered and Reviewed  Radiological Study: Ordered and Reviewed  Medical Tests: Ordered and Reviewed           ED Medication(s):  Medications   potassium chloride 10% oral solution 20 mEq (20 mEq Oral Given 9/17/19 1323)       New Prescriptions    No medications       Follow-up Information     Naga Bhatti MD.    Specialty:  Cardiology  Contact information:  [address] [facility]  [address]  [facility] [address] [phone]                       Scribe Attestation:   Scribe #1: I performed the above scribed service and the documentation accurately describes the services I performed. I attest to the accuracy of the note.     Attending:   Physician  Attestation Statement for Scribe #1: I, Amanda Bhakta MD, personally performed the services described in this documentation, as scribed by Lucia Davenport, in my presence, and it is both accurate and complete.           Clinical Impression       ICD-10-CM ICD-9-CM   1. SOB (shortness of breath) R06.02 786.05   2. Shortness of breath R06.02 786.05       Disposition:   Disposition: Discharged  Condition: Stable         Amanda Bhakta MD  09/20/19 8339

## 2019-09-18 ENCOUNTER — ANTI-COAG VISIT (OUTPATIENT)
Dept: CARDIOLOGY | Facility: CLINIC | Age: 84
End: 2019-09-18
Payer: MEDICARE

## 2019-09-18 DIAGNOSIS — Z79.01 LONG TERM (CURRENT) USE OF ANTICOAGULANTS: ICD-10-CM

## 2019-09-18 DIAGNOSIS — I48.91 ATRIAL FIBRILLATION, UNSPECIFIED TYPE: ICD-10-CM

## 2019-09-18 PROCEDURE — 93793 ANTICOAG MGMT PT WARFARIN: CPT | Mod: ,,,

## 2019-09-18 PROCEDURE — 93793 PR ANTICOAGULANT MGMT FOR PT TAKING WARFARIN: ICD-10-PCS | Mod: ,,,

## 2019-09-18 NOTE — PROGRESS NOTES
Mr. Figueroa was seen in the emergency department on yesterday for SOB - no medication changes made.  INR was checked.    Patient's INR is therapeutic at 2.4.  Mrs. Alexis contacted.  Patient has completed antibiotics and high dose prednisone, currently taking maintenance dose of prednisone 5mg daily.   No changes in warfarin dose at this time.  Continue current dose of warfarin 5mg every evening. Caregiver voiced understanding.  Recheck INR on 10/10/19 with labs.   Please call should you have any questions or concerns at [phone] or [phone].

## 2019-09-20 ENCOUNTER — TELEPHONE (OUTPATIENT)
Dept: CARDIOLOGY | Facility: CLINIC | Age: 84
End: 2019-09-20

## 2019-09-20 NOTE — TELEPHONE ENCOUNTER
Left message for return call on direct call back number.    ----- Message from Janett Arthur sent at 9/20/2019 10:49 AM CDT -----  Contact: Patient's daughter- Ms. Alexis  Type: Needs Medical Advice    Who Called:  Patient's daughter- Ms. Alexis  Symptoms (please be specific):  na  How long has patient had these symptoms:  na  Pharmacy name and phone #:  na  Best Call Back Number: [phone]    Additional Information: requesting to speak with someone in the office regarding a clearance for surgery. Please call to advise, thank you!

## 2019-09-25 DIAGNOSIS — J44.9 COPD, SEVERE: ICD-10-CM

## 2019-09-25 RX ORDER — IPRATROPIUM BROMIDE AND ALBUTEROL SULFATE 2.5; .5 MG/3ML; MG/3ML
3 SOLUTION RESPIRATORY (INHALATION) EVERY 4 HOURS PRN
Qty: 360 ML | Refills: 11 | Status: CANCELLED | OUTPATIENT
Start: 2019-09-25 | End: 2020-01-23

## 2019-10-01 DIAGNOSIS — Z79.01 LONG TERM (CURRENT) USE OF ANTICOAGULANTS: ICD-10-CM

## 2019-10-01 RX ORDER — WARFARIN SODIUM 5 MG/1
TABLET ORAL
Qty: 30 TABLET | Refills: 11 | Status: SHIPPED | OUTPATIENT
Start: 2019-10-01

## 2019-10-04 ENCOUNTER — HOSPITAL ENCOUNTER (INPATIENT)
Facility: HOSPITAL | Age: 84
LOS: 6 days | Discharge: NURSING, REHAB OR LONG-TERM CARE | DRG: 064 | End: 2019-10-10
Attending: EMERGENCY MEDICINE | Admitting: INTERNAL MEDICINE
Payer: MEDICARE

## 2019-10-04 DIAGNOSIS — J44.1 COPD EXACERBATION: ICD-10-CM

## 2019-10-04 DIAGNOSIS — I50.33 ACUTE ON CHRONIC DIASTOLIC CONGESTIVE HEART FAILURE: ICD-10-CM

## 2019-10-04 DIAGNOSIS — I63.9 ACUTE CVA (CEREBROVASCULAR ACCIDENT): Primary | ICD-10-CM

## 2019-10-04 DIAGNOSIS — I27.20 PULMONARY HTN: ICD-10-CM

## 2019-10-04 DIAGNOSIS — G93.5 UNCAL HERNIATION: ICD-10-CM

## 2019-10-04 DIAGNOSIS — I63.9 CVA (CEREBRAL VASCULAR ACCIDENT): ICD-10-CM

## 2019-10-04 DIAGNOSIS — R53.1 WEAKNESS: ICD-10-CM

## 2019-10-04 LAB
ALBUMIN SERPL BCP-MCNC: 3.5 G/DL (ref 3.5–5.2)
ALP SERPL-CCNC: 59 U/L (ref 55–135)
ALT SERPL W/O P-5'-P-CCNC: 17 U/L (ref 10–44)
ANION GAP SERPL CALC-SCNC: 11 MMOL/L (ref 8–16)
ANION GAP SERPL CALC-SCNC: 9 MMOL/L (ref 8–16)
AST SERPL-CCNC: 27 U/L (ref 10–40)
BASOPHILS # BLD AUTO: 0.03 K/UL (ref 0–0.2)
BASOPHILS NFR BLD: 0.4 % (ref 0–1.9)
BILIRUB SERPL-MCNC: 2.1 MG/DL (ref 0.1–1)
BILIRUB UR QL STRIP: NEGATIVE
BUN SERPL-MCNC: 13 MG/DL (ref 8–23)
BUN SERPL-MCNC: 15 MG/DL (ref 8–23)
CALCIUM SERPL-MCNC: 8.5 MG/DL (ref 8.7–10.5)
CALCIUM SERPL-MCNC: 9.4 MG/DL (ref 8.7–10.5)
CHLORIDE SERPL-SCNC: 102 MMOL/L (ref 95–110)
CHLORIDE SERPL-SCNC: 107 MMOL/L (ref 95–110)
CLARITY UR: CLEAR
CO2 SERPL-SCNC: 26 MMOL/L (ref 23–29)
CO2 SERPL-SCNC: 28 MMOL/L (ref 23–29)
COLOR UR: YELLOW
CREAT SERPL-MCNC: 1.3 MG/DL (ref 0.5–1.4)
CREAT SERPL-MCNC: 1.6 MG/DL (ref 0.5–1.4)
DIFFERENTIAL METHOD: ABNORMAL
EOSINOPHIL # BLD AUTO: 0.1 K/UL (ref 0–0.5)
EOSINOPHIL NFR BLD: 1.3 % (ref 0–8)
ERYTHROCYTE [DISTWIDTH] IN BLOOD BY AUTOMATED COUNT: 12.7 % (ref 11.5–14.5)
EST. GFR  (AFRICAN AMERICAN): 45 ML/MIN/1.73 M^2
EST. GFR  (AFRICAN AMERICAN): 58 ML/MIN/1.73 M^2
EST. GFR  (NON AFRICAN AMERICAN): 39 ML/MIN/1.73 M^2
EST. GFR  (NON AFRICAN AMERICAN): 50 ML/MIN/1.73 M^2
GLUCOSE SERPL-MCNC: 176 MG/DL (ref 70–110)
GLUCOSE SERPL-MCNC: 202 MG/DL (ref 70–110)
GLUCOSE UR QL STRIP: NEGATIVE
HCT VFR BLD AUTO: 40.2 % (ref 40–54)
HGB BLD-MCNC: 12.8 G/DL (ref 14–18)
HGB UR QL STRIP: NEGATIVE
IMM GRANULOCYTES # BLD AUTO: 0.04 K/UL (ref 0–0.04)
IMM GRANULOCYTES NFR BLD AUTO: 0.5 % (ref 0–0.5)
INFLUENZA A, MOLECULAR: NEGATIVE
INFLUENZA B, MOLECULAR: NEGATIVE
INR PPP: 1 (ref 0.8–1.2)
KETONES UR QL STRIP: NEGATIVE
LACTATE SERPL-SCNC: 1.2 MMOL/L (ref 0.5–2.2)
LACTATE SERPL-SCNC: 1.3 MMOL/L (ref 0.5–2.2)
LEUKOCYTE ESTERASE UR QL STRIP: NEGATIVE
LIPASE SERPL-CCNC: 37 U/L (ref 4–60)
LYMPHOCYTES # BLD AUTO: 1.1 K/UL (ref 1–4.8)
LYMPHOCYTES NFR BLD: 15 % (ref 18–48)
MCH RBC QN AUTO: 29.8 PG (ref 27–31)
MCHC RBC AUTO-ENTMCNC: 31.8 G/DL (ref 32–36)
MCV RBC AUTO: 94 FL (ref 82–98)
MONOCYTES # BLD AUTO: 0.8 K/UL (ref 0.3–1)
MONOCYTES NFR BLD: 11.1 % (ref 4–15)
NEUTROPHILS # BLD AUTO: 5.3 K/UL (ref 1.8–7.7)
NEUTROPHILS NFR BLD: 71.7 % (ref 38–73)
NITRITE UR QL STRIP: NEGATIVE
NRBC BLD-RTO: 0 /100 WBC
PH UR STRIP: 6 [PH] (ref 5–8)
PLATELET # BLD AUTO: 164 K/UL (ref 150–350)
PMV BLD AUTO: 11.7 FL (ref 9.2–12.9)
POTASSIUM SERPL-SCNC: 3 MMOL/L (ref 3.5–5.1)
POTASSIUM SERPL-SCNC: 3.3 MMOL/L (ref 3.5–5.1)
PROCALCITONIN SERPL IA-MCNC: 0.03 NG/ML
PROT SERPL-MCNC: 6.9 G/DL (ref 6–8.4)
PROT UR QL STRIP: NEGATIVE
PROTHROMBIN TIME: 10.5 SEC (ref 9–12.5)
RBC # BLD AUTO: 4.3 M/UL (ref 4.6–6.2)
SODIUM SERPL-SCNC: 141 MMOL/L (ref 136–145)
SODIUM SERPL-SCNC: 142 MMOL/L (ref 136–145)
SP GR UR STRIP: 1.02 (ref 1–1.03)
SPECIMEN SOURCE: NORMAL
TROPONIN I SERPL DL<=0.01 NG/ML-MCNC: 0.03 NG/ML (ref 0–0.03)
TSH SERPL DL<=0.005 MIU/L-ACNC: 1.52 UIU/ML (ref 0.4–4)
URN SPEC COLLECT METH UR: NORMAL
UROBILINOGEN UR STRIP-ACNC: NEGATIVE EU/DL
WBC # BLD AUTO: 7.46 K/UL (ref 3.9–12.7)

## 2019-10-04 PROCEDURE — 36415 COLL VENOUS BLD VENIPUNCTURE: CPT

## 2019-10-04 PROCEDURE — 83036 HEMOGLOBIN GLYCOSYLATED A1C: CPT

## 2019-10-04 PROCEDURE — 80053 COMPREHEN METABOLIC PANEL: CPT

## 2019-10-04 PROCEDURE — 84443 ASSAY THYROID STIM HORMONE: CPT

## 2019-10-04 PROCEDURE — 81003 URINALYSIS AUTO W/O SCOPE: CPT

## 2019-10-04 PROCEDURE — 96361 HYDRATE IV INFUSION ADD-ON: CPT

## 2019-10-04 PROCEDURE — 83690 ASSAY OF LIPASE: CPT

## 2019-10-04 PROCEDURE — 93010 ELECTROCARDIOGRAM REPORT: CPT | Mod: ,,, | Performed by: INTERNAL MEDICINE

## 2019-10-04 PROCEDURE — 83605 ASSAY OF LACTIC ACID: CPT | Mod: 91

## 2019-10-04 PROCEDURE — 63600175 PHARM REV CODE 636 W HCPCS: Performed by: INTERNAL MEDICINE

## 2019-10-04 PROCEDURE — 25000003 PHARM REV CODE 250: Performed by: INTERNAL MEDICINE

## 2019-10-04 PROCEDURE — 87040 BLOOD CULTURE FOR BACTERIA: CPT

## 2019-10-04 PROCEDURE — 21400001 HC TELEMETRY ROOM

## 2019-10-04 PROCEDURE — 25500020 PHARM REV CODE 255: Performed by: INTERNAL MEDICINE

## 2019-10-04 PROCEDURE — 96360 HYDRATION IV INFUSION INIT: CPT

## 2019-10-04 PROCEDURE — 25000003 PHARM REV CODE 250: Performed by: EMERGENCY MEDICINE

## 2019-10-04 PROCEDURE — 87502 INFLUENZA DNA AMP PROBE: CPT

## 2019-10-04 PROCEDURE — 63600175 PHARM REV CODE 636 W HCPCS: Performed by: EMERGENCY MEDICINE

## 2019-10-04 PROCEDURE — 93005 ELECTROCARDIOGRAM TRACING: CPT

## 2019-10-04 PROCEDURE — 85610 PROTHROMBIN TIME: CPT

## 2019-10-04 PROCEDURE — 84484 ASSAY OF TROPONIN QUANT: CPT

## 2019-10-04 PROCEDURE — 80061 LIPID PANEL: CPT

## 2019-10-04 PROCEDURE — 85025 COMPLETE CBC W/AUTO DIFF WBC: CPT

## 2019-10-04 PROCEDURE — 99291 CRITICAL CARE FIRST HOUR: CPT | Mod: 25

## 2019-10-04 PROCEDURE — 99223 1ST HOSP IP/OBS HIGH 75: CPT | Mod: ,,, | Performed by: PSYCHIATRY & NEUROLOGY

## 2019-10-04 PROCEDURE — 99223 PR INITIAL HOSPITAL CARE,LEVL III: ICD-10-PCS | Mod: ,,, | Performed by: PSYCHIATRY & NEUROLOGY

## 2019-10-04 PROCEDURE — 80048 BASIC METABOLIC PNL TOTAL CA: CPT

## 2019-10-04 PROCEDURE — 93010 EKG 12-LEAD: ICD-10-PCS | Mod: ,,, | Performed by: INTERNAL MEDICINE

## 2019-10-04 PROCEDURE — 25000003 PHARM REV CODE 250: Performed by: NURSE PRACTITIONER

## 2019-10-04 PROCEDURE — 84145 PROCALCITONIN (PCT): CPT

## 2019-10-04 RX ORDER — ASPIRIN 325 MG
325 TABLET ORAL
Status: COMPLETED | OUTPATIENT
Start: 2019-10-04 | End: 2019-10-04

## 2019-10-04 RX ORDER — LEVETIRACETAM 500 MG/1
500 TABLET ORAL 2 TIMES DAILY
Status: DISCONTINUED | OUTPATIENT
Start: 2019-10-04 | End: 2019-10-10 | Stop reason: HOSPADM

## 2019-10-04 RX ORDER — INSULIN ASPART 100 [IU]/ML
0-5 INJECTION, SOLUTION INTRAVENOUS; SUBCUTANEOUS EVERY 6 HOURS PRN
Status: DISCONTINUED | OUTPATIENT
Start: 2019-10-04 | End: 2019-10-10 | Stop reason: HOSPADM

## 2019-10-04 RX ORDER — ATORVASTATIN CALCIUM 40 MG/1
40 TABLET, FILM COATED ORAL DAILY
Status: DISCONTINUED | OUTPATIENT
Start: 2019-10-05 | End: 2019-10-10 | Stop reason: HOSPADM

## 2019-10-04 RX ORDER — SODIUM CHLORIDE 0.9 % (FLUSH) 0.9 %
10 SYRINGE (ML) INJECTION
Status: DISCONTINUED | OUTPATIENT
Start: 2019-10-04 | End: 2019-10-10 | Stop reason: HOSPADM

## 2019-10-04 RX ORDER — ASPIRIN 81 MG/1
81 TABLET ORAL DAILY
Status: DISCONTINUED | OUTPATIENT
Start: 2019-10-05 | End: 2019-10-07

## 2019-10-04 RX ORDER — GLUCAGON 1 MG
1 KIT INJECTION
Status: DISCONTINUED | OUTPATIENT
Start: 2019-10-04 | End: 2019-10-10 | Stop reason: HOSPADM

## 2019-10-04 RX ORDER — ENOXAPARIN SODIUM 100 MG/ML
40 INJECTION SUBCUTANEOUS EVERY 24 HOURS
Status: DISCONTINUED | OUTPATIENT
Start: 2019-10-04 | End: 2019-10-10 | Stop reason: HOSPADM

## 2019-10-04 RX ORDER — ACETAMINOPHEN 325 MG/1
650 TABLET ORAL EVERY 6 HOURS PRN
Status: DISCONTINUED | OUTPATIENT
Start: 2019-10-04 | End: 2019-10-10 | Stop reason: HOSPADM

## 2019-10-04 RX ORDER — PANTOPRAZOLE SODIUM 40 MG/1
40 TABLET, DELAYED RELEASE ORAL DAILY
Status: DISCONTINUED | OUTPATIENT
Start: 2019-10-05 | End: 2019-10-10 | Stop reason: HOSPADM

## 2019-10-04 RX ORDER — LABETALOL HYDROCHLORIDE 5 MG/ML
10 INJECTION, SOLUTION INTRAVENOUS EVERY 4 HOURS PRN
Status: DISCONTINUED | OUTPATIENT
Start: 2019-10-04 | End: 2019-10-06

## 2019-10-04 RX ORDER — TAMSULOSIN HYDROCHLORIDE 0.4 MG/1
0.4 CAPSULE ORAL DAILY
Status: DISCONTINUED | OUTPATIENT
Start: 2019-10-05 | End: 2019-10-10 | Stop reason: HOSPADM

## 2019-10-04 RX ADMIN — LEVETIRACETAM 500 MG: 500 TABLET, FILM COATED ORAL at 08:10

## 2019-10-04 RX ADMIN — ACETAMINOPHEN 650 MG: 325 TABLET ORAL at 09:10

## 2019-10-04 RX ADMIN — ENOXAPARIN SODIUM 40 MG: 100 INJECTION SUBCUTANEOUS at 05:10

## 2019-10-04 RX ADMIN — ASPIRIN 325 MG ORAL TABLET 325 MG: 325 PILL ORAL at 11:10

## 2019-10-04 RX ADMIN — SODIUM CHLORIDE 2484 ML: 0.9 INJECTION, SOLUTION INTRAVENOUS at 10:10

## 2019-10-04 RX ADMIN — IOHEXOL 100 ML: 350 INJECTION, SOLUTION INTRAVENOUS at 06:10

## 2019-10-04 NOTE — ED PROVIDER NOTES
SCRIBE #1 NOTE: I, Antwan Crawford, am scribing for, and in the presence of, Jonn Birmingham Jr., MD. I have scribed the entire note.      History      Chief Complaint   Patient presents with    Fall     pt fell off toilet and hit head, on coumadin.  weakness x 2 days       Review of patient's allergies indicates:   Allergen Reactions    No known drug allergies         HPI   HPI    10/4/2019, 10:15 AM   History obtained from the patient and EMS      History of Present Illness: Bola Figueroa Sr. is a 84 y.o. male patient who presents to the Emergency Department following a fall just PTA. Pt reports generalized weakness over the past 2 days, causing him to fall today and hit the back of his head on the toilet. Symptoms are moderate in severity. No mitigating or exacerbating factors reported. Associated sxs include headache. Patient denies any fever, chills, n/v/d, SOB, CP, numbness, dizziness, LOC, and all other sxs at this time. No prior Tx reported. No further complaints or concerns at this time. He states he only has a headache.  Denies any other symptoms other than generalized weakness.    Arrival mode: EMS     PCP: Naga Bhatti MD     Past Medical History:  Past Medical History:   Diagnosis Date    *Atrial fibrillation     Atrial fibrillation     Bilateral carotid artery stenosis 1/15/2016    Cardiac pacemaker 8/7/2013    Congestive heart failure 4/6/2015    COPD (chronic obstructive pulmonary disease) 8/7/2013    Coronary artery disease     Hyperlipidemia     Hypertension     Long-term (current) use of anticoagulants 8/7/2013    PVD (peripheral vascular disease)     S/P PTCA (percutaneous transluminal coronary angioplasty) 8/7/2013    Sleep apnea 8/7/2013    Stroke        Past Surgical History:  Past Surgical History:   Procedure Laterality Date    CATARACT EXTRACTION W/  INTRAOCULAR LENS IMPLANT      INSERT / REPLACE / REMOVE PACEMAKER  2005         Family History:  Family History    Problem Relation Age of Onset    Diabetes Sister     Fainting Sister     Heart disease Paternal Uncle     Heart attack Paternal Uncle     Hypertension Maternal Grandmother     Diabetes Maternal Grandfather        Social History:  Social History     Tobacco Use    Smoking status: Former Smoker     Packs/day: 1.50     Years: 35.00     Pack years: 52.50     Types: Cigarettes     Last attempt to quit: 1979     Years since quittin.9    Smokeless tobacco: Never Used   Substance and Sexual Activity    Alcohol use: No     Comment: QUIT 77    Drug use: No    Sexual activity: Unknown       ROS   Review of Systems   Constitutional: Negative for chills, diaphoresis, fatigue and fever.   HENT: Negative for sore throat.    Respiratory: Negative for shortness of breath.    Cardiovascular: Negative for chest pain.   Gastrointestinal: Negative for abdominal pain, diarrhea, nausea and vomiting.   Genitourinary: Negative for dysuria.   Musculoskeletal: Negative for back pain.   Skin: Negative for rash and wound.   Neurological: Positive for weakness (generalized) and headaches. Negative for dizziness and syncope.   Hematological: Does not bruise/bleed easily.   All other systems reviewed and are negative.    Physical Exam      Initial Vitals [10/04/19 1011]   BP Pulse Resp Temp SpO2   (!) 180/90 60 18 99.2 °F (37.3 °C) 98 %      MAP       --          Physical Exam  Nursing Notes and Vital Signs Reviewed.  Constitutional: Patient is in no acute distress. Well-developed.  Mild bitemporal temporal wasting.  Patient appears fatigued and washed out.  Head: Mild bilateral temporal wasting. Hematoma to posterior head.  Eyes: PERRL. EOM intact. Conjunctivae are not pale. No scleral icterus.  ENT: Mucous membranes are moist. Oropharynx is clear and symmetric.    Neck: Supple. Full ROM. No lymphadenopathy.  Cardiovascular: Regular rate. Regular rhythm. No murmurs, rubs, or gallops. Distal pulses are 2+ and  symmetric.  Pulmonary/Chest: No respiratory distress. Clear to auscultation bilaterally. No wheezing or rales.  Abdominal: Soft and non-distended.  There is no tenderness.  No rebound, guarding, or rigidity.   Musculoskeletal: Moves all extremities. No obvious deformities. No edema.  Skin: Warm and dry.  Neurological:  Alert, awake, and appropriate.  Normal speech.  No acute focal neurological deficits are appreciated. 2 through 12 intact bilaterally.  Speech within normal limits.  5 x 5 strength in all extremities. Mild generalized symmetrical weakness.  Psychiatric: Normal affect. Good eye contact. Appropriate in content.    ED Course    Critical Care  Date/Time: 10/4/2019 1:18 PM  Performed by: Jonn Birmingham Jr., MD  Authorized by: Jonn Birmingham Jr., MD   Direct patient critical care time: 35 minutes  Additional history critical care time: 5 minutes  Ordering / reviewing critical care time: 5 minutes  Documentation critical care time: 5 minutes  Consulting other physicians critical care time: 5 minutes  Consult with family critical care time: 5 minutes  Total critical care time (exclusive of procedural time) : 60 minutes  Critical care time was exclusive of separately billable procedures and treating other patients and teaching time.  Critical care was necessary to treat or prevent imminent or life-threatening deterioration of the following conditions: Uncal herniation, acute CVA.  Critical care was time spent personally by me on the following activities: blood draw for specimens, development of treatment plan with patient or surrogate, discussions with consultants, interpretation of cardiac output measurements, evaluation of patient's response to treatment, examination of patient, obtaining history from patient or surrogate, ordering and performing treatments and interventions, ordering and review of laboratory studies, ordering and review of radiographic studies, pulse oximetry and re-evaluation of  "patient's condition.        ED Vital Signs:  Vitals:    10/04/19 1011 10/04/19 1026 10/04/19 1030 10/04/19 1053   BP: (!) 180/90   (!) 144/76   Pulse: 60 72 74 71   Resp: 18   20   Temp: 99.2 °F (37.3 °C)      TempSrc: Oral      SpO2: 98%   96%   Weight:  82.8 kg (182 lb 9.6 oz)     Height:  5' 11" (1.803 m)      10/04/19 1201 10/04/19 1302   BP: (!) 142/71 (!) 192/86   Pulse: 78 79   Resp: (!) 24 (!) 22   Temp: 99.1 °F (37.3 °C) 99.3 °F (37.4 °C)   TempSrc: Oral Oral   SpO2: 99% 99%   Weight:     Height:         Abnormal Lab Results:  Labs Reviewed   CBC W/ AUTO DIFFERENTIAL - Abnormal; Notable for the following components:       Result Value    RBC 4.30 (*)     Hemoglobin 12.8 (*)     Mean Corpuscular Hemoglobin Conc 31.8 (*)     Lymph% 15.0 (*)     All other components within normal limits   COMPREHENSIVE METABOLIC PANEL - Abnormal; Notable for the following components:    Potassium 3.3 (*)     Glucose 176 (*)     Creatinine 1.6 (*)     Total Bilirubin 2.1 (*)     eGFR if  45 (*)     eGFR if non  39 (*)     All other components within normal limits   TROPONIN I - Abnormal; Notable for the following components:    Troponin I 0.032 (*)     All other components within normal limits   INFLUENZA A & B BY MOLECULAR   CULTURE, BLOOD   CULTURE, BLOOD   LACTIC ACID, PLASMA   URINALYSIS, REFLEX TO URINE CULTURE    Narrative:     Preferred Collection Type->Urine, Clean Catch   PROCALCITONIN   PROTIME-INR   LIPASE   LACTIC ACID, PLASMA        All Lab Results:  Results for orders placed or performed during the hospital encounter of 10/04/19   Influenza A & B by Molecular   Result Value Ref Range    Influenza A, Molecular Negative Negative    Influenza B, Molecular Negative Negative    Flu A & B Source Nasal swab    CBC auto differential   Result Value Ref Range    WBC 7.46 3.90 - 12.70 K/uL    RBC 4.30 (L) 4.60 - 6.20 M/uL    Hemoglobin 12.8 (L) 14.0 - 18.0 g/dL    Hematocrit 40.2 40.0 - 54.0 % "    Mean Corpuscular Volume 94 82 - 98 fL    Mean Corpuscular Hemoglobin 29.8 27.0 - 31.0 pg    Mean Corpuscular Hemoglobin Conc 31.8 (L) 32.0 - 36.0 g/dL    RDW 12.7 11.5 - 14.5 %    Platelets 164 150 - 350 K/uL    MPV 11.7 9.2 - 12.9 fL    Immature Granulocytes 0.5 0.0 - 0.5 %    Gran # (ANC) 5.3 1.8 - 7.7 K/uL    Immature Grans (Abs) 0.04 0.00 - 0.04 K/uL    Lymph # 1.1 1.0 - 4.8 K/uL    Mono # 0.8 0.3 - 1.0 K/uL    Eos # 0.1 0.0 - 0.5 K/uL    Baso # 0.03 0.00 - 0.20 K/uL    nRBC 0 0 /100 WBC    Gran% 71.7 38.0 - 73.0 %    Lymph% 15.0 (L) 18.0 - 48.0 %    Mono% 11.1 4.0 - 15.0 %    Eosinophil% 1.3 0.0 - 8.0 %    Basophil% 0.4 0.0 - 1.9 %    Differential Method Automated    Comprehensive metabolic panel   Result Value Ref Range    Sodium 141 136 - 145 mmol/L    Potassium 3.3 (L) 3.5 - 5.1 mmol/L    Chloride 102 95 - 110 mmol/L    CO2 28 23 - 29 mmol/L    Glucose 176 (H) 70 - 110 mg/dL    BUN, Bld 15 8 - 23 mg/dL    Creatinine 1.6 (H) 0.5 - 1.4 mg/dL    Calcium 9.4 8.7 - 10.5 mg/dL    Total Protein 6.9 6.0 - 8.4 g/dL    Albumin 3.5 3.5 - 5.2 g/dL    Total Bilirubin 2.1 (H) 0.1 - 1.0 mg/dL    Alkaline Phosphatase 59 55 - 135 U/L    AST 27 10 - 40 U/L    ALT 17 10 - 44 U/L    Anion Gap 11 8 - 16 mmol/L    eGFR if African American 45 (A) >60 mL/min/1.73 m^2    eGFR if non African American 39 (A) >60 mL/min/1.73 m^2   Lactic acid, plasma #1   Result Value Ref Range    Lactate (Lactic Acid) 1.3 0.5 - 2.2 mmol/L   Urinalysis, Reflex to Urine Culture Urine, Clean Catch   Result Value Ref Range    Specimen UA Urine, Clean Catch     Color, UA Yellow Yellow, Straw, Marie    Appearance, UA Clear Clear    pH, UA 6.0 5.0 - 8.0    Specific Gravity, UA 1.025 1.005 - 1.030    Protein, UA Negative Negative    Glucose, UA Negative Negative    Ketones, UA Negative Negative    Bilirubin (UA) Negative Negative    Occult Blood UA Negative Negative    Nitrite, UA Negative Negative    Urobilinogen, UA Negative <2.0 EU/dL    Leukocytes, UA  Negative Negative   Troponin I   Result Value Ref Range    Troponin I 0.032 (H) 0.000 - 0.026 ng/mL   Procalcitonin   Result Value Ref Range    Procalcitonin 0.03 <0.25 ng/mL   Protime-INR   Result Value Ref Range    Prothrombin Time 10.5 9.0 - 12.5 sec    INR 1.0 0.8 - 1.2   Lipase   Result Value Ref Range    Lipase 37 4 - 60 U/L     Imaging Results:  Imaging Results          CT Head Without Contrast (Final result)  Result time 10/04/19 11:05:59    Final result by David Morton III, MD (10/04/19 11:05:59)                 Impression:      Acute to subacute right occipital lobe infarct.    Possible calcified thrombus along the M2 segments of the left MCA unless the patient has a prior history of prior embolization.  Acute left MCA territory white matter infarct versus vasogenic edema with associated mass effect minimal rightward midline shift and left uncal herniation.  Further evaluation with a contrast enhanced MRI should be considered to exclude underlying mass.    No evidence of intracranial hemorrhage.    Dr. Birmingham was notified of the above findings by phone on 10/04/2019 at 10:55.      Electronically signed by: David Morton MD  Date:    10/04/2019  Time:    11:05             Narrative:    EXAMINATION:  CT HEAD WITHOUT CONTRAST    CLINICAL HISTORY:  Syncope/fainting;Head trauma, intracranial venous inj suspected;    TECHNIQUE:  Routine noncontrast axial head CT. All CT scans at this facility are performed  using dose modulation techniques as appropriate to performed exam including the following:  automated exposure control; adjustment of mA and/or kV according to the patients size (this includes techniques or standardized protocols for targeted exams where dose is matched to indication/reason for exam: i.e. extremities or head);  iterative reconstruction technique.    COMPARISON:  none    FINDINGS:  Images are degraded due to patient motion.  There is a subacute to late acute infarct of the right occipital  lobe and possibly the right perihippocampal region.    There are multiple curvilinear calcifications in the left temporal lobe and posterolateral left frontal lobe possibly along the course of the M2 branches of the left MCA possibly calcified intraluminal thrombus.  There is asymmetric white matter hypodensity/edema of the left temporal and parietal lobes, left basal ganglia, internal capsule and corona radiata with relative preservation of the gray-white matter differentiation representing either white matter ischemic changes or vasogenic edema.  There is associated mass effect with effacement of the left lateral ventricle temporal horn, minimal rightward midline shift and left uncal herniation.  No evidence of intracranial hemorrhage.  There is superimposed age-related atrophy with commensurate expansion of the ventricles.  The visualized paranasal sinuses and mastoid air cells are clear. No calvarial fracture is identified.                               X-Ray Chest AP Portable (Final result)  Result time 10/04/19 10:41:26    Final result by Miguel Whiteside MD (10/04/19 10:41:26)                 Impression:      No acute process seen.      Electronically signed by: Miguel Whiteside MD  Date:    10/04/2019  Time:    10:41             Narrative:    EXAMINATION:  XR CHEST AP PORTABLE    CLINICAL HISTORY:  Sepsis;    FINDINGS:  Single view of the chest.  Left-sided pacing device demonstrates similar configuration.  Aorta demonstrates atherosclerotic disease.    Cardiac silhouette is normal.  The lungs demonstrate no evidence of active disease.  Mild atelectasis at the lung bases.  No evidence of pleural effusion or pneumothorax.  Bones demonstrate mild degenerative changes.                               The EKG was ordered, reviewed, and independently interpreted by the ED provider.  Interpretation time: 10:26  Rate: 72 BPM  Rhythm: Electronic ventricular pacemaker  Interpretation: No acute ST changes. No STEMI.            The Emergency Provider reviewed the vital signs and test results, which are outlined above.    ED Discussion     11:20 AM: Re-evaluated pt. D/w pt all pertinent results. D/w pt any concerns expressed at this time. Answered all questions. Pt expresses understanding at this time.    1:00 PM: Discussed pt's case with Dr. Sanna Zhao NP ([facility]) who recommends contacting Neurosurgery in [place] regarding pt's head CT.    2:02 PM: Consult with Dr. Kahn (Neurosurgery) at [facility] concerning pt. Dr. Kahn has reviewed pt's CT, and does not recommend transfer to [place] at this time. Dr. Pierce instead recommends admission here at [facility] for MRA/MRI. Updated [facility] of Dr. Kahn's recommendations.    2:13 PM: Discussed case with Sanna Zhao NP ([facility]). Dr. Garcia agrees with current care and management of pt and accepts admission.   Admitting Service: Hospital Medicine  Admitting Physician: Dr. Garcia  Admit to: Inpatient Tele    2:14 PM: Re-evaluated pt. I have discussed test results, shared treatment plan, and the need for admission with patient and family at bedside. Pt and family express understanding at this time and agree with all information. All questions answered. Pt and family have no further questions or concerns at this time. Pt is ready for admit.    2:27 PM:  Patient son gordo was attempted to be notified.  His phone number is [phone].    ED Medication(s):  Medications   sodium chloride 0.9% bolus 2,484 mL (0 mL/kg × 82.8 kg Intravenous Stopped 10/4/19 1256)   aspirin tablet 325 mg (325 mg Oral Given 10/4/19 1147)        New Prescriptions    No medications on file        Medical Decision Making    Medical Decision Making:   Clinical Tests:   Lab Tests: Ordered and Reviewed  Radiological Study: Ordered and Reviewed  Medical Tests: Ordered and Reviewed           Scribe Attestation:   Scribe #1: I performed the above scribed service and the  documentation accurately describes the services I performed. I attest to the accuracy of the note.    Attending:   Physician Attestation Statement for Scribe #1: I, Jonn Birmingham Jr., MD, personally performed the services described in this documentation, as scribed by Antwan Crawford, in my presence, and it is both accurate and complete.          Clinical Impression       ICD-10-CM ICD-9-CM   1. Acute CVA (cerebrovascular accident) I63.9 434.91   2. Weakness R53.1 780.79   3. Uncal herniation G93.5 348.4       Disposition:   Disposition: Admitted  Condition: Fair         Jonn Birmingham Jr., MD  10/04/19 1427       Jonn Birmingham Jr., MD  10/04/19 1428       Jonn Birmingham Jr., MD  10/04/19 1428

## 2019-10-04 NOTE — SUBJECTIVE & OBJECTIVE
Past Medical History:   Diagnosis Date    *Atrial fibrillation     Atrial fibrillation     Bilateral carotid artery stenosis 1/15/2016    Cardiac pacemaker 8/7/2013    Congestive heart failure 4/6/2015    COPD (chronic obstructive pulmonary disease) 8/7/2013    Coronary artery disease     Hyperlipidemia     Hypertension     Long-term (current) use of anticoagulants 8/7/2013    PVD (peripheral vascular disease)     S/P PTCA (percutaneous transluminal coronary angioplasty) 8/7/2013    Sleep apnea 8/7/2013    Stroke        Past Surgical History:   Procedure Laterality Date    CATARACT EXTRACTION W/  INTRAOCULAR LENS IMPLANT      INSERT / REPLACE / REMOVE PACEMAKER  2005       Review of patient's allergies indicates:   Allergen Reactions    No known drug allergies        No current facility-administered medications on file prior to encounter.      Current Outpatient Medications on File Prior to Encounter   Medication Sig    amLODIPine (NORVASC) 10 MG tablet Take 1 tablet (10 mg total) by mouth once daily. Was 5 mg , now increase to 10 mg    aspirin (ECOTRIN) 81 MG EC tablet Take 81 mg by mouth once daily.    benzonatate (TESSALON) 200 MG capsule Take 1 capsule (200 mg total) by mouth 3 (three) times daily as needed for Cough.    carvedilol (COREG) 12.5 MG tablet Take 1 tablet (12.5 mg total) by mouth 2 (two) times daily with meals.    furosemide (LASIX) 40 MG tablet Take 1 tablet (40 mg total) by mouth 2 (two) times daily.    gabapentin (NEURONTIN) 100 MG capsule Take 100 mg by mouth 3 (three) times daily.    hydrALAZINE (APRESOLINE) 25 MG tablet TAKE 1 TABLET BY MOUTH THREE TIMES DAILY    ipratropium (ATROVENT) 42 mcg (0.06 %) nasal spray instill 2 sprays into each nostril three times a day    ipratropium-albuterol (COMBIVENT RESPIMAT)  mcg/actuation inhaler Inhale 2 puffs into the lungs every 6 (six) hours as needed for Shortness of Breath. Rescue    isosorbide dinitrate (ISORDIL) 20  MG tablet take 1 tablet by mouth every 12 hours with HYDRALAZINE    lubiprostone (AMITIZA) 8 MCG Cap Take 8 mcg by mouth 2 (two) times daily with meals.     metFORMIN (GLUCOPHAGE) 500 MG tablet Take 500 mg by mouth.    nitroGLYCERIN (NITROBID) 6.5 MG CpSR Take 1 capsule (6.5 mg total) by mouth 2 (two) times daily.    pantoprazole (PROTONIX) 40 MG tablet Take 40 mg by mouth.    potassium chloride (KLOR-CON) 10 MEQ TbSR Take 1 tablet (10 mEq total) by mouth 2 (two) times daily.    tamsulosin (FLOMAX) 0.4 mg Cp24 Take 0.4 mg by mouth once daily.     traMADol (ULTRAM) 50 mg tablet take 1 tablet by mouth every 6 hours if needed for UP TO 10 days    warfarin (COUMADIN) 5 MG tablet TAKE 1 TABLET BY MOUTH EVERY EVENING AS DIRECTED BY THE COUMADIN CLINIC    azithromycin (ZITHROMAX Z-ROGELIO) 250 MG tablet 500 mg on day 1 (two tablets) followed by 250 mg once daily on days 2-5    erythromycin (ROMYCIN) ophthalmic ointment Place a 1/2 inch ribbon of ointment into the lower eyelid q 8 hours for 5 days    fluticasone-umeclidin-vilanter (TRELEGY ELLIPTA) 100-62.5-25 mcg DsDv Inhale 1 puff into the lungs once daily.    latanoprost 0.005 % ophthalmic solution Place 1 drop into both eyes nightly.    levetiracetam (KEPPRA) 500 MG Tab Take 1 tablet by mouth Twice daily.    predniSONE (DELTASONE) 20 MG tablet Take two tab for 3 days , then one tab for 3 days , then 1/2 tab for 4 days , then start daily 5 mg as previously prescribed    predniSONE (DELTASONE) 5 MG tablet Take 1 tablet (5 mg total) by mouth once daily. Start after completion of  high dose prednisone    promethazine-dextromethorphan (PROMETHAZINE-DM) 6.25-15 mg/5 mL Syrp Take 5 mLs by mouth every 6 (six) hours as needed.     Family History     Problem Relation (Age of Onset)    Diabetes Sister, Maternal Grandfather    Fainting Sister    Heart attack Paternal Uncle    Heart disease Paternal Uncle    Hypertension Maternal Grandmother        Tobacco Use     Smoking status: Former Smoker     Packs/day: 1.50     Years: 35.00     Pack years: 52.50     Types: Cigarettes     Last attempt to quit: 1979     Years since quittin.9    Smokeless tobacco: Never Used   Substance and Sexual Activity    Alcohol use: No     Comment: QUIT 77    Drug use: No    Sexual activity: Not on file     Review of Systems   Constitutional: Positive for fatigue. Negative for activity change, appetite change, chills, fever and unexpected weight change.   HENT: Negative for congestion, dental problem, drooling, ear discharge, ear pain, facial swelling, hearing loss, mouth sores, nosebleeds, postnasal drip and rhinorrhea.    Eyes: Negative for photophobia, pain, discharge, redness and itching.   Respiratory: Negative for apnea, cough, choking, chest tightness, shortness of breath and stridor.    Cardiovascular: Negative for chest pain, palpitations and leg swelling.   Gastrointestinal: Negative for abdominal distention, abdominal pain, anal bleeding, blood in stool, constipation, diarrhea, nausea and rectal pain.   Endocrine: Negative for cold intolerance, heat intolerance, polydipsia and polyphagia.   Genitourinary: Negative for difficulty urinating, discharge, dysuria, enuresis, flank pain, frequency, genital sores, hematuria and penile pain.   Musculoskeletal: Negative for arthralgias, back pain, gait problem, joint swelling, myalgias, neck pain and neck stiffness.   Neurological: Positive for weakness and numbness.   Psychiatric/Behavioral: Negative for agitation, behavioral problems, confusion, decreased concentration, dysphoric mood and hallucinations. The patient is not hyperactive.      Objective:     Vital Signs (Most Recent):  Temp: 98.3 °F (36.8 °C) (10/04/19 1617)  Pulse: 70 (10/04/19 1617)  Resp: 20 (10/04/19 1617)  BP: (!) 168/77 (10/04/19 1617)  SpO2: 96 % (10/04/19 1617) Vital Signs (24h Range):  Temp:  [98.3 °F (36.8 °C)-99.3 °F (37.4 °C)] 98.3 °F (36.8  °C)  Pulse:  [60-79] 70  Resp:  [18-24] 20  SpO2:  [96 %-99 %] 96 %  BP: (139-192)/(57-90) 168/77     Weight: 83.5 kg (184 lb 1.4 oz)  Body mass index is 25.67 kg/m².    Physical Exam   Constitutional: He is oriented to person, place, and time. He appears well-developed and well-nourished. No distress.   HENT:   Head: Normocephalic.   Right Ear: External ear normal.   Left Ear: External ear normal.   Eyes: Pupils are equal, round, and reactive to light. EOM are normal. Right eye exhibits no discharge. Left eye exhibits no discharge. No scleral icterus.   Neck: Normal range of motion. Neck supple. No JVD present. No tracheal deviation present. No thyromegaly present.   Pulmonary/Chest: Effort normal and breath sounds normal. No stridor. No respiratory distress. He has no wheezes.   Abdominal: Soft. Bowel sounds are normal. He exhibits no distension. There is no tenderness. There is no guarding.   Musculoskeletal: Normal range of motion. He exhibits no edema or deformity.   Neurological: He is alert and oriented to person, place, and time. He displays normal reflexes. No cranial nerve deficit. Coordination normal.   Skin: Skin is warm. He is not diaphoretic.   Psychiatric: He has a normal mood and affect.   Nursing note and vitals reviewed.        CRANIAL NERVES     CN III, IV, VI   Pupils are equal, round, and reactive to light.  Extraocular motions are normal.        Significant Labs: All pertinent labs within the past 24 hours have been reviewed.    Significant Imaging: I have reviewed all pertinent imaging results/findings within the past 24 hours.

## 2019-10-04 NOTE — ED NOTES
Secure chat sent to Dr Aguayo regarding the orders for the MRI and the pt having a pacemaker. Awaiting reply

## 2019-10-04 NOTE — ASSESSMENT & PLAN NOTE
Rate control  HOLD anticoagulation due to acute CVA  HOLD blood pressure medication for permessive HTN

## 2019-10-04 NOTE — ED NOTES
Pt given a urinal and specimen cup with sanitary wipe. Pt instructed on need for a urine sample. Pt verbalized understanding.

## 2019-10-04 NOTE — PLAN OF CARE
Plan of care reviewed with patient and children, all verbalized understanding.  Pt remains free from falls this shift, fall precautions in place.  Pt remains free from skin breakdown, he turns and repositions frequently while in bed.  Bed rest ordered and maintained.  AAOx2, VSS, NAD noted at this time.  Pt remained afebrile.  Room air.  Paced on the tele monitor.  Blood glucose monitoring AC/HS.  Pt was admitted to tele from ED this shift for CVA, pt is confused and disoriented. CTA of head and neck to be performed this evening.  Pt currently resting comfortably in bed.  Hourly rounding complete.  Family remains at the bedside.  Will continue to monitor.

## 2019-10-04 NOTE — ED NOTES
Patient sitting up in bed, no acute distress noted, awake, alert, calm, respirations even and unlabored, and skin is warm and dry. Patient verbalized understanding of status and plan of care. Side rails up x 2, call light in reach, bed low and locked. Pt given a sandwich and juice to eat per ok by Dr Birmingham

## 2019-10-04 NOTE — CONSULTS
Subjective:       Patient ID: Bola Figueroa Sr. is a 84 y.o. male.    Chief Complaint: Fall (pt fell off toilet and hit head, on coumadin.  weakness x 2 days)    HPI       The patient presented to the Emergency Department following a fall just PTA. Pt reports generalized weakness over the past 2 days, causing him to fall today and hit the back of his head on the toilet.  CTH shows evidence of subacute RT PCA (OL) Stroke and LT MCA (TL/PL). NSG and TeleStroke -BISMARK were consulted and documented no indication for any intervention. Supposed to be on Coumadin but INR is 1.0      The patient medical co morbidities include Stroke, Epilepsy, AF, HTN, HLP, DM, CMP/CMP, PPM, RUTH, PVD, CVD.      Review of Systems   Constitutional: Positive for fatigue. Negative for appetite change.   HENT: Negative for hearing loss and tinnitus.    Eyes: Positive for visual disturbance. Negative for photophobia.   Respiratory: Negative for apnea and shortness of breath.    Cardiovascular: Negative for chest pain and palpitations.   Gastrointestinal: Negative for nausea and vomiting.   Endocrine: Negative for cold intolerance and heat intolerance.   Genitourinary: Negative for difficulty urinating and urgency.   Musculoskeletal: Negative for arthralgias, back pain, gait problem, joint swelling, myalgias, neck pain and neck stiffness.   Skin: Negative for color change and rash.   Allergic/Immunologic: Negative for environmental allergies and immunocompromised state.   Neurological: Positive for weakness and numbness. Negative for dizziness, tremors, seizures, syncope, facial asymmetry, speech difficulty, light-headedness and headaches.   Hematological: Negative for adenopathy. Does not bruise/bleed easily.   Psychiatric/Behavioral: Positive for dysphoric mood. Negative for agitation, behavioral problems, confusion, decreased concentration, hallucinations, self-injury, sleep disturbance and suicidal ideas. The patient is not nervous/anxious and is  not hyperactive.            No current facility-administered medications for this encounter.   Past Medical History:   Diagnosis Date    *Atrial fibrillation     Atrial fibrillation     Bilateral carotid artery stenosis 1/15/2016    Cardiac pacemaker 2013    Congestive heart failure 2015    COPD (chronic obstructive pulmonary disease) 2013    Coronary artery disease     Hyperlipidemia     Hypertension     Long-term (current) use of anticoagulants 2013    PVD (peripheral vascular disease)     S/P PTCA (percutaneous transluminal coronary angioplasty) 2013    Sleep apnea 2013    Stroke      Past Surgical History:   Procedure Laterality Date    CATARACT EXTRACTION W/  INTRAOCULAR LENS IMPLANT      INSERT / REPLACE / REMOVE PACEMAKER       Social History     Socioeconomic History    Marital status:      Spouse name: Not on file    Number of children: Not on file    Years of education: Not on file    Highest education level: Not on file   Occupational History    Not on file   Social Needs    Financial resource strain: Not on file    Food insecurity:     Worry: Not on file     Inability: Not on file    Transportation needs:     Medical: Not on file     Non-medical: Not on file   Tobacco Use    Smoking status: Former Smoker     Packs/day: 1.50     Years: 35.00     Pack years: 52.50     Types: Cigarettes     Last attempt to quit: 1979     Years since quittin.9    Smokeless tobacco: Never Used   Substance and Sexual Activity    Alcohol use: No     Comment: QUIT 77    Drug use: No    Sexual activity: Not on file   Lifestyle    Physical activity:     Days per week: Not on file     Minutes per session: Not on file    Stress: Not on file   Relationships    Social connections:     Talks on phone: Not on file     Gets together: Not on file     Attends Mu-ism service: Not on file     Active member of club or organization: Not on file     Attends  meetings of clubs or organizations: Not on file     Relationship status: Not on file   Other Topics Concern    Not on file   Social History Narrative    Daughter, Luz Maria Alexis, is the surrogate decision maker [phone].        Objective:     Vital signs reviewed     GENERAL APPEARANCE:     The patient looks comfortable.    Flat Affect    Normal breathing pattern.    No dysmorphic features    Intermittent eye contact.     GENERAL MEDICAL EXAM:    HEENT:  Head is atraumatic normocephalic. No tender temporal arteries.     Neck and Axillae: No JVD. No carotid bruits. No thyromegaly. No lymphadenopathy.    Cardiopulmonary: No cyanosis. No tachypnea. Normal respiratory effort.  Clear breath sounds. PPM .    Gastrointestinal:  No stomas or lesions. No hernias.  Abdomen is soft non-tender. No masses or organomegaly.    Skin, Hair and Nails: No pathognonomic skin rash. No neurofibromatosis.   No stigmata of autoimmune disease.     Limbs: No varicose veins. No edema. Symmetric pulses.    Muskoskeletal: No deformities.No spine tenderness.   Signs of longstanding neuropathy. No dislocations or fractures.        Neurologic Exam     Mental Status   Oriented to person.   Disoriented to place.   Disoriented to time.   Follows 1 step commands.   Attention: decreased. Concentration: decreased.   Speech: (Slow  )  Level of consciousness: drowsy  Able to perform simple calculations.   Able to name object. Able to repeat. Normal comprehension.     Aprosody is noted      Cranial Nerves     CN II   Visual fields full to confrontation.   Visual acuity: decreased  Right visual field deficit: upper nasal, lower nasal, upper temporal and lower temporal quadrant(s)  Left visual field deficit: upper temporal, lower temporal, upper nasal and lower nasal quadrant(s)    CN III, IV, VI   Pupils are equal, round, and reactive to light.  Extraocular motions are normal.   Right pupil: Size: 1 mm. Shape: regular. Reactivity: sluggish. Consensual  response: intact.   Left pupil: Size: 1 mm. Shape: regular. Reactivity: sluggish. Consensual response: intact.   CN III: no CN III palsy  CN VI: no CN VI palsy  Nystagmus: none   Diplopia: none  Ophthalmoparesis: none  Upgaze: normal  Downgaze: normal  Conjugate gaze: present  Vestibulo-ocular reflex: present    CN V   Facial sensation intact.   Right facial sensation deficit: none  Left facial sensation deficit: none  Right corneal reflex: normal  Left corneal reflex: normal    CN VII   Right facial weakness: none  Left facial weakness: none  Right taste: normal  Left taste: normal    CN VIII   CN VIII normal.   Hearing: intact  Right Rinne: AC > BC  Left Rinne: AC > BC  Lubin: does not lateralize     CN IX, X   CN IX normal.   CN X normal.   Palate: symmetric  Right gag reflex: normal  Left gag reflex: normal    CN XI   CN XI normal.   Right sternocleidomastoid strength: normal  Left sternocleidomastoid strength: normal  Right trapezius strength: normal  Left trapezius strength: normal    CN XII   CN XII normal.   Tongue: not atrophic  Fasciculations: absent  Tongue deviation: none    Motor Exam   Muscle bulk: normal  Overall muscle tone: normal  Right arm tone: normal  Left arm tone: normal  Right arm pronator drift: absent  Left arm pronator drift: absent  Right leg tone: normal  Left leg tone: normal    Strength   Right neck flexion: 4/5  Left neck flexion: 4/5  Right neck extension: 4/5  Left neck extension: 4/5  Right deltoid: 4/5  Left deltoid: 4/5  Right biceps: 4/5  Left biceps: 4/5  Right triceps: 4/5  Left triceps: 4/5  Right wrist flexion: 4/5  Left wrist flexion: 4/5  Right wrist extension: 4/5  Left wrist extension: 4/5  Right interossei: 4/5  Left interossei: 4/5  Right abdominals: 4/5  Left abdominals: 4/5  Right iliopsoas: 4/5  Left iliopsoas: 4/5  Right quadriceps: 4/5  Left quadriceps: 4/5  Right hamstrin/5  Left hamstrin/5  Right glutei: 4/5  Left glutei: 4/5  Right anterior tibial:  4/5  Left anterior tibial: 4/5  Right posterior tibial: 4/5  Left posterior tibial: 4/5  Right peroneal: 4/5  Left peroneal: 4/5  Right gastroc: 4/5  Left gastroc: 4/5    Sensory Exam   Right arm light touch: normal  Left arm light touch: normal  Right leg light touch: decreased from ankle  Left leg light touch: decreased from ankle  Right arm vibration: normal  Left arm vibration: normal  Right leg vibration: decreased from ankle  Left leg vibration: decreased from ankle  Right arm proprioception: normal  Left arm proprioception: normal  Right leg proprioception: decreased from ankle  Left leg proprioception: decreased from ankle  Right arm pinprick: normal  Left arm pinprick: normal  Right leg pinprick: decreased from ankle  Left leg pinprick: decreased from ankle  Graphesthesia: normal  Stereognosis: normal    Gait, Coordination, and Reflexes     Gait  Gait: (Deferred)    Coordination   Finger to nose coordination: normal  Heel to shin coordination: normal    Tremor   Resting tremor: absent  Intention tremor: absent  Action tremor: absent    Reflexes   Right brachioradialis: 2+  Left brachioradialis: 2+  Right biceps: 2+  Left biceps: 2+  Right triceps: 2+  Left triceps: 2+  Right patellar: 1+  Left patellar: 1+  Right achilles: 0  Left achilles: 0  Right plantar: normal  Left plantar: normal  Right Briseno: absent  Left Briseno: absent  Right ankle clonus: absent  Left ankle clonus: absent  Right pendular knee jerk: absent  Left pendular knee jerk: absent      Lab Results   Component Value Date    WBC 7.46 10/04/2019    HGB 12.8 (L) 10/04/2019    HCT 40.2 10/04/2019    MCV 94 10/04/2019     10/04/2019     Sodium   Date Value Ref Range Status   10/04/2019 142 136 - 145 mmol/L Final     Potassium   Date Value Ref Range Status   10/04/2019 3.0 (L) 3.5 - 5.1 mmol/L Final     Chloride   Date Value Ref Range Status   10/04/2019 107 95 - 110 mmol/L Final     CO2   Date Value Ref Range Status   10/04/2019 26 23 -  29 mmol/L Final     Glucose   Date Value Ref Range Status   10/04/2019 202 (H) 70 - 110 mg/dL Final     BUN, Bld   Date Value Ref Range Status   10/04/2019 13 8 - 23 mg/dL Final     Creatinine   Date Value Ref Range Status   10/04/2019 1.3 0.5 - 1.4 mg/dL Final     Calcium   Date Value Ref Range Status   10/04/2019 8.5 (L) 8.7 - 10.5 mg/dL Final     Total Protein   Date Value Ref Range Status   10/04/2019 6.9 6.0 - 8.4 g/dL Final     Albumin   Date Value Ref Range Status   10/04/2019 3.5 3.5 - 5.2 g/dL Final     Total Bilirubin   Date Value Ref Range Status   10/04/2019 2.1 (H) 0.1 - 1.0 mg/dL Final     Comment:     For infants and newborns, interpretation of results should be based  on gestational age, weight and in agreement with clinical  observations.  Premature Infant recommended reference ranges:  Up to 24 hours.............<8.0 mg/dL  Up to 48 hours............<12.0 mg/dL  3-5 days..................<15.0 mg/dL  6-29 days.................<15.0 mg/dL       Alkaline Phosphatase   Date Value Ref Range Status   10/04/2019 59 55 - 135 U/L Final     AST   Date Value Ref Range Status   10/04/2019 27 10 - 40 U/L Final     ALT   Date Value Ref Range Status   10/04/2019 17 10 - 44 U/L Final     Anion Gap   Date Value Ref Range Status   10/04/2019 9 8 - 16 mmol/L Final     eGFR if    Date Value Ref Range Status   10/04/2019 58 (A) >60 mL/min/1.73 m^2 Final     eGFR if non    Date Value Ref Range Status   10/04/2019 50 (A) >60 mL/min/1.73 m^2 Final     Comment:     Calculation used to obtain the estimated glomerular filtration  rate (eGFR) is the CKD-EPI equation.        No results found for: [id number]  Lab Results   Component Value Date    TSH 2.9 10/18/2007    FREET4 1.16 10/18/2007       10-    CT Early Subacute RT PCA/OL Stroke and Late Subacute/Chronic RT MCA/TPL Strokes            Reviewed the neuroimaging independently     INR 1.0      Assessment:       1. Acute CVA  (cerebrovascular accident)    2. Weakness    3. Uncal herniation        Plan:       MULTIPLE CARDIOEMBOLIC STROKES 2/2 AF NOT ANTICOAGULATED            INR is 1.0 indicating lack of anticoagulation     Unfortunately, acute anticoagulation is not recommended, with no added benefit and carries a very high risk of bleeding especially in the first 1-2 weeks.     Continue ASA      Medical supportive care    Vascular Risk Factors (VRFs) stratification                                  Vanessa Kc MD, FAAN    Attending Neurologist/Epileptologist         Diplomate, American Board-Psychiatry and Neurology (Neurology)    Diplomate, American Board-Clinical Neurophysiology (Epilpesy-Neuromuscular)     Fellow, American Academy of Neurology

## 2019-10-04 NOTE — ASSESSMENT & PLAN NOTE
-CT head show : Acute to subacute right occipital lobe infarct.Possible calcified thrombus along the M2 segments of the left MCA unless the patient has a prior history of prior embolization.  -ER Dr discussed the case with neurology and Tele stroke . They did not  recommend  Any intervention at this time  . They states pt can stay in [facility]   -Neurology consulted   - MRI/MRA  Can not be done due to pacemaker .  Will order a Head and  neck CTA  -Cont asa and statin   permissive HTN  For 24 to 48 hrs  -PT/OT/SLP evaluation

## 2019-10-04 NOTE — HPI
84 y.o. male  with a PMHx od COPD , Afib on coumadin , HTN , tobacco user  And  PHT who presents to the Emergency Department following a fall just PTA. Pt reports generalized weakness over the past 2 days, causing him to fall today and hit the back of his head on the toilet. . Associated sxs include headache. Patient denies any fever, chills, n/v/d, SOB, CP, numbness, dizziness, LOC, and all other sxs at this time. No prior Tx reported. He was recently d/c from this facility  1 month ago with a dx od COPD exacerbation,   ER COURSE: CT head show Acute to subacute right occipital lobe infarct.Possible calcified thrombus along the M2 segments of the left MCA unless the patient has a prior history of prior embolization. CXR did not show any acute finding . BUN/CR 15/1.6  ER VS:  BP Pulse Resp Temp SpO2   (!) 180/90 60 18 99.2 °F (37.3 °C) 98 %       MAP           --          -The  ER Dr discussed the Case with Neurosurgery and Tele - stroke  . They did not recommend any  Intervention at this time .   -Pt can not have a MRI/MRA due to pacemaker   -Pt is not a candidate for TPA  -Neurology was consulted .

## 2019-10-04 NOTE — ED NOTES
Pt lying in bed comfortably. AAO x 1. Skin warm and dry. Resp even and unlabored with equal chest rise and fall. Denies any needs or assist at this time.

## 2019-10-04 NOTE — NURSING
Pt arrived to unit from ED in no acute distress. VSS, BP elevated. Tele monitor #[id number] applied to pt and verified with monitor tech. Phone report given from ED nurse to primary nurse CHIQUI Minaya. PIVs saline locked to BUE. Bed low, side rails up x2, call light in reach, non-slip footwear socks in place, bed alarm armed. Pt oriented to room and unit protocols. Pt has no complaints at this time, instructed to call for assistance.

## 2019-10-04 NOTE — H&P
[facility] Hospital Medicine  History & Physical    Patient Name: Bola Figueroa Sr.  MRN: [id number]  Admission Date: 10/4/2019  Attending Physician: Thierno Prescott MD  Primary Care Provider: Naga Bhatti MD         Patient information was obtained from patient and ER records.     Subjective:     Principal Problem:Acute CVA (cerebrovascular accident)    Chief Complaint:   Chief Complaint   Patient presents with    Fall     pt fell off toilet and hit head, on coumadin.  weakness x 2 days        HPI:   84 y.o. male  with a PMHx od COPD , Afib on coumadin , HTN , tobacco user  And  PHT who presents to the Emergency Department following a fall just PTA. Pt reports generalized weakness over the past 2 days, causing him to fall today and hit the back of his head on the toilet. . Associated sxs include headache. Patient denies any fever, chills, n/v/d, SOB, CP, numbness, dizziness, LOC, and all other sxs at this time. No prior Tx reported . He was recently d/c from this facility  1 month ago with a dx od COPD exacerbation,   ER COURSE: CT head show Acute to subacute right occipital lobe infarct.Possible calcified thrombus along the M2 segments of the left MCA unless the patient has a prior history of prior embolization. CXR did not show any acute finding . BUN/CR 15/1.6  ER VS:  BP Pulse Resp Temp SpO2   (!) 180/90 60 18 99.2 °F (37.3 °C) 98 %       MAP           --          -The  ER Dr discussed the Case with Neurosurgery and Tele - stroke  . They did not recommend any  Intervention at this time .   -Pt can not have a MRI/MRA due to pacemaker   -Pt is not a candidate for TPA  -Neurology was consulted .    Past Medical History:   Diagnosis Date    *Atrial fibrillation     Atrial fibrillation     Bilateral carotid artery stenosis 1/15/2016    Cardiac pacemaker 8/7/2013    Congestive heart failure 4/6/2015    COPD (chronic obstructive pulmonary disease) 8/7/2013    Coronary artery  disease     Hyperlipidemia     Hypertension     Long-term (current) use of anticoagulants 8/7/2013    PVD (peripheral vascular disease)     S/P PTCA (percutaneous transluminal coronary angioplasty) 8/7/2013    Sleep apnea 8/7/2013    Stroke        Past Surgical History:   Procedure Laterality Date    CATARACT EXTRACTION W/  INTRAOCULAR LENS IMPLANT      INSERT / REPLACE / REMOVE PACEMAKER  2005       Review of patient's allergies indicates:   Allergen Reactions    No known drug allergies        No current facility-administered medications on file prior to encounter.      Current Outpatient Medications on File Prior to Encounter   Medication Sig    amLODIPine (NORVASC) 10 MG tablet Take 1 tablet (10 mg total) by mouth once daily. Was 5 mg , now increase to 10 mg    aspirin (ECOTRIN) 81 MG EC tablet Take 81 mg by mouth once daily.    benzonatate (TESSALON) 200 MG capsule Take 1 capsule (200 mg total) by mouth 3 (three) times daily as needed for Cough.    carvedilol (COREG) 12.5 MG tablet Take 1 tablet (12.5 mg total) by mouth 2 (two) times daily with meals.    furosemide (LASIX) 40 MG tablet Take 1 tablet (40 mg total) by mouth 2 (two) times daily.    gabapentin (NEURONTIN) 100 MG capsule Take 100 mg by mouth 3 (three) times daily.    hydrALAZINE (APRESOLINE) 25 MG tablet TAKE 1 TABLET BY MOUTH THREE TIMES DAILY    ipratropium (ATROVENT) 42 mcg (0.06 %) nasal spray instill 2 sprays into each nostril three times a day    ipratropium-albuterol (COMBIVENT RESPIMAT)  mcg/actuation inhaler Inhale 2 puffs into the lungs every 6 (six) hours as needed for Shortness of Breath. Rescue    isosorbide dinitrate (ISORDIL) 20 MG tablet take 1 tablet by mouth every 12 hours with HYDRALAZINE    lubiprostone (AMITIZA) 8 MCG Cap Take 8 mcg by mouth 2 (two) times daily with meals.     metFORMIN (GLUCOPHAGE) 500 MG tablet Take 500 mg by mouth.    nitroGLYCERIN (NITROBID) 6.5 MG CpSR Take 1 capsule (6.5 mg  total) by mouth 2 (two) times daily.    pantoprazole (PROTONIX) 40 MG tablet Take 40 mg by mouth.    potassium chloride (KLOR-CON) 10 MEQ TbSR Take 1 tablet (10 mEq total) by mouth 2 (two) times daily.    tamsulosin (FLOMAX) 0.4 mg Cp24 Take 0.4 mg by mouth once daily.     traMADol (ULTRAM) 50 mg tablet take 1 tablet by mouth every 6 hours if needed for UP TO 10 days    warfarin (COUMADIN) 5 MG tablet TAKE 1 TABLET BY MOUTH EVERY EVENING AS DIRECTED BY THE COUMADIN CLINIC    azithromycin (ZITHROMAX Z-ROGELIO) 250 MG tablet 500 mg on day 1 (two tablets) followed by 250 mg once daily on days 2-5    erythromycin (ROMYCIN) ophthalmic ointment Place a 1/2 inch ribbon of ointment into the lower eyelid q 8 hours for 5 days    fluticasone-umeclidin-vilanter (TRELEGY ELLIPTA) 100-62.5-25 mcg DsDv Inhale 1 puff into the lungs once daily.    latanoprost 0.005 % ophthalmic solution Place 1 drop into both eyes nightly.    levetiracetam (KEPPRA) 500 MG Tab Take 1 tablet by mouth Twice daily.    predniSONE (DELTASONE) 20 MG tablet Take two tab for 3 days , then one tab for 3 days , then 1/2 tab for 4 days , then start daily 5 mg as previously prescribed    predniSONE (DELTASONE) 5 MG tablet Take 1 tablet (5 mg total) by mouth once daily. Start after completion of  high dose prednisone    promethazine-dextromethorphan (PROMETHAZINE-DM) 6.25-15 mg/5 mL Syrp Take 5 mLs by mouth every 6 (six) hours as needed.     Family History     Problem Relation (Age of Onset)    Diabetes Sister, Maternal Grandfather    Fainting Sister    Heart attack Paternal Uncle    Heart disease Paternal Uncle    Hypertension Maternal Grandmother        Tobacco Use    Smoking status: Former Smoker     Packs/day: 1.50     Years: 35.00     Pack years: 52.50     Types: Cigarettes     Last attempt to quit: 1979     Years since quittin.9    Smokeless tobacco: Never Used   Substance and Sexual Activity    Alcohol use: No     Comment: QUIT  08/13/77    Drug use: No    Sexual activity: Not on file     Review of Systems   Constitutional: Positive for fatigue. Negative for activity change, appetite change, chills, fever and unexpected weight change.   HENT: Negative for congestion, dental problem, drooling, ear discharge, ear pain, facial swelling, hearing loss, mouth sores, nosebleeds, postnasal drip and rhinorrhea.    Eyes: Negative for photophobia, pain, discharge, redness and itching.   Respiratory: Negative for apnea, cough, choking, chest tightness, shortness of breath and stridor.    Cardiovascular: Negative for chest pain, palpitations and leg swelling.   Gastrointestinal: Negative for abdominal distention, abdominal pain, anal bleeding, blood in stool, constipation, diarrhea, nausea and rectal pain.   Endocrine: Negative for cold intolerance, heat intolerance, polydipsia and polyphagia.   Genitourinary: Negative for difficulty urinating, discharge, dysuria, enuresis, flank pain, frequency, genital sores, hematuria and penile pain.   Musculoskeletal: Negative for arthralgias, back pain, gait problem, joint swelling, myalgias, neck pain and neck stiffness.   Neurological: Positive for weakness and numbness.   Psychiatric/Behavioral: Negative for agitation, behavioral problems, confusion, decreased concentration, dysphoric mood and hallucinations. The patient is not hyperactive.      Objective:     Vital Signs (Most Recent):  Temp: 98.3 °F (36.8 °C) (10/04/19 1617)  Pulse: 70 (10/04/19 1617)  Resp: 20 (10/04/19 1617)  BP: (!) 168/77 (10/04/19 1617)  SpO2: 96 % (10/04/19 1617) Vital Signs (24h Range):  Temp:  [98.3 °F (36.8 °C)-99.3 °F (37.4 °C)] 98.3 °F (36.8 °C)  Pulse:  [60-79] 70  Resp:  [18-24] 20  SpO2:  [96 %-99 %] 96 %  BP: (139-192)/(57-90) 168/77     Weight: 83.5 kg (184 lb 1.4 oz)  Body mass index is 25.67 kg/m².    Physical Exam   Constitutional: He is oriented to person, place, and time. He appears well-developed and well-nourished. No  distress.   HENT:   Head: Normocephalic.   Right Ear: External ear normal.   Left Ear: External ear normal.   Eyes: Pupils are equal, round, and reactive to light. EOM are normal. Right eye exhibits no discharge. Left eye exhibits no discharge. No scleral icterus.   Neck: Normal range of motion. Neck supple. No JVD present. No tracheal deviation present. No thyromegaly present.   Pulmonary/Chest: Effort normal and breath sounds normal. No stridor. No respiratory distress. He has no wheezes.   Abdominal: Soft. Bowel sounds are normal. He exhibits no distension. There is no tenderness. There is no guarding.   Musculoskeletal: Normal range of motion. He exhibits no edema or deformity.   Neurological: He is alert and oriented to person, place, and time. He displays normal reflexes. No cranial nerve deficit. Coordination normal.   Skin: Skin is warm. He is not diaphoretic.   Psychiatric: He has a normal mood and affect.   Nursing note and vitals reviewed.        CRANIAL NERVES     CN III, IV, VI   Pupils are equal, round, and reactive to light.  Extraocular motions are normal.        Significant Labs: All pertinent labs within the past 24 hours have been reviewed.    Significant Imaging: I have reviewed all pertinent imaging results/findings within the past 24 hours.    Assessment/Plan:     * Acute CVA (cerebrovascular accident)  -CT head show : Acute to subacute right occipital lobe infarct.Possible calcified thrombus along the M2 segments of the left MCA unless the patient has a prior history of prior embolization.  -ER  discussed the case with neurology and Tele stroke . They did not  recommend  Any intervention at this time  . They states pt can stay in [facility]   -Neurology consulted   - MRI/MRA  Can not be done due to pacemaker .  Will order a Head and  neck CTA  -Cont asa and statin   permissive HTN  For 24 to 48 hrs  -PT/OT/SLP evaluation            Grade III diastolic dysfunction  Compensated  Cont  CHF core measures          Seizure disorder  Resume keppra       Mild dementia  Cont supportive tx       COPD, severe  Stable   Cont breathing Tx       Benign prostatic hyperplasia  Resume flomax       Long term current use of anticoagulant therapy  On coumadin   Last INR 1  Hold Coumadin       Essential hypertension  Permissive HTN  For 24 to 48 hrs  Treat if SBP > 210        PVD (peripheral vascular disease)  Resume statin       Coronary artery disease  Resume statin and asa  HOLD BB for 24 hrs      A-fib  Rate control  HOLD anticoagulation due to acute CVA  HOLD blood pressure medication for permessive HTN         VTE Risk Mitigation (From admission, onward)         Ordered     enoxaparin injection 40 mg  Daily      10/04/19 1653     IP VTE HIGH RISK PATIENT  Once      10/04/19 1653     Place sequential compression device  Until discontinued      10/04/19 1653                   Thierno Prescott MD  Department of Hospital Medicine   [facility]

## 2019-10-04 NOTE — ED NOTES
"Pt is confused, repeatedly asking for where family is. Pt states "I dont know why im here"  Reports feeling dizzy at times   "

## 2019-10-05 LAB
ALBUMIN SERPL BCP-MCNC: 3.4 G/DL (ref 3.5–5.2)
ALP SERPL-CCNC: 55 U/L (ref 55–135)
ALT SERPL W/O P-5'-P-CCNC: 14 U/L (ref 10–44)
ANION GAP SERPL CALC-SCNC: 12 MMOL/L (ref 8–16)
APTT BLDCRRT: 29.9 SEC (ref 21–32)
AST SERPL-CCNC: 20 U/L (ref 10–40)
BASOPHILS # BLD AUTO: 0.04 K/UL (ref 0–0.2)
BASOPHILS NFR BLD: 0.7 % (ref 0–1.9)
BILIRUB SERPL-MCNC: 2.3 MG/DL (ref 0.1–1)
BUN SERPL-MCNC: 8 MG/DL (ref 8–23)
CALCIUM SERPL-MCNC: 9.9 MG/DL (ref 8.7–10.5)
CHLORIDE SERPL-SCNC: 104 MMOL/L (ref 95–110)
CHOLEST SERPL-MCNC: 191 MG/DL (ref 120–199)
CHOLEST/HDLC SERPL: 4.2 {RATIO} (ref 2–5)
CK MB SERPL-MCNC: 1.6 NG/ML (ref 0.1–6.5)
CK MB SERPL-RTO: 2.5 % (ref 0–5)
CK SERPL-CCNC: 64 U/L (ref 20–200)
CO2 SERPL-SCNC: 28 MMOL/L (ref 23–29)
CREAT SERPL-MCNC: 1.1 MG/DL (ref 0.5–1.4)
DIFFERENTIAL METHOD: ABNORMAL
EOSINOPHIL # BLD AUTO: 0.1 K/UL (ref 0–0.5)
EOSINOPHIL NFR BLD: 2.1 % (ref 0–8)
ERYTHROCYTE [DISTWIDTH] IN BLOOD BY AUTOMATED COUNT: 12.5 % (ref 11.5–14.5)
EST. GFR  (AFRICAN AMERICAN): >60 ML/MIN/1.73 M^2
EST. GFR  (NON AFRICAN AMERICAN): >60 ML/MIN/1.73 M^2
ESTIMATED AVG GLUCOSE: 180 MG/DL (ref 68–131)
GLUCOSE SERPL-MCNC: 129 MG/DL (ref 70–110)
HBA1C MFR BLD HPLC: 7.9 % (ref 4–5.6)
HCT VFR BLD AUTO: 40.8 % (ref 40–54)
HDLC SERPL-MCNC: 46 MG/DL (ref 40–75)
HDLC SERPL: 24.1 % (ref 20–50)
HGB BLD-MCNC: 12.5 G/DL (ref 14–18)
IMM GRANULOCYTES # BLD AUTO: 0.02 K/UL (ref 0–0.04)
IMM GRANULOCYTES NFR BLD AUTO: 0.4 % (ref 0–0.5)
LDLC SERPL CALC-MCNC: 125.4 MG/DL (ref 63–159)
LYMPHOCYTES # BLD AUTO: 1.3 K/UL (ref 1–4.8)
LYMPHOCYTES NFR BLD: 22.3 % (ref 18–48)
MAGNESIUM SERPL-MCNC: 1.7 MG/DL (ref 1.6–2.6)
MCH RBC QN AUTO: 29.3 PG (ref 27–31)
MCHC RBC AUTO-ENTMCNC: 30.6 G/DL (ref 32–36)
MCV RBC AUTO: 96 FL (ref 82–98)
MONOCYTES # BLD AUTO: 0.7 K/UL (ref 0.3–1)
MONOCYTES NFR BLD: 12.7 % (ref 4–15)
NEUTROPHILS # BLD AUTO: 3.5 K/UL (ref 1.8–7.7)
NEUTROPHILS NFR BLD: 61.8 % (ref 38–73)
NONHDLC SERPL-MCNC: 145 MG/DL
NRBC BLD-RTO: 0 /100 WBC
PHOSPHATE SERPL-MCNC: 2.9 MG/DL (ref 2.7–4.5)
PLATELET # BLD AUTO: 167 K/UL (ref 150–350)
PMV BLD AUTO: 10.8 FL (ref 9.2–12.9)
POCT GLUCOSE: 111 MG/DL (ref 70–110)
POCT GLUCOSE: 141 MG/DL (ref 70–110)
POCT GLUCOSE: 152 MG/DL (ref 70–110)
POCT GLUCOSE: 155 MG/DL (ref 70–110)
POTASSIUM SERPL-SCNC: 3.5 MMOL/L (ref 3.5–5.1)
PROT SERPL-MCNC: 6.8 G/DL (ref 6–8.4)
RBC # BLD AUTO: 4.27 M/UL (ref 4.6–6.2)
SODIUM SERPL-SCNC: 144 MMOL/L (ref 136–145)
TRIGL SERPL-MCNC: 98 MG/DL (ref 30–150)
TROPONIN I SERPL DL<=0.01 NG/ML-MCNC: 0.03 NG/ML (ref 0–0.03)
WBC # BLD AUTO: 5.65 K/UL (ref 3.9–12.7)

## 2019-10-05 PROCEDURE — 25000003 PHARM REV CODE 250: Performed by: NURSE PRACTITIONER

## 2019-10-05 PROCEDURE — 82550 ASSAY OF CK (CPK): CPT

## 2019-10-05 PROCEDURE — 93306 TTE W/DOPPLER COMPLETE: CPT

## 2019-10-05 PROCEDURE — 84100 ASSAY OF PHOSPHORUS: CPT

## 2019-10-05 PROCEDURE — 25000003 PHARM REV CODE 250: Performed by: INTERNAL MEDICINE

## 2019-10-05 PROCEDURE — 85730 THROMBOPLASTIN TIME PARTIAL: CPT

## 2019-10-05 PROCEDURE — 80053 COMPREHEN METABOLIC PANEL: CPT

## 2019-10-05 PROCEDURE — 93306 2D ECHO WITH COLOR FLOW DOPPLER: ICD-10-PCS | Mod: 26,,, | Performed by: INTERNAL MEDICINE

## 2019-10-05 PROCEDURE — 36415 COLL VENOUS BLD VENIPUNCTURE: CPT

## 2019-10-05 PROCEDURE — 92522 EVALUATE SPEECH PRODUCTION: CPT

## 2019-10-05 PROCEDURE — 97530 THERAPEUTIC ACTIVITIES: CPT

## 2019-10-05 PROCEDURE — 84484 ASSAY OF TROPONIN QUANT: CPT

## 2019-10-05 PROCEDURE — 21400001 HC TELEMETRY ROOM

## 2019-10-05 PROCEDURE — 83735 ASSAY OF MAGNESIUM: CPT

## 2019-10-05 PROCEDURE — 63600175 PHARM REV CODE 636 W HCPCS: Performed by: INTERNAL MEDICINE

## 2019-10-05 PROCEDURE — 85025 COMPLETE CBC W/AUTO DIFF WBC: CPT

## 2019-10-05 PROCEDURE — 82553 CREATINE MB FRACTION: CPT

## 2019-10-05 PROCEDURE — 97166 OT EVAL MOD COMPLEX 45 MIN: CPT

## 2019-10-05 PROCEDURE — 93306 TTE W/DOPPLER COMPLETE: CPT | Mod: 26,,, | Performed by: INTERNAL MEDICINE

## 2019-10-05 PROCEDURE — 92610 EVALUATE SWALLOWING FUNCTION: CPT

## 2019-10-05 RX ADMIN — ACETAMINOPHEN 650 MG: 325 TABLET ORAL at 09:10

## 2019-10-05 RX ADMIN — LEVETIRACETAM 500 MG: 500 TABLET, FILM COATED ORAL at 08:10

## 2019-10-05 RX ADMIN — ASPIRIN 81 MG: 81 TABLET, COATED ORAL at 09:10

## 2019-10-05 RX ADMIN — LEVETIRACETAM 500 MG: 500 TABLET, FILM COATED ORAL at 09:10

## 2019-10-05 RX ADMIN — PANTOPRAZOLE SODIUM 40 MG: 40 TABLET, DELAYED RELEASE ORAL at 09:10

## 2019-10-05 RX ADMIN — ACETAMINOPHEN 650 MG: 325 TABLET ORAL at 08:10

## 2019-10-05 RX ADMIN — ENOXAPARIN SODIUM 40 MG: 100 INJECTION SUBCUTANEOUS at 05:10

## 2019-10-05 RX ADMIN — ATORVASTATIN CALCIUM 40 MG: 40 TABLET, FILM COATED ORAL at 09:10

## 2019-10-05 RX ADMIN — TAMSULOSIN HYDROCHLORIDE 0.4 MG: 0.4 CAPSULE ORAL at 09:10

## 2019-10-05 NOTE — HOSPITAL COURSE
10/5 pt was seen and examined at bedside . He is aao x 2 in nad .  He is confuse on and off . PT/OT/SLP evaluate the pt today .  CTA show Extensive atherosclerotic plaque right common carotid bifurcation with 60% stenosis of the right internal carotid artery bulb. Acute occlusion distal right posterior cerebral artery with acute right posterior cerebral artery territory infarction. The case was discussed with family members /  10/6  He denies any complaint for today . We will resume blood pressure medication   Today .  PT/OT/SLP recommend rehab. TTE pending .   10/7 He is compalining of  worsing HA today with vision deficit . The ct head from today show 1.7 x 3.3 x 3.1 cm extra-axial mass in the middle cranial fossa on the left side consistent with a meningioma.  Extensive vasogenic edema.  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 We will start dexamethasone 4 mg iv bid and consult Hem/onc . Neurology recommend to start coumadin w/o lovenox bridge . The case was discussed with the family .   10/8 Pt was started on dexamethasone yesterday with an improvement of his  HA . Hematology/onc was consulted .  Neurology resume coumadin .   10/9 Record  From neuro medical were reviewed . It seem there is not  to much changes comparing with ct from 5/19 .  Neurosurgery will reviewed the CT head for further evaluation   10/10 Pt was seen and examined at bedside . He was determined to  Be suitable for d/c to  Rehab    -Per neurology pt should cont on coumadin with a goal of 2 to 3 . Neurology did not recommend asa or Lovenox bridge   -Case was  Discussed with Neurosurgery . There is not mayor changes  On the meningioma. Recommend to cont outpt f/u  -Pt BP cont  Elevated  : Home medication were resume  With  An improvement .  Norvasc , coreg , lasix and isosorbide   -He was started on dexamethasone 4 mg iv bid  For brain edema which is chronic in nature . The dexamethasone was taper to 2 mg iv bid . Pt was d/c on a equivalent  dose for  prednisone  20 mg qd  . Pt will be taper until reach 5 mg po qd which is his usual daily dose   -The HA and seizures are chronic in nature. The hemianopsia is a new finding after the stroke

## 2019-10-05 NOTE — CONSULTS
Consult received for a shower chair. Per [facility], shower chairs are not covered by patient's insurance.  Cm will continue to follow for services needed and possible placement.

## 2019-10-05 NOTE — SUBJECTIVE & OBJECTIVE
Interval History:     Review of Systems   Constitutional: Positive for fatigue. Negative for activity change, appetite change, chills, fever and unexpected weight change.   HENT: Negative for congestion, dental problem, drooling, ear discharge, ear pain, facial swelling, hearing loss, mouth sores, nosebleeds, postnasal drip and rhinorrhea.    Eyes: Negative for photophobia, pain, discharge, redness and itching.   Respiratory: Negative for apnea, cough, choking, chest tightness, shortness of breath and stridor.    Cardiovascular: Negative for chest pain, palpitations and leg swelling.   Gastrointestinal: Negative for abdominal distention, abdominal pain, anal bleeding, blood in stool, constipation, diarrhea, nausea and rectal pain.   Endocrine: Negative for cold intolerance, heat intolerance, polydipsia and polyphagia.   Genitourinary: Negative for difficulty urinating, discharge, dysuria, enuresis, flank pain, frequency, genital sores, hematuria and penile pain.   Musculoskeletal: Negative for arthralgias, back pain, gait problem, joint swelling, myalgias, neck pain and neck stiffness.   Neurological: Positive for weakness, numbness and headaches.   Psychiatric/Behavioral: Positive for confusion and decreased concentration. Negative for agitation, behavioral problems, dysphoric mood and hallucinations. The patient is not hyperactive.      Objective:     Vital Signs (Most Recent):  Temp: 98.3 °F (36.8 °C) (10/05/19 1135)  Pulse: 71 (10/05/19 1135)  Resp: 18 (10/05/19 1135)  BP: (!) 148/67 (10/05/19 1135)  SpO2: 97 % (10/05/19 1135) Vital Signs (24h Range):  Temp:  [97.9 °F (36.6 °C)-99.3 °F (37.4 °C)] 98.3 °F (36.8 °C)  Pulse:  [69-81] 71  Resp:  [18-22] 18  SpO2:  [95 %-99 %] 97 %  BP: (139-199)/(57-91) 148/67     Weight: 83.5 kg (184 lb 1.4 oz)  Body mass index is 25.67 kg/m².    Intake/Output Summary (Last 24 hours) at 10/5/2019 1241  Last data filed at 10/5/2019 0531  Gross per 24 hour   Intake 2484 ml   Output  900 ml   Net 1584 ml      Physical Exam   Constitutional: He is oriented to person, place, and time. He appears well-developed and well-nourished. No distress.   HENT:   Head: Normocephalic.   Right Ear: External ear normal.   Left Ear: External ear normal.   Eyes: Pupils are equal, round, and reactive to light. EOM are normal. Right eye exhibits no discharge. Left eye exhibits no discharge. No scleral icterus.   Neck: Normal range of motion. Neck supple. No JVD present. No tracheal deviation present. No thyromegaly present.   Pulmonary/Chest: Effort normal and breath sounds normal. No stridor. No respiratory distress. He has no wheezes.   Abdominal: Soft. Bowel sounds are normal. He exhibits no distension. There is no tenderness. There is no guarding.   Musculoskeletal: Normal range of motion. He exhibits no edema or deformity.   Neurological: He is alert and oriented to person, place, and time. He displays normal reflexes. No cranial nerve deficit. Coordination normal.   Skin: Skin is warm. He is not diaphoretic.   Psychiatric: He has a normal mood and affect.   Nursing note and vitals reviewed.      Significant Labs: All pertinent labs within the past 24 hours have been reviewed.    Significant Imaging: I have reviewed all pertinent imaging results/findings within the past 24 hours.

## 2019-10-05 NOTE — PROGRESS NOTES
[facility] Hospital Medicine  Progress Note    Patient Name: Bola Figueroa Sr.  MRN: [id number]  Patient Class: IP- Inpatient   Admission Date: 10/4/2019  Length of Stay: 1 days  Attending Physician: Thierno Prescott, *  Primary Care Provider: Naga Bhatti MD        Subjective:     Principal Problem:Acute CVA (cerebrovascular accident)        HPI:  84 y.o. male  with a PMHx od COPD , Afib on coumadin , HTN , tobacco user  And  PHT who presents to the Emergency Department following a fall just PTA. Pt reports generalized weakness over the past 2 days, causing him to fall today and hit the back of his head on the toilet. . Associated sxs include headache. Patient denies any fever, chills, n/v/d, SOB, CP, numbness, dizziness, LOC, and all other sxs at this time. No prior Tx reported . He was recently d/c from this facility  1 month ago with a dx od COPD exacerbation,   ER COURSE: CT head show Acute to subacute right occipital lobe infarct.Possible calcified thrombus along the M2 segments of the left MCA unless the patient has a prior history of prior embolization. CXR did not show any acute finding . BUN/CR 15/1.6  ER VS:  BP Pulse Resp Temp SpO2   (!) 180/90 60 18 99.2 °F (37.3 °C) 98 %       MAP           --          -The  ER Dr discussed the Case with Neurosurgery and Tele - stroke  . They did not recommend any  Intervention at this time .   -Pt can not have a MRI/MRA due to pacemaker   -Pt is not a candidate for TPA  -Neurology was consulted .    Overview/Hospital Course:  10/5 pt was seen and examined at bedside . He is aao x 2 in nad .  He is confuse on and off . PT/OT/SLP evaluate the pt today .  CTA show Extensive atherosclerotic plaque right common carotid bifurcation with 60% stenosis of the right internal carotid artery bulb. Acute occlusion distal right posterior cerebral artery with acute right posterior cerebral artery territory infarction. The case was discussed with family  members /    Interval History:     Review of Systems   Constitutional: Positive for fatigue. Negative for activity change, appetite change, chills, fever and unexpected weight change.   HENT: Negative for congestion, dental problem, drooling, ear discharge, ear pain, facial swelling, hearing loss, mouth sores, nosebleeds, postnasal drip and rhinorrhea.    Eyes: Negative for photophobia, pain, discharge, redness and itching.   Respiratory: Negative for apnea, cough, choking, chest tightness, shortness of breath and stridor.    Cardiovascular: Negative for chest pain, palpitations and leg swelling.   Gastrointestinal: Negative for abdominal distention, abdominal pain, anal bleeding, blood in stool, constipation, diarrhea, nausea and rectal pain.   Endocrine: Negative for cold intolerance, heat intolerance, polydipsia and polyphagia.   Genitourinary: Negative for difficulty urinating, discharge, dysuria, enuresis, flank pain, frequency, genital sores, hematuria and penile pain.   Musculoskeletal: Negative for arthralgias, back pain, gait problem, joint swelling, myalgias, neck pain and neck stiffness.   Neurological: Positive for weakness, numbness and headaches.   Psychiatric/Behavioral: Positive for confusion and decreased concentration. Negative for agitation, behavioral problems, dysphoric mood and hallucinations. The patient is not hyperactive.      Objective:     Vital Signs (Most Recent):  Temp: 98.3 °F (36.8 °C) (10/05/19 1135)  Pulse: 71 (10/05/19 1135)  Resp: 18 (10/05/19 1135)  BP: (!) 148/67 (10/05/19 1135)  SpO2: 97 % (10/05/19 1135) Vital Signs (24h Range):  Temp:  [97.9 °F (36.6 °C)-99.3 °F (37.4 °C)] 98.3 °F (36.8 °C)  Pulse:  [69-81] 71  Resp:  [18-22] 18  SpO2:  [95 %-99 %] 97 %  BP: (139-199)/(57-91) 148/67     Weight: 83.5 kg (184 lb 1.4 oz)  Body mass index is 25.67 kg/m².    Intake/Output Summary (Last 24 hours) at 10/5/2019 1241  Last data filed at 10/5/2019 0531  Gross per 24 hour   Intake 5366  ml   Output 900 ml   Net 1584 ml      Physical Exam   Constitutional: He is oriented to person, place, and time. He appears well-developed and well-nourished. No distress.   HENT:   Head: Normocephalic.   Right Ear: External ear normal.   Left Ear: External ear normal.   Eyes: Pupils are equal, round, and reactive to light. EOM are normal. Right eye exhibits no discharge. Left eye exhibits no discharge. No scleral icterus.   Neck: Normal range of motion. Neck supple. No JVD present. No tracheal deviation present. No thyromegaly present.   Pulmonary/Chest: Effort normal and breath sounds normal. No stridor. No respiratory distress. He has no wheezes.   Abdominal: Soft. Bowel sounds are normal. He exhibits no distension. There is no tenderness. There is no guarding.   Musculoskeletal: Normal range of motion. He exhibits no edema or deformity.   Neurological: He is alert and oriented to person, place, and time. He displays normal reflexes. No cranial nerve deficit. Coordination normal.   Skin: Skin is warm. He is not diaphoretic.   Psychiatric: He has a normal mood and affect.   Nursing note and vitals reviewed.      Significant Labs: All pertinent labs within the past 24 hours have been reviewed.    Significant Imaging: I have reviewed all pertinent imaging results/findings within the past 24 hours.      Assessment/Plan:      * Acute CVA (cerebrovascular accident)  -CT head show : Acute to subacute right occipital lobe infarct.Possible calcified thrombus along the M2 segments of the left MCA unless the patient has a prior history of prior embolization.  -ER Dr discussed the case with neurology and Tele stroke . They did not  recommend  Any intervention at this time  . They states pt can stay in [facility]   -Neurology consulted   - MRI/MRA  Can not be done due to pacemaker .  Will order a Head and  neck CTA  -Cont asa and statin   permissive HTN  For 24 to 48 hrs  -PT/OT/SLP evaluation            Grade III  diastolic dysfunction  Compensated  Cont CHF core measures          Seizure disorder  Resume keppra       Mild dementia  Cont supportive tx       COPD, severe  Stable   Cont breathing Tx       Benign prostatic hyperplasia  Resume flomax       Long term current use of anticoagulant therapy  On coumadin   Last INR 1  Hold Coumadin       Essential hypertension  Permissive HTN  For 24 to 48 hrs  Treat if SBP > 210        PVD (peripheral vascular disease)  Resume statin       Coronary artery disease  Resume statin and asa  HOLD BB for 24 hrs      A-fib  Rate control  HOLD anticoagulation due to acute CVA  HOLD blood pressure medication for permessive HTN         VTE Risk Mitigation (From admission, onward)         Ordered     enoxaparin injection 40 mg  Daily      10/04/19 1653     IP VTE HIGH RISK PATIENT  Once      10/04/19 1653     Place sequential compression device  Until discontinued      10/04/19 1653                      Thierno Prescott MD  Department of Hospital Medicine   [facility]

## 2019-10-05 NOTE — PT/OT/SLP EVAL
Occupational Therapy   Evaluation    Name: Bola Figueroa Sr.  MRN: [id number]  Admitting Diagnosis:  Acute CVA (cerebrovascular accident)      Recommendations:     Discharge Recommendations: home health OT, outpatient OT(HHOT VS OUT PT OT DEPENDING ON PROGRESS)  Discharge Equipment Recommendations:  none  Barriers to discharge:       Assessment:     Bola Figueroa Sr. is a 84 y.o. male with a medical diagnosis of Acute CVA (cerebrovascular accident).  He presents with DEBILITY AND GENERALIZED WEAKNESS. Performance deficits affecting function: weakness, impaired functional mobilty, impaired balance, impaired endurance, impaired self care skills, gait instability, decreased upper extremity function, decreased safety awareness.      Rehab Prognosis: Good; patient would benefit from acute skilled OT services to address these deficits and reach maximum level of function.       Plan:     Patient to be seen 3 x/week to address the above listed problems via self-care/home management, therapeutic exercises, therapeutic activities  · Plan of Care Expires:    · Plan of Care Reviewed with: patient    Subjective     Chief Complaint: DEBILITY AND GENERALIZED WEAKNESS  Patient/Family Comments/goals:     Occupational Profile:  Living Environment: LIVES WITH SON AND REPORTS CAREGIVER FOR SPOUSE  Previous level of function: (I) WITH AD'S AND MOD (I) WITH FUNCTIONAL MOBILITY  Roles and Routines: OCCUPATIONAL THERAPY   Equipment Used at Home:  walker, rolling, shower chair  Assistance upon Discharge:     Pain/Comfort:  · Pain Rating 1: 0/10    Patients cultural, spiritual, Hinduism conflicts given the current situation:      Objective:     Communicated with: NURSE AND Epic CHART REVIEW prior to session.  Patient found up in chair with telemetry upon OT entry to room.    General Precautions: Standard, aphasia, fall, vision impaired   Orthopedic Precautions:N/A   Braces: N/A     Occupational Performance:    Bed Mobility:    · Patient completed  Rolling/Turning to Right with moderate assistance  · Patient completed Scooting/Bridging with moderate assistance  · Patient completed Supine to Sit with moderate assistance    Functional Mobility/Transfers:  · Patient completed Sit <> Stand Transfer with moderate assistance  with  rolling walker   · Functional Mobility: PT AMBULATED 60 FEET WITH MOD A  WITH ROLLING WALKER AND VC FOR SAFETY AND POSTURE    Activities of Daily Living:  · Upper Body Dressing: moderate assistance .  · Lower Body Dressing: moderate assistance .    Cognitive/Visual Perceptual:  Cognitive/Psychosocial Skills:     -       Oriented to: Person   -       Follows Commands/attention:Follows two-step commands  -       Communication: clear/fluent  -       Memory: UNABLE TO ASSESS  -       Safety awareness/insight to disability: impaired   Visual/Perceptual:      -Intact .    Physical Exam:  Upper Extremity Range of Motion:     -       Right Upper Extremity: WFL  -       Left Upper Extremity: WFL  Upper Extremity Strength:    -       Right Upper Extremity: MMT: 3/5 GROSSLY  -       Left Upper Extremity: MMT: 3/5 GROSSLY   Strength:    -       Right Upper Extremity: MMT: 3/5 GROSSLY  -       Left Upper Extremity: MMT: 3/5 GROSSLY    AMPAC 6 Click ADL:  AMPAC Total Score: 17    Treatment & Education:    Education:    Patient left HOB elevated with all lines intact, call button in reach, bed alarm on and NURSE LISSA notified    GOALS:   Multidisciplinary Problems     Occupational Therapy Goals        Problem: Occupational Therapy Goal    Goal Priority Disciplines Outcome Interventions   Occupational Therapy Goal     OT, PT/OT     Description:  OT GOALS TO BE MET BY 10-12-19  SBA WITH UE DRESSING  PT WILL TOLERATE 1 SET X 15 REPS B UE ROM EXERCISE  SBA WITH LE DRESSING                    History:     Past Medical History:   Diagnosis Date    *Atrial fibrillation     Atrial fibrillation     Bilateral carotid artery stenosis 1/15/2016    Cardiac  pacemaker 8/7/2013    Congestive heart failure 4/6/2015    COPD (chronic obstructive pulmonary disease) 8/7/2013    Coronary artery disease     Hyperlipidemia     Hypertension     Long-term (current) use of anticoagulants 8/7/2013    PVD (peripheral vascular disease)     S/P PTCA (percutaneous transluminal coronary angioplasty) 8/7/2013    Sleep apnea 8/7/2013    Stroke        Past Surgical History:   Procedure Laterality Date    CATARACT EXTRACTION W/  INTRAOCULAR LENS IMPLANT      INSERT / REPLACE / REMOVE PACEMAKER  2005       Time Tracking:     OT Date of Treatment: 10/05/19  OT Start Time: 0940  OT Stop Time: 1003  OT Total Time (min): 23 min    Billable Minutes:Evaluation 10 MINUTES  Therapeutic Activity 13 MINUTES    Parul Hinojosa OT  10/5/2019

## 2019-10-05 NOTE — PLAN OF CARE
"Pt AAO to self only.  BP elevated. Provider notified.  Pt remained afebrile throughout this shift.   All meds administered per order.   Pt remained free of falls this shift. Bed alarm on.  Pt uncooperative most of shift. Had to be redirected numerous times.  Pt c/o headache pain this shift.Tylenol administered as ordered.   Plan of care reviewed. Patient exhibited no signs of understanding.   Pt moving/turning independently.   Patient Paced on monitor.   Bed low, side rails up x 2, wheels locked, call light in reach.   Patient instructed to call for assistance.   Hourly rounding completed.   Will continue to monitor.    BP (!) 199/87 (BP Location: Left arm, Patient Position: Lying)   Pulse 71   Temp 97.9 °F (36.6 °C) (Oral)   Resp 20   Ht 5' 11" (1.803 m)   Wt 83.5 kg (184 lb 1.4 oz)   SpO2 97%   BMI 25.67 kg/m²     "

## 2019-10-05 NOTE — PLAN OF CARE
Met with patient. Patient known to me from previous admission.   Patient was independent with adls and iadls using his assistive devices. Patient was the primary caregiver for his bedbound spouse. Patient's son stays with he and his wife at night. His daughter Luz Maria ( Felicitas ) is also very involved. Patient's spouse is also a patient [facility] at the present time.   Patient 's discharge plan will depend upon his progress with therapy and their recommendations. Therapies ordered. CM to continue to follow. Updated white board with 's name and number. Transitional Care Folder, Discharge Planning Begins on Admission pamphlet, [facility] Bedside Delivery pamphlet, Advance Directive information given to patient along with the contact information given.Instructed patient or family to call with any questions or concerns.          D/C Plan: Rehab vs SNF  PCP:Naga Bhatti MD  Preferred Pharmacy:[facility] [address]  Discharge transportation:facility vs family  My [facility]:declined  Pharmacy Bedside Delivery:unsure, may discharge to a facility.           10/05/19 1159   Discharge Assessment   Assessment Type Discharge Planning Assessment   Confirmed/corrected address and phone number on facesheet? Yes   Assessment information obtained from? Patient;Medical Record   Expected Length of Stay (days)   (tbd)   Communicated expected length of stay with patient/caregiver no   Prior to hospitilization cognitive status: Unable to Assess   Prior to hospitalization functional status: Assistive Equipment   Current cognitive status: Not Oriented to Place;Not Oriented to Time  (oriented to self)   Current Functional Status: Needs Assistance   Facility Arrived From: home   Lives With spouse;child(sarai), adult   Able to Return to Prior Arrangements other (see comments)  (tbd)   Is patient able to care for self after discharge? Unable to determine at this time (comments)   Who are your caregiver(s) and their phone number(s)?  Luz Maria Alexis ( daughter ) [phone]   Patient's perception of discharge disposition skilled nursing facility;rehab facility   Readmission Within the Last 30 Days no previous admission in last 30 days   Patient currently being followed by outpatient case management? No   Patient currently receives any other outside agency services? No   Equipment Currently Used at Home walker, rolling;shower chair   Do you have any problems affording any of your prescribed medications? No   Is the patient taking medications as prescribed? yes   Does the patient have transportation home? Yes   Transportation Anticipated family or friend will provide   Does the patient receive services at the [facility]? Yes   Discharge Plan A Rehab;Skilled Nursing Facility   Discharge Plan B Skilled Nursing Facility;Rehab   DME Needed Upon Discharge  none   Patient/Family in Agreement with Plan unable to assess

## 2019-10-05 NOTE — PROGRESS NOTES
Pt attempting to get out of bed. Bed Alarm activated. Pt is uncooperative at this time. Redirected back to bed. Bed Alarm reset.

## 2019-10-05 NOTE — ASSESSMENT & PLAN NOTE
-CT head show : Acute to subacute right occipital lobe infarct.Possible calcified thrombus along the M2 segments of the left MCA unless the patient has a prior history of prior embolization.  -ER Dr discussed the case with neurology and Tele stroke . They did not  recommend  Any intervention at this time  . They states pt can stay in [facility]   -Neurology consulted   - MRI/MRA  Can not be done due to pacemaker .  Will order a Head and  neck CTA  -Cont asa and statin   permissive HTN  For 24 to 48 hrs  -PT/OT/SLP evaluation

## 2019-10-05 NOTE — PT/OT/SLP EVAL
Speech Language Pathology Evaluation  Cognitive/Bedside Swallow    Patient Name:  Bola Figueroa Sr.   MRN:  [id number]  Admitting Diagnosis: Acute CVA (cerebrovascular accident)    Recommendations:                  General Recommendations:  Dysphagia therapy, Speech/language therapy and Cognitive-linguistic therapy  Diet recommendations:  Mechanical soft, Thin   Aspiration Precautions: Standard aspiration precautions   General Precautions: Standard, aphasia, fall, vision impaired  Communication strategies:  provide increased time to answer    History:     Past Medical History:   Diagnosis Date    *Atrial fibrillation     Atrial fibrillation     Bilateral carotid artery stenosis 1/15/2016    Cardiac pacemaker 8/7/2013    Congestive heart failure 4/6/2015    COPD (chronic obstructive pulmonary disease) 8/7/2013    Coronary artery disease     Hyperlipidemia     Hypertension     Long-term (current) use of anticoagulants 8/7/2013    PVD (peripheral vascular disease)     S/P PTCA (percutaneous transluminal coronary angioplasty) 8/7/2013    Sleep apnea 8/7/2013    Stroke        Past Surgical History:   Procedure Laterality Date    CATARACT EXTRACTION W/  INTRAOCULAR LENS IMPLANT      INSERT / REPLACE / REMOVE PACEMAKER  2005       Subjective         Pain/Comfort:  · Pain Rating 1: 0/10  · Pain Rating Post-Intervention 1: 0/10    Objective:     Cognitive Status:    Attention Sustained attention deficit poor  Orientation Person and Place      Receptive Language:   Comprehension:      Questions Simple yes/no adequate  Commands  One step adequate    Pragmatics:    inconsistent eye contact during evaluation procedures    Expressive Language:  Verbal:    Automatic Speech  Counting WLF, Days of the week WFL and Months of the year WFL  Repetition Words adequate  Naming Confrontation impaired and Convergent impaired      Motor Speech:  WFL    Voice:   WFL    Visual-Spatial:  Field cut visual deficits    Reading:   Unable  to assess due to visual deficits     Written Expression:   Writing interfering components visual deficits    Oral Musculature Evaluation  · Oral Musculature: general weakness  · Dentition: edentulous    Bedside Swallow Eval:   Consistencies Assessed:  · Thin liquids via cup and straw  · Puree textured  · Solids regular     Oral Phase:   · Prolonged mastication  · Oral residue    Pharyngeal Phase:   · no overt clinical signs/symptoms of aspiration  · no overt clinical signs/symptoms of pharyngeal dysphagia    Treatment: Bedside swallowing evaluation completed. Pt edentulous and reported having dentures at home but not utilizing for po intake. Pt tolerated po trials of ice chips, thin liquids via spoon, thin liquids via straw, textured puree, and regular consistency solid without any overt s/s of aspiration. Pt with extended oral prep and oral residue which was cleared spontaneously with liquid wash. Pt with difficulty maintain attention to tasks for speech and language evaluation due to c/o headache. Pt was oriented to person and place. He exhibited adequate auditory comprehension, intact automatic speech, adequate repetition, and impaired confrontation naming tasks. Unable to complete reading and witting tasks secondary to c/o blurry vision.     Assessment:     Bola Figueroa . is a 84 y.o. male with an SLP diagnosis of Aphasia and Dysphagia.  He presents with fluent aphasia and oral dysphagia.    Goals:   Multidisciplinary Problems     SLP Goals        Problem: SLP Goal    Goal Priority Disciplines Outcome   SLP Goal     SLP    Description:  TPW complete confrontation naming tasks given the physical object with min verbal cues  TPW complete convergent and divergent naming tasks given verbal cues   TPW tolerate current diet without any overt s/s of aspiration                    Plan:     · Patient to be seen:  3 x/week   · Plan of Care expires:  10/12/19  · Plan of Care reviewed with:  patient   · SLP Follow-Up:  Yes        Discharge recommendations:  Home health speech therapy  Barriers to Discharge:  Level of Skilled Assistance Needed min/mod    Time Tracking:     SLP Treatment Date:   10/05/19  Speech Start Time:  0815  Speech Stop Time:  0847     Speech Total Time (min):  32 min    Billable Minutes: Wilfredo 15     Selam Moreno CCC-SLP  10/05/2019

## 2019-10-05 NOTE — PLAN OF CARE
Discussed Plan of Care with patient and verbalized understanding - Patient remains awake and alert, still has some confusion - to have 2D Echo  - remains free of falls, accidents and trauma during the day shift. Bed is in the low position and the call light is within reach. Will continue to monitor

## 2019-10-06 LAB
ALBUMIN SERPL BCP-MCNC: 3.3 G/DL (ref 3.5–5.2)
ALP SERPL-CCNC: 57 U/L (ref 55–135)
ALT SERPL W/O P-5'-P-CCNC: 13 U/L (ref 10–44)
ANION GAP SERPL CALC-SCNC: 12 MMOL/L (ref 8–16)
AORTIC VALVE STENOSIS: ABNORMAL
AST SERPL-CCNC: 19 U/L (ref 10–40)
BASOPHILS # BLD AUTO: 0.02 K/UL (ref 0–0.2)
BASOPHILS NFR BLD: 0.4 % (ref 0–1.9)
BILIRUB SERPL-MCNC: 2 MG/DL (ref 0.1–1)
BUN SERPL-MCNC: 6 MG/DL (ref 8–23)
CALCIUM SERPL-MCNC: 9.3 MG/DL (ref 8.7–10.5)
CHLORIDE SERPL-SCNC: 102 MMOL/L (ref 95–110)
CO2 SERPL-SCNC: 26 MMOL/L (ref 23–29)
CREAT SERPL-MCNC: 1 MG/DL (ref 0.5–1.4)
DIASTOLIC DYSFUNCTION: YES
DIFFERENTIAL METHOD: ABNORMAL
EOSINOPHIL # BLD AUTO: 0.2 K/UL (ref 0–0.5)
EOSINOPHIL NFR BLD: 2.6 % (ref 0–8)
ERYTHROCYTE [DISTWIDTH] IN BLOOD BY AUTOMATED COUNT: 12.2 % (ref 11.5–14.5)
EST. GFR  (AFRICAN AMERICAN): >60 ML/MIN/1.73 M^2
EST. GFR  (NON AFRICAN AMERICAN): >60 ML/MIN/1.73 M^2
ESTIMATED PA SYSTOLIC PRESSURE: 33.69
GLUCOSE SERPL-MCNC: 141 MG/DL (ref 70–110)
HCT VFR BLD AUTO: 40.4 % (ref 40–54)
HGB BLD-MCNC: 12.7 G/DL (ref 14–18)
IMM GRANULOCYTES # BLD AUTO: 0.02 K/UL (ref 0–0.04)
IMM GRANULOCYTES NFR BLD AUTO: 0.4 % (ref 0–0.5)
LYMPHOCYTES # BLD AUTO: 1.2 K/UL (ref 1–4.8)
LYMPHOCYTES NFR BLD: 21.1 % (ref 18–48)
MCH RBC QN AUTO: 29.3 PG (ref 27–31)
MCHC RBC AUTO-ENTMCNC: 31.4 G/DL (ref 32–36)
MCV RBC AUTO: 93 FL (ref 82–98)
MITRAL VALVE REGURGITATION: ABNORMAL
MONOCYTES # BLD AUTO: 0.6 K/UL (ref 0.3–1)
MONOCYTES NFR BLD: 11.2 % (ref 4–15)
NEUTROPHILS # BLD AUTO: 3.7 K/UL (ref 1.8–7.7)
NEUTROPHILS NFR BLD: 64.3 % (ref 38–73)
NRBC BLD-RTO: 0 /100 WBC
PLATELET # BLD AUTO: 171 K/UL (ref 150–350)
PMV BLD AUTO: 10.7 FL (ref 9.2–12.9)
POCT GLUCOSE: 144 MG/DL (ref 70–110)
POCT GLUCOSE: 150 MG/DL (ref 70–110)
POCT GLUCOSE: 253 MG/DL (ref 70–110)
POTASSIUM SERPL-SCNC: 2.9 MMOL/L (ref 3.5–5.1)
PROT SERPL-MCNC: 6.5 G/DL (ref 6–8.4)
RBC # BLD AUTO: 4.34 M/UL (ref 4.6–6.2)
RETIRED EF AND QEF - SEE NOTES: 60 (ref 55–65)
SODIUM SERPL-SCNC: 140 MMOL/L (ref 136–145)
TRICUSPID VALVE REGURGITATION: ABNORMAL
WBC # BLD AUTO: 5.69 K/UL (ref 3.9–12.7)

## 2019-10-06 PROCEDURE — 97162 PT EVAL MOD COMPLEX 30 MIN: CPT

## 2019-10-06 PROCEDURE — 97116 GAIT TRAINING THERAPY: CPT

## 2019-10-06 PROCEDURE — 36415 COLL VENOUS BLD VENIPUNCTURE: CPT

## 2019-10-06 PROCEDURE — 63600175 PHARM REV CODE 636 W HCPCS: Performed by: INTERNAL MEDICINE

## 2019-10-06 PROCEDURE — 99233 SBSQ HOSP IP/OBS HIGH 50: CPT | Mod: ,,, | Performed by: PSYCHIATRY & NEUROLOGY

## 2019-10-06 PROCEDURE — 25000003 PHARM REV CODE 250: Performed by: INTERNAL MEDICINE

## 2019-10-06 PROCEDURE — 99233 PR SUBSEQUENT HOSPITAL CARE,LEVL III: ICD-10-PCS | Mod: ,,, | Performed by: PSYCHIATRY & NEUROLOGY

## 2019-10-06 PROCEDURE — 97530 THERAPEUTIC ACTIVITIES: CPT

## 2019-10-06 PROCEDURE — 25000003 PHARM REV CODE 250: Performed by: NURSE PRACTITIONER

## 2019-10-06 PROCEDURE — 21400001 HC TELEMETRY ROOM

## 2019-10-06 PROCEDURE — 80053 COMPREHEN METABOLIC PANEL: CPT

## 2019-10-06 PROCEDURE — 85025 COMPLETE CBC W/AUTO DIFF WBC: CPT

## 2019-10-06 RX ORDER — AMLODIPINE BESYLATE 10 MG/1
10 TABLET ORAL DAILY
Status: DISCONTINUED | OUTPATIENT
Start: 2019-10-06 | End: 2019-10-10 | Stop reason: HOSPADM

## 2019-10-06 RX ORDER — CARVEDILOL 12.5 MG/1
12.5 TABLET ORAL 2 TIMES DAILY WITH MEALS
Status: DISCONTINUED | OUTPATIENT
Start: 2019-10-06 | End: 2019-10-10 | Stop reason: HOSPADM

## 2019-10-06 RX ORDER — POTASSIUM CHLORIDE 20 MEQ/1
40 TABLET, EXTENDED RELEASE ORAL DAILY
Status: COMPLETED | OUTPATIENT
Start: 2019-10-06 | End: 2019-10-07

## 2019-10-06 RX ORDER — LABETALOL HYDROCHLORIDE 5 MG/ML
10 INJECTION, SOLUTION INTRAVENOUS EVERY 4 HOURS PRN
Status: DISCONTINUED | OUTPATIENT
Start: 2019-10-06 | End: 2019-10-10 | Stop reason: HOSPADM

## 2019-10-06 RX ADMIN — CARVEDILOL 12.5 MG: 12.5 TABLET, FILM COATED ORAL at 09:10

## 2019-10-06 RX ADMIN — TAMSULOSIN HYDROCHLORIDE 0.4 MG: 0.4 CAPSULE ORAL at 09:10

## 2019-10-06 RX ADMIN — LEVETIRACETAM 500 MG: 500 TABLET, FILM COATED ORAL at 09:10

## 2019-10-06 RX ADMIN — ASPIRIN 81 MG: 81 TABLET, COATED ORAL at 09:10

## 2019-10-06 RX ADMIN — ENOXAPARIN SODIUM 40 MG: 100 INJECTION SUBCUTANEOUS at 05:10

## 2019-10-06 RX ADMIN — POTASSIUM CHLORIDE 40 MEQ: 1500 TABLET, EXTENDED RELEASE ORAL at 09:10

## 2019-10-06 RX ADMIN — AMLODIPINE BESYLATE 10 MG: 10 TABLET ORAL at 09:10

## 2019-10-06 RX ADMIN — CARVEDILOL 12.5 MG: 12.5 TABLET, FILM COATED ORAL at 05:10

## 2019-10-06 RX ADMIN — ACETAMINOPHEN 650 MG: 325 TABLET ORAL at 06:10

## 2019-10-06 RX ADMIN — PANTOPRAZOLE SODIUM 40 MG: 40 TABLET, DELAYED RELEASE ORAL at 09:10

## 2019-10-06 RX ADMIN — INSULIN ASPART 3 UNITS: 100 INJECTION, SOLUTION INTRAVENOUS; SUBCUTANEOUS at 11:10

## 2019-10-06 RX ADMIN — ATORVASTATIN CALCIUM 40 MG: 40 TABLET, FILM COATED ORAL at 09:10

## 2019-10-06 NOTE — PLAN OF CARE
Discussed Plan of Care with patient and verbalized understanding - Patient remains awake and alert, confused off and on - remains free of falls, accidents and trauma during the day shift. Bed in in the low position and the call light is within reach. Will continue to monitor

## 2019-10-06 NOTE — ASSESSMENT & PLAN NOTE
Rate control  HOLD anticoagulation due to acute CVA . Per neurology  Only asa   For now . Resume coumadin in 1 week .   Resume bb

## 2019-10-06 NOTE — PROGRESS NOTES
[facility] Hospital Medicine  Progress Note    Patient Name: Bola Figueroa Sr.  MRN: [id number]  Patient Class: IP- Inpatient   Admission Date: 10/4/2019  Length of Stay: 2 days  Attending Physician: Thierno Prescott, *  Primary Care Provider: Naga Bhatti MD        Subjective:     Principal Problem:Acute CVA (cerebrovascular accident)        HPI:  84 y.o. male  with a PMHx od COPD , Afib on coumadin , HTN , tobacco user  And  PHT who presents to the Emergency Department following a fall just PTA. Pt reports generalized weakness over the past 2 days, causing him to fall today and hit the back of his head on the toilet. . Associated sxs include headache. Patient denies any fever, chills, n/v/d, SOB, CP, numbness, dizziness, LOC, and all other sxs at this time. No prior Tx reported . He was recently d/c from this facility  1 month ago with a dx od COPD exacerbation,   ER COURSE: CT head show Acute to subacute right occipital lobe infarct.Possible calcified thrombus along the M2 segments of the left MCA unless the patient has a prior history of prior embolization. CXR did not show any acute finding . BUN/CR 15/1.6  ER VS:  BP Pulse Resp Temp SpO2   (!) 180/90 60 18 99.2 °F (37.3 °C) 98 %       MAP           --          -The  ER Dr discussed the Case with Neurosurgery and Tele - stroke  . They did not recommend any  Intervention at this time .   -Pt can not have a MRI/MRA due to pacemaker   -Pt is not a candidate for TPA  -Neurology was consulted .    Overview/Hospital Course:  10/5 pt was seen and examined at bedside . He is aao x 2 in nad .  He is confuse on and off . PT/OT/SLP evaluate the pt today .  CTA show Extensive atherosclerotic plaque right common carotid bifurcation with 60% stenosis of the right internal carotid artery bulb. Acute occlusion distal right posterior cerebral artery with acute right posterior cerebral artery territory infarction. The case was discussed with family  members /  10/6  He denies any complaint for today . We will resume blood pressure medication   Today .  PT/OT/SLP recommend rehab. TTE pending .     Interval History:     Review of Systems   Constitutional: Positive for fatigue. Negative for activity change, appetite change, chills, fever and unexpected weight change.   HENT: Negative for congestion, dental problem, drooling, ear discharge, ear pain, facial swelling, hearing loss, mouth sores, nosebleeds, postnasal drip and rhinorrhea.    Eyes: Negative for photophobia, pain, discharge, redness and itching.   Respiratory: Negative for apnea, cough, choking, chest tightness, shortness of breath and stridor.    Cardiovascular: Negative for chest pain, palpitations and leg swelling.   Gastrointestinal: Negative for abdominal distention, abdominal pain, anal bleeding, blood in stool, constipation, diarrhea, nausea and rectal pain.   Endocrine: Negative for cold intolerance, heat intolerance, polydipsia and polyphagia.   Genitourinary: Negative for difficulty urinating, discharge, dysuria, enuresis, flank pain, frequency, genital sores, hematuria and penile pain.   Musculoskeletal: Negative for arthralgias, back pain, gait problem, joint swelling, myalgias, neck pain and neck stiffness.   Neurological: Positive for weakness, numbness and headaches.   Psychiatric/Behavioral: Positive for confusion and decreased concentration. Negative for agitation, behavioral problems, dysphoric mood and hallucinations. The patient is not hyperactive.      Objective:     Vital Signs (Most Recent):  Temp: 99.1 °F (37.3 °C) (10/06/19 0814)  Pulse: 72 (10/06/19 0814)  Resp: 20 (10/06/19 0814)  BP: (!) 182/88 (10/06/19 0814)  SpO2: 96 % (10/06/19 0814) Vital Signs (24h Range):  Temp:  [98 °F (36.7 °C)-99.1 °F (37.3 °C)] 99.1 °F (37.3 °C)  Pulse:  [69-78] 72  Resp:  [18-20] 20  SpO2:  [95 %-97 %] 96 %  BP: (161-197)/(77-91) 182/88     Weight: 78.6 kg (173 lb 4.5 oz)  Body mass index is 24.18  kg/m².    Intake/Output Summary (Last 24 hours) at 10/6/2019 1147  Last data filed at 10/6/2019 0000  Gross per 24 hour   Intake --   Output 550 ml   Net -550 ml      Physical Exam   Constitutional: He is oriented to person, place, and time. He appears well-developed and well-nourished. No distress.   HENT:   Head: Normocephalic.   Right Ear: External ear normal.   Left Ear: External ear normal.   Eyes: Pupils are equal, round, and reactive to light. EOM are normal. Right eye exhibits no discharge. Left eye exhibits no discharge. No scleral icterus.   Neck: Normal range of motion. Neck supple. No JVD present. No tracheal deviation present. No thyromegaly present.   Pulmonary/Chest: Effort normal and breath sounds normal. No stridor. No respiratory distress. He has no wheezes.   Abdominal: Soft. Bowel sounds are normal. He exhibits no distension. There is no tenderness. There is no guarding.   Musculoskeletal: Normal range of motion. He exhibits no edema or deformity.   Neurological: He is alert and oriented to person, place, and time. He displays normal reflexes. No cranial nerve deficit. Coordination normal.   Skin: Skin is warm. He is not diaphoretic.   Psychiatric: He has a normal mood and affect.   Nursing note and vitals reviewed.      Significant Labs: All pertinent labs within the past 24 hours have been reviewed.    Significant Imaging: I have reviewed all pertinent imaging results/findings within the past 24 hours.      Assessment/Plan:      * Acute CVA (cerebrovascular accident)  -CT head show : Acute to subacute right occipital lobe infarct.Possible calcified thrombus along the M2 segments of the left MCA unless the patient has a prior history of prior embolization.  -ER  discussed the case with neurology and Tele stroke . They did not  recommend  Any intervention at this time  . They states pt can stay in [facility]   -Neurology consulted   - MRI/MRA  Can not be done due to pacemaker .  Will  order a Head and  neck CTA  -Cont asa and statin   - S/P permissive HTN  For 24 to 48 hrs. Started on coreg and norvasc .  -PT/OT/SLP recimmend rehab             Grade III diastolic dysfunction  Compensated  Cont CHF core measures          Seizure disorder  Resume keppra       Mild dementia  Cont supportive tx       COPD, severe  Stable   Cont breathing Tx       Benign prostatic hyperplasia  Resume flomax       Long term current use of anticoagulant therapy  On coumadin   Last INR 1  Hold Coumadin  For 1 to 2 week per neurology recommendation       Essential hypertension  S/p permissive HTN  Resume  norvasc and coreg           PVD (peripheral vascular disease)  Resume statin       Coronary artery disease  Resume statin and asa  Resume  BB       A-fib  Rate control  HOLD anticoagulation due to acute CVA . Per neurology  Only asa   For now . Resume coumadin in 1 week .   Resume bb        VTE Risk Mitigation (From admission, onward)         Ordered     enoxaparin injection 40 mg  Daily      10/04/19 1653     IP VTE HIGH RISK PATIENT  Once      10/04/19 1653     Place sequential compression device  Until discontinued      10/04/19 1653                      Thierno Prescott MD  Department of Hospital Medicine   [facility]

## 2019-10-06 NOTE — SUBJECTIVE & OBJECTIVE
Interval History:     Review of Systems   Constitutional: Positive for fatigue. Negative for activity change, appetite change, chills, fever and unexpected weight change.   HENT: Negative for congestion, dental problem, drooling, ear discharge, ear pain, facial swelling, hearing loss, mouth sores, nosebleeds, postnasal drip and rhinorrhea.    Eyes: Negative for photophobia, pain, discharge, redness and itching.   Respiratory: Negative for apnea, cough, choking, chest tightness, shortness of breath and stridor.    Cardiovascular: Negative for chest pain, palpitations and leg swelling.   Gastrointestinal: Negative for abdominal distention, abdominal pain, anal bleeding, blood in stool, constipation, diarrhea, nausea and rectal pain.   Endocrine: Negative for cold intolerance, heat intolerance, polydipsia and polyphagia.   Genitourinary: Negative for difficulty urinating, discharge, dysuria, enuresis, flank pain, frequency, genital sores, hematuria and penile pain.   Musculoskeletal: Negative for arthralgias, back pain, gait problem, joint swelling, myalgias, neck pain and neck stiffness.   Neurological: Positive for weakness, numbness and headaches.   Psychiatric/Behavioral: Positive for confusion and decreased concentration. Negative for agitation, behavioral problems, dysphoric mood and hallucinations. The patient is not hyperactive.      Objective:     Vital Signs (Most Recent):  Temp: 99.1 °F (37.3 °C) (10/06/19 0814)  Pulse: 72 (10/06/19 0814)  Resp: 20 (10/06/19 0814)  BP: (!) 182/88 (10/06/19 0814)  SpO2: 96 % (10/06/19 0814) Vital Signs (24h Range):  Temp:  [98 °F (36.7 °C)-99.1 °F (37.3 °C)] 99.1 °F (37.3 °C)  Pulse:  [69-78] 72  Resp:  [18-20] 20  SpO2:  [95 %-97 %] 96 %  BP: (161-197)/(77-91) 182/88     Weight: 78.6 kg (173 lb 4.5 oz)  Body mass index is 24.18 kg/m².    Intake/Output Summary (Last 24 hours) at 10/6/2019 1147  Last data filed at 10/6/2019 0000  Gross per 24 hour   Intake --   Output 550 ml    Net -550 ml      Physical Exam   Constitutional: He is oriented to person, place, and time. He appears well-developed and well-nourished. No distress.   HENT:   Head: Normocephalic.   Right Ear: External ear normal.   Left Ear: External ear normal.   Eyes: Pupils are equal, round, and reactive to light. EOM are normal. Right eye exhibits no discharge. Left eye exhibits no discharge. No scleral icterus.   Neck: Normal range of motion. Neck supple. No JVD present. No tracheal deviation present. No thyromegaly present.   Pulmonary/Chest: Effort normal and breath sounds normal. No stridor. No respiratory distress. He has no wheezes.   Abdominal: Soft. Bowel sounds are normal. He exhibits no distension. There is no tenderness. There is no guarding.   Musculoskeletal: Normal range of motion. He exhibits no edema or deformity.   Neurological: He is alert and oriented to person, place, and time. He displays normal reflexes. No cranial nerve deficit. Coordination normal.   Skin: Skin is warm. He is not diaphoretic.   Psychiatric: He has a normal mood and affect.   Nursing note and vitals reviewed.      Significant Labs: All pertinent labs within the past 24 hours have been reviewed.    Significant Imaging: I have reviewed all pertinent imaging results/findings within the past 24 hours.

## 2019-10-06 NOTE — PT/OT/SLP EVAL
Physical Therapy Evaluation    Patient Name:  Bola Figueroa Sr.   MRN:  [id number]    Recommendations:     Discharge Recommendations:  rehabilitation facility   Discharge Equipment Recommendations: none   Barriers to discharge: Decreased caregiver support    Assessment:     Bola Figueroa Sr. is a 85 y.o. male admitted with a medical diagnosis of Acute CVA (cerebrovascular accident).  He presents with the following impairments/functional limitations:  weakness, gait instability, impaired balance, impaired endurance, impaired coordination, visual deficits, decreased safety awareness, impaired cognition, impaired self care skills, impaired functional mobilty.    Rehab Prognosis: Good; patient would benefit from acute skilled PT services to address these deficits and reach maximum level of function.    Recent Surgery: * No surgery found *      Plan:     During this hospitalization, patient to be seen 5 x/week to address the identified rehab impairments via gait training, therapeutic activities, therapeutic exercises, neuromuscular re-education and progress toward the following goals:    · Plan of Care Expires:  10/12/19    Subjective     Chief Complaint: headache  Patient/Family Comments/goals: to go home  Pain/Comfort:  ·      Patients cultural, spiritual, Caodaism conflicts given the current situation:      Living Environment:  Lives with wife and son; per pt. Report son is always there with them; wife needs total care all mobility/adl's; questionable historian  Prior to admission, patients level of function was mod indep with rw per patient report.  Equipment used at home: shower chair, walker, rolling.  DME owned (not currently used): none.  Upon discharge, patient will have assistance from family?.    Objective:     Communicated with BK Minaya prior to session.  Patient found HOB elevated with telemetry, peripheral IV  upon PT entry to room.    General Precautions: Standard, fall, vision impaired   Orthopedic  Precautions:N/A   Braces:       Exams:  · A & O x 1 - name; B LE ROM and strength 3+ to 4-/5 grossly    Functional Mobility:  · Bed Mobility - mod A dec coord - needs cues for technique; occasional LOB L  · Transfers - mod A sit to/from stand for safety and balance; impulsive to sit  · Gt - 50ft total - 25ft handheld and 25ft with RW - mod A for safety and balance; 2 LOB      Therapeutic Activities and Exercises:   PT educated patient on POC, role of PT, le rom to do in bed to prep mobility and safety/fall precautions with mobility.    AM-PAC 6 CLICK MOBILITY  Total Score:      Patient left HOB elevated with all lines intact, call button in reach, bed alarm on and RN notified.    GOALS:   Multidisciplinary Problems     Physical Therapy Goals        Problem: Physical Therapy Goal    Goal Priority Disciplines Outcome Goal Variances Interventions   Physical Therapy Goal     PT, PT/OT      Description:  1. Patient will perform supine to/from sit min A  2. Patient will perform sit to/from stand min A with least RW  3. Patient will ambulate x 150ft RW min A                    History:     Past Medical History:   Diagnosis Date    *Atrial fibrillation     Atrial fibrillation     Bilateral carotid artery stenosis 1/15/2016    Cardiac pacemaker 8/7/2013    Congestive heart failure 4/6/2015    COPD (chronic obstructive pulmonary disease) 8/7/2013    Coronary artery disease     Hyperlipidemia     Hypertension     Long-term (current) use of anticoagulants 8/7/2013    PVD (peripheral vascular disease)     S/P PTCA (percutaneous transluminal coronary angioplasty) 8/7/2013    Sleep apnea 8/7/2013    Stroke        Past Surgical History:   Procedure Laterality Date    CATARACT EXTRACTION W/  INTRAOCULAR LENS IMPLANT      INSERT / REPLACE / REMOVE PACEMAKER  2005       Time Tracking:     PT Received On: 10/04/19  PT Start Time: 0855     PT Stop Time: 0920  PT Total Time (min): 25 min     Billable Minutes: Evaluation  15 and Therapeutic Activity 10      Elio Tavarez, PT  10/06/2019

## 2019-10-06 NOTE — PT/OT/SLP PROGRESS
Physical Therapy  Treatment    Bola Figueroa Sr.   MRN: [id number]   Admitting Diagnosis: Acute CVA (cerebrovascular accident)    PT Received On: 10/04/19  PT Start Time: 1005     PT Stop Time: 1030    PT Total Time (min): 25 min       Billable Minutes:  Gait Training 15 and Therapeutic Activity 10    Treatment Type: Treatment  PT/PTA: PT             General Precautions: Standard, fall, vision impaired  Orthopedic Precautions: N/A   Braces: N/A         Subjective:  Communicated with BK Santiago prior to session. Patient has a 1:1 sitter today r/t inc confusion.      Pain/Comfort  Pain Rating 1: 5/10  Location 1: back(and headache)  Pain Addressed 1: Reposition, Distraction, Cessation of Activity, Nurse notified  Pain Rating Post-Intervention 1: 5/10(at rest)    Objective:   Patient found with: telemetry, peripheral IV    Functional Mobility:  Bed Mobility: supine to/from sit mod A for safety/technique; patient was impulsive to lie down after walking       Transfers: sit to/from stand mod A cues for safe hand placement and balance; steadier with RW use       Gait: amb 60ft rw mod A x 1-2 for safety, balance and rw management; directional cues needed       Stairs: n/a      Balance:   Static Sit: min A  Dynamic Sit: mod a  Static Stand: min a with RW  Dynamic stand: mod a rw     Therapeutic Activities and Exercises:  PT educated patient/sitter on poc, safety/fall precautions with mobility, role of PT and safe use of bsc, plus le rom to do in bed.    AM-PAC 6 CLICK MOBILITY  How much help from another person does this patient currently need?   1 = Unable, Total/Dependent Assistance  2 = A lot, Maximum/Moderate Assistance  3 = A little, Minimum/Contact Guard/Supervision  4 = None, Modified [place]/Independent    Turning over in bed (including adjusting bedclothes, sheets and blankets)?: 3  Sitting down on and standing up from a chair with arms (e.g., wheelchair, bedside commode, etc.): 2  Moving from lying on back to sitting  on the side of the bed?: 2  Moving to and from a bed to a chair (including a wheelchair)?: 2  Need to walk in hospital room?: 2  Climbing 3-5 steps with a railing?: 1  Basic Mobility Total Score: 12    AM-PAC Raw Score CMS G-Code Modifier Level of Impairment Assistance   6 % Total / Unable   7 - 9 CM 80 - 100% Maximal Assist   10 - 14 CL 60 - 80% Moderate Assist   15 - 19 CK 40 - 60% Moderate Assist   20 - 22 CJ 20 - 40% Minimal Assist   23 CI 1-20% SBA / CGA   24 CH 0% Independent/ Mod I     Patient left HOB elevated with all lines intact, call button in reach, RN notified and sitter present.    Assessment:  Bola Figueroa Sr. is a 85 y.o. male with a medical diagnosis of Acute CVA (cerebrovascular accident) and presents with impaired mobility.    Rehab identified problem list/impairments: Rehab identified problem list/impairments: weakness, gait instability, impaired endurance, impaired balance, decreased lower extremity function, impaired coordination, visual deficits, decreased safety awareness, impaired self care skills, impaired cognition, pain, impaired functional mobilty    Rehab potential is fair.    Activity tolerance: Fair    Discharge recommendations: Discharge Facility/Level of Care Needs: home health OT, home health PT(24hr care from family - family prefers home health)     Barriers to discharge:      Equipment recommendations: Equipment Needed After Discharge: (tbd by home health)     GOALS:   Multidisciplinary Problems     Physical Therapy Goals        Problem: Physical Therapy Goal    Goal Priority Disciplines Outcome Goal Variances Interventions   Physical Therapy Goal     PT, PT/OT Ongoing, Progressing     Description:  1. Patient will perform supine to/from sit min A  2. Patient will perform sit to/from stand min A with least RW  3. Patient will ambulate x 150ft RW min A                    PLAN:    Patient to be seen 5 x/week  to address the above listed problems via gait training,  therapeutic activities, therapeutic exercises, neuromuscular re-education  Plan of Care expires: 10/12/19  Plan of Care reviewed with: daughter, patient    PT G-Codes  Functional Assessment Tool Used: [facility]  Score: 12    Elio Tavarez, PT  10/06/2019

## 2019-10-06 NOTE — CONSULTS
"  [facility]  Adult Nutrition  Consult Note    SUMMARY     Recommendations    1. RD to provide HF education at next visit.   2. Encourage good PO intake.  Goals: Pt to meet >/= 75% of EEN/EPN daily.  Nutrition Goal Status: new  Communication of RD Recs: (plan of care)    Reason for Assessment    Reason For Assessment: consult(assess)  Diagnosis: stroke/CVA  Relevant Medical History: PVD, CHF, CAD, COPD, HTN, HLD  General Information Comments: Pt currently on a level 5 Mechanical soft diet 2/2 not having dentures with him. SLP reportspt with fluent aphasia and oral dysphagia. Pt also with fatique, weakness, and confusion from CVA. Chart records show good intake. NFPE not completed. Assessed remotely.   Nutrition Discharge Planning: Pt to d/c on a cardiac diet.    Nutrition Risk Screen    Nutrition Risk Screen: no indicators present    Nutrition/Diet History    Factors Affecting Nutritional Intake: chewing difficulties/inability to chew food    Anthropometrics    Temp: 99.1 °F (37.3 °C)  Height Method: Stated  Height: 5' 10.98" (180.3 cm)  Height (inches): 70.98 in  Weight Method: Bed Scale  Weight: 78.6 kg (173 lb 4.5 oz)  Weight (lb): 173.28 lb  Ideal Body Weight (IBW), Male: 171.88 lb  % Ideal Body Weight, Male (lb): 100.81 lb  BMI (Calculated): 24.2  BMI Grade: 18.5-24.9 - normal     Lab/Procedures/Meds    Pertinent Labs Reviewed: reviewed  Pertinent Labs Comments: K 3.3L, Crea 1.6H, Glu 176H, GFR 45L, A1C 7.9H  Pertinent Medications Reviewed: reviewed  Pertinent Medications Comments: statin, pantoprazole    Estimated/Assessed Needs    Weight Used For Calorie Calculations: 78.6 kg (173 lb 4.5 oz)  Energy Calorie Requirements (kcal): 1642-7667 kcal daily  Energy Need Method: Kcal/kg  Protein Requirements: 63 gm daily  Weight Used For Protein Calculations: 78.6 kg (173 lb 4.5 oz)  Fluid Requirements (mL): 1 mL/kcal or per MD  Estimated Fluid Requirement Method: RDA Method  RDA Method (mL): 1965   "   Nutrition Prescription Ordered    Current Diet Order: Mechanical soft (level 5)    Evaluation of Received Nutrient/Fluid Intake    Tolerance: tolerating  % Intake of Estimated Energy Needs: 75 - 100 %  % Meal Intake: 75 - 100 %    Nutrition Risk    Level of Risk/Frequency of Follow-up: (f/u 1x/weekly)     Assessment and Plan    No nutrition diagnosis at this time.    Monitor and Evaluation    Food and Nutrient Intake: energy intake  Food and Nutrient Adminstration: diet order  Knowledge/Beliefs/Attitudes: beliefs and attitudes  Physical Activity and Function: nutrition-related ADLs and IADLs  Anthropometric Measurements: weight change, weight  Biochemical Data, Medical Tests and Procedures: inflammatory profile, electrolyte and renal panel  Nutrition-Focused Physical Findings: overall appearance     Nutrition Follow-Up    RD Follow-up?: Yes

## 2019-10-06 NOTE — PLAN OF CARE
"Pt AAO to self only. Permissive HTN.  Pt remained afebrile throughout this shift.   All meds administered per order.   Pt remained free of falls this shift.   Pt c/o headache pain this shift.Tylenol administered as ordered.   Plan of care reviewed. Patient verbalizes understanding.   Pt moving/turning independently. Generalized weakness.  Patient Paced on monitor.   Bed low, side rails up x 2, wheels locked, call light in reach.   Patient instructed to call for assistance.   Hourly rounding completed.   Will continue to monitor.    BP (!) 193/80 (BP Location: Right arm, Patient Position: Lying)   Pulse 70   Temp 98 °F (36.7 °C) (Oral)   Resp 18   Ht 5' 11" (1.803 m)   Wt 83.5 kg (184 lb 1.4 oz)   SpO2 95%   BMI 25.67 kg/m²     "

## 2019-10-06 NOTE — PLAN OF CARE
Recommendations    1. RD to provide HF education at next visit.   2. Encourage good PO intake.  Goals: Pt to meet >/= 75% of EEN/EPN daily.  Nutrition Goal Status: new

## 2019-10-06 NOTE — CONSULTS
Subjective:       Patient ID: Bola Figueroa Sr. is a 85 y.o. male.    Chief Complaint: Fall (pt fell off toilet and hit head, on coumadin.  weakness x 2 days)    Fall   Associated symptoms include numbness. Pertinent negatives include no headaches, nausea or vomiting.        ADMISSION HISTORY       The patient presented to the Emergency Department following a fall just PTA. Pt reports generalized weakness over the past 2 days, causing him to fall today and hit the back of his head on the toilet.  CTH shows evidence of subacute RT PCA (OL) Stroke and LT MCA (TL/PL). NSG and TeleStroke -BISMARK were consulted and documented no indication for any intervention. Supposed to be on Coumadin but INR is 1.0.     The patient medical co morbidities include Stroke, Epilepsy, AF, HTN, HLP, DM, CMP/CMP, PPM, RUTH, PVD, CVD S/P LT CEA.        INTERVAL NEUROLOGICAL HISTORY       No acute/new neurological events.     Reviewed the CTA H/N.     Review of Systems   Constitutional: Positive for fatigue. Negative for appetite change.   HENT: Negative for hearing loss and tinnitus.    Eyes: Positive for visual disturbance. Negative for photophobia.   Respiratory: Negative for apnea and shortness of breath.    Cardiovascular: Negative for chest pain and palpitations.   Gastrointestinal: Negative for nausea and vomiting.   Endocrine: Negative for cold intolerance and heat intolerance.   Genitourinary: Negative for difficulty urinating and urgency.   Musculoskeletal: Negative for arthralgias, back pain, gait problem, joint swelling, myalgias, neck pain and neck stiffness.   Skin: Negative for color change and rash.   Allergic/Immunologic: Negative for environmental allergies and immunocompromised state.   Neurological: Positive for weakness and numbness. Negative for dizziness, tremors, seizures, syncope, facial asymmetry, speech difficulty, light-headedness and headaches.   Hematological: Negative for adenopathy. Does not bruise/bleed easily.    Psychiatric/Behavioral: Positive for dysphoric mood. Negative for agitation, behavioral problems, confusion, decreased concentration, hallucinations, self-injury, sleep disturbance and suicidal ideas. The patient is not nervous/anxious and is not hyperactive.              Current Facility-Administered Medications:     acetaminophen tablet 650 mg, 650 mg, Oral, Q6H PRN, Jessica Irizarry, VARGAS, 650 mg at 10/05/19 2045    amLODIPine tablet 10 mg, 10 mg, Oral, Daily, Thierno Prescott MD, 10 mg at 10/06/19 0901    aspirin EC tablet 81 mg, 81 mg, Oral, Daily, Thierno Prescott MD, 81 mg at 10/06/19 0901    atorvastatin tablet 40 mg, 40 mg, Oral, Daily, Thierno Prescott MD, 40 mg at 10/06/19 0900    carvedilol tablet 12.5 mg, 12.5 mg, Oral, BID WM, Thierno Prescott MD, 12.5 mg at 10/06/19 0901    dextrose 10% (D10W) Bolus, 12.5 g, Intravenous, PRN, Thierno Prescott MD    enoxaparin injection 40 mg, 40 mg, Subcutaneous, Daily, Thierno Prescott MD, 40 mg at 10/05/19 1749    glucagon (human recombinant) injection 1 mg, 1 mg, Intramuscular, PRN, Thierno Prescott MD    insulin aspart U-100 pen 0-5 Units, 0-5 Units, Subcutaneous, Q6H PRN, Thierno Prescott MD, 3 Units at 10/06/19 1158    labetalol injection 10 mg, 10 mg, Intravenous, Q4H PRN, Thierno Prescott MD    levETIRAcetam tablet 500 mg, 500 mg, Oral, BID, Thierno Prescott MD, 500 mg at 10/06/19 0900    pantoprazole EC tablet 40 mg, 40 mg, Oral, Daily, Thierno Prescott MD, 40 mg at 10/06/19 0900    potassium chloride SA CR tablet 40 mEq, 40 mEq, Oral, Daily, Thierno Prescott MD, 40 mEq at 10/06/19 0900    sodium chloride 0.9% flush 10 mL, 10 mL, Intravenous, PRN, Thierno Prescott MD    tamsulosin 24 hr capsule 0.4 mg, 0.4 mg, Oral, Daily, Thierno Prescott MD, 0.4 mg at 10/06/19 0900  Past Medical History:   Diagnosis Date    *Atrial fibrillation     Atrial  fibrillation     Bilateral carotid artery stenosis 1/15/2016    Cardiac pacemaker 2013    Congestive heart failure 2015    COPD (chronic obstructive pulmonary disease) 2013    Coronary artery disease     Hyperlipidemia     Hypertension     Long-term (current) use of anticoagulants 2013    PVD (peripheral vascular disease)     S/P PTCA (percutaneous transluminal coronary angioplasty) 2013    Sleep apnea 2013    Stroke      Past Surgical History:   Procedure Laterality Date    CATARACT EXTRACTION W/  INTRAOCULAR LENS IMPLANT      INSERT / REPLACE / REMOVE PACEMAKER       Social History     Socioeconomic History    Marital status:      Spouse name: Not on file    Number of children: Not on file    Years of education: Not on file    Highest education level: Not on file   Occupational History    Not on file   Social Needs    Financial resource strain: Not on file    Food insecurity:     Worry: Not on file     Inability: Not on file    Transportation needs:     Medical: Not on file     Non-medical: Not on file   Tobacco Use    Smoking status: Former Smoker     Packs/day: 1.50     Years: 35.00     Pack years: 52.50     Types: Cigarettes     Last attempt to quit: 1979     Years since quittin.9    Smokeless tobacco: Never Used   Substance and Sexual Activity    Alcohol use: No     Comment: QUIT 77    Drug use: No    Sexual activity: Not on file   Lifestyle    Physical activity:     Days per week: Not on file     Minutes per session: Not on file    Stress: Not on file   Relationships    Social connections:     Talks on phone: Not on file     Gets together: Not on file     Attends Denominational service: Not on file     Active member of club or organization: Not on file     Attends meetings of clubs or organizations: Not on file     Relationship status: Not on file   Other Topics Concern    Not on file   Social History Narrative    DaughterLuz Maria  Reuben, is the surrogate decision maker [phone].        Objective:     Vital signs reviewed     GENERAL APPEARANCE:     The patient looks comfortable.    Flat Affect    Normal breathing pattern.    No dysmorphic features    Intermittent eye contact.     GENERAL MEDICAL EXAM:    HEENT:  Head is atraumatic normocephalic. No tender temporal arteries.     Neck and Axillae: No JVD. No carotid bruits. No thyromegaly. No lymphadenopathy.    Cardiopulmonary: No cyanosis. No tachypnea. Normal respiratory effort.  Clear breath sounds. PPM .    Gastrointestinal:  No stomas or lesions. No hernias.  Abdomen is soft non-tender. No masses or organomegaly.    Skin, Hair and Nails: No pathognonomic skin rash. No neurofibromatosis.   No stigmata of autoimmune disease.     Limbs: No varicose veins. No edema. Symmetric pulses.    Muskoskeletal: No deformities.No spine tenderness.   Signs of longstanding neuropathy. No dislocations or fractures.        Neurologic Exam     Mental Status   Oriented to person.   Disoriented to place.   Disoriented to time.   Follows 1 step commands.   Attention: decreased. Concentration: decreased.   Speech: (Slow  )  Level of consciousness: drowsy  Able to perform simple calculations.   Able to name object. Able to repeat. Normal comprehension.     Aprosody is noted      Cranial Nerves     CN II   Visual fields full to confrontation.   Visual acuity: decreased  Right visual field deficit: upper nasal, lower nasal, upper temporal and lower temporal quadrant(s)  Left visual field deficit: upper temporal, lower temporal, upper nasal and lower nasal quadrant(s)    CN III, IV, VI   Pupils are equal, round, and reactive to light.  Extraocular motions are normal.   Right pupil: Size: 1 mm. Shape: regular. Reactivity: sluggish. Consensual response: intact.   Left pupil: Size: 1 mm. Shape: regular. Reactivity: sluggish. Consensual response: intact.   CN III: no CN III palsy  CN VI: no CN VI  palsy  Nystagmus: none   Diplopia: none  Ophthalmoparesis: none  Upgaze: normal  Downgaze: normal  Conjugate gaze: present  Vestibulo-ocular reflex: present    CN V   Facial sensation intact.   Right facial sensation deficit: none  Left facial sensation deficit: none  Right corneal reflex: normal  Left corneal reflex: normal    CN VII   Right facial weakness: none  Left facial weakness: none  Right taste: normal  Left taste: normal    CN VIII   CN VIII normal.   Hearing: intact  Right Rinne: AC > BC  Left Rinne: AC > BC  Lubin: does not lateralize     CN IX, X   CN IX normal.   CN X normal.   Palate: symmetric  Right gag reflex: normal  Left gag reflex: normal    CN XI   CN XI normal.   Right sternocleidomastoid strength: normal  Left sternocleidomastoid strength: normal  Right trapezius strength: normal  Left trapezius strength: normal    CN XII   CN XII normal.   Tongue: not atrophic  Fasciculations: absent  Tongue deviation: none    Motor Exam   Muscle bulk: normal  Overall muscle tone: normal  Right arm tone: normal  Left arm tone: normal  Right arm pronator drift: absent  Left arm pronator drift: absent  Right leg tone: normal  Left leg tone: normal    Strength   Right neck flexion: 4/5  Left neck flexion: 4/5  Right neck extension: 4/5  Left neck extension: 4/5  Right deltoid: 4/5  Left deltoid: 4/5  Right biceps: 4/5  Left biceps: 4/5  Right triceps: 4/5  Left triceps: 4/5  Right wrist flexion: 4/5  Left wrist flexion: 4/5  Right wrist extension: 4/5  Left wrist extension: 4/5  Right interossei: 4/5  Left interossei: 4/5  Right abdominals: 4/5  Left abdominals: 4/5  Right iliopsoas: 4/5  Left iliopsoas: 4/5  Right quadriceps: 4/5  Left quadriceps: 4/5  Right hamstrin/5  Left hamstrin/5  Right glutei: 4/5  Left glutei: 4/5  Right anterior tibial: 4/5  Left anterior tibial: 4/5  Right posterior tibial: 4/5  Left posterior tibial: 4/5  Right peroneal: 4/5  Left peroneal: 4/5  Right gastroc: 4/5  Left  gastroc: 4/5    Sensory Exam   Right arm light touch: normal  Left arm light touch: normal  Right leg light touch: decreased from ankle  Left leg light touch: decreased from ankle  Right arm vibration: normal  Left arm vibration: normal  Right leg vibration: decreased from ankle  Left leg vibration: decreased from ankle  Right arm proprioception: normal  Left arm proprioception: normal  Right leg proprioception: decreased from ankle  Left leg proprioception: decreased from ankle  Right arm pinprick: normal  Left arm pinprick: normal  Right leg pinprick: decreased from ankle  Left leg pinprick: decreased from ankle  Graphesthesia: normal  Stereognosis: normal    Gait, Coordination, and Reflexes     Gait  Gait: (Deferred)    Coordination   Finger to nose coordination: normal  Heel to shin coordination: normal    Tremor   Resting tremor: absent  Intention tremor: absent  Action tremor: absent    Reflexes   Right brachioradialis: 2+  Left brachioradialis: 2+  Right biceps: 2+  Left biceps: 2+  Right triceps: 2+  Left triceps: 2+  Right patellar: 1+  Left patellar: 1+  Right achilles: 0  Left achilles: 0  Right plantar: normal  Left plantar: normal  Right Briseno: absent  Left Briseno: absent  Right ankle clonus: absent  Left ankle clonus: absent  Right pendular knee jerk: absent  Left pendular knee jerk: absent      Lab Results   Component Value Date    WBC 5.69 10/06/2019    HGB 12.7 (L) 10/06/2019    HCT 40.4 10/06/2019    MCV 93 10/06/2019     10/06/2019     Sodium   Date Value Ref Range Status   10/06/2019 140 136 - 145 mmol/L Final     Potassium   Date Value Ref Range Status   10/06/2019 2.9 (L) 3.5 - 5.1 mmol/L Final     Chloride   Date Value Ref Range Status   10/06/2019 102 95 - 110 mmol/L Final     CO2   Date Value Ref Range Status   10/06/2019 26 23 - 29 mmol/L Final     Glucose   Date Value Ref Range Status   10/06/2019 141 (H) 70 - 110 mg/dL Final     BUN, Bld   Date Value Ref Range Status    10/06/2019 6 (L) 8 - 23 mg/dL Final     Creatinine   Date Value Ref Range Status   10/06/2019 1.0 0.5 - 1.4 mg/dL Final     Calcium   Date Value Ref Range Status   10/06/2019 9.3 8.7 - 10.5 mg/dL Final     Total Protein   Date Value Ref Range Status   10/06/2019 6.5 6.0 - 8.4 g/dL Final     Albumin   Date Value Ref Range Status   10/06/2019 3.3 (L) 3.5 - 5.2 g/dL Final     Total Bilirubin   Date Value Ref Range Status   10/06/2019 2.0 (H) 0.1 - 1.0 mg/dL Final     Comment:     For infants and newborns, interpretation of results should be based  on gestational age, weight and in agreement with clinical  observations.  Premature Infant recommended reference ranges:  Up to 24 hours.............<8.0 mg/dL  Up to 48 hours............<12.0 mg/dL  3-5 days..................<15.0 mg/dL  6-29 days.................<15.0 mg/dL       Alkaline Phosphatase   Date Value Ref Range Status   10/06/2019 57 55 - 135 U/L Final     AST   Date Value Ref Range Status   10/06/2019 19 10 - 40 U/L Final     ALT   Date Value Ref Range Status   10/06/2019 13 10 - 44 U/L Final     Anion Gap   Date Value Ref Range Status   10/06/2019 12 8 - 16 mmol/L Final     eGFR if    Date Value Ref Range Status   10/06/2019 >60 >60 mL/min/1.73 m^2 Final     eGFR if non    Date Value Ref Range Status   10/06/2019 >60 >60 mL/min/1.73 m^2 Final     Comment:     Calculation used to obtain the estimated glomerular filtration  rate (eGFR) is the CKD-EPI equation.        No results found for: [id number]  Lab Results   Component Value Date    TSH 1.521 10/04/2019    FREET4 1.16 10/18/2007       10-    CTH Early Subacute RT PCA/OL Stroke and Late Subacute/Chronic RT MCA/TPL Strokes                CTA H/N    1. Postoperative changes consistent with left carotid artery endarterectomy.  2. Extensive atherosclerotic plaque right common carotid bifurcation with 60% stenosis of the right internal carotid artery bulb.  3. Acute  occlusion distal right posterior cerebral artery with acute right posterior cerebral artery territory infarction.  4. Rim calcified centrally enhancing 3 cm lesion of the left temporal lobe.   5. Left vertebral artery atherosclerosis with variable occlusion of the proximal vertebral artery and cisternal vertebral artery segments.      Reviewed the neuroimaging independently     INR 1.0      Assessment:       1. Acute CVA (cerebrovascular accident)    2. Weakness    3. Uncal herniation    4. CVA (cerebral vascular accident)        Plan:           MULTIPLE CARDIOEMBOLIC STROKES 2/2 AF NOT ANTICOAGULATED (NON-COMPLIANCE)         INR is 1.0 indicating lack of anticoagulation     Unfortunately, acute anticoagulation is not recommended, with no added benefit and carries a very high risk of bleeding especially in the first 1-2 weeks.     Continue ASA      Medical supportive care    Vascular Risk Factors (VRFs) stratification                                  Amgrupo Kc MD, FAAN    Attending Neurologist/Epileptologist         Diplomate, American Board-Psychiatry and Neurology (Neurology)    Diplomate, American Board-Clinical Neurophysiology (Epilpesy-Neuromuscular)     Fellow, American Academy of Neurology

## 2019-10-06 NOTE — ASSESSMENT & PLAN NOTE
-CT head show : Acute to subacute right occipital lobe infarct.Possible calcified thrombus along the M2 segments of the left MCA unless the patient has a prior history of prior embolization.  -ER Dr discussed the case with neurology and Tele stroke . They did not  recommend  Any intervention at this time  . They states pt can stay in [facility]   -Neurology consulted   - MRI/MRA  Can not be done due to pacemaker .  Will order a Head and  neck CTA  -Cont asa and statin   - S/P permissive HTN  For 24 to 48 hrs. Started on coreg and norvasc .  -PT/OT/SLP recimmend rehab

## 2019-10-07 LAB
ANION GAP SERPL CALC-SCNC: 9 MMOL/L (ref 8–16)
BASOPHILS # BLD AUTO: 0.02 K/UL (ref 0–0.2)
BASOPHILS NFR BLD: 0.4 % (ref 0–1.9)
BUN SERPL-MCNC: 8 MG/DL (ref 8–23)
CALCIUM SERPL-MCNC: 9.4 MG/DL (ref 8.7–10.5)
CHLORIDE SERPL-SCNC: 103 MMOL/L (ref 95–110)
CO2 SERPL-SCNC: 24 MMOL/L (ref 23–29)
CREAT SERPL-MCNC: 1 MG/DL (ref 0.5–1.4)
DIFFERENTIAL METHOD: ABNORMAL
EOSINOPHIL # BLD AUTO: 0.2 K/UL (ref 0–0.5)
EOSINOPHIL NFR BLD: 3.2 % (ref 0–8)
ERYTHROCYTE [DISTWIDTH] IN BLOOD BY AUTOMATED COUNT: 12.3 % (ref 11.5–14.5)
EST. GFR  (AFRICAN AMERICAN): >60 ML/MIN/1.73 M^2
EST. GFR  (NON AFRICAN AMERICAN): >60 ML/MIN/1.73 M^2
GLUCOSE SERPL-MCNC: 221 MG/DL (ref 70–110)
HCT VFR BLD AUTO: 42.4 % (ref 40–54)
HGB BLD-MCNC: 13.3 G/DL (ref 14–18)
IMM GRANULOCYTES # BLD AUTO: 0.02 K/UL (ref 0–0.04)
IMM GRANULOCYTES NFR BLD AUTO: 0.4 % (ref 0–0.5)
INR PPP: 1 (ref 0.8–1.2)
LYMPHOCYTES # BLD AUTO: 1.1 K/UL (ref 1–4.8)
LYMPHOCYTES NFR BLD: 20.9 % (ref 18–48)
MCH RBC QN AUTO: 29.1 PG (ref 27–31)
MCHC RBC AUTO-ENTMCNC: 31.4 G/DL (ref 32–36)
MCV RBC AUTO: 93 FL (ref 82–98)
MONOCYTES # BLD AUTO: 0.5 K/UL (ref 0.3–1)
MONOCYTES NFR BLD: 9.9 % (ref 4–15)
NEUTROPHILS # BLD AUTO: 3.4 K/UL (ref 1.8–7.7)
NEUTROPHILS NFR BLD: 65.2 % (ref 38–73)
NRBC BLD-RTO: 0 /100 WBC
PLATELET # BLD AUTO: 189 K/UL (ref 150–350)
PMV BLD AUTO: 10.9 FL (ref 9.2–12.9)
POCT GLUCOSE: 131 MG/DL (ref 70–110)
POCT GLUCOSE: 208 MG/DL (ref 70–110)
POCT GLUCOSE: 224 MG/DL (ref 70–110)
POTASSIUM SERPL-SCNC: 3.7 MMOL/L (ref 3.5–5.1)
PROTHROMBIN TIME: 10.5 SEC (ref 9–12.5)
RBC # BLD AUTO: 4.57 M/UL (ref 4.6–6.2)
SODIUM SERPL-SCNC: 136 MMOL/L (ref 136–145)
WBC # BLD AUTO: 5.27 K/UL (ref 3.9–12.7)

## 2019-10-07 PROCEDURE — 99233 SBSQ HOSP IP/OBS HIGH 50: CPT | Mod: ,,, | Performed by: PSYCHIATRY & NEUROLOGY

## 2019-10-07 PROCEDURE — 92526 ORAL FUNCTION THERAPY: CPT

## 2019-10-07 PROCEDURE — 85025 COMPLETE CBC W/AUTO DIFF WBC: CPT

## 2019-10-07 PROCEDURE — 25000003 PHARM REV CODE 250: Performed by: PSYCHIATRY & NEUROLOGY

## 2019-10-07 PROCEDURE — 25000003 PHARM REV CODE 250: Performed by: INTERNAL MEDICINE

## 2019-10-07 PROCEDURE — 80048 BASIC METABOLIC PNL TOTAL CA: CPT

## 2019-10-07 PROCEDURE — 63600175 PHARM REV CODE 636 W HCPCS: Performed by: INTERNAL MEDICINE

## 2019-10-07 PROCEDURE — 85610 PROTHROMBIN TIME: CPT

## 2019-10-07 PROCEDURE — 99233 PR SUBSEQUENT HOSPITAL CARE,LEVL III: ICD-10-PCS | Mod: ,,, | Performed by: PSYCHIATRY & NEUROLOGY

## 2019-10-07 PROCEDURE — 25000003 PHARM REV CODE 250: Performed by: NURSE PRACTITIONER

## 2019-10-07 PROCEDURE — 21400001 HC TELEMETRY ROOM

## 2019-10-07 PROCEDURE — 36415 COLL VENOUS BLD VENIPUNCTURE: CPT

## 2019-10-07 RX ORDER — TRAMADOL HYDROCHLORIDE 50 MG/1
50 TABLET ORAL EVERY 6 HOURS PRN
Status: DISCONTINUED | OUTPATIENT
Start: 2019-10-07 | End: 2019-10-10 | Stop reason: HOSPADM

## 2019-10-07 RX ORDER — DEXAMETHASONE SODIUM PHOSPHATE 4 MG/ML
4 INJECTION, SOLUTION INTRA-ARTICULAR; INTRALESIONAL; INTRAMUSCULAR; INTRAVENOUS; SOFT TISSUE EVERY 12 HOURS
Status: DISCONTINUED | OUTPATIENT
Start: 2019-10-07 | End: 2019-10-09

## 2019-10-07 RX ORDER — WARFARIN SODIUM 5 MG/1
5 TABLET ORAL DAILY
Status: DISCONTINUED | OUTPATIENT
Start: 2019-10-07 | End: 2019-10-10 | Stop reason: HOSPADM

## 2019-10-07 RX ORDER — DEXAMETHASONE SODIUM PHOSPHATE 4 MG/ML
4 INJECTION, SOLUTION INTRA-ARTICULAR; INTRALESIONAL; INTRAMUSCULAR; INTRAVENOUS; SOFT TISSUE ONCE
Status: COMPLETED | OUTPATIENT
Start: 2019-10-07 | End: 2019-10-07

## 2019-10-07 RX ADMIN — LEVETIRACETAM 500 MG: 500 TABLET, FILM COATED ORAL at 09:10

## 2019-10-07 RX ADMIN — ASPIRIN 81 MG: 81 TABLET, COATED ORAL at 08:10

## 2019-10-07 RX ADMIN — POTASSIUM CHLORIDE 40 MEQ: 1500 TABLET, EXTENDED RELEASE ORAL at 08:10

## 2019-10-07 RX ADMIN — PANTOPRAZOLE SODIUM 40 MG: 40 TABLET, DELAYED RELEASE ORAL at 08:10

## 2019-10-07 RX ADMIN — TRAMADOL HYDROCHLORIDE 50 MG: 50 TABLET, COATED ORAL at 08:10

## 2019-10-07 RX ADMIN — TAMSULOSIN HYDROCHLORIDE 0.4 MG: 0.4 CAPSULE ORAL at 08:10

## 2019-10-07 RX ADMIN — LEVETIRACETAM 500 MG: 500 TABLET, FILM COATED ORAL at 08:10

## 2019-10-07 RX ADMIN — WARFARIN SODIUM 5 MG: 5 TABLET ORAL at 05:10

## 2019-10-07 RX ADMIN — ENOXAPARIN SODIUM 40 MG: 100 INJECTION SUBCUTANEOUS at 05:10

## 2019-10-07 RX ADMIN — CARVEDILOL 12.5 MG: 12.5 TABLET, FILM COATED ORAL at 06:10

## 2019-10-07 RX ADMIN — ACETAMINOPHEN 650 MG: 325 TABLET ORAL at 06:10

## 2019-10-07 RX ADMIN — DEXAMETHASONE SODIUM PHOSPHATE 4 MG: 4 INJECTION, SOLUTION INTRAMUSCULAR; INTRAVENOUS at 12:10

## 2019-10-07 RX ADMIN — DEXAMETHASONE SODIUM PHOSPHATE 4 MG: 4 INJECTION, SOLUTION INTRAMUSCULAR; INTRAVENOUS at 09:10

## 2019-10-07 RX ADMIN — AMLODIPINE BESYLATE 10 MG: 10 TABLET ORAL at 08:10

## 2019-10-07 RX ADMIN — INSULIN ASPART 1 UNITS: 100 INJECTION, SOLUTION INTRAVENOUS; SUBCUTANEOUS at 09:10

## 2019-10-07 RX ADMIN — CARVEDILOL 12.5 MG: 12.5 TABLET, FILM COATED ORAL at 05:10

## 2019-10-07 RX ADMIN — ATORVASTATIN CALCIUM 40 MG: 40 TABLET, FILM COATED ORAL at 08:10

## 2019-10-07 NOTE — PROGRESS NOTES
[facility] Hospital Medicine  Progress Note    Patient Name: Bola Figueroa Sr.  MRN: [id number]  Patient Class: IP- Inpatient   Admission Date: 10/4/2019  Length of Stay: 3 days  Attending Physician: Thierno Prescott, *  Primary Care Provider: Naga Bhatti MD        Subjective:     Principal Problem:Acute CVA (cerebrovascular accident)        HPI:  84 y.o. male  with a PMHx od COPD , Afib on coumadin , HTN , tobacco user  And  PHT who presents to the Emergency Department following a fall just PTA. Pt reports generalized weakness over the past 2 days, causing him to fall today and hit the back of his head on the toilet. . Associated sxs include headache. Patient denies any fever, chills, n/v/d, SOB, CP, numbness, dizziness, LOC, and all other sxs at this time. No prior Tx reported . He was recently d/c from this facility  1 month ago with a dx od COPD exacerbation,   ER COURSE: CT head show Acute to subacute right occipital lobe infarct.Possible calcified thrombus along the M2 segments of the left MCA unless the patient has a prior history of prior embolization. CXR did not show any acute finding . BUN/CR 15/1.6  ER VS:  BP Pulse Resp Temp SpO2   (!) 180/90 60 18 99.2 °F (37.3 °C) 98 %       MAP           --          -The  ER Dr discussed the Case with Neurosurgery and Tele - stroke  . They did not recommend any  Intervention at this time .   -Pt can not have a MRI/MRA due to pacemaker   -Pt is not a candidate for TPA  -Neurology was consulted .    Overview/Hospital Course:  10/5 pt was seen and examined at bedside . He is aao x 2 in nad .  He is confuse on and off . PT/OT/SLP evaluate the pt today .  CTA show Extensive atherosclerotic plaque right common carotid bifurcation with 60% stenosis of the right internal carotid artery bulb. Acute occlusion distal right posterior cerebral artery with acute right posterior cerebral artery territory infarction. The case was discussed with family  members /  10/6  He denies any complaint for today . We will resume blood pressure medication   Today .  PT/OT/SLP recommend rehab. TTE pending .   10/7 He is compalining of  worsing HA today with vision deficit . The ct head from today show 1.7 x 3.3 x 3.1 cm extra-axial mass in the middle cranial fossa on the left side consistent with a meningioma.  Extensive vasogenic edema.  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 We will start dexamethasone 4 mg iv bid and consult Hem/onc . Neurology recommend to start coumadin w/o lovenox bridge . The case was discussed with the family .     Interval History:     Review of Systems   Constitutional: Positive for fatigue. Negative for activity change, appetite change, chills, fever and unexpected weight change.   HENT: Negative for congestion, dental problem, drooling, ear discharge, ear pain, facial swelling, hearing loss, mouth sores, nosebleeds, postnasal drip and rhinorrhea.    Eyes: Negative for photophobia, pain, discharge, redness and itching.   Respiratory: Negative for apnea, cough, choking, chest tightness, shortness of breath and stridor.    Cardiovascular: Negative for chest pain, palpitations and leg swelling.   Gastrointestinal: Negative for abdominal distention, abdominal pain, anal bleeding, blood in stool, constipation, diarrhea, nausea and rectal pain.   Endocrine: Negative for cold intolerance, heat intolerance, polydipsia and polyphagia.   Genitourinary: Negative for difficulty urinating, discharge, dysuria, enuresis, flank pain, frequency, genital sores, hematuria and penile pain.   Musculoskeletal: Negative for arthralgias, back pain, gait problem, joint swelling, myalgias, neck pain and neck stiffness.   Neurological: Positive for weakness, numbness and headaches.   Psychiatric/Behavioral: Positive for confusion and decreased concentration. Negative for agitation, behavioral problems, dysphoric mood and hallucinations. The patient is not hyperactive.       Objective:     Vital Signs (Most Recent):  Temp: 97.7 °F (36.5 °C) (10/07/19 1223)  Pulse: 70 (10/07/19 1223)  Resp: 18 (10/07/19 1223)  BP: (!) 143/82 (10/07/19 1223)  SpO2: 95 % (10/07/19 1223) Vital Signs (24h Range):  Temp:  [97.7 °F (36.5 °C)-98.8 °F (37.1 °C)] 97.7 °F (36.5 °C)  Pulse:  [69-74] 70  Resp:  [18-20] 18  SpO2:  [93 %-99 %] 95 %  BP: (143-197)/(67-89) 143/82     Weight: 78.2 kg (172 lb 6.4 oz)  Body mass index is 24.06 kg/m².    Intake/Output Summary (Last 24 hours) at 10/7/2019 1325  Last data filed at 10/7/2019 0748  Gross per 24 hour   Intake 848 ml   Output --   Net 848 ml      Physical Exam   Constitutional: He is oriented to person, place, and time. He appears well-developed and well-nourished. No distress.   HENT:   Head: Normocephalic.   Right Ear: External ear normal.   Left Ear: External ear normal.   Eyes: Pupils are equal, round, and reactive to light. Right eye exhibits no discharge. Left eye exhibits no discharge. No scleral icterus. Right eye exhibits abnormal extraocular motion.   Neck: Normal range of motion. Neck supple. No JVD present. No tracheal deviation present. No thyromegaly present.   Pulmonary/Chest: Effort normal and breath sounds normal. No stridor. No respiratory distress. He has no wheezes.   Abdominal: Soft. Bowel sounds are normal. He exhibits no distension. There is no tenderness. There is no guarding.   Musculoskeletal: Normal range of motion. He exhibits no edema or deformity.   Neurological: He is alert and oriented to person, place, and time. He displays normal reflexes. No cranial nerve deficit. Coordination normal.   Skin: Skin is warm. He is not diaphoretic.   Psychiatric: He has a normal mood and affect.   Nursing note and vitals reviewed.      Significant Labs: All pertinent labs within the past 24 hours have been reviewed.    Significant Imaging: I have reviewed all pertinent imaging results/findings within the past 24 hours.      Assessment/Plan:       * Acute CVA (cerebrovascular accident)  -CT head show : Acute to subacute right occipital lobe infarct.Possible calcified thrombus along the M2 segments of the left MCA unless the patient has a prior history of prior embolization.  -ER Dr discussed the case with neurology and Tele stroke . They did not  recommend  Any intervention at this time  . They states pt can stay in [facility]   -Neurology consulted   - MRI/MRA  Can not be done due to pacemaker .  Will order a Head and  neck CTA  -Cont asa and statin   - S/P permissive HTN  For 24 to 48 hrs. Started on coreg and norvasc .  -PT/OT/SLP recimmend rehab   -Repeated CT head show:1.7 x 3.3 x 3.1 cm extra-axial mass in the middle cranial fossa on the left side consistent with a meningioma.  Extensive vasogenic edema.  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  -Start on dexamethsone 4 mg iv bid  And consult  Oncology             Grade III diastolic dysfunction  Compensated  Cont CHF core measures          Seizure disorder  Resume keppra       Mild dementia  Cont supportive tx       COPD, severe  Stable   Cont breathing Tx       Benign prostatic hyperplasia  Resume flomax       Long term current use of anticoagulant therapy  On coumadin   Last INR 1  Hold Coumadin  For 1 to 2 week per neurology recommendation       Essential hypertension  S/p permissive HTN  Resume  norvasc and coreg           PVD (peripheral vascular disease)  Resume statin       Coronary artery disease  Cont  statin and asa  Cont   BB       A-fib  Rate control  Resume coumadin  Per neurology recommendation  W/o Lovenox bridge   Cont bb        VTE Risk Mitigation (From admission, onward)         Ordered     enoxaparin injection 40 mg  Daily      10/04/19 1653     IP VTE HIGH RISK PATIENT  Once      10/04/19 1653     Place sequential compression device  Until discontinued      10/04/19 1653                      Thierno Prescott MD  Department of Hospital Medicine   [facility]

## 2019-10-07 NOTE — PLAN OF CARE
Lying in bed, no distress noted, resp easy, denies pain at this time, no injuries noted during this shift.  Patient has been confused all night, Neuro check WNL, Avasys in use.  Call light in reach.

## 2019-10-07 NOTE — PT/OT/SLP PROGRESS
Occupational Therapy      Patient Name:  Bola Figueroa Sr.   MRN:  [id number]  OT attempted tx at 10:00am, pt away at CT Scan at this time. PT will attempt tx at later time/ date in order to continue with POC.  Parul Hinojosa, OT  10/7/2019   1000

## 2019-10-07 NOTE — PROGRESS NOTES
Clinical Pharmacy Consult Note: Coumadin Dosing and Monitoring     Bola Figueroa Sr. 's Coumadin will be dosed and monitored by Pharmacy at the request of Dr. Tejeda.      Indication: A-fib, Right PCA stroke, h/o TIA stroke.  Target INR is 2-3  Lab Results   Component Value Date    INR 1.0 10/07/2019    INR 1.0 10/04/2019    INR 2.4 (H) 09/17/2019     - Patient will be resumed on a dose of 5 mg per day. No booster dose is indicated per Dr. Tejeda.  - Note: Last therapeutic INR of 2.4 on 9/17/19 was on a regimen of Warfarin 5 mg PO daily per Coumadin Clinic notes.  -PT/INR will be monitored daily. Dose adjustments will be made accordingly.      Thank you for allowing us to participate in this patient's care.     Elena Fink, PharmCESAR 10/7/2019 2:17 PM

## 2019-10-07 NOTE — PROGRESS NOTES
Progress Note  Neurology      SUBJECTIVE:     History of Present Illness:  Bola Figueroa Sr. is a 85 y.o. male who presented to to [facility] on 10/14 after a fall and was found to have a right PCA infarct, logically embolic due to a fib and noncompliance with Coumadin (INR 1.0)    Past Medical History:   Diagnosis Date    *Atrial fibrillation     Atrial fibrillation     Bilateral carotid artery stenosis 1/15/2016    Cardiac pacemaker 2013    Congestive heart failure 2015    COPD (chronic obstructive pulmonary disease) 2013    Coronary artery disease     Hyperlipidemia     Hypertension     Long-term (current) use of anticoagulants 2013    PVD (peripheral vascular disease)     S/P PTCA (percutaneous transluminal coronary angioplasty) 2013    Sleep apnea 2013    Stroke         Past Surgical History:   Procedure Laterality Date    CATARACT EXTRACTION W/  INTRAOCULAR LENS IMPLANT      INSERT / REPLACE / REMOVE PACEMAKER          Social History     Socioeconomic History    Marital status:      Spouse name: Not on file    Number of children: Not on file    Years of education: Not on file    Highest education level: Not on file   Occupational History    Not on file   Social Needs    Financial resource strain: Not on file    Food insecurity:     Worry: Not on file     Inability: Not on file    Transportation needs:     Medical: Not on file     Non-medical: Not on file   Tobacco Use    Smoking status: Former Smoker     Packs/day: 1.50     Years: 35.00     Pack years: 52.50     Types: Cigarettes     Last attempt to quit: 1979     Years since quittin.9    Smokeless tobacco: Never Used   Substance and Sexual Activity    Alcohol use: No     Comment: QUIT 77    Drug use: No    Sexual activity: Not on file   Lifestyle    Physical activity:     Days per week: Not on file     Minutes per session: Not on file    Stress: Not on file   Relationships     Social connections:     Talks on phone: Not on file     Gets together: Not on file     Attends [employer] service: Not on file     Active member of club or organization: Not on file     Attends meetings of clubs or organizations: Not on file     Relationship status: Not on file   Other Topics Concern    Not on file   Social History Narrative    Daughter, Luz Maria Alexis, is the surrogate decision maker [phone].         FAMILY HISTORY:  Family History   Problem Relation Age of Onset    Diabetes Sister     Fainting Sister     Heart disease Paternal Uncle     Heart attack Paternal Uncle     Hypertension Maternal Grandmother     Diabetes Maternal Grandfather         ALLERGIES:  Review of patient's allergies indicates:   Allergen Reactions    No known drug allergies        MEDICATIONS:  No current facility-administered medications on file prior to encounter.      Current Outpatient Medications on File Prior to Encounter   Medication Sig Dispense Refill    amLODIPine (NORVASC) 10 MG tablet Take 1 tablet (10 mg total) by mouth once daily. Was 5 mg , now increase to 10 mg 30 tablet 0    aspirin (ECOTRIN) 81 MG EC tablet Take 81 mg by mouth once daily.      benzonatate (TESSALON) 200 MG capsule Take 1 capsule (200 mg total) by mouth 3 (three) times daily as needed for Cough. 90 capsule 12    carvedilol (COREG) 12.5 MG tablet Take 1 tablet (12.5 mg total) by mouth 2 (two) times daily with meals. 60 tablet 11    furosemide (LASIX) 40 MG tablet Take 1 tablet (40 mg total) by mouth 2 (two) times daily. 60 tablet 6    gabapentin (NEURONTIN) 100 MG capsule Take 100 mg by mouth 3 (three) times daily.  0    hydrALAZINE (APRESOLINE) 25 MG tablet TAKE 1 TABLET BY MOUTH THREE TIMES DAILY 90 tablet 3    ipratropium (ATROVENT) 42 mcg (0.06 %) nasal spray instill 2 sprays into each nostril three times a day 45 mL 4    ipratropium-albuterol (COMBIVENT RESPIMAT)  mcg/actuation inhaler Inhale 2 puffs into the lungs  every 6 (six) hours as needed for Shortness of Breath. Rescue 4 g 11    isosorbide dinitrate (ISORDIL) 20 MG tablet take 1 tablet by mouth every 12 hours with HYDRALAZINE 60 tablet 6    lubiprostone (AMITIZA) 8 MCG Cap Take 8 mcg by mouth 2 (two) times daily with meals.       metFORMIN (GLUCOPHAGE) 500 MG tablet Take 500 mg by mouth.      nitroGLYCERIN (NITROBID) 6.5 MG CpSR Take 1 capsule (6.5 mg total) by mouth 2 (two) times daily. 30 capsule 6    pantoprazole (PROTONIX) 40 MG tablet Take 40 mg by mouth.      potassium chloride (KLOR-CON) 10 MEQ TbSR Take 1 tablet (10 mEq total) by mouth 2 (two) times daily. 60 tablet 5    tamsulosin (FLOMAX) 0.4 mg Cp24 Take 0.4 mg by mouth once daily.       traMADol (ULTRAM) 50 mg tablet take 1 tablet by mouth every 6 hours if needed for UP TO 10 days  0    warfarin (COUMADIN) 5 MG tablet TAKE 1 TABLET BY MOUTH EVERY EVENING AS DIRECTED BY THE COUMADIN CLINIC 30 tablet 11    azithromycin (ZITHROMAX Z-ROGELIO) 250 MG tablet 500 mg on day 1 (two tablets) followed by 250 mg once daily on days 2-5 6 tablet 0    erythromycin (ROMYCIN) ophthalmic ointment Place a 1/2 inch ribbon of ointment into the lower eyelid q 8 hours for 5 days 1 Tube 0    fluticasone-umeclidin-vilanter (TRELEGY ELLIPTA) 100-62.5-25 mcg DsDv Inhale 1 puff into the lungs once daily. 1 each 11    latanoprost 0.005 % ophthalmic solution Place 1 drop into both eyes nightly.  0    levetiracetam (KEPPRA) 500 MG Tab Take 1 tablet by mouth Twice daily.      predniSONE (DELTASONE) 20 MG tablet Take two tab for 3 days , then one tab for 3 days , then 1/2 tab for 4 days , then start daily 5 mg as previously prescribed 11 tablet 0    predniSONE (DELTASONE) 5 MG tablet Take 1 tablet (5 mg total) by mouth once daily. Start after completion of  high dose prednisone 30 tablet 5    promethazine-dextromethorphan (PROMETHAZINE-DM) 6.25-15 mg/5 mL Syrp Take 5 mLs by mouth every 6 (six) hours as needed. 473 mL 5          OBJECTIVE:     VITAL SIGNS (Last 24H):  Temp:  [98 °F (36.7 °C)-99.1 °F (37.3 °C)]   Pulse:  [70-74]   Resp:  [18-20]   BP: (144-197)/(67-89)   SpO2:  [93 %-99 %]       Physical Exam:  Constitutional: Appears well-developed, well-nourished and in no acute distress.   Head: Normocephalic and atraumatic. Edentulous  Neck: Supple  Pulmonary/Chest: Effort normal. No respiratory distress.   Abdomen: Soft.   Neurological: +dysarthria, PERRL, slight right NLF flattening, symmetrical palate raise, tongue in midline, +pronation on pronator drift testing; symmetrical 5/5 power, sensation intact to LT; DTRs hyperreflexic on right  Skin: Warm and dry. No lesions.  Extremities: No clubbing, cyanosis or edema.    Laboratory:  CBC:   Recent Labs   Lab 10/06/19  0452   WBC 5.69   RBC 4.34*   HGB 12.7*   HCT 40.4        BMP:   Recent Labs   Lab 10/06/19  0452   *      K 2.9*      CO2 26   BUN 6*   CREATININE 1.0   CALCIUM 9.3     Coagulation:   Recent Labs   Lab 10/04/19  1039 10/05/19  0410   INR 1.0  --    APTT  --  29.9       Diagnostic Results:    Reading Physician Reading Date Result Priority   Miguel Moses MD 10/7/2019 STAT      Narrative     EXAMINATION:  CT HEAD WITHOUT CONTRAST    CLINICAL HISTORY:  Headache, acute, norm neuro exam;Stroke;    TECHNIQUE:  Axial CT images of the head were obtained without  contrast.    COMPARISON:  10/04/2019    FINDINGS:  Calcified extra-axial mass in the middle cranial fossa on the left side with evidence of dural calcification likely representing a meningioma with extensive adjacent vasogenic edema..  Old right occipital lobe territory infarct..  Intracranial atherosclerotic calcifications.  No evidence of a mass or bleed.  No acute interval change when compared to the prior study..  The cranium and extracranial structures are unremarkable.  Visualized sinuses and mastoid air cells are clear.      Impression       1.7 x 3.3 x 3.1 cm extra-axial mass in  "the middle cranial fossa on the left side consistent with a meningioma.  Extensive vasogenic edema.  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  Consider MRI postcontrast for further evaluation.    All CT scans at this facility use dose modulation, iterative reconstruction and/or weight based dosing when appropriate to reduce radiation dose to as low as reasonably achievable.      Electronically signed by: Miguel Moses MD  Date: 10/07/2019  Time: 10:17             Last Resulted: 10/07/19 10:17 Order Details View Encounter Lab and Collection Details Routing Result History            External Result Report       ASSESSMENT/PLAN:     (1) Right PCA stroke - logically due to a fib and noncompliance with Coumadin.     - I think it's OK to restart Coumadin because the large area of left MCA edema is most likely vasogenic due to the mass. Will d/c ASA. Pharmacy to dose Coumadin for INR 2-3.     (2) Left sided extraaxial mass with extensive vasogenic edema. This is likely the cause of his "seizure disorder."    - I don't see any previous documentation about a brain mass besides a prior CTA head referencing similar findings.   - Family says that they are aware of the brain mass and he has been followed with serial scans for this.     (3) Type 2 Diabetes     - Hemoglobin A1C is 7.9. This would appear to be a new diagnoses. Strict glucose control is indicated.   "

## 2019-10-07 NOTE — PLAN OF CARE
Pt AAO to self only.  Pt remained afebrile throughout this shift.   All meds administered per order.   Pt remained free of falls this shift.   Pt c/o headache pain this shift.Tylenol and ultram administered, pain controled.  Plan of care reviewed.   Pt moving/turning independently. Generalized weakness.  Pt paced 60's on telel monitor.  Bed low, side rails up x 2, wheels locked, call light in reach.   Patient instructed to call for assistance.   Will continue to monitor.

## 2019-10-07 NOTE — PT/OT/SLP PROGRESS
Physical Therapy      Patient Name:  Bola Figueroa Sr.   MRN:  [id number]    PT attempted tx at 10:00am, pt away at CT Scan at this time. PT will attempt tx at later time/ date in order to continue with POC.     Gissel Christie, PT/OT   10/7/2019\

## 2019-10-07 NOTE — SUBJECTIVE & OBJECTIVE
Interval History:     Review of Systems   Constitutional: Positive for fatigue. Negative for activity change, appetite change, chills, fever and unexpected weight change.   HENT: Negative for congestion, dental problem, drooling, ear discharge, ear pain, facial swelling, hearing loss, mouth sores, nosebleeds, postnasal drip and rhinorrhea.    Eyes: Negative for photophobia, pain, discharge, redness and itching.   Respiratory: Negative for apnea, cough, choking, chest tightness, shortness of breath and stridor.    Cardiovascular: Negative for chest pain, palpitations and leg swelling.   Gastrointestinal: Negative for abdominal distention, abdominal pain, anal bleeding, blood in stool, constipation, diarrhea, nausea and rectal pain.   Endocrine: Negative for cold intolerance, heat intolerance, polydipsia and polyphagia.   Genitourinary: Negative for difficulty urinating, discharge, dysuria, enuresis, flank pain, frequency, genital sores, hematuria and penile pain.   Musculoskeletal: Negative for arthralgias, back pain, gait problem, joint swelling, myalgias, neck pain and neck stiffness.   Neurological: Positive for weakness, numbness and headaches.   Psychiatric/Behavioral: Positive for confusion and decreased concentration. Negative for agitation, behavioral problems, dysphoric mood and hallucinations. The patient is not hyperactive.      Objective:     Vital Signs (Most Recent):  Temp: 97.7 °F (36.5 °C) (10/07/19 1223)  Pulse: 70 (10/07/19 1223)  Resp: 18 (10/07/19 1223)  BP: (!) 143/82 (10/07/19 1223)  SpO2: 95 % (10/07/19 1223) Vital Signs (24h Range):  Temp:  [97.7 °F (36.5 °C)-98.8 °F (37.1 °C)] 97.7 °F (36.5 °C)  Pulse:  [69-74] 70  Resp:  [18-20] 18  SpO2:  [93 %-99 %] 95 %  BP: (143-197)/(67-89) 143/82     Weight: 78.2 kg (172 lb 6.4 oz)  Body mass index is 24.06 kg/m².    Intake/Output Summary (Last 24 hours) at 10/7/2019 1325  Last data filed at 10/7/2019 0748  Gross per 24 hour   Intake 848 ml   Output --    Net 848 ml      Physical Exam   Constitutional: He is oriented to person, place, and time. He appears well-developed and well-nourished. No distress.   HENT:   Head: Normocephalic.   Right Ear: External ear normal.   Left Ear: External ear normal.   Eyes: Pupils are equal, round, and reactive to light. Right eye exhibits no discharge. Left eye exhibits no discharge. No scleral icterus. Right eye exhibits abnormal extraocular motion.   Neck: Normal range of motion. Neck supple. No JVD present. No tracheal deviation present. No thyromegaly present.   Pulmonary/Chest: Effort normal and breath sounds normal. No stridor. No respiratory distress. He has no wheezes.   Abdominal: Soft. Bowel sounds are normal. He exhibits no distension. There is no tenderness. There is no guarding.   Musculoskeletal: Normal range of motion. He exhibits no edema or deformity.   Neurological: He is alert and oriented to person, place, and time. He displays normal reflexes. No cranial nerve deficit. Coordination normal.   Skin: Skin is warm. He is not diaphoretic.   Psychiatric: He has a normal mood and affect.   Nursing note and vitals reviewed.      Significant Labs: All pertinent labs within the past 24 hours have been reviewed.    Significant Imaging: I have reviewed all pertinent imaging results/findings within the past 24 hours.

## 2019-10-07 NOTE — PROGRESS NOTES
Clinical Pharmacy Progress Note: Medication Education     Pharmacist educated patient on warfarin indication, side effects, and drug interactions. Pharmacist discussed importance of medication compliance and INR monitoring and reviewed signs of abnormal bleeding. Pharmacist gave patient warfarin educational handout. Instructed patient to contact the Coumadin Clinic at [phone] for follow-up after discharge. Patient expressed understanding and had no further questions.    Thank you for allowing us to participate in this patient's care.     Elena Fink 10/7/2019 3:30 PM

## 2019-10-07 NOTE — PLAN OF CARE
Verbal consult received for discharge planning, home health vs rehab. Attempted to meet with patient and family, no family present. Left message with the sitter if family returns please call me. Attempted to contact patient's daughter Zaki Polk) @[phone], no answer and no  Vm.         10/07/19 1414   Post-Acute Status   Post-Acute Authorization Placement   Discharge Delays (!) Other         Multiple attempts made to contact Luz Maria Polk ) Reuben, unsuccessful. Contacted Bola, patient's son @ [phone]. Explained that there is conflicting information as to what the discharge disposition will be. Explained that Dr Garcia spoke with his sister and the [facility] inpatient rehab. But the PT stated that family would prefer home health. He stated that he is on his way to see his sister, they will speak with other family and the patient. He will call me when they have a family meeting and make a decision.

## 2019-10-07 NOTE — ASSESSMENT & PLAN NOTE
-CT head show : Acute to subacute right occipital lobe infarct.Possible calcified thrombus along the M2 segments of the left MCA unless the patient has a prior history of prior embolization.  -ER Dr discussed the case with neurology and Tele stroke . They did not  recommend  Any intervention at this time  . They states pt can stay in [facility]   -Neurology consulted   - MRI/MRA  Can not be done due to pacemaker .  Will order a Head and  neck CTA  -Cont asa and statin   - S/P permissive HTN  For 24 to 48 hrs. Started on coreg and norvasc .  -PT/OT/SLP recimmend rehab   -Repeated CT head show:1.7 x 3.3 x 3.1 cm extra-axial mass in the middle cranial fossa on the left side consistent with a meningioma.  Extensive vasogenic edema.  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  -Start on dexamethsone 4 mg iv bid  And consult  Oncology

## 2019-10-07 NOTE — PT/OT/SLP PROGRESS
Speech Language Pathology Treatment    Patient Name:  Bola Figueroa Sr.   MRN:  [id number]  Admitting Diagnosis: Acute CVA (cerebrovascular accident)    Recommendations:                 General Recommendations:  Dysphagia therapy  Diet recommendations:  Mechanical soft, Liquid Diet Level: Thin   Aspiration Precautions: HOB to 90 degrees and Small bites/sips   General Precautions: Standard, aphasia, fall, vision impaired  Communication strategies:  none    Subjective     Pt without complaints. Fly states that he is more confused since his fall. She reports not much change since his CVA but that he has dementia.  Patient goals: none stated     Pain/Comfort:  · Pain Rating 1: 0/10  · Pain Rating Post-Intervention 1: 0/10    Objective:     Has the patient been evaluated by SLP for swallowing?   Yes  Keep patient NPO? No   Current Respiratory Status:        Pt remains disoriented to place and date. He answered y/n questions with 100% acc and solved problems with max A. Long term recall was limited and pt experienced perseveration when answering questions. Family reports that he is more confused since his fall but this may be due to being out of his normal environment. He ate a cracker and drank thin liquids without difficulty.       Assessment:     Bola Figueroa Sr. is a 85 y.o. male with an SLP diagnosis of Cognitive-Linguistic Impairment.  He presents with disorientation and memory/problem solving deficits.    Goals:   Multidisciplinary Problems     SLP Goals        Problem: SLP Goal    Goal Priority Disciplines Outcome   SLP Goal     SLP Ongoing, Progressing   Description:  TPW complete confrontation naming tasks given the physical object with min verbal cues  TPW complete convergent and divergent naming tasks given verbal cues   TPW tolerate current diet without any overt s/s of aspiration                    Plan:     · Patient to be seen:  3 x/week   · Plan of Care expires:  10/12/19  · Plan of Care reviewed with:  patient    · SLP Follow-Up:  Yes       Discharge recommendations:     Barriers to Discharge:  None    Time Tracking:     SLP Treatment Date:   10/07/19  Speech Start Time:  1014  Speech Stop Time:  1032     Speech Total Time (min):  18 min    Billable Minutes: Speech Therapy Individual 10 and Treatment Swallowing Dysfunction 8    Kelle Cardona CCC-SLP  10/07/2019

## 2019-10-08 PROBLEM — D32.9 MENINGIOMA: Status: ACTIVE | Noted: 2019-10-08

## 2019-10-08 LAB
INR PPP: 0.9 (ref 0.8–1.2)
POCT GLUCOSE: 162 MG/DL (ref 70–110)
POCT GLUCOSE: 195 MG/DL (ref 70–110)
POCT GLUCOSE: 212 MG/DL (ref 70–110)
POCT GLUCOSE: 241 MG/DL (ref 70–110)
PROTHROMBIN TIME: 10.3 SEC (ref 9–12.5)

## 2019-10-08 PROCEDURE — 63600175 PHARM REV CODE 636 W HCPCS: Performed by: INTERNAL MEDICINE

## 2019-10-08 PROCEDURE — 97110 THERAPEUTIC EXERCISES: CPT | Performed by: PHYSICAL THERAPIST

## 2019-10-08 PROCEDURE — 97530 THERAPEUTIC ACTIVITIES: CPT

## 2019-10-08 PROCEDURE — 99232 PR SUBSEQUENT HOSPITAL CARE,LEVL II: ICD-10-PCS | Mod: ,,, | Performed by: PSYCHIATRY & NEUROLOGY

## 2019-10-08 PROCEDURE — 25000003 PHARM REV CODE 250: Performed by: PSYCHIATRY & NEUROLOGY

## 2019-10-08 PROCEDURE — 92526 ORAL FUNCTION THERAPY: CPT

## 2019-10-08 PROCEDURE — 21400001 HC TELEMETRY ROOM

## 2019-10-08 PROCEDURE — 94640 AIRWAY INHALATION TREATMENT: CPT

## 2019-10-08 PROCEDURE — 97110 THERAPEUTIC EXERCISES: CPT

## 2019-10-08 PROCEDURE — 85610 PROTHROMBIN TIME: CPT

## 2019-10-08 PROCEDURE — 25000242 PHARM REV CODE 250 ALT 637 W/ HCPCS: Performed by: INTERNAL MEDICINE

## 2019-10-08 PROCEDURE — 25000003 PHARM REV CODE 250: Performed by: INTERNAL MEDICINE

## 2019-10-08 PROCEDURE — 94761 N-INVAS EAR/PLS OXIMETRY MLT: CPT

## 2019-10-08 PROCEDURE — 36415 COLL VENOUS BLD VENIPUNCTURE: CPT

## 2019-10-08 PROCEDURE — 97116 GAIT TRAINING THERAPY: CPT | Performed by: PHYSICAL THERAPIST

## 2019-10-08 PROCEDURE — 99232 SBSQ HOSP IP/OBS MODERATE 35: CPT | Mod: ,,, | Performed by: PSYCHIATRY & NEUROLOGY

## 2019-10-08 PROCEDURE — 92507 TX SP LANG VOICE COMM INDIV: CPT

## 2019-10-08 RX ORDER — IPRATROPIUM BROMIDE AND ALBUTEROL SULFATE 2.5; .5 MG/3ML; MG/3ML
3 SOLUTION RESPIRATORY (INHALATION) EVERY 4 HOURS PRN
Status: DISCONTINUED | OUTPATIENT
Start: 2019-10-08 | End: 2019-10-10 | Stop reason: HOSPADM

## 2019-10-08 RX ADMIN — LEVETIRACETAM 500 MG: 500 TABLET, FILM COATED ORAL at 09:10

## 2019-10-08 RX ADMIN — INSULIN ASPART 1 UNITS: 100 INJECTION, SOLUTION INTRAVENOUS; SUBCUTANEOUS at 09:10

## 2019-10-08 RX ADMIN — INSULIN ASPART 2 UNITS: 100 INJECTION, SOLUTION INTRAVENOUS; SUBCUTANEOUS at 11:10

## 2019-10-08 RX ADMIN — ATORVASTATIN CALCIUM 40 MG: 40 TABLET, FILM COATED ORAL at 08:10

## 2019-10-08 RX ADMIN — TAMSULOSIN HYDROCHLORIDE 0.4 MG: 0.4 CAPSULE ORAL at 08:10

## 2019-10-08 RX ADMIN — TRAMADOL HYDROCHLORIDE 50 MG: 50 TABLET, COATED ORAL at 11:10

## 2019-10-08 RX ADMIN — PANTOPRAZOLE SODIUM 40 MG: 40 TABLET, DELAYED RELEASE ORAL at 08:10

## 2019-10-08 RX ADMIN — LEVETIRACETAM 500 MG: 500 TABLET, FILM COATED ORAL at 08:10

## 2019-10-08 RX ADMIN — AMLODIPINE BESYLATE 10 MG: 10 TABLET ORAL at 08:10

## 2019-10-08 RX ADMIN — DEXAMETHASONE SODIUM PHOSPHATE 4 MG: 4 INJECTION, SOLUTION INTRAMUSCULAR; INTRAVENOUS at 09:10

## 2019-10-08 RX ADMIN — WARFARIN SODIUM 5 MG: 5 TABLET ORAL at 04:10

## 2019-10-08 RX ADMIN — CARVEDILOL 12.5 MG: 12.5 TABLET, FILM COATED ORAL at 04:10

## 2019-10-08 RX ADMIN — IPRATROPIUM BROMIDE AND ALBUTEROL SULFATE 3 ML: .5; 3 SOLUTION RESPIRATORY (INHALATION) at 10:10

## 2019-10-08 RX ADMIN — TRAMADOL HYDROCHLORIDE 50 MG: 50 TABLET, COATED ORAL at 06:10

## 2019-10-08 RX ADMIN — CARVEDILOL 12.5 MG: 12.5 TABLET, FILM COATED ORAL at 08:10

## 2019-10-08 RX ADMIN — ENOXAPARIN SODIUM 40 MG: 100 INJECTION SUBCUTANEOUS at 04:10

## 2019-10-08 NOTE — HPI
85 y.o male with h/o COPD , Afib on coumadin , HTN who presented to the Emergency Department on 10/04/2019 following a fall just prior to his arrival.  On evaluation patient discovered to have cerebrovascular accident.  Patient was admitted for further evaluation and management.  On yesterday patient with complaints of headache accompanied by visual deficit prompting evaluation with CT head.  CT head revealed 1.7 x 3.3 x 3.1 cm extra-axial mass in the middle cranial fossa on the left side consistent with a meningioma.  Extensive vasogenic edema.  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  Patient was started on dexamethasone 4 mg iv bid.      Discussed findings with patient's daughter who reports patient has had this mass for quite some time that has been followed by Dr. Kahn,  Neurology at [facility] Q 6 months via imaging

## 2019-10-08 NOTE — ASSESSMENT & PLAN NOTE
"October 8, 2019  --spoke with patient's daughter states that patient has known history of "head mass".  Reports this is been followed in the outpatient setting by Dr. Kahn who was a neurologist at the [facility].  I have asked charge nurse to please have patient's medical records requested from the [facility].  Would like to obtain medical records to determine the stability or progression of meningioma.  Vasogenic edema findings on CT head concerning for progression. Continue dexamethasone 4 mg IV b.i.d..  Await medical records for further recommendations in regards to meningioma findings.  Continued neuro checks.  "

## 2019-10-08 NOTE — PHYSICIAN QUERY
"PT Name: Bola Figueroa Sr.  MR #: [id number]    Physician Query Form - Heart  Condition Clarification     CDS: Aletha Acosta RN  Contact information: [email or web address]  This form is a permanent document in the medical record.     Query Date: October 8, 2019    By submitting this query, we are merely seeking further clarification of documentation. Please utilize your independent clinical judgment when addressing the question(s) below.    The medical record contains the following   Indicators     Supporting Clinical Findings Location in Medical Record    BNP      EF     x Radiology findings No acute process seen.   CXR 10/4   x Echo Results CONCLUSIONS     1 - Biatrial enlargement.     2 - Concentric hypertrophy.     3 - No wall motion abnormalities.     4 - Normal left ventricular systolic function (EF 60-65%).     5 - Restrictive LV filling pattern, indicating markedly elevated LAP (grade 3 diastolic dysfunction).     6 - Normal right ventricular systolic function .     7 - The estimated PA systolic pressure is 34 mmHg.     8 - Mild aortic stenosis, BRITTANY = 1.33 cm2, AVAi = 0.79 cm2/m2, peak velocity = 2.13 m/s, mean gradient = 10 mmHg.     9 - Trivial mitral regurgitation.     10 - Mild tricuspid regurgitation.   Echo 10/5    "Ascites" documented      "SOB" or "FLOWERS" documented      "Hypoxia" documented     x Heart Failure documented Past Medical History:  Congestive Heart Failure   H&P 10/4    "Edema" documented     x Diuretics/Meds amLODIPine (NORVASC) 10 MG tablet  carvedilol (COREG) 12.5 MG tablet   furosemide (LASIX) 40 MG tablet  isosorbide dinitrate (ISORDIL) 20 MG tablet   H&P 10/4  Home med list    Treatment:     x Other:  Grade III diastolic dysfunction  Compensated   H&P 10/4   Heart failure (HF) can be acute, chronic or both. It is generally further specificed as systolic, diastolic, or combined. Lastly, it is important to identify an underlying etiology if known or suspected.     Common clues to acute " exacerbation:  Rapidly progressive symptoms (w/in 2 weeks of presentation), using IV diuretics to treat, using supplemental O2, pulmonary edema on Xray, MI w/in 4 weeks, and/or BNP >500    Systolic Heart Failure: is defined as chart documentation of a left ventricular ejection fraction (LVEF) less than 40%     Diastolic Heart Failure: is defined as a left ventricular ejection fraction (LVEF) greater than 40%   +      Evidence of diastolic dysfunction on echocardiography OR    Right heart catheterization wedge pressure above 12 mm Hg OR    Left heart catheterization left ventricular end diastolic pressure 18 mm Hg or above.    References: *American Heart Association    The clinical guidelines noted below are only system guidelines, and do not replace the providers clinical judgment.     Provider, please further specify/clarify the diagnosis associated with above clinical findings.    [ x  ] Chronic Diastolic Heart Failure - Pre-existing diastolic HF diagnosis.  EF > 40%  without  acute HF symptoms documented   [   ] Other (please specify):   [  ] Clinically Undetermined                           Please document in your progress notes daily for the duration of treatment until resolved and include in your discharge summary.

## 2019-10-08 NOTE — PHYSICIAN QUERY
PT Name: Bola Figueroa Sr.  MR #: [id number]    Physician Query Form - Atrial Fibrillation Specificity     CDS: Aletha Acosta RN  Contact information: [email or web address]     This form is a permanent document in the medical record.     Query Date: October 8, 2019    By submitting this query, we are merely seeking further clarification of documentation. Please utilize your independent clinical judgment when addressing the question(s) below.    The medical record contains the following:   Indicators     Supporting Clinical Findings Location in Medical Record   x Atrial Fibrillation Past Medical History:  Atrial Fibrillation    A-fib  Rate control  HOLD anticoagulation due to acute CVA  HOLD blood pressure medication for permessive HTN      (1) Right PCA stroke - logically due to a fib and noncompliance with Coumadin. H&P 10/4                  Neuro PN 10/7   x EKG results Electronic ventricular pacemaker  No previous ECGs available   EKG 10/4    Medication      Treatment      Other       Provider, please further specify the Atrial Fibrillation diagnosis.    [  ] Paroxysmal   [x  ] Permanent   [  ] Other (please specify):   [  ] Clinically Undetermined       Please document in your progress notes daily for the duration of treatment until resolved, and include in your discharge summary.

## 2019-10-08 NOTE — PROGRESS NOTES
Bola Figueroa Sr. [id number] is a 85 y.o. male who has been consulted for coumadin dosing.  Dx: Afib  INR Goal: 2.0 to 3.0    Current INR: 1.0  Home dose is Coumadin 5 mg daily     The patient has the following labs:    Coumadin Monitoring INR   Protime-INR   Collected: 10/07/19 1130   Resulting lab: [facility]   Reference range: 0.8 - 1.2   Value: 1.0   Comment: Coumadin Therapy:   2.0 - 3.0 for INR for all indicators except mechanical heart valves   and antiphospholipid syndromes which should use 2.5 - 3.5.    *Additional information available - comment   Protime-INR   Order: [id number]   Status:  Final result   Visible to patient:  No (Not Released) Next appt:  10/10/2019 at 10:00 AM in Podiatry (Roma Cueto DPM)    Ref Range & Units 11:30 3d ago   Prothrombin Time 9.0 - 12.5 sec 10.5  10.5    INR 0.8 - 1.2 1.0  1.0 CM   Comment: Coumadin Therapy:   2.0 - 3.0 for INR for all indicators except mechanical heart valves   and antiphospholipid syndromes which should use 2.5 - 3.5.                Current weight is 78.2 kg (172 lb 6.4 oz)    The patient will be started on Coumadin 5 mg daily and the morning pharmacist will  the patient to determine whether they have missed any doses due to the INR of onl 1.0.  Dose adjustment will be made based on this history and the next INR level tomorrow AM.       Thank you for allowing us to participate in this patient's care.     Miguel Snider

## 2019-10-08 NOTE — ASSESSMENT & PLAN NOTE
-CT head show : Acute to subacute right occipital lobe infarct.Possible calcified thrombus along the M2 segments of the left MCA unless the patient has a prior history of prior embolization.  -ER Dr discussed the case with neurology and Tele stroke . They did not  recommend  Any intervention at this time  . They states pt can stay in [facility]   -Neurology consulted   - MRI/MRA  Can not be done due to pacemaker .  Will order a Head and  neck CTA  -Cont asa and statin   - S/P permissive HTN  For 24 to 48 hrs. Started on coreg and norvasc .  -PT/OT/SLP recimmend rehab   -Repeated CT head show:1.7 x 3.3 x 3.1 cm extra-axial mass in the middle cranial fossa on the left side consistent with a meningioma.  Extensive vasogenic edema.  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  -Start on dexamethsone 4 mg iv bid  And consult  Oncology

## 2019-10-08 NOTE — SUBJECTIVE & OBJECTIVE
"Interval history:  Patient alert and oriented to person and place.  Patient reports significant improvement in headache.  Describes headache as "vague."   Oncology Treatment Plan:   [No treatment plan]    Medications:  Continuous Infusions:  Scheduled Meds:   amLODIPine  10 mg Oral Daily    atorvastatin  40 mg Oral Daily    carvedilol  12.5 mg Oral BID WM    dexamethasone  4 mg Intravenous Q12H    enoxaparin  40 mg Subcutaneous Daily    levETIRAcetam  500 mg Oral BID    pantoprazole  40 mg Oral Daily    tamsulosin  0.4 mg Oral Daily    warfarin  5 mg Oral Daily     PRN Meds:acetaminophen, albuterol-ipratropium, Dextrose 10% Bolus, glucagon (human recombinant), insulin aspart U-100, labetalol, sodium chloride 0.9%, traMADol     Review of patient's allergies indicates:   Allergen Reactions    No known drug allergies         Past Medical History:   Diagnosis Date    *Atrial fibrillation     Atrial fibrillation     Bilateral carotid artery stenosis 1/15/2016    Cardiac pacemaker 8/7/2013    Congestive heart failure 4/6/2015    COPD (chronic obstructive pulmonary disease) 8/7/2013    Coronary artery disease     Hyperlipidemia     Hypertension     Long-term (current) use of anticoagulants 8/7/2013    PVD (peripheral vascular disease)     S/P PTCA (percutaneous transluminal coronary angioplasty) 8/7/2013    Sleep apnea 8/7/2013    Stroke      Past Surgical History:   Procedure Laterality Date    CATARACT EXTRACTION W/  INTRAOCULAR LENS IMPLANT      INSERT / REPLACE / REMOVE PACEMAKER  2005     Family History     Problem Relation (Age of Onset)    Diabetes Sister, Maternal Grandfather    Fainting Sister    Heart attack Paternal Uncle    Heart disease Paternal Uncle    Hypertension Maternal Grandmother        Tobacco Use    Smoking status: Former Smoker     Packs/day: 1.50     Years: 35.00     Pack years: 52.50     Types: Cigarettes     Last attempt to quit: 11/13/1979     Years since quitting: " 39.9    Smokeless tobacco: Never Used   Substance and Sexual Activity    Alcohol use: No     Comment: QUIT 08/13/77    Drug use: No    Sexual activity: Not on file       Review of Systems   Constitutional: Positive for activity change. Negative for appetite change, chills, fatigue and fever.   HENT: Negative for congestion, mouth sores, nosebleeds, sore throat, trouble swallowing and voice change.    Eyes: Positive for visual disturbance. Negative for pain.   Respiratory: Negative for cough, chest tightness, shortness of breath and wheezing.    Cardiovascular: Negative for chest pain, palpitations and leg swelling.   Gastrointestinal: Negative for abdominal distention, abdominal pain, anal bleeding, blood in stool, constipation, diarrhea, nausea and vomiting.   Genitourinary: Negative for difficulty urinating, dysuria and hematuria.   Musculoskeletal: Positive for gait problem. Negative for arthralgias, back pain and myalgias.   Skin: Negative for pallor, rash and wound.   Neurological: Positive for weakness and headaches. Negative for dizziness and syncope.   Hematological: Negative for adenopathy. Does not bruise/bleed easily.   Psychiatric/Behavioral: Positive for confusion. The patient is not nervous/anxious.      Objective:     Vital Signs (Most Recent):  Temp: 97.7 °F (36.5 °C) (10/08/19 1136)  Pulse: 72 (10/08/19 1618)  Resp: 18 (10/08/19 1618)  BP: (!) 169/74 (10/08/19 1618)  SpO2: 96 % (10/08/19 1618) Vital Signs (24h Range):  Temp:  [97 °F (36.1 °C)-98.1 °F (36.7 °C)] 97.7 °F (36.5 °C)  Pulse:  [69-76] 72  Resp:  [18-20] 18  SpO2:  [95 %-98 %] 96 %  BP: (135-169)/(64-74) 169/74     Weight: 83.4 kg (183 lb 13.8 oz)  Body mass index is 25.66 kg/m².  Body surface area is 2.04 meters squared.      Intake/Output Summary (Last 24 hours) at 10/8/2019 1656  Last data filed at 10/8/2019 0500  Gross per 24 hour   Intake 118 ml   Output --   Net 118 ml       Physical Exam   Constitutional: He appears  well-developed and well-nourished. He is cooperative. He appears ill. He appears distressed.   HENT:   Head: Normocephalic.   Right Ear: Hearing and tympanic membrane normal. No drainage.   Left Ear: Hearing and tympanic membrane normal. No drainage.   Nose: Nose normal.   Mouth/Throat: Oropharynx is clear and moist.   Eyes: Conjunctivae, EOM and lids are normal. Right eye exhibits no discharge. Left eye exhibits no discharge. No scleral icterus.   Neck: Normal range of motion. No thyroid mass present.   Cardiovascular: Normal rate.   Pulmonary/Chest: Effort normal and breath sounds normal. No respiratory distress. He has no wheezes. He has no rhonchi. He has no rales.   Abdominal: Soft. Bowel sounds are normal. He exhibits no distension. There is no tenderness.   Genitourinary:   Genitourinary Comments: deferred   Musculoskeletal: Normal range of motion. He exhibits no edema.   Neurological: He is alert.   Oriented to person/place   Skin: Skin is warm, dry and intact.   Psychiatric: He has a normal mood and affect. His speech is normal and behavior is normal. Thought content normal.   Vitals reviewed.      Significant Labs:   BMP:   Recent Labs   Lab 10/07/19  1130   *      K 3.7      CO2 24   BUN 8   CREATININE 1.0   CALCIUM 9.4   , CBC:   Recent Labs   Lab 10/07/19  1130   WBC 5.27   HGB 13.3*   HCT 42.4      , LFTs: No results for input(s): ALT, AST, ALKPHOS, BILITOT, PROT, ALBUMIN in the last 48 hours. and All pertinent labs from the last 24 hours have been reviewed.    Diagnostic Results:  I have reviewed all pertinent imaging results/findings within the past 24 hours.

## 2019-10-08 NOTE — PLAN OF CARE
Call received from patient's daughter Luz Maria Alexis ( Pamela). She stated that her father will need to go an inpatient rehab. Discussed options and reviewed facilities. Preference letter obtained via telephone for [facility]. Referral faxed to [facility].       10/08/19 102   Post-Acute Status   Post-Acute Authorization Placement   Post-Acute Placement Status Referrals Sent

## 2019-10-08 NOTE — PLAN OF CARE
Swallowing assessed at bedside with puree and thin liquids.  No aspiration suspected, however pt required min cueing for slower rate and 1 bite at a time.  Pt completed cognitive tasks with max cueing required for orientation and memory tasks.

## 2019-10-08 NOTE — PLAN OF CARE
Ongoing (interventions implemented as appropriate)  Pt AAO x4.  VSS  Pt able to make needs known.  Pt remained afebrile throughout this shift.   Pt remained free of falls this shift.   Pt denies pain this shift.  Plan of care reviewed. Patient verbalizes understanding.   Pt moving/turing independent. Frequent weight shifting encouraged.  Patient paced on monitor.   Bed low, side rails up x 2, wheels locked, call light in reach.   Hourly rounding completed.   Will continue to monitor.

## 2019-10-08 NOTE — PLAN OF CARE
Lying in bed, no distress noted, resp easy, denies pain at this time, no injuries noted during this shift.  Confused throughout shift, Avasys in use.  Call light in reach.

## 2019-10-08 NOTE — PT/OT/SLP PROGRESS
"Occupational Therapy  Treatment    Bola Figueroa Sr.   MRN: [id number]   Admitting Diagnosis: Acute CVA (cerebrovascular accident)    OT Date of Treatment: 10/08/19   OT Start Time: 0930  OT Stop Time: 0953  OT Total Time (min): 23 min    Billable Minutes:  Therapeutic Activity 13 MINUTES and Therapeutic Exercise 10 MINUTES    General Precautions: Standard, vision impaired, fall  Orthopedic Precautions: N/A  Braces: N/A         Subjective:  Communicated with NURSE AND EPIC CHART REVIEW prior to session.    Pain/Comfort  Pain Rating 1: 0/10    Objective:        Functional Mobility:  Functional Ambulation: PT AMBULATED X 20 FEET  WITH MOD A AND MAX VERBAL AND TACTILE CUES    Activities of Daily Living:     Feeding adaptive equipment: NA     UE adaptive equipment:na      LE adaptive equipment: max a     Bathing adaptive equipment: na    Balance:   Static Sit: FAIR+: Able to take MINIMAL challenges from all directions  Dynamic Sit: FAIR: Cannot move trunk without losing balance  Static Stand: POOR+: Needs MINIMAL assist to maintain  Dynamic stand: POOR: Needs MOD (moderate) assist during gait    Therapeutic Activities and Exercises:      AM-PAC 6 CLICK ADL   How much help from another person does this patient currently need?   1 = Unable, Total/Dependent Assistance  2 = A lot, Maximum/Moderate Assistance  3 = A little, Minimum/Contact Guard/Supervision  4 = None, Modified [place]/Independent    Putting on and taking off regular lower body clothing? : 3  Bathing (including washing, rinsing, drying)?: 2  Toileting, which includes using toilet, bedpan, or urinal? : 2  Putting on and taking off regular upper body clothing?: 3  Taking care of personal grooming such as brushing teeth?: 3  Eating meals?: 4  Daily Activity Total Score: 17     AM-PAC Raw Score CMS "G-Code Modifier Level of Impairment Assistance   6 % Total / Unable   7 - 8 CM 80 - 100% Maximal Assist   9-13 CL 60 - 80% Moderate Assist   14 - 19 CK 40 - " 60% Moderate Assist   20 - 22 CJ 20 - 40% Minimal Assist   23 CI 1-20% SBA / CGA   24 CH 0% Independent/ Mod I       Patient left up in chair with all lines intact, call button in reach and chair alarm on    ASSESSMENT:  Bola Figueroa Sr. is a 85 y.o. male with a medical diagnosis of Acute CVA (cerebrovascular accident) and presents with  DEBILITY AND GENERALIZED WEAKNESS. PT WILL CONTINUE TO BENEFIT FROM SKILLED OT.  Rehab identified problem list/impairments: Rehab identified problem list/impairments: weakness, impaired functional mobilty, impaired balance, decreased upper extremity function, decreased safety awareness, impaired endurance, impaired self care skills, gait instability    Rehab potential is good.    Activity tolerance: Good    Discharge recommendations: Discharge Facility/Level of Care Needs: rehabilitation facility     Barriers to discharge: Barriers to Discharge: None    Equipment recommendations: none     GOALS:   Multidisciplinary Problems     Occupational Therapy Goals        Problem: Occupational Therapy Goal    Goal Priority Disciplines Outcome Interventions   Occupational Therapy Goal     OT, PT/OT Ongoing, Progressing    Description:  OT GOALS TO BE MET BY 10-12-19  SBA WITH UE DRESSING  PT WILL TOLERATE 1 SET X 15 REPS B UE ROM EXERCISE  SBA WITH LE DRESSING                    Plan:  Patient to be seen 3 x/week to address the above listed problems via self-care/home management, therapeutic exercises, therapeutic activities  Plan of Care expires: 10/12/19  Plan of Care reviewed with: patient, daughter         Parul Singhzier, OT  10/08/2019

## 2019-10-08 NOTE — PROGRESS NOTES
Progress Note  Neurology      SUBJECTIVE:     Started on dexamethasone yesterday by hospital medicine. Patient says that his headache is now gone.     Past Medical History:   Diagnosis Date    *Atrial fibrillation     Atrial fibrillation     Bilateral carotid artery stenosis 1/15/2016    Cardiac pacemaker 2013    Congestive heart failure 2015    COPD (chronic obstructive pulmonary disease) 2013    Coronary artery disease     Hyperlipidemia     Hypertension     Long-term (current) use of anticoagulants 2013    PVD (peripheral vascular disease)     S/P PTCA (percutaneous transluminal coronary angioplasty) 2013    Sleep apnea 2013    Stroke         Past Surgical History:   Procedure Laterality Date    CATARACT EXTRACTION W/  INTRAOCULAR LENS IMPLANT      INSERT / REPLACE / REMOVE PACEMAKER          Social History     Socioeconomic History    Marital status:      Spouse name: Not on file    Number of children: Not on file    Years of education: Not on file    Highest education level: Not on file   Occupational History    Not on file   Social Needs    Financial resource strain: Not on file    Food insecurity:     Worry: Not on file     Inability: Not on file    Transportation needs:     Medical: Not on file     Non-medical: Not on file   Tobacco Use    Smoking status: Former Smoker     Packs/day: 1.50     Years: 35.00     Pack years: 52.50     Types: Cigarettes     Last attempt to quit: 1979     Years since quittin.9    Smokeless tobacco: Never Used   Substance and Sexual Activity    Alcohol use: No     Comment: QUIT 77    Drug use: No    Sexual activity: Not on file   Lifestyle    Physical activity:     Days per week: Not on file     Minutes per session: Not on file    Stress: Not on file   Relationships    Social connections:     Talks on phone: Not on file     Gets together: Not on file     Attends Yarsani service: Not on file      Active member of club or organization: Not on file     Attends meetings of clubs or organizations: Not on file     Relationship status: Not on file   Other Topics Concern    Not on file   Social History Narrative    Daughter, Luz Maria Alexis, is the surrogate decision maker [phone].         FAMILY HISTORY:  Family History   Problem Relation Age of Onset    Diabetes Sister     Fainting Sister     Heart disease Paternal Uncle     Heart attack Paternal Uncle     Hypertension Maternal Grandmother     Diabetes Maternal Grandfather         ALLERGIES:  Review of patient's allergies indicates:   Allergen Reactions    No known drug allergies        MEDICATIONS:      Current Facility-Administered Medications:     acetaminophen tablet 650 mg, 650 mg, Oral, Q6H PRN, Jessica Irizarry NP, 650 mg at 10/07/19 0649    amLODIPine tablet 10 mg, 10 mg, Oral, Daily, Thierno Prescott MD, 10 mg at 10/08/19 0859    atorvastatin tablet 40 mg, 40 mg, Oral, Daily, Thierno Prescott MD, 40 mg at 10/08/19 0858    carvedilol tablet 12.5 mg, 12.5 mg, Oral, BID WM, Thierno Prescott MD, 12.5 mg at 10/08/19 0858    dexamethasone injection 4 mg, 4 mg, Intravenous, Q12H, Thierno Prescott MD, 4 mg at 10/08/19 0902    dextrose 10% (D10W) Bolus, 12.5 g, Intravenous, PRN, Thierno Prescott MD    enoxaparin injection 40 mg, 40 mg, Subcutaneous, Daily, Thierno Prescott MD, 40 mg at 10/07/19 170    glucagon (human recombinant) injection 1 mg, 1 mg, Intramuscular, PRN, Thierno Prescott MD    insulin aspart U-100 pen 0-5 Units, 0-5 Units, Subcutaneous, Q6H PRN, Thierno Prescott MD, 1 Units at 10/07/19 2140    labetalol injection 10 mg, 10 mg, Intravenous, Q4H PRN, Thierno Prescott MD    levETIRAcetam tablet 500 mg, 500 mg, Oral, BID, Thierno Prescott MD, 500 mg at 10/08/19 0858    pantoprazole EC tablet 40 mg, 40 mg, Oral, Daily, Thierno Prescott MD, 40 mg at  10/08/19 0859    sodium chloride 0.9% flush 10 mL, 10 mL, Intravenous, PRN, Thierno Prescott MD    tamsulosin 24 hr capsule 0.4 mg, 0.4 mg, Oral, Daily, Thierno Prescott MD, 0.4 mg at 10/08/19 0859    traMADol tablet 50 mg, 50 mg, Oral, Q6H PRN, Thierno Prescott MD, 50 mg at 10/08/19 0617    warfarin (COUMADIN) tablet 5 mg, 5 mg, Oral, Daily, Richy Tejeda MD, 5 mg at 10/07/19 1707          No current facility-administered medications on file prior to encounter.      Current Outpatient Medications on File Prior to Encounter   Medication Sig Dispense Refill    amLODIPine (NORVASC) 10 MG tablet Take 1 tablet (10 mg total) by mouth once daily. Was 5 mg , now increase to 10 mg 30 tablet 0    aspirin (ECOTRIN) 81 MG EC tablet Take 81 mg by mouth once daily.      benzonatate (TESSALON) 200 MG capsule Take 1 capsule (200 mg total) by mouth 3 (three) times daily as needed for Cough. 90 capsule 12    carvedilol (COREG) 12.5 MG tablet Take 1 tablet (12.5 mg total) by mouth 2 (two) times daily with meals. 60 tablet 11    furosemide (LASIX) 40 MG tablet Take 1 tablet (40 mg total) by mouth 2 (two) times daily. 60 tablet 6    gabapentin (NEURONTIN) 100 MG capsule Take 100 mg by mouth 3 (three) times daily.  0    hydrALAZINE (APRESOLINE) 25 MG tablet TAKE 1 TABLET BY MOUTH THREE TIMES DAILY 90 tablet 3    ipratropium (ATROVENT) 42 mcg (0.06 %) nasal spray instill 2 sprays into each nostril three times a day 45 mL 4    ipratropium-albuterol (COMBIVENT RESPIMAT)  mcg/actuation inhaler Inhale 2 puffs into the lungs every 6 (six) hours as needed for Shortness of Breath. Rescue 4 g 11    isosorbide dinitrate (ISORDIL) 20 MG tablet take 1 tablet by mouth every 12 hours with HYDRALAZINE 60 tablet 6    lubiprostone (AMITIZA) 8 MCG Cap Take 8 mcg by mouth 2 (two) times daily with meals.       metFORMIN (GLUCOPHAGE) 500 MG tablet Take 500 mg by mouth.      nitroGLYCERIN (NITROBID) 6.5 MG  CpSR Take 1 capsule (6.5 mg total) by mouth 2 (two) times daily. 30 capsule 6    pantoprazole (PROTONIX) 40 MG tablet Take 40 mg by mouth.      potassium chloride (KLOR-CON) 10 MEQ TbSR Take 1 tablet (10 mEq total) by mouth 2 (two) times daily. 60 tablet 5    tamsulosin (FLOMAX) 0.4 mg Cp24 Take 0.4 mg by mouth once daily.       traMADol (ULTRAM) 50 mg tablet take 1 tablet by mouth every 6 hours if needed for UP TO 10 days  0    warfarin (COUMADIN) 5 MG tablet TAKE 1 TABLET BY MOUTH EVERY EVENING AS DIRECTED BY THE COUMADIN CLINIC 30 tablet 11    azithromycin (ZITHROMAX Z-ROGELIO) 250 MG tablet 500 mg on day 1 (two tablets) followed by 250 mg once daily on days 2-5 6 tablet 0    erythromycin (ROMYCIN) ophthalmic ointment Place a 1/2 inch ribbon of ointment into the lower eyelid q 8 hours for 5 days 1 Tube 0    fluticasone-umeclidin-vilanter (TRELEGY ELLIPTA) 100-62.5-25 mcg DsDv Inhale 1 puff into the lungs once daily. 1 each 11    latanoprost 0.005 % ophthalmic solution Place 1 drop into both eyes nightly.  0    levetiracetam (KEPPRA) 500 MG Tab Take 1 tablet by mouth Twice daily.      predniSONE (DELTASONE) 20 MG tablet Take two tab for 3 days , then one tab for 3 days , then 1/2 tab for 4 days , then start daily 5 mg as previously prescribed 11 tablet 0    predniSONE (DELTASONE) 5 MG tablet Take 1 tablet (5 mg total) by mouth once daily. Start after completion of  high dose prednisone 30 tablet 5    promethazine-dextromethorphan (PROMETHAZINE-DM) 6.25-15 mg/5 mL Syrp Take 5 mLs by mouth every 6 (six) hours as needed. 473 mL 5         OBJECTIVE:     VITAL SIGNS (Last 24H):  Temp:  [97 °F (36.1 °C)-98.1 °F (36.7 °C)]   Pulse:  [69-76]   Resp:  [18-20]   BP: (135-187)/(64-82)   SpO2:  [94 %-97 %]       Physical Exam:  Constitutional: Appears well-developed, well-nourished and in no acute distress.   Head: Normocephalic and atraumatic. Edentulous  Neck: Supple  Pulmonary/Chest: Effort normal. No respiratory  distress.   Abdomen: Soft.   Neurological: +dysarthria, PERRL, slight right NLF flattening, symmetrical palate raise, tongue in midline, +pronation on pronator drift testing; symmetrical 5/5 power, sensation intact to LT; DTRs hyperreflexic on right  Skin: Warm and dry. No lesions.  Extremities: No clubbing, cyanosis or edema.    Laboratory:  CBC:   Recent Labs   Lab 10/07/19  1130   WBC 5.27   RBC 4.57*   HGB 13.3*   HCT 42.4        BMP:   Recent Labs   Lab 10/07/19  1130   *      K 3.7      CO2 24   BUN 8   CREATININE 1.0   CALCIUM 9.4     Coagulation:   Recent Labs   Lab 10/05/19  0410  10/08/19  0520   INR  --    < > 0.9   APTT 29.9  --   --     < > = values in this interval not displayed.       Diagnostic Results:    Reading Physician Reading Date Result Priority   Miguel Moses MD 10/7/2019 STAT      Narrative     EXAMINATION:  CT HEAD WITHOUT CONTRAST    CLINICAL HISTORY:  Headache, acute, norm neuro exam;Stroke;    TECHNIQUE:  Axial CT images of the head were obtained without  contrast.    COMPARISON:  10/04/2019    FINDINGS:  Calcified extra-axial mass in the middle cranial fossa on the left side with evidence of dural calcification likely representing a meningioma with extensive adjacent vasogenic edema..  Old right occipital lobe territory infarct..  Intracranial atherosclerotic calcifications.  No evidence of a mass or bleed.  No acute interval change when compared to the prior study..  The cranium and extracranial structures are unremarkable.  Visualized sinuses and mastoid air cells are clear.      Impression       1.7 x 3.3 x 3.1 cm extra-axial mass in the middle cranial fossa on the left side consistent with a meningioma.  Extensive vasogenic edema.  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  Consider MRI postcontrast for further evaluation.    All CT scans at this facility use dose modulation, iterative reconstruction and/or weight based dosing when appropriate to reduce  "radiation dose to as low as reasonably achievable.      Electronically signed by: Miguel Moses MD  Date: 10/07/2019  Time: 10:17             Last Resulted: 10/07/19 10:17 Order Details View Encounter Lab and Collection Details Routing Result History            External Result Report       ASSESSMENT/PLAN:     (1) Right PCA stroke - logically due to a fib and noncompliance with Coumadin.     - Coumadin restarted yesterday. INR 0.9 today.   - Suggest PT evaluation for post-acute care placement    (2) Left sided extraaxial mass with extensive vasogenic edema. This is likely the cause of his "seizure disorder."    - Awaiting heme/onc consult  - Started on Decadron by hospital medicine       "

## 2019-10-08 NOTE — CONSULTS
"[facility] -   Hematology/Oncology  Consult Note    Patient Name: Bola Figueroa Sr.  MRN: [id number]  Admission Date: 10/4/2019  Hospital Length of Stay: 4 days  Code Status: Full Code   Attending Provider: Thierno Prescott, *  Consulting Provider: Jaqui Dale NP  Primary Care Physician: Naga Bhatti MD  Principal Problem:Acute CVA (cerebrovascular accident)    Consults  Subjective:     HPI:  85 y.o male with h/o COPD , Afib on coumadin , HTN who presented to the Emergency Department on 10/04/2019 following a fall just prior to his arrival.  On evaluation patient discovered to have cerebrovascular accident.  Patient was admitted for further evaluation and management.  On yesterday patient with complaints of headache accompanied by visual deficit prompting evaluation with CT head.  CT head revealed 1.7 x 3.3 x 3.1 cm extra-axial mass in the middle cranial fossa on the left side consistent with a meningioma.  Extensive vasogenic edema.  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  Patient was started on dexamethasone 4 mg iv bid.      Discussed findings with patient's daughter who reports patient has had this mass for quite some time that has been followed by Dr. Kahn,  Neurology at [facility] Q 6 months via imaging    Interval history:  Patient alert and oriented to person and place.  Patient reports significant improvement in headache.  Describes headache as "vague."   Oncology Treatment Plan:   [No treatment plan]    Medications:  Continuous Infusions:  Scheduled Meds:   amLODIPine  10 mg Oral Daily    atorvastatin  40 mg Oral Daily    carvedilol  12.5 mg Oral BID WM    dexamethasone  4 mg Intravenous Q12H    enoxaparin  40 mg Subcutaneous Daily    levETIRAcetam  500 mg Oral BID    pantoprazole  40 mg Oral Daily    tamsulosin  0.4 mg Oral Daily    warfarin  5 mg Oral Daily     PRN Meds:acetaminophen, albuterol-ipratropium, Dextrose 10% Bolus, glucagon (human " recombinant), insulin aspart U-100, labetalol, sodium chloride 0.9%, traMADol     Review of patient's allergies indicates:   Allergen Reactions    No known drug allergies         Past Medical History:   Diagnosis Date    *Atrial fibrillation     Atrial fibrillation     Bilateral carotid artery stenosis 1/15/2016    Cardiac pacemaker 2013    Congestive heart failure 2015    COPD (chronic obstructive pulmonary disease) 2013    Coronary artery disease     Hyperlipidemia     Hypertension     Long-term (current) use of anticoagulants 2013    PVD (peripheral vascular disease)     S/P PTCA (percutaneous transluminal coronary angioplasty) 2013    Sleep apnea 2013    Stroke      Past Surgical History:   Procedure Laterality Date    CATARACT EXTRACTION W/  INTRAOCULAR LENS IMPLANT      INSERT / REPLACE / REMOVE PACEMAKER       Family History     Problem Relation (Age of Onset)    Diabetes Sister, Maternal Grandfather    Fainting Sister    Heart attack Paternal Uncle    Heart disease Paternal Uncle    Hypertension Maternal Grandmother        Tobacco Use    Smoking status: Former Smoker     Packs/day: 1.50     Years: 35.00     Pack years: 52.50     Types: Cigarettes     Last attempt to quit: 1979     Years since quittin.9    Smokeless tobacco: Never Used   Substance and Sexual Activity    Alcohol use: No     Comment: QUIT 77    Drug use: No    Sexual activity: Not on file       Review of Systems   Constitutional: Positive for activity change. Negative for appetite change, chills, fatigue and fever.   HENT: Negative for congestion, mouth sores, nosebleeds, sore throat, trouble swallowing and voice change.    Eyes: Positive for visual disturbance. Negative for pain.   Respiratory: Negative for cough, chest tightness, shortness of breath and wheezing.    Cardiovascular: Negative for chest pain, palpitations and leg swelling.   Gastrointestinal: Negative for  abdominal distention, abdominal pain, anal bleeding, blood in stool, constipation, diarrhea, nausea and vomiting.   Genitourinary: Negative for difficulty urinating, dysuria and hematuria.   Musculoskeletal: Positive for gait problem. Negative for arthralgias, back pain and myalgias.   Skin: Negative for pallor, rash and wound.   Neurological: Positive for weakness and headaches. Negative for dizziness and syncope.   Hematological: Negative for adenopathy. Does not bruise/bleed easily.   Psychiatric/Behavioral: Positive for confusion. The patient is not nervous/anxious.      Objective:     Vital Signs (Most Recent):  Temp: 97.7 °F (36.5 °C) (10/08/19 1136)  Pulse: 72 (10/08/19 1618)  Resp: 18 (10/08/19 1618)  BP: (!) 169/74 (10/08/19 1618)  SpO2: 96 % (10/08/19 1618) Vital Signs (24h Range):  Temp:  [97 °F (36.1 °C)-98.1 °F (36.7 °C)] 97.7 °F (36.5 °C)  Pulse:  [69-76] 72  Resp:  [18-20] 18  SpO2:  [95 %-98 %] 96 %  BP: (135-169)/(64-74) 169/74     Weight: 83.4 kg (183 lb 13.8 oz)  Body mass index is 25.66 kg/m².  Body surface area is 2.04 meters squared.      Intake/Output Summary (Last 24 hours) at 10/8/2019 1656  Last data filed at 10/8/2019 0500  Gross per 24 hour   Intake 118 ml   Output --   Net 118 ml       Physical Exam   Constitutional: He appears well-developed and well-nourished. He is cooperative. He appears ill. He appears distressed.   HENT:   Head: Normocephalic.   Right Ear: Hearing and tympanic membrane normal. No drainage.   Left Ear: Hearing and tympanic membrane normal. No drainage.   Nose: Nose normal.   Mouth/Throat: Oropharynx is clear and moist.   Eyes: Conjunctivae, EOM and lids are normal. Right eye exhibits no discharge. Left eye exhibits no discharge. No scleral icterus.   Neck: Normal range of motion. No thyroid mass present.   Cardiovascular: Normal rate.   Pulmonary/Chest: Effort normal and breath sounds normal. No respiratory distress. He has no wheezes. He has no rhonchi. He has no  "rales.   Abdominal: Soft. Bowel sounds are normal. He exhibits no distension. There is no tenderness.   Genitourinary:   Genitourinary Comments: deferred   Musculoskeletal: Normal range of motion. He exhibits no edema.   Neurological: He is alert.   Oriented to person/place   Skin: Skin is warm, dry and intact.   Psychiatric: He has a normal mood and affect. His speech is normal and behavior is normal. Thought content normal.   Vitals reviewed.      Significant Labs:   BMP:   Recent Labs   Lab 10/07/19  1130   *      K 3.7      CO2 24   BUN 8   CREATININE 1.0   CALCIUM 9.4   , CBC:   Recent Labs   Lab 10/07/19  1130   WBC 5.27   HGB 13.3*   HCT 42.4      , LFTs: No results for input(s): ALT, AST, ALKPHOS, BILITOT, PROT, ALBUMIN in the last 48 hours. and All pertinent labs from the last 24 hours have been reviewed.    Diagnostic Results:  I have reviewed all pertinent imaging results/findings within the past 24 hours.    Assessment/Plan:     Meningioma  October 8, 2019  --spoke with patient's daughter states that patient has known history of "head mass".  Reports this is been followed in the outpatient setting by Dr. Kahn who was a neurologist at the [facility].  I have asked charge nurse to please have patient's medical records requested from the [facility].  Would like to obtain medical records to determine the stability or progression of meningioma.  Vasogenic edema findings on CT head concerning for progression. Continue dexamethasone 4 mg IV b.i.d..  Await medical records for further recommendations in regards to meningioma findings.  Continued neuro checks.        Thank you for your consult. I will follow-up with patient. Please contact us if you have any additional questions.    Jaqui Dale NP  Hematology/Oncology  [facility]  "

## 2019-10-08 NOTE — PT/OT/SLP PROGRESS
Physical Therapy  Treatment    Bola Figueroa .   MRN: [id number]   Admitting Diagnosis: Acute CVA (cerebrovascular accident)    PT Received On: 10/08/19  PT Start Time: 0915     PT Stop Time: 0940    PT Total Time (min): 25 min       Billable Minutes:  Gait Training 12 min and Therapeutic Exercise 13 min    Treatment Type: Treatment  PT/PTA: PT     PTA Visit Number: 0       General Precautions: Standard, fall, vision impaired  Orthopedic Precautions: N/A   Braces: N/A         Subjective:  Communicated with Nurse Daly and epic chart review prior to session.  Pt found supine in bed and agreeable to tx at this time.     Pain/Comfort  Pain Rating 1: 0/10    Objective:   Patient found with: telemetry, peripheral IV    Functional Mobility:  Therapeutic Activities and Exercises:  Pt performed supine.sit with Mod A and extended time, scooted to EOB with Min A, donned socks with Total A, sit>stand with Mod A using RW, ambulated ~60ft using RW with Mod A and verbal/ tactile cues for safety, t/f to chair using RW with Mod A. Pt performed (B) LE therapeutic exercises sitting in chair 1 x 20 reps: MIP, TKE, AP.      AM-PAC 6 CLICK MOBILITY  How much help from another person does this patient currently need?   1 = Unable, Total/Dependent Assistance  2 = A lot, Maximum/Moderate Assistance  3 = A little, Minimum/Contact Guard/Supervision  4 = None, Modified [place]/Independent    Turning over in bed (including adjusting bedclothes, sheets and blankets)?: 2  Sitting down on and standing up from a chair with arms (e.g., wheelchair, bedside commode, etc.): 2  Moving from lying on back to sitting on the side of the bed?: 2  Moving to and from a bed to a chair (including a wheelchair)?: 2  Need to walk in hospital room?: 2  Climbing 3-5 steps with a railing?: 1  Basic Mobility Total Score: 11    AM-PAC Raw Score CMS G-Code Modifier Level of Impairment Assistance   6 % Total / Unable   7 - 9 CM 80 - 100% Maximal Assist   10 -  14 CL 60 - 80% Moderate Assist   15 - 19 CK 40 - 60% Moderate Assist   20 - 22 CJ 20 - 40% Minimal Assist   23 CI 1-20% SBA / CGA   24 CH 0% Independent/ Mod I     Patient left up in chair with all lines intact, call button in reach, chair alarm on, Nurse Addy notified and daughter present.    Assessment:  Bola Figueroa Sr. is a 85 y.o. male with a medical diagnosis of Acute CVA (cerebrovascular accident) and presents with impaired functional mobility. Pt will benefit from continued skilled PT in order to address impairments.     Rehab identified problem list/impairments: Rehab identified problem list/impairments: weakness, impaired endurance, gait instability, impaired functional mobilty, impaired self care skills, impaired balance, visual deficits, decreased lower extremity function, decreased upper extremity function, decreased coordination, decreased safety awareness, pain    Rehab potential is good.    Activity tolerance: Good    Discharge recommendations: Discharge Facility/Level of Care Needs: rehabilitation facility     Barriers to discharge:      Equipment recommendations: Equipment Needed After Discharge: (tbd)     GOALS:   Multidisciplinary Problems     Physical Therapy Goals        Problem: Physical Therapy Goal    Goal Priority Disciplines Outcome Goal Variances Interventions   Physical Therapy Goal     PT, PT/OT Ongoing, Progressing     Description:  1. Patient will perform supine to/from sit min A  2. Patient will perform sit to/from stand min A with least RW  3. Patient will ambulate x 150ft RW min A                    PLAN:    Patient to be seen 5 x/week  to address the above listed problems via gait training, therapeutic activities, therapeutic exercises  Plan of Care expires: 10/12/19  Plan of Care reviewed with: patient, daughter    PT G-Codes  Functional Assessment Tool Used: [facility]  Score: 11    Gissel Christie, PT/OT  10/08/2019

## 2019-10-08 NOTE — PT/OT/SLP PROGRESS
Speech Language Pathology Treatment    Patient Name:  Bola Figueroa Sr.   MRN:  [id number]  Admitting Diagnosis: Acute CVA (cerebrovascular accident)    Recommendations:                 General Recommendations:  Dysphagia therapy, Speech/language therapy and Cognitive-linguistic therapy  Diet recommendations:  Mechanical soft, Liquid Diet Level: Thin   Aspiration Precautions: 1 bite/sip at a time, Double swallow with each bite/sip, HOB to 90 degrees, Remain upright 30 minutes post meal and Small bites/sips   General Precautions: Standard, fall, vision impaired    Subjective     Pt seen at bedside with his daughter present.   Patient goals: none stated     Pain/Comfort:  · Pain Rating 1: 0/10    Objective:     Has the patient been evaluated by SLP for swallowing?   Yes  Keep patient NPO? No   Current Respiratory Status: room air      Swallowing assessed at bedside with puree and thin liquids.  No aspiration suspected, however pt required min cueing for slower rate and 1 bite at a time.  Pt completed cognitive tasks with max cueing required for orientation and memory tasks.  He required mod cueing for attention to left visual field.     Assessment:     Bola Figueroa Sr. is a 85 y.o. male with an SLP diagnosis of Dysphagia and Cognitive-Linguistic Impairment.  He will benefit from continued ST per POC.    Goals:   Multidisciplinary Problems     SLP Goals        Problem: SLP Goal    Goal Priority Disciplines Outcome   SLP Goal     SLP Ongoing, Progressing   Description:  TPW complete confrontation naming tasks given the physical object with min verbal cues  TPW complete convergent and divergent naming tasks given verbal cues   TPW tolerate current diet without any overt s/s of aspiration                    Plan:     · Patient to be seen:  3 x/week   · Plan of Care expires:  10/12/19  · Plan of Care reviewed with:  patient, daughter   · SLP Follow-Up:  Yes       Discharge recommendations:  home health speech therapy, outpatient  speech therapy   Barriers to Discharge:  None    Time Tracking:     SLP Treatment Date:   10/08/19  Speech Start Time:  1100  Speech Stop Time:  1123     Speech Total Time (min):  23 min    Billable Minutes: Speech Therapy Individual 13 and Treatment Swallowing Dysfunction 10    Josefina Sanchez CCC-SLP  10/08/2019

## 2019-10-08 NOTE — PLAN OF CARE
IMM was presented to the patient and/or family member and was signed and understood on 10/8/2019 @ 1237.

## 2019-10-08 NOTE — PROGRESS NOTES
"  Clinical Pharmacy: Coumadin Progress Note    Day #2 of Coumadin Consult     Indication: A-fib, Right PCA stroke, h/o TIA stroke.  Goal INR: 2-3  Today's INR: 0.9 (down from 1.0)     Patient's Coumadin was restarted yesterday, per neurology.   Patient came in with subtherapeutic INR 4 days ago of 1.0, likely due to non compliance. Coumadin was held during admission due to acute CVA and risk of bleeding.   Per Dr. Tejeda's note yesterday: "I think it's OK to restart Coumadin because the large area of left MCA edema is most likely vasogenic due to the mass."  Will continue patient on current home dose of Coumadin 5 mg PO daily.   PT/INR will be monitored daily. Dose adjustments will be made accordingly.    Patient has been educated by a pharmacist this admission.     Thank you for allowing us to participate in this patient's care.   Dana Tavera, PharmD 10/8/2019 7:34 AM    "

## 2019-10-08 NOTE — PROGRESS NOTES
[facility] Hospital Medicine  Progress Note    Patient Name: Bola Figueroa Sr.  MRN: [id number]  Patient Class: IP- Inpatient   Admission Date: 10/4/2019  Length of Stay: 4 days  Attending Physician: Thierno Prescott, *  Primary Care Provider: Naga Bhatti MD        Subjective:     Principal Problem:Acute CVA (cerebrovascular accident)        HPI:  84 y.o. male  with a PMHx od COPD , Afib on coumadin , HTN , tobacco user  And  PHT who presents to the Emergency Department following a fall just PTA. Pt reports generalized weakness over the past 2 days, causing him to fall today and hit the back of his head on the toilet. . Associated sxs include headache. Patient denies any fever, chills, n/v/d, SOB, CP, numbness, dizziness, LOC, and all other sxs at this time. No prior Tx reported . He was recently d/c from this facility  1 month ago with a dx od COPD exacerbation,   ER COURSE: CT head show Acute to subacute right occipital lobe infarct.Possible calcified thrombus along the M2 segments of the left MCA unless the patient has a prior history of prior embolization. CXR did not show any acute finding . BUN/CR 15/1.6  ER VS:  BP Pulse Resp Temp SpO2   (!) 180/90 60 18 99.2 °F (37.3 °C) 98 %       MAP           --          -The  ER Dr discussed the Case with Neurosurgery and Tele - stroke  . They did not recommend any  Intervention at this time .   -Pt can not have a MRI/MRA due to pacemaker   -Pt is not a candidate for TPA  -Neurology was consulted .    Overview/Hospital Course:  10/5 pt was seen and examined at bedside . He is aao x 2 in nad .  He is confuse on and off . PT/OT/SLP evaluate the pt today .  CTA show Extensive atherosclerotic plaque right common carotid bifurcation with 60% stenosis of the right internal carotid artery bulb. Acute occlusion distal right posterior cerebral artery with acute right posterior cerebral artery territory infarction. The case was discussed with family  members /  10/6  He denies any complaint for today . We will resume blood pressure medication   Today .  PT/OT/SLP recommend rehab. TTE pending .   10/7 He is compalining of  worsing HA today with vision deficit . The ct head from today show 1.7 x 3.3 x 3.1 cm extra-axial mass in the middle cranial fossa on the left side consistent with a meningioma.  Extensive vasogenic edema.  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 We will start dexamethasone 4 mg iv bid and consult Hem/onc . Neurology recommend to start coumadin w/o lovenox bridge . The case was discussed with the family .   10/8 Pt was started on dexamethasone yesterday with an improvement of his  HA . Hematology/onc was consulted .  Neurology resume coumadin .     Interval History:     Review of Systems   Constitutional: Positive for fatigue. Negative for activity change, appetite change, chills, fever and unexpected weight change.   HENT: Negative for congestion, dental problem, drooling, ear discharge, ear pain, facial swelling, hearing loss, mouth sores, nosebleeds, postnasal drip and rhinorrhea.    Eyes: Negative for photophobia, pain, discharge, redness and itching.   Respiratory: Negative for apnea, cough, choking, chest tightness, shortness of breath and stridor.    Cardiovascular: Negative for chest pain, palpitations and leg swelling.   Gastrointestinal: Negative for abdominal distention, abdominal pain, anal bleeding, blood in stool, constipation, diarrhea, nausea and rectal pain.   Endocrine: Negative for cold intolerance, heat intolerance, polydipsia and polyphagia.   Genitourinary: Negative for difficulty urinating, discharge, dysuria, enuresis, flank pain, frequency, genital sores, hematuria and penile pain.   Musculoskeletal: Negative for arthralgias, back pain, gait problem, joint swelling, myalgias, neck pain and neck stiffness.   Neurological: Positive for weakness and numbness. Negative for headaches.   Psychiatric/Behavioral: Positive  for confusion and decreased concentration. Negative for agitation, behavioral problems, dysphoric mood and hallucinations. The patient is not hyperactive.      Objective:     Vital Signs (Most Recent):  Temp: 97.7 °F (36.5 °C) (10/08/19 1136)  Pulse: 74 (10/08/19 1258)  Resp: 19 (10/08/19 1136)  BP: (!) 151/70 (10/08/19 1136)  SpO2: 98 % (10/08/19 1136) Vital Signs (24h Range):  Temp:  [97 °F (36.1 °C)-98.1 °F (36.7 °C)] 97.7 °F (36.5 °C)  Pulse:  [69-76] 74  Resp:  [18-20] 19  SpO2:  [94 %-98 %] 98 %  BP: (135-187)/(64-82) 151/70     Weight: 83.4 kg (183 lb 13.8 oz)  Body mass index is 25.66 kg/m².    Intake/Output Summary (Last 24 hours) at 10/8/2019 1334  Last data filed at 10/8/2019 0500  Gross per 24 hour   Intake 118 ml   Output --   Net 118 ml      Physical Exam   Constitutional: He is oriented to person, place, and time. He appears well-developed and well-nourished. No distress.   HENT:   Head: Normocephalic.   Right Ear: External ear normal.   Left Ear: External ear normal.   Eyes: Pupils are equal, round, and reactive to light. Right eye exhibits no discharge. Left eye exhibits no discharge. No scleral icterus.   Neck: Normal range of motion. Neck supple. No JVD present. No tracheal deviation present. No thyromegaly present.   Pulmonary/Chest: Effort normal and breath sounds normal. No stridor. No respiratory distress. He has no wheezes.   Abdominal: Soft. Bowel sounds are normal. He exhibits no distension. There is no tenderness. There is no guarding.   Musculoskeletal: Normal range of motion. He exhibits no edema or deformity.   Neurological: He is alert and oriented to person, place, and time. He displays normal reflexes. No cranial nerve deficit. Coordination normal.   Skin: Skin is warm. He is not diaphoretic.   Psychiatric: He has a normal mood and affect.   Nursing note and vitals reviewed.      Significant Labs: All pertinent labs within the past 24 hours have been reviewed.    Significant Imaging:  I have reviewed all pertinent imaging results/findings within the past 24 hours.      Assessment/Plan:      * Acute CVA (cerebrovascular accident)  -CT head show : Acute to subacute right occipital lobe infarct.Possible calcified thrombus along the M2 segments of the left MCA unless the patient has a prior history of prior embolization.  -ER Dr discussed the case with neurology and Tele stroke . They did not  recommend  Any intervention at this time  . [place] [facility]   -Neurology consulted   - MRI/MRA  Can not be done due to pacemaker .  Will order a Head and  neck CTA  -Cont asa and statin   - S/P permissive HTN  For 24 to 48 hrs. Started on coreg and norvasc .  -PT/OT/SLP recimmend rehab   -Repeated CT head show:1.7 x 3.3 x 3.1 cm extra-axial mass in the middle cranial fossa on the left side consistent with a meningioma.  Extensive vasogenic edema.  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  -Start on dexamethsone 4 mg iv bid  And consult  Oncology             Grade III diastolic dysfunction  Compensated  Cont CHF core measures          Seizure disorder  Resume keppra       Mild dementia  Cont supportive tx       COPD, severe  Stable   Cont breathing Tx       Benign prostatic hyperplasia  Resume flomax       Long term current use of anticoagulant therapy  Neurology restart coumadin w/o Lovenox bridge        Essential hypertension  S/p permissive HTN  Resume  norvasc and coreg           PVD (peripheral vascular disease)  Resume statin       Coronary artery disease  Cont  statin and asa  Cont   BB       A-fib  Rate control  Resume coumadin  Per neurology recommendation  W/o Lovenox bridge   Cont bb        VTE Risk Mitigation (From admission, onward)         Ordered     warfarin (COUMADIN) tablet 5 mg  Daily      10/07/19 1416     enoxaparin injection 40 mg  Daily      10/04/19 1653     IP VTE HIGH RISK PATIENT  Once      10/04/19 1653     Place sequential compression device  Until  discontinued      10/04/19 1653                      Thierno Prescott MD  Department of Hospital Medicine   [facility]

## 2019-10-08 NOTE — SUBJECTIVE & OBJECTIVE
Interval History:     Review of Systems   Constitutional: Positive for fatigue. Negative for activity change, appetite change, chills, fever and unexpected weight change.   HENT: Negative for congestion, dental problem, drooling, ear discharge, ear pain, facial swelling, hearing loss, mouth sores, nosebleeds, postnasal drip and rhinorrhea.    Eyes: Negative for photophobia, pain, discharge, redness and itching.   Respiratory: Negative for apnea, cough, choking, chest tightness, shortness of breath and stridor.    Cardiovascular: Negative for chest pain, palpitations and leg swelling.   Gastrointestinal: Negative for abdominal distention, abdominal pain, anal bleeding, blood in stool, constipation, diarrhea, nausea and rectal pain.   Endocrine: Negative for cold intolerance, heat intolerance, polydipsia and polyphagia.   Genitourinary: Negative for difficulty urinating, discharge, dysuria, enuresis, flank pain, frequency, genital sores, hematuria and penile pain.   Musculoskeletal: Negative for arthralgias, back pain, gait problem, joint swelling, myalgias, neck pain and neck stiffness.   Neurological: Positive for weakness and numbness. Negative for headaches.   Psychiatric/Behavioral: Positive for confusion and decreased concentration. Negative for agitation, behavioral problems, dysphoric mood and hallucinations. The patient is not hyperactive.      Objective:     Vital Signs (Most Recent):  Temp: 97.7 °F (36.5 °C) (10/08/19 1136)  Pulse: 74 (10/08/19 1258)  Resp: 19 (10/08/19 1136)  BP: (!) 151/70 (10/08/19 1136)  SpO2: 98 % (10/08/19 1136) Vital Signs (24h Range):  Temp:  [97 °F (36.1 °C)-98.1 °F (36.7 °C)] 97.7 °F (36.5 °C)  Pulse:  [69-76] 74  Resp:  [18-20] 19  SpO2:  [94 %-98 %] 98 %  BP: (135-187)/(64-82) 151/70     Weight: 83.4 kg (183 lb 13.8 oz)  Body mass index is 25.66 kg/m².    Intake/Output Summary (Last 24 hours) at 10/8/2019 1334  Last data filed at 10/8/2019 0500  Gross per 24 hour   Intake 118 ml    Output --   Net 118 ml      Physical Exam   Constitutional: He is oriented to person, place, and time. He appears well-developed and well-nourished. No distress.   HENT:   Head: Normocephalic.   Right Ear: External ear normal.   Left Ear: External ear normal.   Eyes: Pupils are equal, round, and reactive to light. Right eye exhibits no discharge. Left eye exhibits no discharge. No scleral icterus.   Neck: Normal range of motion. Neck supple. No JVD present. No tracheal deviation present. No thyromegaly present.   Pulmonary/Chest: Effort normal and breath sounds normal. No stridor. No respiratory distress. He has no wheezes.   Abdominal: Soft. Bowel sounds are normal. He exhibits no distension. There is no tenderness. There is no guarding.   Musculoskeletal: Normal range of motion. He exhibits no edema or deformity.   Neurological: He is alert and oriented to person, place, and time. He displays normal reflexes. No cranial nerve deficit. Coordination normal.   Skin: Skin is warm. He is not diaphoretic.   Psychiatric: He has a normal mood and affect.   Nursing note and vitals reviewed.      Significant Labs: All pertinent labs within the past 24 hours have been reviewed.    Significant Imaging: I have reviewed all pertinent imaging results/findings within the past 24 hours.

## 2019-10-09 LAB
ANION GAP SERPL CALC-SCNC: 10 MMOL/L (ref 8–16)
BACTERIA BLD CULT: NORMAL
BACTERIA BLD CULT: NORMAL
BASOPHILS # BLD AUTO: 0.01 K/UL (ref 0–0.2)
BASOPHILS NFR BLD: 0.1 % (ref 0–1.9)
BUN SERPL-MCNC: 24 MG/DL (ref 8–23)
CALCIUM SERPL-MCNC: 9.2 MG/DL (ref 8.7–10.5)
CHLORIDE SERPL-SCNC: 104 MMOL/L (ref 95–110)
CO2 SERPL-SCNC: 21 MMOL/L (ref 23–29)
CREAT SERPL-MCNC: 1.2 MG/DL (ref 0.5–1.4)
DIFFERENTIAL METHOD: ABNORMAL
EOSINOPHIL # BLD AUTO: 0 K/UL (ref 0–0.5)
EOSINOPHIL NFR BLD: 0 % (ref 0–8)
ERYTHROCYTE [DISTWIDTH] IN BLOOD BY AUTOMATED COUNT: 12.2 % (ref 11.5–14.5)
EST. GFR  (AFRICAN AMERICAN): >60 ML/MIN/1.73 M^2
EST. GFR  (NON AFRICAN AMERICAN): 55 ML/MIN/1.73 M^2
GLUCOSE SERPL-MCNC: 265 MG/DL (ref 70–110)
HCT VFR BLD AUTO: 39.4 % (ref 40–54)
HGB BLD-MCNC: 12.2 G/DL (ref 14–18)
IMM GRANULOCYTES # BLD AUTO: 0.07 K/UL (ref 0–0.04)
IMM GRANULOCYTES NFR BLD AUTO: 0.5 % (ref 0–0.5)
INR PPP: 1 (ref 0.8–1.2)
LYMPHOCYTES # BLD AUTO: 0.9 K/UL (ref 1–4.8)
LYMPHOCYTES NFR BLD: 6.8 % (ref 18–48)
MAGNESIUM SERPL-MCNC: 2 MG/DL (ref 1.6–2.6)
MCH RBC QN AUTO: 29.1 PG (ref 27–31)
MCHC RBC AUTO-ENTMCNC: 31 G/DL (ref 32–36)
MCV RBC AUTO: 94 FL (ref 82–98)
MONOCYTES # BLD AUTO: 0.4 K/UL (ref 0.3–1)
MONOCYTES NFR BLD: 3.2 % (ref 4–15)
NEUTROPHILS # BLD AUTO: 12 K/UL (ref 1.8–7.7)
NEUTROPHILS NFR BLD: 89.4 % (ref 38–73)
NRBC BLD-RTO: 0 /100 WBC
PLATELET # BLD AUTO: 240 K/UL (ref 150–350)
PMV BLD AUTO: 11.6 FL (ref 9.2–12.9)
POCT GLUCOSE: 197 MG/DL (ref 70–110)
POCT GLUCOSE: 200 MG/DL (ref 70–110)
POCT GLUCOSE: 206 MG/DL (ref 70–110)
POTASSIUM SERPL-SCNC: 4.7 MMOL/L (ref 3.5–5.1)
PROTHROMBIN TIME: 10.8 SEC (ref 9–12.5)
RBC # BLD AUTO: 4.19 M/UL (ref 4.6–6.2)
SODIUM SERPL-SCNC: 135 MMOL/L (ref 136–145)
WBC # BLD AUTO: 13.43 K/UL (ref 3.9–12.7)

## 2019-10-09 PROCEDURE — 99233 SBSQ HOSP IP/OBS HIGH 50: CPT | Mod: ,,, | Performed by: INTERNAL MEDICINE

## 2019-10-09 PROCEDURE — 85025 COMPLETE CBC W/AUTO DIFF WBC: CPT

## 2019-10-09 PROCEDURE — 99233 PR SUBSEQUENT HOSPITAL CARE,LEVL III: ICD-10-PCS | Mod: ,,, | Performed by: INTERNAL MEDICINE

## 2019-10-09 PROCEDURE — 36415 COLL VENOUS BLD VENIPUNCTURE: CPT

## 2019-10-09 PROCEDURE — 97530 THERAPEUTIC ACTIVITIES: CPT

## 2019-10-09 PROCEDURE — 97110 THERAPEUTIC EXERCISES: CPT

## 2019-10-09 PROCEDURE — 97535 SELF CARE MNGMENT TRAINING: CPT

## 2019-10-09 PROCEDURE — 92507 TX SP LANG VOICE COMM INDIV: CPT

## 2019-10-09 PROCEDURE — 85610 PROTHROMBIN TIME: CPT

## 2019-10-09 PROCEDURE — 80048 BASIC METABOLIC PNL TOTAL CA: CPT

## 2019-10-09 PROCEDURE — 97116 GAIT TRAINING THERAPY: CPT

## 2019-10-09 PROCEDURE — 25000003 PHARM REV CODE 250: Performed by: INTERNAL MEDICINE

## 2019-10-09 PROCEDURE — 83735 ASSAY OF MAGNESIUM: CPT

## 2019-10-09 PROCEDURE — 63600175 PHARM REV CODE 636 W HCPCS: Performed by: INTERNAL MEDICINE

## 2019-10-09 PROCEDURE — 25000003 PHARM REV CODE 250: Performed by: PSYCHIATRY & NEUROLOGY

## 2019-10-09 PROCEDURE — 21400001 HC TELEMETRY ROOM

## 2019-10-09 PROCEDURE — 97803 MED NUTRITION INDIV SUBSEQ: CPT

## 2019-10-09 RX ORDER — DEXAMETHASONE 1 MG/1
2 TABLET ORAL EVERY 12 HOURS
Status: DISCONTINUED | OUTPATIENT
Start: 2019-10-09 | End: 2019-10-10 | Stop reason: HOSPADM

## 2019-10-09 RX ADMIN — DEXAMETHASONE SODIUM PHOSPHATE 4 MG: 4 INJECTION, SOLUTION INTRAMUSCULAR; INTRAVENOUS at 08:10

## 2019-10-09 RX ADMIN — LEVETIRACETAM 500 MG: 500 TABLET, FILM COATED ORAL at 08:10

## 2019-10-09 RX ADMIN — TAMSULOSIN HYDROCHLORIDE 0.4 MG: 0.4 CAPSULE ORAL at 08:10

## 2019-10-09 RX ADMIN — AMLODIPINE BESYLATE 10 MG: 10 TABLET ORAL at 08:10

## 2019-10-09 RX ADMIN — PANTOPRAZOLE SODIUM 40 MG: 40 TABLET, DELAYED RELEASE ORAL at 08:10

## 2019-10-09 RX ADMIN — ATORVASTATIN CALCIUM 40 MG: 40 TABLET, FILM COATED ORAL at 08:10

## 2019-10-09 RX ADMIN — DEXAMETHASONE 2 MG: 1 TABLET ORAL at 08:10

## 2019-10-09 RX ADMIN — CARVEDILOL 12.5 MG: 12.5 TABLET, FILM COATED ORAL at 08:10

## 2019-10-09 RX ADMIN — CARVEDILOL 12.5 MG: 12.5 TABLET, FILM COATED ORAL at 04:10

## 2019-10-09 RX ADMIN — LABETALOL HYDROCHLORIDE 10 MG: 5 INJECTION, SOLUTION INTRAVENOUS at 08:10

## 2019-10-09 RX ADMIN — ENOXAPARIN SODIUM 40 MG: 100 INJECTION SUBCUTANEOUS at 04:10

## 2019-10-09 RX ADMIN — INSULIN ASPART 2 UNITS: 100 INJECTION, SOLUTION INTRAVENOUS; SUBCUTANEOUS at 06:10

## 2019-10-09 RX ADMIN — WARFARIN SODIUM 5 MG: 5 TABLET ORAL at 04:10

## 2019-10-09 NOTE — ASSESSMENT & PLAN NOTE
"October 8, 2019  --spoke with patient's daughter states that patient has known history of "head mass".  Reports this is been followed in the outpatient setting by Dr. Kahn who was a neurologist at the [facility].  I have asked charge nurse to please have patient's medical records requested from the [facility].  Would like to obtain medical records to determine the stability or progression of meningioma.  Vasogenic edema findings on CT head concerning for progression. Continue dexamethasone 4 mg IV b.i.d..  Await medical records for further recommendations in regards to meningioma findings.  Continued neuro checks.    October 9, 2019  --awaiting medical records from Dr. Kahn's office at [facility]  --recommend neurosurgery consult  --continue dexamethasone 4 mg IV b.i.d. may consider switch to p.o. Steroids tomorrow  --continued neuro checks  --supportive care  "

## 2019-10-09 NOTE — PROGRESS NOTES
Clinical Pharmacy Progress Note: Coumadin Dosing and Monitoring     Indication: A-fib, Right PCA stroke, h/o TIA stroke.  Goal INR: 2-3  Lab Results   Component Value Date    INR 1.0 10/09/2019    INR 0.9 10/08/2019    INR 1.0 10/07/2019       Patient has been educated by pharmacist this admission.      Current dose: warfarin 5 mg daily     Plan: Continue dosing. DVT prophylactic dose Lovenox preferred per Neurology.   PT/INR will be monitored daily. Dose adjustments will be made accordingly.      Thank you for allowing us to participate in this patient's care.    Katiuska Velásquez, PharmD 10/9/2019 8:29 AM

## 2019-10-09 NOTE — PT/OT/SLP PROGRESS
Occupational Therapy  Treatment    Bola Figueroa .   MRN: [id number]   Admitting Diagnosis: Acute CVA (cerebrovascular accident)    OT Date of Treatment: 10/09/19   OT Start Time: 1052  OT Stop Time: 1117  OT Total Time (min): 25 min    Billable Minutes:  Self Care/Home Management  x 10min and Therapeutic Activity  x 15 min    General Precautions: Standard, fall  Orthopedic Precautions: N/A  Braces: N/A         Subjective:  Communicated with pt and nurse - Addy prior to session.    Pain/Comfort  Pain Rating 1: 0/10    Objective:  Patient found with: telemetry, peripheral IV     Functional Mobility:  Bed Mobility:       Transfers:        Functional Ambulation: fx ambulation with RW in room with t/c and v/c due with mod A, due to impaired left sided visual scanning, left sided neglect, left sided deep pressure impaired. Pt needs constant v/c to stay focus on tasks, has tendency to push RW to right  and unable to navigate RW in straight line.     Activities of Daily Living:     Feeding adaptive equipment:      UE adaptive equipment:      LE adaptive equipment:                     Bathing adaptive equipment:     Balance:   Static Sit: FAIR: Maintains without assist, but unable to take any challenges   Dynamic Sit: POOR: N/A  Static Stand: POOR: Needs MODERATE assist to maintain  Dynamic stand: 0: N/A    Therapeutic Activities and Exercises:  Pt performed bed mobility with mod A for rolling and  Supine to sit with extra time, min a for sit to supine. Pt requires total A to gisele socks and gown seated with support due to posterior lean to back and left.     AM-PAC 6 CLICK ADL   How much help from another person does this patient currently need?   1 = Unable, Total/Dependent Assistance  2 = A lot, Maximum/Moderate Assistance  3 = A little, Minimum/Contact Guard/Supervision  4 = None, Modified [place]/Independent    Putting on and taking off regular lower body clothing? : 2  Bathing (including washing, rinsing, drying)?:  "2  Toileting, which includes using toilet, bedpan, or urinal? : 2  Putting on and taking off regular upper body clothing?: 2  Taking care of personal grooming such as brushing teeth?: 3  Eating meals?: 3  Daily Activity Total Score: 14     AM-PAC Raw Score CMS "G-Code Modifier Level of Impairment Assistance   6 % Total / Unable   7 - 8 CM 80 - 100% Maximal Assist   9-13 CL 60 - 80% Moderate Assist   14 - 19 CK 40 - 60% Moderate Assist   20 - 22 CJ 20 - 40% Minimal Assist   23 CI 1-20% SBA / CGA   24 CH 0% Independent/ Mod I       Patient left supine with all lines intact, call button in reach and bed alarm on    ASSESSMENT:  Bola Figueroa Sr. is a 85 y.o. male with a medical diagnosis of Acute CVA (cerebrovascular accident) and presents with impaired self care and fx mobility, impaired LUE sensations, left sided neglect and  Left visual scanning, along with impaired cognition.     Rehab identified problem list/impairments: Rehab identified problem list/impairments: weakness, impaired self care skills, impaired balance, decreased coordination, decreased safety awareness, impaired coordination, decreased upper extremity function, visual deficits, impaired functional mobilty, impaired endurance, impaired sensation, gait instability, impaired cognition, impaired fine motor    Rehab potential is fair.    Activity tolerance: Fair    Discharge recommendations: Discharge Facility/Level of Care Needs: rehabilitation facility     Barriers to discharge: Barriers to Discharge: None    Equipment recommendations: none     GOALS:   Multidisciplinary Problems     Occupational Therapy Goals        Problem: Occupational Therapy Goal    Goal Priority Disciplines Outcome Interventions   Occupational Therapy Goal     OT, PT/OT Ongoing, Progressing    Description:  OT GOALS TO BE MET BY 10-12-19  SBA WITH UE DRESSING  PT WILL TOLERATE 1 SET X 15 REPS B UE ROM EXERCISE  SBA WITH LE DRESSING                    Plan:  Patient to be " seen 3 x/week to address the above listed problems via self-care/home management, neuromuscular re-education, therapeutic activities, therapeutic exercises  Plan of Care expires: 10/12/19  Plan of Care reviewed with: patient         Neda Hobbs, OT  10/09/2019

## 2019-10-09 NOTE — PLAN OF CARE
Recommendations  1. Continue current diet. 2. Will continue to monitor.   Intervention: RD provided cardiac diet education w/ handouts from the Nutrition Care Manual.   Goals: Pt to meet >/= 85% of EEN/EPN daily.  Nutrition Goal Status: goal met   Communication of RD Recs: (plan of care, sticky note)

## 2019-10-09 NOTE — SUBJECTIVE & OBJECTIVE
Interval History:     Review of Systems   Constitutional: Positive for activity change. Negative for appetite change, chills, fatigue and fever.   HENT: Negative for congestion, mouth sores, nosebleeds, sore throat, trouble swallowing and voice change.    Eyes: Positive for visual disturbance. Negative for pain.   Respiratory: Negative for cough, chest tightness, shortness of breath and wheezing.    Cardiovascular: Negative for chest pain, palpitations and leg swelling.   Gastrointestinal: Negative for abdominal distention, abdominal pain, anal bleeding, blood in stool, constipation, diarrhea, nausea and vomiting.   Genitourinary: Negative for difficulty urinating, dysuria and hematuria.   Musculoskeletal: Positive for gait problem. Negative for arthralgias, back pain and myalgias.   Skin: Negative for pallor, rash and wound.   Neurological: Positive for weakness. Negative for dizziness, syncope and headaches.   Hematological: Negative for adenopathy. Does not bruise/bleed easily.   Psychiatric/Behavioral: Positive for confusion. The patient is not nervous/anxious.      Objective:     Vital Signs (Most Recent):  Temp: 97.8 °F (36.6 °C) (10/09/19 0712)  Pulse: 72 (10/09/19 1300)  Resp: 18 (10/09/19 0712)  BP: (!) 156/72 (10/09/19 1151)  SpO2: (!) 94 % (10/09/19 1151) Vital Signs (24h Range):  Temp:  [97.6 °F (36.4 °C)-98.6 °F (37 °C)] 97.8 °F (36.6 °C)  Pulse:  [69-76] 72  Resp:  [18-20] 18  SpO2:  [94 %-99 %] 94 %  BP: (126-180)/(58-87) 156/72     Weight: 85.2 kg (187 lb 13.3 oz)  Body mass index is 26.21 kg/m².    Intake/Output Summary (Last 24 hours) at 10/9/2019 1446  Last data filed at 10/9/2019 0600  Gross per 24 hour   Intake 240 ml   Output --   Net 240 ml      Physical Exam   Constitutional: He appears well-developed and well-nourished. He is cooperative. He appears ill. No distress.   HENT:   Head: Normocephalic.   Right Ear: Hearing and tympanic membrane normal. No drainage.   Left Ear: Hearing and  tympanic membrane normal. No drainage.   Nose: Nose normal.   Mouth/Throat: Oropharynx is clear and moist.   Eyes: Conjunctivae, EOM and lids are normal. Right eye exhibits no discharge. Left eye exhibits no discharge. No scleral icterus.   Neck: Normal range of motion. No thyroid mass present.   Cardiovascular: Normal rate.   Pulmonary/Chest: Effort normal and breath sounds normal. No respiratory distress. He has no wheezes. He has no rhonchi. He has no rales.   Abdominal: Soft. Bowel sounds are normal. He exhibits no distension. There is no tenderness.   Genitourinary:   Genitourinary Comments: deferred   Musculoskeletal: Normal range of motion. He exhibits no edema.   Neurological: He is alert.   Oriented to person/place   Skin: Skin is warm, dry and intact.   Psychiatric: He has a normal mood and affect. His speech is normal and behavior is normal. Thought content normal.   Vitals reviewed.      Significant Labs: All pertinent labs within the past 24 hours have been reviewed.    Significant Imaging: I have reviewed all pertinent imaging results/findings within the past 24 hours.

## 2019-10-09 NOTE — SUBJECTIVE & OBJECTIVE
Interval history: No acute events overnight.  [facility].  Oncology Treatment Plan:   [No treatment plan]    Medications:  Continuous Infusions:  Scheduled Meds:   amLODIPine  10 mg Oral Daily    atorvastatin  40 mg Oral Daily    carvedilol  12.5 mg Oral BID WM    dexamethasone  4 mg Intravenous Q12H    enoxaparin  40 mg Subcutaneous Daily    levETIRAcetam  500 mg Oral BID    pantoprazole  40 mg Oral Daily    tamsulosin  0.4 mg Oral Daily    warfarin  5 mg Oral Daily     PRN Meds:acetaminophen, albuterol-ipratropium, Dextrose 10% Bolus, glucagon (human recombinant), insulin aspart U-100, labetalol, sodium chloride 0.9%, traMADol     Review of patient's allergies indicates:   Allergen Reactions    No known drug allergies         Past Medical History:   Diagnosis Date    *Atrial fibrillation     Atrial fibrillation     Bilateral carotid artery stenosis 1/15/2016    Cardiac pacemaker 2013    Congestive heart failure 2015    COPD (chronic obstructive pulmonary disease) 2013    Coronary artery disease     Hyperlipidemia     Hypertension     Long-term (current) use of anticoagulants 2013    PVD (peripheral vascular disease)     S/P PTCA (percutaneous transluminal coronary angioplasty) 2013    Sleep apnea 2013    Stroke      Past Surgical History:   Procedure Laterality Date    CATARACT EXTRACTION W/  INTRAOCULAR LENS IMPLANT      INSERT / REPLACE / REMOVE PACEMAKER       Family History     Problem Relation (Age of Onset)    Diabetes Sister, Maternal Grandfather    Fainting Sister    Heart attack Paternal Uncle    Heart disease Paternal Uncle    Hypertension Maternal Grandmother        Tobacco Use    Smoking status: Former Smoker     Packs/day: 1.50     Years: 35.00     Pack years: 52.50     Types: Cigarettes     Last attempt to quit: 1979     Years since quittin.9    Smokeless tobacco: Never Used   Substance and Sexual Activity     Alcohol use: No     Comment: QUIT 08/13/77    Drug use: No    Sexual activity: Not on file       Review of Systems   Constitutional: Positive for activity change. Negative for appetite change, chills, fatigue and fever.   HENT: Negative for congestion, mouth sores, nosebleeds, sore throat, trouble swallowing and voice change.    Eyes: Positive for visual disturbance. Negative for pain.   Respiratory: Negative for cough, chest tightness, shortness of breath and wheezing.    Cardiovascular: Negative for chest pain, palpitations and leg swelling.   Gastrointestinal: Negative for abdominal distention, abdominal pain, anal bleeding, blood in stool, constipation, diarrhea, nausea and vomiting.   Genitourinary: Negative for difficulty urinating, dysuria and hematuria.   Musculoskeletal: Positive for gait problem. Negative for arthralgias, back pain and myalgias.   Skin: Negative for pallor, rash and wound.   Neurological: Positive for weakness. Negative for dizziness, syncope and headaches.   Hematological: Negative for adenopathy. Does not bruise/bleed easily.   Psychiatric/Behavioral: Positive for confusion. The patient is not nervous/anxious.      Objective:     Vital Signs (Most Recent):  Temp: 97.8 °F (36.6 °C) (10/09/19 0712)  Pulse: 70 (10/09/19 0712)  Resp: 18 (10/09/19 0712)  BP: (!) 180/87 (10/09/19 0712)  SpO2: 95 % (10/09/19 0712) Vital Signs (24h Range):  Temp:  [97.6 °F (36.4 °C)-98.6 °F (37 °C)] 97.8 °F (36.6 °C)  Pulse:  [69-75] 70  Resp:  [18-20] 18  SpO2:  [95 %-99 %] 95 %  BP: (126-180)/(58-87) 180/87     Weight: 85.2 kg (187 lb 13.3 oz)  Body mass index is 26.21 kg/m².  Body surface area is 2.07 meters squared.      Intake/Output Summary (Last 24 hours) at 10/9/2019 1148  Last data filed at 10/9/2019 0600  Gross per 24 hour   Intake 240 ml   Output --   Net 240 ml       Physical Exam   Constitutional: He appears well-developed and well-nourished. He is cooperative. He appears ill. He appears  distressed.   HENT:   Head: Normocephalic.   Right Ear: Hearing and tympanic membrane normal. No drainage.   Left Ear: Hearing and tympanic membrane normal. No drainage.   Nose: Nose normal.   Mouth/Throat: Oropharynx is clear and moist.   Eyes: Conjunctivae, EOM and lids are normal. Right eye exhibits no discharge. Left eye exhibits no discharge. No scleral icterus.   Neck: Normal range of motion. No thyroid mass present.   Cardiovascular: Normal rate.   Pulmonary/Chest: Effort normal and breath sounds normal. No respiratory distress. He has no wheezes. He has no rhonchi. He has no rales.   Abdominal: Soft. Bowel sounds are normal. He exhibits no distension. There is no tenderness.   Genitourinary:   Genitourinary Comments: deferred   Musculoskeletal: Normal range of motion. He exhibits no edema.   Neurological: He is alert.   Oriented to person/place   Skin: Skin is warm, dry and intact.   Psychiatric: He has a normal mood and affect. His speech is normal and behavior is normal. Thought content normal.   Vitals reviewed.      Significant Labs:   BMP:   Recent Labs   Lab 10/09/19  0900   *   *   K 4.7      CO2 21*   BUN 24*   CREATININE 1.2   CALCIUM 9.2   MG 2.0   , CBC:   Recent Labs   Lab 10/09/19  0900   WBC 13.43*   HGB 12.2*   HCT 39.4*      , LFTs: No results for input(s): ALT, AST, ALKPHOS, BILITOT, PROT, ALBUMIN in the last 48 hours. and All pertinent labs from the last 24 hours have been reviewed.    Diagnostic Results:  I have reviewed all pertinent imaging results/findings within the past 24 hours.

## 2019-10-09 NOTE — PLAN OF CARE
Lying in bed, no distress noted, resp easy, denies pain at this time, no injuries noted during this shift.  Call light in reach.

## 2019-10-09 NOTE — ASSESSMENT & PLAN NOTE
-CT head show : Acute to subacute right occipital lobe infarct.Possible calcified thrombus along the M2 segments of the left MCA unless the patient has a prior history of prior embolization.  -ER Dr discussed the case with neurology and Tele stroke . They did not  recommend  Any intervention at this time  . They states pt can stay in [facility]   -Neurology consulted   - MRI/MRA  Can not be done due to pacemaker .  Will order a Head and  neck CTA  -Cont asa and statin   - S/P permissive HTN  For 24 to 48 hrs. Started on coreg and norvasc .  -PT/OT/SLP recimmend rehab   -Repeated CT head show:1.7 x 3.3 x 3.1 cm extra-axial mass in the middle cranial fossa on the left side consistent with a meningioma.  Extensive vasogenic edema.  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  -Start on dexamethsone 4 mg iv bid  And consult  Oncology

## 2019-10-09 NOTE — PT/OT/SLP PROGRESS
Speech Language Pathology Treatment    Patient Name:  Bola Figueroa Sr.   MRN:  [id number]  Admitting Diagnosis: Acute CVA (cerebrovascular accident)    Recommendations:                 General Recommendations:  Speech/language therapy  Diet recommendations:  Mechanical soft, Liquid Diet Level: Thin   Aspiration Precautions: 1 bite/sip at a time, HOB to 90 degrees and Small bites/sips   General Precautions: Standard, fall, vision impaired  Communication strategies:  none    Subjective     Pt alert but confused   Patient goals: none stated     Pain/Comfort:  · Pain Rating 1: 0/10  · Pain Rating Post-Intervention 1: 0/10    Objective:     Has the patient been evaluated by SLP for swallowing?   Yes  Keep patient NPO? No   Current Respiratory Status: room air      Pt oriented to person only. Orientation provided. Perseveration was observed during the session. Pt named objects with 40% acc. And had reduced visual scanning to the left.     Assessment:     Bola Figueroa Sr. is a 85 y.o. male with an SLP diagnosis of Cognitive-Linguistic Impairment.  He presents with poor recall and orientation.    Goals:   Multidisciplinary Problems     SLP Goals        Problem: SLP Goal    Goal Priority Disciplines Outcome   SLP Goal     SLP Ongoing, Progressing   Description:  TPW complete confrontation naming tasks given the physical object with min verbal cues  TPW complete convergent and divergent naming tasks given verbal cues   TPW tolerate current diet without any overt s/s of aspiration                    Plan:     · Patient to be seen:  3 x/week   · Plan of Care expires:  10/12/19  · Plan of Care reviewed with:  patient   · SLP Follow-Up:  Yes       Discharge recommendations:  home health speech therapy, outpatient speech therapy   Barriers to Discharge:  None    Time Tracking:     SLP Treatment Date:   10/09/19  Speech Start Time:  1102  Speech Stop Time:  1122     Speech Total Time (min):  20 min    Billable Minutes: Treatment  Swallowing Dysfunction 20    Kelle Cardona CCC-SLP  10/09/2019

## 2019-10-09 NOTE — PROGRESS NOTES
"  [facility] -   Adult Nutrition  Progress Note    SUMMARY     Recommendations  1. Continue current diet. 2. Will continue to monitor.   Intervention: RD provided cardiac diet education w/ handouts from the Nutrition Care Manual.   Goals: Pt to meet >/= 85% of EEN/EPN daily.  Nutrition Goal Status: goal met   Communication of RD Recs: (plan of care, sticky note)    Reason for Assessment    Reason For Assessment: RD f/u  Diagnosis: stroke/CVA  Relevant Medical History: PVD, CHF, CAD, COPD, HTN, HLD  General Information Comments: Pt disoriented. Per Pt's daughter, pt w/ good appetite today (PO intake 100 %).  Pt's daughter states pt has good appetite and intake PTA, no wt loss. Per NFPE 10/9/19, no muscle wasting/ fat depletion noted.  RD provided cardiac diet education to pt's family.   Nutrition Discharge Planning: Pt to d/c on a cardiac diet.    Nutrition Risk Screen    Nutrition Risk Screen: no indicators present    Nutrition/Diet History    Factors Affecting Nutritional Intake: chewing difficulties/inability to chew food    Anthropometrics    Temp: 99.1 °F (37.3 °C)  Height Method: Stated  Height: 5' 10.98" (180.3 cm)  Height (inches): 70.98 in  Weight Method: Bed Scale  Weight: 78.6 kg (173 lb 4.5 oz)  Weight (lb): 173.28 lb  Ideal Body Weight (IBW), Male: 171.88 lb  % Ideal Body Weight, Male (lb): 100.81 lb  BMI (Calculated): 24.2  BMI Grade: 18.5-24.9 - normal     Lab/Procedures/Meds    Pertinent Labs Reviewed: reviewed  BMP  Lab Results   Component Value Date     (L) 10/09/2019    K 4.7 10/09/2019     10/09/2019    CO2 21 (L) 10/09/2019    BUN 24 (H) 10/09/2019    CREATININE 1.2 10/09/2019    CALCIUM 9.2 10/09/2019    ANIONGAP 10 10/09/2019    ESTGFRAFRICA >60 10/09/2019    EGFRNONAA 55 (A) 10/09/2019     Lab Results   Component Value Date    CALCIUM 9.2 10/09/2019    PHOS 2.9 10/05/2019     Lab Results   Component Value Date    ALBUMIN 3.3 (L) 10/06/2019     Recent Labs   Lab " 10/08/19  2129   POCTGLUCOSE 241*       Pertinent Medications Reviewed: reviewed      Estimated/Assessed Needs    Weight Used For Calorie Calculations: 78.6 kg (173 lb 4.5 oz)  Energy Calorie Requirements (kcal): 1869  Energy Need Method: [place] [address] Jeor x 1.2  Protein Requirements: 63 -78 gm daily  Weight Used For Protein Calculations: 78.6 kg (173 lb 4.5 oz)  Fluid Requirements (mL): 1 mL/kcal or per MD  Estimated Fluid Requirement Method: RDA Method  RDA Method (mL): 1869     Nutrition Prescription Ordered    Current Diet Order: Mechanical soft (level 5)    Evaluation of Received Nutrient/Fluid Intake    Tolerance: tolerating  % Intake of Estimated Energy Needs: 75 - 100 %  % Meal Intake: 75 - 100 %    Nutrition Risk    Level of Risk/Frequency of Follow-up: (f/u 1x/weekly)     Assessment and Plan    Nutrition Problem  Decreased nutrient needs (sodium, fat)    Related to (etiology):   Current medical condition and past medical hx    Signs and Symptoms (as evidenced by):   CVA, hx of CHF    Interventions/Recommendations (treatment strategy):  See above     Nutrition Diagnosis Status:   New        Monitor and Evaluation    Food and Nutrient Intake: energy intake  Food and Nutrient Adminstration: diet order  Knowledge/Beliefs/Attitudes: beliefs and attitudes  Physical Activity and Function: nutrition-related ADLs and IADLs  Anthropometric Measurements: weight change, weight  Biochemical Data, Medical Tests and Procedures: inflammatory profile, electrolyte and renal panel  Nutrition-Focused Physical Findings: overall appearance     Nutrition Follow-Up    RD Follow-up?: Yes

## 2019-10-09 NOTE — ASSESSMENT & PLAN NOTE
-Record from neuro medical were reviewed   -It seem there is not  Much changes fro, ct 5/19 . Neurosurgery will reviewed the CT head  For further recommendation

## 2019-10-09 NOTE — PROGRESS NOTES
[facility] -   Hematology/Oncology  Progress Note    Patient Name: Bola Figueroa Sr.  Admission Date: 10/4/2019  Hospital Length of Stay: 5 days  Code Status: Full Code     Subjective:     HPI:  85 y.o male with h/o COPD , Afib on coumadin , HTN who presented to the Emergency Department on 10/04/2019 following a fall just prior to his arrival.  On evaluation patient discovered to have cerebrovascular accident.  Patient was admitted for further evaluation and management.  On yesterday patient with complaints of headache accompanied by visual deficit prompting evaluation with CT head.  CT head revealed 1.7 x 3.3 x 3.1 cm extra-axial mass in the middle cranial fossa on the left side consistent with a meningioma.  Extensive vasogenic edema.  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  Patient was started on dexamethasone 4 mg iv bid.      Discussed findings with patient's daughter who reports patient has had this mass for quite some time that has been followed by Dr. Kahn,  Neurology at [facility] Q 6 months via imaging    Interval history: No acute events overnight.  Awaiting [facility] records.  Oncology Treatment Plan:   [No treatment plan]    Medications:  Continuous Infusions:  Scheduled Meds:   amLODIPine  10 mg Oral Daily    atorvastatin  40 mg Oral Daily    carvedilol  12.5 mg Oral BID WM    dexamethasone  4 mg Intravenous Q12H    enoxaparin  40 mg Subcutaneous Daily    levETIRAcetam  500 mg Oral BID    pantoprazole  40 mg Oral Daily    tamsulosin  0.4 mg Oral Daily    warfarin  5 mg Oral Daily     PRN Meds:acetaminophen, albuterol-ipratropium, Dextrose 10% Bolus, glucagon (human recombinant), insulin aspart U-100, labetalol, sodium chloride 0.9%, traMADol     Review of patient's allergies indicates:   Allergen Reactions    No known drug allergies         Past Medical History:   Diagnosis Date    *Atrial fibrillation     Atrial fibrillation     Bilateral carotid  artery stenosis 1/15/2016    Cardiac pacemaker 2013    Congestive heart failure 2015    COPD (chronic obstructive pulmonary disease) 2013    Coronary artery disease     Hyperlipidemia     Hypertension     Long-term (current) use of anticoagulants 2013    PVD (peripheral vascular disease)     S/P PTCA (percutaneous transluminal coronary angioplasty) 2013    Sleep apnea 2013    Stroke      Past Surgical History:   Procedure Laterality Date    CATARACT EXTRACTION W/  INTRAOCULAR LENS IMPLANT      INSERT / REPLACE / REMOVE PACEMAKER       Family History     Problem Relation (Age of Onset)    Diabetes Sister, Maternal Grandfather    Fainting Sister    Heart attack Paternal Uncle    Heart disease Paternal Uncle    Hypertension Maternal Grandmother        Tobacco Use    Smoking status: Former Smoker     Packs/day: 1.50     Years: 35.00     Pack years: 52.50     Types: Cigarettes     Last attempt to quit: 1979     Years since quittin.9    Smokeless tobacco: Never Used   Substance and Sexual Activity    Alcohol use: No     Comment: QUIT 77    Drug use: No    Sexual activity: Not on file       Review of Systems   Constitutional: Positive for activity change. Negative for appetite change, chills, fatigue and fever.   HENT: Negative for congestion, mouth sores, nosebleeds, sore throat, trouble swallowing and voice change.    Eyes: Positive for visual disturbance. Negative for pain.   Respiratory: Negative for cough, chest tightness, shortness of breath and wheezing.    Cardiovascular: Negative for chest pain, palpitations and leg swelling.   Gastrointestinal: Negative for abdominal distention, abdominal pain, anal bleeding, blood in stool, constipation, diarrhea, nausea and vomiting.   Genitourinary: Negative for difficulty urinating, dysuria and hematuria.   Musculoskeletal: Positive for gait problem. Negative for arthralgias, back pain and myalgias.   Skin:  Negative for pallor, rash and wound.   Neurological: Positive for weakness. Negative for dizziness, syncope and headaches.   Hematological: Negative for adenopathy. Does not bruise/bleed easily.   Psychiatric/Behavioral: Positive for confusion. The patient is not nervous/anxious.      Objective:     Vital Signs (Most Recent):  Temp: 97.8 °F (36.6 °C) (10/09/19 0712)  Pulse: 70 (10/09/19 0712)  Resp: 18 (10/09/19 0712)  BP: (!) 180/87 (10/09/19 0712)  SpO2: 95 % (10/09/19 0712) Vital Signs (24h Range):  Temp:  [97.6 °F (36.4 °C)-98.6 °F (37 °C)] 97.8 °F (36.6 °C)  Pulse:  [69-75] 70  Resp:  [18-20] 18  SpO2:  [95 %-99 %] 95 %  BP: (126-180)/(58-87) 180/87     Weight: 85.2 kg (187 lb 13.3 oz)  Body mass index is 26.21 kg/m².  Body surface area is 2.07 meters squared.      Intake/Output Summary (Last 24 hours) at 10/9/2019 1148  Last data filed at 10/9/2019 0600  Gross per 24 hour   Intake 240 ml   Output --   Net 240 ml       Physical Exam   Constitutional: He appears well-developed and well-nourished. He is cooperative. He appears ill. He appears distressed.   HENT:   Head: Normocephalic.   Right Ear: Hearing and tympanic membrane normal. No drainage.   Left Ear: Hearing and tympanic membrane normal. No drainage.   Nose: Nose normal.   Mouth/Throat: Oropharynx is clear and moist.   Eyes: Conjunctivae, EOM and lids are normal. Right eye exhibits no discharge. Left eye exhibits no discharge. No scleral icterus.   Neck: Normal range of motion. No thyroid mass present.   Cardiovascular: Normal rate.   Pulmonary/Chest: Effort normal and breath sounds normal. No respiratory distress. He has no wheezes. He has no rhonchi. He has no rales.   Abdominal: Soft. Bowel sounds are normal. He exhibits no distension. There is no tenderness.   Genitourinary:   Genitourinary Comments: deferred   Musculoskeletal: Normal range of motion. He exhibits no edema.   Neurological: He is alert.   Oriented to person/place   Skin: Skin is warm,  "dry and intact.   Psychiatric: He has a normal mood and affect. His speech is normal and behavior is normal. Thought content normal.   Vitals reviewed.      Significant Labs:   BMP:   Recent Labs   Lab 10/09/19  0900   *   *   K 4.7      CO2 21*   BUN 24*   CREATININE 1.2   CALCIUM 9.2   MG 2.0   , CBC:   Recent Labs   Lab 10/09/19  0900   WBC 13.43*   HGB 12.2*   HCT 39.4*      , LFTs: No results for input(s): ALT, AST, ALKPHOS, BILITOT, PROT, ALBUMIN in the last 48 hours. and All pertinent labs from the last 24 hours have been reviewed.    Diagnostic Results:  I have reviewed all pertinent imaging results/findings within the past 24 hours.    Assessment/Plan:     Meningioma  October 8, 2019  --spoke with patient's daughter states that patient has known history of "head mass".  Reports this is been followed in the outpatient setting by Dr. Kahn who was a neurologist at the [facility].  I have asked charge nurse to please have patient's medical records requested from the [facility].  Would like to obtain medical records to determine the stability or progression of meningioma.  Vasogenic edema findings on CT head concerning for progression. Continue dexamethasone 4 mg IV b.i.d..  Await medical records for further recommendations in regards to meningioma findings.  Continued neuro checks.    October 9, 2019  --awaiting medical records from Dr. Kahn's office at [facility]  --recommend neurosurgery consult  --continue dexamethasone 4 mg IV b.i.d. may consider switch to p.o. Steroids tomorrow  --continued neuro checks  --supportive care        Thank you for your consult. I will follow-up with patient. Please contact us if you have any additional questions.     Jaqui Dale NP  Hematology/Oncology  [facility]    "

## 2019-10-09 NOTE — PT/OT/SLP PROGRESS
Physical Therapy  Treatment    Bola Figueroa .   MRN: [id number]   Admitting Diagnosis: Acute CVA (cerebrovascular accident)    PT Received On: 10/09/19  PT Start Time: 1000     PT Stop Time: 1025    PT Total Time (min): 25 min       Billable Minutes:  Gait Training 15 and Therapeutic Exercise 10    Treatment Type: Treatment  PT/PTA: PTA     PTA Visit Number: 1       General Precautions: Standard, fall  Orthopedic Precautions: N/A   Braces: N/A    Spiritual, Cultural Beliefs, Jainism Practices, Values that Affect Care: no    Subjective:  Communicated with PATIENT NURSE, ERROL AND [facility] CHART REVIEW DONE  prior to session.  PATIENT AGREE TO TX NOW.    Pain/Comfort  Pain Rating 1: 2/10  Location - Side 1: Bilateral  Location - Orientation 1: anterior  Location 1: head  Pain Addressed 1: Pre-medicate for activity, Reposition, Distraction, Cessation of Activity  Pain Rating Post-Intervention 1: 2/10    Objective:   Patient found with: peripheral IV, telemetry, SUPINE IN BED. ASSISTED PATIENT WITH OOB T/FS, GT INTO HALLWAY, EYAL LE EXERCISE INSTRUCTION.    Functional Mobility:  Bed Mobility:    SUPINE TO SIT , SIT TO SUPINE AT MIN TO CGAX1.    Transfers:   SIT TO STAND, STAND TO SIT AT MIN TO MOD ASSIST X1 -2 FOR SAFETY, MAX CUES FOR BODY ALIGNMENT DURING T/FS.    Gait:    25'X2 AT MOD ASSIST X2 FOR SAFETY WITH RW, PATIENT WITH LEFT NEGLECT , NOTED DURING TURNS, T/FS , NEGOTIATING RW BY OBSTACLES,    Stairs:  N/A    Balance:   Static Sit: FAIR: Maintains without assist, but unable to take any challenges   Dynamic Sit: FAIR: Cannot move trunk without losing balance  Static Stand: POOR: Needs MODERATE assist to maintain  Dynamic stand: POOR: Needs MOD (moderate) assist during gait     Therapeutic Activities and Exercises:  EYAL LE EXERCISES, OOB T/FS , GT INTO HALLWAY.    AM-PAC 6 CLICK MOBILITY  How much help from another person does this patient currently need?   1 = Unable, Total/Dependent Assistance  2 = A lot,  Maximum/Moderate Assistance  3 = A little, Minimum/Contact Guard/Supervision  4 = None, Modified [place]/Independent    Turning over in bed (including adjusting bedclothes, sheets and blankets)?: 3  Sitting down on and standing up from a chair with arms (e.g., wheelchair, bedside commode, etc.): 3  Moving from lying on back to sitting on the side of the bed?: 3  Moving to and from a bed to a chair (including a wheelchair)?: 2  Need to walk in hospital room?: 2  Climbing 3-5 steps with a railing?: 1  Basic Mobility Total Score: 14    AM-PAC Raw Score CMS G-Code Modifier Level of Impairment Assistance   6 % Total / Unable   7 - 9 CM 80 - 100% Maximal Assist   10 - 14 CL 60 - 80% Moderate Assist   15 - 19 CK 40 - 60% Moderate Assist   20 - 22 CJ 20 - 40% Minimal Assist   23 CI 1-20% SBA / CGA   24 CH 0% Independent/ Mod I     Patient left supine  IN BED PER NURSE REQUEST FOR SAFETY with all lines intact, call button in reach and bed alarm on.    Assessment:  Bola Figueroa Sr. is a 85 y.o. male with a medical diagnosis of Acute CVA (cerebrovascular accident) . PATIENT PARTICIPATING WELL WITH ALL THERAPEUTIC ACTIVIITES. PATIENT UNSTEADY ON HIS FEET, LEFT NEGLECT NOTED DURING T/FS, GT ACTIVITY. PATIENT WITH BENEFIT WITH CONTINUED THERAPY SERVICES.    Rehab identified problem list/impairments: Rehab identified problem list/impairments: weakness, gait instability, impaired endurance, impaired balance, decreased safety awareness, decreased lower extremity function, decreased upper extremity function, impaired self care skills, impaired functional mobilty, pain    Rehab potential is good.    Activity tolerance: Good    Discharge recommendations: Discharge Facility/Level of Care Needs: rehabilitation facility     Barriers to discharge:      Equipment recommendations: Equipment Needed After Discharge: none     GOALS:   Multidisciplinary Problems     Physical Therapy Goals        Problem: Physical Therapy Goal    Goal  Priority Disciplines Outcome Goal Variances Interventions   Physical Therapy Goal     PT, PT/OT Ongoing, Progressing     Description:  1. Patient will perform supine to/from sit min A  2. Patient will perform sit to/from stand min A with least RW  3. Patient will ambulate x 150ft RW min A                    PLAN:    Patient to be seen 5 x/week  to address the above listed problems via gait training, therapeutic activities, therapeutic exercises  Plan of Care expires: 10/12/19  Plan of Care reviewed with: patient         Dawna Ferguson, PTA  10/09/2019

## 2019-10-09 NOTE — PLAN OF CARE
Ongoing (interventions implemented as appropriate)  Pt only oriented to person.  VSS  Pt able to make needs known.  Pt remained afebrile throughout this shift.   Pt ambulates with assist in room, gait unsteady   Pt remained free of falls this shift.   Pt denies pain this shift.  Plan of care reviewed. Patient verbalizes understanding.   Pt moving/turing independent. Frequent weight shifting encouraged.  Patient Paced on monitor.   Bed low, side rails up x 2, wheels locked, call light in reach.   Hourly rounding completed.   Will continue to monitor.

## 2019-10-09 NOTE — PLAN OF CARE
PATIENT WITH GREAT EFFORTS WITH GT INTO HALLWAY AT MOD ASSIST X1-2 WITH RW FOR SAFETY, LEFT NEGLECT NOTED DURING T/FS , GT ACTIVITY.

## 2019-10-09 NOTE — PROGRESS NOTES
Clinical Pharmacy Progress Note: Medication Education     Pharmacist educated caregiver, patient's daughter Sarahi, on warfarin indication, side effects, and drug interactions. Pharmacist discussed importance of medication compliance and INR monitoring and reviewed signs of abnormal bleeding. Pharmacist gave caregiver warfarin educational handout. Instructed caregiver to contact the [facility] at [phone] for follow-up after discharge. Caregiver expressed understanding and had no further questions.    Thank you for allowing us to participate in this patient's care.     Elena Fink 10/9/2019 10:35 AM

## 2019-10-10 VITALS
BODY MASS INDEX: 26.35 KG/M2 | WEIGHT: 188.25 LBS | HEART RATE: 71 BPM | TEMPERATURE: 98 F | RESPIRATION RATE: 18 BRPM | OXYGEN SATURATION: 95 % | DIASTOLIC BLOOD PRESSURE: 65 MMHG | HEIGHT: 71 IN | SYSTOLIC BLOOD PRESSURE: 144 MMHG

## 2019-10-10 LAB
ANION GAP SERPL CALC-SCNC: 9 MMOL/L (ref 8–16)
BASOPHILS # BLD AUTO: 0.01 K/UL (ref 0–0.2)
BASOPHILS NFR BLD: 0.1 % (ref 0–1.9)
BUN SERPL-MCNC: 22 MG/DL (ref 8–23)
CALCIUM SERPL-MCNC: 9.3 MG/DL (ref 8.7–10.5)
CHLORIDE SERPL-SCNC: 105 MMOL/L (ref 95–110)
CO2 SERPL-SCNC: 26 MMOL/L (ref 23–29)
CREAT SERPL-MCNC: 1.1 MG/DL (ref 0.5–1.4)
DIFFERENTIAL METHOD: ABNORMAL
EOSINOPHIL # BLD AUTO: 0 K/UL (ref 0–0.5)
EOSINOPHIL NFR BLD: 0 % (ref 0–8)
ERYTHROCYTE [DISTWIDTH] IN BLOOD BY AUTOMATED COUNT: 12.3 % (ref 11.5–14.5)
EST. GFR  (AFRICAN AMERICAN): >60 ML/MIN/1.73 M^2
EST. GFR  (NON AFRICAN AMERICAN): >60 ML/MIN/1.73 M^2
GLUCOSE SERPL-MCNC: 181 MG/DL (ref 70–110)
HCT VFR BLD AUTO: 39.1 % (ref 40–54)
HGB BLD-MCNC: 12.5 G/DL (ref 14–18)
IMM GRANULOCYTES # BLD AUTO: 0.06 K/UL (ref 0–0.04)
IMM GRANULOCYTES NFR BLD AUTO: 0.5 % (ref 0–0.5)
INR PPP: 1.1 (ref 0.8–1.2)
LYMPHOCYTES # BLD AUTO: 1 K/UL (ref 1–4.8)
LYMPHOCYTES NFR BLD: 8.7 % (ref 18–48)
MCH RBC QN AUTO: 29.6 PG (ref 27–31)
MCHC RBC AUTO-ENTMCNC: 32 G/DL (ref 32–36)
MCV RBC AUTO: 93 FL (ref 82–98)
MONOCYTES # BLD AUTO: 0.4 K/UL (ref 0.3–1)
MONOCYTES NFR BLD: 3.6 % (ref 4–15)
NEUTROPHILS # BLD AUTO: 9.6 K/UL (ref 1.8–7.7)
NEUTROPHILS NFR BLD: 87.1 % (ref 38–73)
NRBC BLD-RTO: 0 /100 WBC
PLATELET # BLD AUTO: 254 K/UL (ref 150–350)
PMV BLD AUTO: 11.6 FL (ref 9.2–12.9)
POCT GLUCOSE: 178 MG/DL (ref 70–110)
POTASSIUM SERPL-SCNC: 4.5 MMOL/L (ref 3.5–5.1)
PROTHROMBIN TIME: 11.4 SEC (ref 9–12.5)
RBC # BLD AUTO: 4.22 M/UL (ref 4.6–6.2)
SODIUM SERPL-SCNC: 140 MMOL/L (ref 136–145)
WBC # BLD AUTO: 10.97 K/UL (ref 3.9–12.7)

## 2019-10-10 PROCEDURE — 97530 THERAPEUTIC ACTIVITIES: CPT | Performed by: PHYSICAL THERAPIST

## 2019-10-10 PROCEDURE — 97110 THERAPEUTIC EXERCISES: CPT

## 2019-10-10 PROCEDURE — 36415 COLL VENOUS BLD VENIPUNCTURE: CPT

## 2019-10-10 PROCEDURE — 80048 BASIC METABOLIC PNL TOTAL CA: CPT

## 2019-10-10 PROCEDURE — 97110 THERAPEUTIC EXERCISES: CPT | Performed by: PHYSICAL THERAPIST

## 2019-10-10 PROCEDURE — 85610 PROTHROMBIN TIME: CPT

## 2019-10-10 PROCEDURE — 99233 SBSQ HOSP IP/OBS HIGH 50: CPT | Mod: ,,, | Performed by: INTERNAL MEDICINE

## 2019-10-10 PROCEDURE — 63600175 PHARM REV CODE 636 W HCPCS: Performed by: INTERNAL MEDICINE

## 2019-10-10 PROCEDURE — 99233 PR SUBSEQUENT HOSPITAL CARE,LEVL III: ICD-10-PCS | Mod: ,,, | Performed by: INTERNAL MEDICINE

## 2019-10-10 PROCEDURE — 85025 COMPLETE CBC W/AUTO DIFF WBC: CPT

## 2019-10-10 PROCEDURE — 90471 IMMUNIZATION ADMIN: CPT | Performed by: INTERNAL MEDICINE

## 2019-10-10 PROCEDURE — 97116 GAIT TRAINING THERAPY: CPT

## 2019-10-10 PROCEDURE — G0008 ADMIN INFLUENZA VIRUS VAC: HCPCS | Performed by: INTERNAL MEDICINE

## 2019-10-10 PROCEDURE — 25000003 PHARM REV CODE 250: Performed by: INTERNAL MEDICINE

## 2019-10-10 PROCEDURE — 90662 IIV NO PRSV INCREASED AG IM: CPT | Performed by: INTERNAL MEDICINE

## 2019-10-10 RX ORDER — PREDNISONE 5 MG/1
5 TABLET ORAL DAILY
Qty: 30 TABLET | Refills: 5
Start: 2019-10-10

## 2019-10-10 RX ORDER — ISOSORBIDE DINITRATE 10 MG/1
20 TABLET ORAL 2 TIMES DAILY
Status: DISCONTINUED | OUTPATIENT
Start: 2019-10-10 | End: 2019-10-10 | Stop reason: HOSPADM

## 2019-10-10 RX ORDER — ISOSORBIDE DINITRATE 10 MG/1
20 TABLET ORAL 2 TIMES DAILY
Status: DISCONTINUED | OUTPATIENT
Start: 2019-10-10 | End: 2019-10-10

## 2019-10-10 RX ORDER — FUROSEMIDE 40 MG/1
40 TABLET ORAL DAILY
Qty: 60 TABLET | Refills: 6 | Status: SHIPPED | OUTPATIENT
Start: 2019-10-10

## 2019-10-10 RX ORDER — FUROSEMIDE 40 MG/1
40 TABLET ORAL DAILY
Status: DISCONTINUED | OUTPATIENT
Start: 2019-10-10 | End: 2019-10-10 | Stop reason: HOSPADM

## 2019-10-10 RX ORDER — ATORVASTATIN CALCIUM 40 MG/1
40 TABLET, FILM COATED ORAL DAILY
Qty: 90 TABLET | Refills: 3
Start: 2019-10-11 | End: 2020-10-10

## 2019-10-10 RX ORDER — FUROSEMIDE 40 MG/1
40 TABLET ORAL 2 TIMES DAILY
Status: DISCONTINUED | OUTPATIENT
Start: 2019-10-10 | End: 2019-10-10

## 2019-10-10 RX ORDER — LISINOPRIL 10 MG/1
10 TABLET ORAL DAILY
Status: DISCONTINUED | OUTPATIENT
Start: 2019-10-10 | End: 2019-10-10

## 2019-10-10 RX ADMIN — LEVETIRACETAM 500 MG: 500 TABLET, FILM COATED ORAL at 08:10

## 2019-10-10 RX ADMIN — FUROSEMIDE 40 MG: 40 TABLET ORAL at 10:10

## 2019-10-10 RX ADMIN — INFLUENZA A VIRUS A/MICHIGAN/45/2015 X-275 (H1N1) ANTIGEN (FORMALDEHYDE INACTIVATED), INFLUENZA A VIRUS A/SINGAPORE/INFIMH-16-0019/2016 IVR-186 (H3N2) ANTIGEN (FORMALDEHYDE INACTIVATED), AND INFLUENZA B VIRUS B/MARYLAND/15/2016 BX-69A (A B/COLORADO/6/2017-LIKE VIRUS) ANTIGEN (FORMALDEHYDE INACTIVATED) 0.5 ML: 60; 60; 60 INJECTION, SUSPENSION INTRAMUSCULAR at 10:10

## 2019-10-10 RX ADMIN — PANTOPRAZOLE SODIUM 40 MG: 40 TABLET, DELAYED RELEASE ORAL at 08:10

## 2019-10-10 RX ADMIN — ISOSORBIDE DINITRATE 20 MG: 10 TABLET ORAL at 10:10

## 2019-10-10 RX ADMIN — ATORVASTATIN CALCIUM 40 MG: 40 TABLET, FILM COATED ORAL at 08:10

## 2019-10-10 RX ADMIN — CARVEDILOL 12.5 MG: 12.5 TABLET, FILM COATED ORAL at 08:10

## 2019-10-10 RX ADMIN — TAMSULOSIN HYDROCHLORIDE 0.4 MG: 0.4 CAPSULE ORAL at 08:10

## 2019-10-10 RX ADMIN — AMLODIPINE BESYLATE 10 MG: 10 TABLET ORAL at 08:10

## 2019-10-10 RX ADMIN — DEXAMETHASONE 2 MG: 1 TABLET ORAL at 08:10

## 2019-10-10 NOTE — PLAN OF CARE
POC reviewed with pt, verbalized understanding. Pt remained free from falls, fall precautions in place. Pt is paced on monitor. VS monitored. BP monitored. PRN medication given for BP. Blood glucose monitored. Neuro checks q4h. No other c/o at this time. PIV intact, saline locked. Sitter at the bedside. Call bell and personal belongings within reach. Hourly rounding complete. Reminded to call for assistance. Will continue to monitor.

## 2019-10-10 NOTE — PT/OT/SLP PROGRESS
Physical Therapy  Treatment    Bola Figueroa .   MRN: [id number]   Admitting Diagnosis: Acute CVA (cerebrovascular accident)    PT Received On: 10/04/19  PT Start Time: 1325     PT Stop Time: 1350    PT Total Time (min): 25 min       Billable Minutes:  Gait Training 15 and Therapeutic Exercise 10    Treatment Type: Treatment  PT/PTA: PT     PTA Visit Number: 1       General Precautions: Standard, fall, vision impaired  Orthopedic Precautions: N/A   Braces: N/A    Spiritual, Cultural Beliefs, Oriental orthodox Practices, Values that Affect Care: no    Subjective:  Communicated with BK Zaragoza prior to session.      Pain/Comfort  Pain Rating 1: 0/10    Objective:   Patient found with: peripheral IV, telemetry    Functional Mobility:  Bed Mobility: Kristie with directional cues and for use of bedrail       Transfers: min A for safety and cues for safe handplacement       Gait: mod a- 50ftx 2 RW cues for direction and A with rw management during turns       Stairs:n/a      Balance:   Static Sit: cga  Dynamic Sit: min a leans to L at times  Static Stand: min A rw  Dynamic stand: mod a rw     Therapeutic Activities and Exercises:  PT educated patient on POC, role of PT, le exs on eob x 10-15 reps with min/cga for balance AND safety/fall precautions with mobility.     AM-PAC 6 CLICK MOBILITY  How much help from another person does this patient currently need?   1 = Unable, Total/Dependent Assistance  2 = A lot, Maximum/Moderate Assistance  3 = A little, Minimum/Contact Guard/Supervision  4 = None, Modified [place]/Independent    Turning over in bed (including adjusting bedclothes, sheets and blankets)?: 3  Sitting down on and standing up from a chair with arms (e.g., wheelchair, bedside commode, etc.): 3  Moving from lying on back to sitting on the side of the bed?: 3  Moving to and from a bed to a chair (including a wheelchair)?: 3  Need to walk in hospital room?: 2  Climbing 3-5 steps with a railing?: 1  Basic Mobility Total  Score: 15    AM-PAC Raw Score CMS G-Code Modifier Level of Impairment Assistance   6 % Total / Unable   7 - 9 CM 80 - 100% Maximal Assist   10 - 14 CL 60 - 80% Moderate Assist   15 - 19 CK 40 - 60% Moderate Assist   20 - 22 CJ 20 - 40% Minimal Assist   23 CI 1-20% SBA / CGA   24 CH 0% Independent/ Mod I     Patient left HOB elevated with all lines intact, call button in reach, RN notified and family present.    Assessment:  Bola Figueroa Sr. is a 85 y.o. male with a medical diagnosis of Acute CVA (cerebrovascular accident) and presents with impaired mobility.    Rehab identified problem list/impairments: Rehab identified problem list/impairments: weakness, impaired self care skills, impaired balance, impaired endurance, impaired functional mobilty, visual deficits, gait instability, impaired cognition, decreased coordination, decreased safety awareness    Rehab potential is good.    Activity tolerance: Good    Discharge recommendations: Discharge Facility/Level of Care Needs: rehabilitation facility     Barriers to discharge:      Equipment recommendations: Equipment Needed After Discharge: (tbd)     GOALS:   Multidisciplinary Problems     Physical Therapy Goals        Problem: Physical Therapy Goal    Goal Priority Disciplines Outcome Goal Variances Interventions   Physical Therapy Goal     PT, PT/OT Ongoing, Progressing     Description:  1. Patient will perform supine to/from sit min A  2. Patient will perform sit to/from stand min A with least RW  3. Patient will ambulate x 150ft RW min A                    PLAN:    Patient to be seen 5 x/week  to address the above listed problems via gait training, therapeutic activities, therapeutic exercises, neuromuscular re-education  Plan of Care expires: 10/12/19  Plan of Care reviewed with: patient, family         Elio Tavarez, PT  10/10/2019

## 2019-10-10 NOTE — PROGRESS NOTES
Clinical Pharmacy Progress Note: Coumadin Dosing and Monitoring     Indication: A-fib, Right PCA stroke, h/o TIA stroke.  Goal INR: 2-3  Lab Results   Component Value Date    INR 1.1 10/10/2019    INR 1.0 10/09/2019    INR 0.9 10/08/2019       Patient/ patient's family has been educated by pharmacist this admission.      Current dose: 5 mg daily     Plan: Continue dosing. DVT prophylactic dose Lovenox preferred per Neurology.   Anticipate more pronounced increases in INR starting tomorrow due to long half-lives of vitamin K dependent cofactors. Patient went ~ 1 week without doses at home with resumed dosing on 10/7.     PT/INR will be monitored daily. Dose adjustments will be made accordingly.      Thank you for allowing us to participate in this patient's care.    Katiuska Velásquez, PharmD 10/10/2019 7:31 AM

## 2019-10-10 NOTE — ASSESSMENT & PLAN NOTE
"October 8, 2019  --spoke with patient's daughter states that patient has known history of "head mass".  Reports this is been followed in the outpatient setting by Dr. Kahn who was a neurologist at the [facility].  I have asked charge nurse to please have patient's medical records requested from the [facility].  Would like to obtain medical records to determine the stability or progression of meningioma.  Vasogenic edema findings on CT head concerning for progression. Continue dexamethasone 4 mg IV b.i.d..  Await medical records for further recommendations in regards to meningioma findings.  Continued neuro checks.    October 9, 2019  --awaiting medical records from Dr. Kahn's office at [facility]  --recommend neurosurgery consult  --continue dexamethasone 4 mg IV b.i.d. may consider switch to p.o. Steroids tomorrow  --continued neuro checks  --supportive care    October 10, 2019  --Previous imaging done by [facility] compared with most recent images done during hospitalization. Meningioma essentially unchanged since previous study. Case discussed with Rad/Onc. No role for radiation in this setting. Patient needs continued outpatient follow up with his Neurologist for continued monitoring. No chemotherapy is indicated in this setting. Continue PO decadron. Neuro checks. Supportive care  "

## 2019-10-10 NOTE — PROGRESS NOTES
[facility] -   Hematology/Oncology  Progress Note    Patient Name: Bola Figueroa Sr.  Admission Date: 10/4/2019  Hospital Length of Stay: 6 days  Code Status: Full Code     Subjective:     HPI:  85 y.o male with h/o COPD , Afib on coumadin , HTN who presented to the Emergency Department on 10/04/2019 following a fall just prior to his arrival.  On evaluation patient discovered to have cerebrovascular accident.  Patient was admitted for further evaluation and management.  On yesterday patient with complaints of headache accompanied by visual deficit prompting evaluation with CT head.  CT head revealed 1.7 x 3.3 x 3.1 cm extra-axial mass in the middle cranial fossa on the left side consistent with a meningioma.  Extensive vasogenic edema.  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  Patient was started on dexamethasone 4 mg iv bid.      Discussed findings with patient's daughter who reports patient has had this mass for quite some time that has been followed by Dr. Kahn,  Neurology at [facility] Q 6 months via imaging    Interval history: No acute events overnight.  [facility] records reviewed. Meningioma essentially unchanged from prior study  Oncology Treatment Plan:   [No treatment plan]    Medications:  Continuous Infusions:  Scheduled Meds:   amLODIPine  10 mg Oral Daily    atorvastatin  40 mg Oral Daily    carvedilol  12.5 mg Oral BID WM    dexAMETHasone  2 mg Oral Q12H    enoxaparin  40 mg Subcutaneous Daily    furosemide  40 mg Oral Daily    isosorbide dinitrate  20 mg Oral BID    levETIRAcetam  500 mg Oral BID    pantoprazole  40 mg Oral Daily    tamsulosin  0.4 mg Oral Daily    warfarin  5 mg Oral Daily     PRN Meds:acetaminophen, albuterol-ipratropium, Dextrose 10% Bolus, glucagon (human recombinant), insulin aspart U-100, labetalol, sodium chloride 0.9%, traMADol     Review of patient's allergies indicates:   Allergen Reactions    No known drug allergies          Past Medical History:   Diagnosis Date    *Atrial fibrillation     Atrial fibrillation     Bilateral carotid artery stenosis 1/15/2016    Cardiac pacemaker 2013    Congestive heart failure 2015    COPD (chronic obstructive pulmonary disease) 2013    Coronary artery disease     Hyperlipidemia     Hypertension     Long-term (current) use of anticoagulants 2013    PVD (peripheral vascular disease)     S/P PTCA (percutaneous transluminal coronary angioplasty) 2013    Sleep apnea 2013    Stroke      Past Surgical History:   Procedure Laterality Date    CATARACT EXTRACTION W/  INTRAOCULAR LENS IMPLANT      INSERT / REPLACE / REMOVE PACEMAKER       Family History     Problem Relation (Age of Onset)    Diabetes Sister, Maternal Grandfather    Fainting Sister    Heart attack Paternal Uncle    Heart disease Paternal Uncle    Hypertension Maternal Grandmother        Tobacco Use    Smoking status: Former Smoker     Packs/day: 1.50     Years: 35.00     Pack years: 52.50     Types: Cigarettes     Last attempt to quit: 1979     Years since quittin.9    Smokeless tobacco: Never Used   Substance and Sexual Activity    Alcohol use: No     Comment: QUIT 77    Drug use: No    Sexual activity: Not on file       Review of Systems   Constitutional: Positive for activity change. Negative for appetite change, chills, fatigue and fever.   HENT: Negative for congestion, mouth sores, nosebleeds, sore throat, trouble swallowing and voice change.    Eyes: Positive for visual disturbance. Negative for pain.   Respiratory: Negative for cough, chest tightness, shortness of breath and wheezing.    Cardiovascular: Negative for chest pain, palpitations and leg swelling.   Gastrointestinal: Negative for abdominal distention, abdominal pain, anal bleeding, blood in stool, constipation, diarrhea, nausea and vomiting.   Genitourinary: Negative for difficulty urinating, dysuria and  hematuria.   Musculoskeletal: Positive for gait problem. Negative for arthralgias, back pain and myalgias.   Skin: Negative for pallor, rash and wound.   Neurological: Positive for weakness. Negative for dizziness, syncope and headaches.   Hematological: Negative for adenopathy. Does not bruise/bleed easily.   Psychiatric/Behavioral: Positive for confusion. The patient is not nervous/anxious.      Objective:     Vital Signs (Most Recent):  Temp: 97.8 °F (36.6 °C) (10/10/19 1105)  Pulse: 71 (10/10/19 1105)  Resp: 18 (10/10/19 1105)  BP: (!) 144/65 (10/10/19 1105)  SpO2: 95 % (10/10/19 1105) Vital Signs (24h Range):  Temp:  [97.6 °F (36.4 °C)-98.2 °F (36.8 °C)] 97.8 °F (36.6 °C)  Pulse:  [62-83] 71  Resp:  [18] 18  SpO2:  [92 %-99 %] 95 %  BP: (144-195)/(65-91) 144/65     Weight: 85.4 kg (188 lb 4.4 oz)  Body mass index is 26.27 kg/m².  Body surface area is 2.07 meters squared.      Intake/Output Summary (Last 24 hours) at 10/10/2019 1235  Last data filed at 10/10/2019 0609  Gross per 24 hour   Intake 870 ml   Output 350 ml   Net 520 ml       Physical Exam   Constitutional: He appears well-developed and well-nourished. He is cooperative. He appears ill. He appears distressed.   HENT:   Head: Normocephalic.   Right Ear: Hearing and tympanic membrane normal. No drainage.   Left Ear: Hearing and tympanic membrane normal. No drainage.   Nose: Nose normal.   Mouth/Throat: Oropharynx is clear and moist.   Eyes: Conjunctivae, EOM and lids are normal. Right eye exhibits no discharge. Left eye exhibits no discharge. No scleral icterus.   Neck: Normal range of motion. No thyroid mass present.   Cardiovascular: Normal rate.   Pulmonary/Chest: Effort normal and breath sounds normal. No respiratory distress. He has no wheezes. He has no rhonchi. He has no rales.   Abdominal: Soft. Bowel sounds are normal. He exhibits no distension. There is no tenderness.   Genitourinary:   Genitourinary Comments: deferred   Musculoskeletal:  "Normal range of motion. He exhibits no edema.   Neurological: He is alert.   Oriented to person/place   Skin: Skin is warm, dry and intact.   Psychiatric: He has a normal mood and affect. His speech is normal and behavior is normal. Thought content normal.   Vitals reviewed.      Significant Labs:   BMP:   Recent Labs   Lab 10/09/19  0900 10/10/19  0814   * 181*   * 140   K 4.7 4.5    105   CO2 21* 26   BUN 24* 22   CREATININE 1.2 1.1   CALCIUM 9.2 9.3   MG 2.0  --    , CBC:   Recent Labs   Lab 10/09/19  0900 10/10/19  0814   WBC 13.43* 10.97   HGB 12.2* 12.5*   HCT 39.4* 39.1*    254   , LFTs: No results for input(s): ALT, AST, ALKPHOS, BILITOT, PROT, ALBUMIN in the last 48 hours. and All pertinent labs from the last 24 hours have been reviewed.    Diagnostic Results:  I have reviewed all pertinent imaging results/findings within the past 24 hours.    Assessment/Plan:     Meningioma  October 8, 2019  --spoke with patient's daughter states that patient has known history of "head mass".  Reports this is been followed in the outpatient setting by Dr. Kahn who was a neurologist at the [facility].  I have asked charge nurse to please have patient's medical records requested from the [facility].  Would like to obtain medical records to determine the stability or progression of meningioma.  Vasogenic edema findings on CT head concerning for progression. Continue dexamethasone 4 mg IV b.i.d..  Await medical records for further recommendations in regards to meningioma findings.  Continued neuro checks.    October 9, 2019  --awaiting medical records from Dr. Kahn's office at [facility]  --recommend neurosurgery consult  --continue dexamethasone 4 mg IV b.i.d. may consider switch to p.o. Steroids tomorrow  --continued neuro checks  --supportive care    October 10, 2019  --Previous imaging done by [facility] compared with most recent images done " during hospitalization. Meningioma essentially unchanged since previous study. Case discussed with Rad/Onc. No role for radiation in this setting. Patient needs continued outpatient follow up with his Neurologist for continued monitoring. No chemotherapy is indicated in this setting. Continue PO decadron. Neuro checks. Supportive care        Thank you for your consult. I will follow-up with patient. Please contact us if you have any additional questions.     Jaqui Dale NP  Hematology/Oncology  [facility]

## 2019-10-10 NOTE — PLAN OF CARE
Patient needs min A for bed mobility and transfers with vc/tc's for direction/coordination and mod A for amb with RW r/t dec vision and L sided weakness

## 2019-10-10 NOTE — PLAN OF CARE
Discharge orders noted. Faced appropriate [facility]. Once paperwork reviewed, [facility] will call with # for report and transportation arrangements. Update to patient's family. (Bola , patient's son @ [phone] via VM. Attempted to contact daughter Luz Maria @ [phone] no answer and no vm )     Update to Citlali, discharge nurse.       Disposition: [facility]    # for report: [phone]    Transportation: to be arranged by [facility].                           Cynthia Delgadillo, Admissions at [facility] will call nursing with transportation arrangements.            10/10/19 1240   Post-Acute Status   Post-Acute Authorization Placement   Post-Acute Placement Status Set-up Complete

## 2019-10-10 NOTE — PLAN OF CARE
10/10/19 1721   Final Note   Assessment Type Final Discharge Note   Anticipated Discharge Disposition Rehab   Right Care Referral Info   Post Acute Recommendation IRF   Facility [facility]

## 2019-10-10 NOTE — PT/OT/SLP PROGRESS
"Occupational Therapy  Treatment    Bola Figueroa Sr.   MRN: [id number]   Admitting Diagnosis: Acute CVA (cerebrovascular accident)    OT Date of Treatment: 10/10/19   OT Start Time: 1120  OT Stop Time: 1145  OT Total Time (min): 25 min    Billable Minutes:  Therapeutic Activity 15 min and Therapeutic Exercise 10 min    General Precautions: Standard, fall, vision impaired  Orthopedic Precautions: N/A  Braces: N/A         Subjective:  Communicated with Nurse Nik and epic chart review prior to session.  Pt found supine in bed and agreeable to tx at this time. Pt had sitter in room.   Pain/Comfort  Pain Rating 1: 0/10  Pain Rating Post-Intervention 1: 0/10    Objective:  Patient found with: bed alarm, peripheral IV, telemetry     Functional Mobility:  Therapeutic Activities and Exercises:  Pt performed supine>sit with Min A, scooted to EOB with SBA. Pt sat EOB and performed (B) UE ROM exercises 1 x 20 reps: shoulder flex, chest press, bicep curls.  Pt performed sit>stand using RW with Min A, functional mobility using RW with with Mod A and verbal and tactile cues for RW safety, t/f to chair using RW with Mod A.     AM-PAC 6 CLICK ADL   How much help from another person does this patient currently need?   1 = Unable, Total/Dependent Assistance  2 = A lot, Maximum/Moderate Assistance  3 = A little, Minimum/Contact Guard/Supervision  4 = None, Modified [place]/Independent    Putting on and taking off regular lower body clothing? : 2  Bathing (including washing, rinsing, drying)?: 2  Toileting, which includes using toilet, bedpan, or urinal? : 2  Putting on and taking off regular upper body clothing?: 2  Taking care of personal grooming such as brushing teeth?: 3  Eating meals?: 3  Daily Activity Total Score: 14     AM-PAC Raw Score CMS "G-Code Modifier Level of Impairment Assistance   6 % Total / Unable   7 - 8 CM 80 - 100% Maximal Assist   9-13 CL 60 - 80% Moderate Assist   14 - 19 CK 40 - 60% Moderate Assist "   20 - 22 CJ 20 - 40% Minimal Assist   23 CI 1-20% SBA / CGA   24 CH 0% Independent/ Mod I       Patient left up in chair with all lines intact, call button in reach, chair alarm on, Nurse Nik notified and Santy Alfred present    ASSESSMENT:  Bola Figueroa Sr. is a 85 y.o. male with a medical diagnosis of Acute CVA (cerebrovascular accident) and presents with impaired functional mobility and ADLs. Pt will benefit from continued skilled OT in order to address impairments.     Rehab identified problem list/impairments: Rehab identified problem list/impairments: weakness, impaired endurance, impaired self care skills, impaired functional mobilty, decreased coordination, decreased safety awareness, visual deficits, impaired balance, decreased lower extremity function, gait instability, decreased upper extremity function    Rehab potential is good.    Activity tolerance: Good    Discharge recommendations: Discharge Facility/Level of Care Needs: rehabilitation facility     Barriers to discharge:      Equipment recommendations: (tbd)     GOALS:   Multidisciplinary Problems     Occupational Therapy Goals        Problem: Occupational Therapy Goal    Goal Priority Disciplines Outcome Interventions   Occupational Therapy Goal     OT, PT/OT Ongoing, Progressing    Description:  OT GOALS TO BE MET BY 10-12-19  SBA WITH UE DRESSING  PT WILL TOLERATE 1 SET X 15 REPS B UE ROM EXERCISE  SBA WITH LE DRESSING                    Plan:  Patient to be seen 3 x/week to address the above listed problems via self-care/home management, therapeutic activities, therapeutic exercises  Plan of Care expires: 10/12/19  Plan of Care reviewed with: patient    OT G-codes  Functional Assessment Tool Used: [facility]  Score: 14    Gissel Christie, PT/OT  10/10/2019

## 2019-10-10 NOTE — SUBJECTIVE & OBJECTIVE
Interval history: No acute events overnight.  [facility]. Meningioma essentially unchanged from prior study  Oncology Treatment Plan:   [No treatment plan]    Medications:  Continuous Infusions:  Scheduled Meds:   amLODIPine  10 mg Oral Daily    atorvastatin  40 mg Oral Daily    carvedilol  12.5 mg Oral BID WM    dexAMETHasone  2 mg Oral Q12H    enoxaparin  40 mg Subcutaneous Daily    furosemide  40 mg Oral Daily    isosorbide dinitrate  20 mg Oral BID    levETIRAcetam  500 mg Oral BID    pantoprazole  40 mg Oral Daily    tamsulosin  0.4 mg Oral Daily    warfarin  5 mg Oral Daily     PRN Meds:acetaminophen, albuterol-ipratropium, Dextrose 10% Bolus, glucagon (human recombinant), insulin aspart U-100, labetalol, sodium chloride 0.9%, traMADol     Review of patient's allergies indicates:   Allergen Reactions    No known drug allergies         Past Medical History:   Diagnosis Date    *Atrial fibrillation     Atrial fibrillation     Bilateral carotid artery stenosis 1/15/2016    Cardiac pacemaker 8/7/2013    Congestive heart failure 4/6/2015    COPD (chronic obstructive pulmonary disease) 8/7/2013    Coronary artery disease     Hyperlipidemia     Hypertension     Long-term (current) use of anticoagulants 8/7/2013    PVD (peripheral vascular disease)     S/P PTCA (percutaneous transluminal coronary angioplasty) 8/7/2013    Sleep apnea 8/7/2013    Stroke      Past Surgical History:   Procedure Laterality Date    CATARACT EXTRACTION W/  INTRAOCULAR LENS IMPLANT      INSERT / REPLACE / REMOVE PACEMAKER  2005     Family History     Problem Relation (Age of Onset)    Diabetes Sister, Maternal Grandfather    Fainting Sister    Heart attack Paternal Uncle    Heart disease Paternal Uncle    Hypertension Maternal Grandmother        Tobacco Use    Smoking status: Former Smoker     Packs/day: 1.50     Years: 35.00     Pack years: 52.50     Types: Cigarettes     Last attempt  to quit: 1979     Years since quittin.9    Smokeless tobacco: Never Used   Substance and Sexual Activity    Alcohol use: No     Comment: QUIT 77    Drug use: No    Sexual activity: Not on file       Review of Systems   Constitutional: Positive for activity change. Negative for appetite change, chills, fatigue and fever.   HENT: Negative for congestion, mouth sores, nosebleeds, sore throat, trouble swallowing and voice change.    Eyes: Positive for visual disturbance. Negative for pain.   Respiratory: Negative for cough, chest tightness, shortness of breath and wheezing.    Cardiovascular: Negative for chest pain, palpitations and leg swelling.   Gastrointestinal: Negative for abdominal distention, abdominal pain, anal bleeding, blood in stool, constipation, diarrhea, nausea and vomiting.   Genitourinary: Negative for difficulty urinating, dysuria and hematuria.   Musculoskeletal: Positive for gait problem. Negative for arthralgias, back pain and myalgias.   Skin: Negative for pallor, rash and wound.   Neurological: Positive for weakness. Negative for dizziness, syncope and headaches.   Hematological: Negative for adenopathy. Does not bruise/bleed easily.   Psychiatric/Behavioral: Positive for confusion. The patient is not nervous/anxious.      Objective:     Vital Signs (Most Recent):  Temp: 97.8 °F (36.6 °C) (10/10/19 1105)  Pulse: 71 (10/10/19 1105)  Resp: 18 (10/10/19 1105)  BP: (!) 144/65 (10/10/19 1105)  SpO2: 95 % (10/10/19 1105) Vital Signs (24h Range):  Temp:  [97.6 °F (36.4 °C)-98.2 °F (36.8 °C)] 97.8 °F (36.6 °C)  Pulse:  [62-83] 71  Resp:  [18] 18  SpO2:  [92 %-99 %] 95 %  BP: (144-195)/(65-91) 144/65     Weight: 85.4 kg (188 lb 4.4 oz)  Body mass index is 26.27 kg/m².  Body surface area is 2.07 meters squared.      Intake/Output Summary (Last 24 hours) at 10/10/2019 1235  Last data filed at 10/10/2019 0609  Gross per 24 hour   Intake 870 ml   Output 350 ml   Net 520 ml       Physical  Exam   Constitutional: He appears well-developed and well-nourished. He is cooperative. He appears ill. He appears distressed.   HENT:   Head: Normocephalic.   Right Ear: Hearing and tympanic membrane normal. No drainage.   Left Ear: Hearing and tympanic membrane normal. No drainage.   Nose: Nose normal.   Mouth/Throat: Oropharynx is clear and moist.   Eyes: Conjunctivae, EOM and lids are normal. Right eye exhibits no discharge. Left eye exhibits no discharge. No scleral icterus.   Neck: Normal range of motion. No thyroid mass present.   Cardiovascular: Normal rate.   Pulmonary/Chest: Effort normal and breath sounds normal. No respiratory distress. He has no wheezes. He has no rhonchi. He has no rales.   Abdominal: Soft. Bowel sounds are normal. He exhibits no distension. There is no tenderness.   Genitourinary:   Genitourinary Comments: deferred   Musculoskeletal: Normal range of motion. He exhibits no edema.   Neurological: He is alert.   Oriented to person/place   Skin: Skin is warm, dry and intact.   Psychiatric: He has a normal mood and affect. His speech is normal and behavior is normal. Thought content normal.   Vitals reviewed.      Significant Labs:   BMP:   Recent Labs   Lab 10/09/19  0900 10/10/19  0814   * 181*   * 140   K 4.7 4.5    105   CO2 21* 26   BUN 24* 22   CREATININE 1.2 1.1   CALCIUM 9.2 9.3   MG 2.0  --    , CBC:   Recent Labs   Lab 10/09/19  0900 10/10/19  0814   WBC 13.43* 10.97   HGB 12.2* 12.5*   HCT 39.4* 39.1*    254   , LFTs: No results for input(s): ALT, AST, ALKPHOS, BILITOT, PROT, ALBUMIN in the last 48 hours. and All pertinent labs from the last 24 hours have been reviewed.    Diagnostic Results:  I have reviewed all pertinent imaging results/findings within the past 24 hours.

## 2019-10-10 NOTE — DISCHARGE SUMMARY
[facility] Hospital Medicine  Discharge Summary      Patient Name: Bola Figueroa Sr.  MRN: [id number]  Admission Date: 10/4/2019  Hospital Length of Stay: 6 days  Discharge Date and Time: 10/10/2019  3:28 PM  Attending Physician: No att. providers found   Discharging Provider: Thierno Prescott MD  Primary Care Provider: Naga Bhatti MD      HPI:   84 y.o. male  with a PMHx od COPD , Afib on coumadin , HTN , tobacco user  And  PHT who presents to the Emergency Department following a fall just PTA. Pt reports generalized weakness over the past 2 days, causing him to fall today and hit the back of his head on the toilet. . Associated sxs include headache. Patient denies any fever, chills, n/v/d, SOB, CP, numbness, dizziness, LOC, and all other sxs at this time. No prior Tx reported . He was recently d/c from this facility  1 month ago with a dx od COPD exacerbation,   ER COURSE: CT head show Acute to subacute right occipital lobe infarct.Possible calcified thrombus along the M2 segments of the left MCA unless the patient has a prior history of prior embolization. CXR did not show any acute finding . BUN/CR 15/1.6  ER VS:  BP Pulse Resp Temp SpO2   (!) 180/90 60 18 99.2 °F (37.3 °C) 98 %       MAP           --          -The  ER Dr discussed the Case with Neurosurgery and Tele - stroke  . They did not recommend any  Intervention at this time .   -Pt can not have a MRI/MRA due to pacemaker   -Pt is not a candidate for TPA  -Neurology was consulted .    * No surgery found *      Hospital Course:   10/5 pt was seen and examined at bedside . He is aao x 2 in nad .  He is confuse on and off . PT/OT/SLP evaluate the pt today .  CTA show Extensive atherosclerotic plaque right common carotid bifurcation with 60% stenosis of the right internal carotid artery bulb. Acute occlusion distal right posterior cerebral artery with acute right posterior cerebral artery territory infarction. The case was  discussed with family members /  10/6  He denies any complaint for today . We will resume blood pressure medication   Today .  PT/OT/SLP recommend rehab. TTE pending .   10/7 He is compalining of  worsing HA today with vision deficit . The ct head from today show 1.7 x 3.3 x 3.1 cm extra-axial mass in the middle cranial fossa on the left side consistent with a meningioma.  Extensive vasogenic edema.  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 We will start dexamethasone 4 mg iv bid and consult Hem/onc . Neurology recommend to start coumadin w/o lovenox bridge . The case was discussed with the family .   10/8 Pt was started on dexamethasone yesterday with an improvement of his  HA . Hematology/onc was consulted .  Neurology resume coumadin .   10/9 Record  From neuro [facility] were reviewed . It seem there is not  to much changes comparing with ct from 5/19 .  Neurosurgery will reviewed the CT head for further evaluation   10/10 Pt was seen and examined at bedside . He was determined to  Be suitable for d/c to  Rehab    -Per neurology pt should cont on coumadin with a goal of 2 to 3 . Neurology did not recommend asa or Lovenox bridge   -Case was  Discussed with Neurosurgery . There is not mayor changes  On the meningioma. Recommend to cont outpt f/u  -Pt BP cont  Elevated  : Home medication were resume  With  An improvement .  Norvasc , coreg , lasix and isosorbide   -He was started on dexamethasone 4 mg iv bid  For brain edema which is chronic in nature . The dexamethasone was taper to 2 mg iv bid . Pt was d/c on a equivalent dose for  prednisone  20 mg qd  . Pt will be taper until reach 5 mg po qd which is his usual daily dose   -The HA and seizures are chronic in nature. The hemianopsia is a new finding after the stroke        Consults:   Consults (From admission, onward)        Status Ordering Provider     Inpatient consult to Hematology/Oncology  Once     Provider:  Nik Haq MD    Acknowledged ALON  MELISSA WALSH     Inpatient consult to Neurology  Once     Provider:  (Not yet assigned)    Completed SENA HARE     Inpatient consult to Registered Dietitian/Nutritionist  Once     Provider:  (Not yet assigned)    Completed SENA HARE     Inpatient consult to Telemedicine-Stroke  Once     Provider:  (Not yet assigned)    Acknowledged SENA HARE     IP consult to case management  Once     Provider:  (Not yet assigned)    Completed SENA HARE     IP consult to case management/social work  Once     Provider:  (Not yet assigned)    Completed SENA HARE     Pharmacy to dose Warfarin consult  Once     Provider:  (Not yet assigned)    Acknowledged MELISSA CHI          * Acute CVA (cerebrovascular accident)  -CT head show : Acute to subacute right occipital lobe infarct.Possible calcified thrombus along the M2 segments of the left MCA unless the patient has a prior history of prior embolization.  -ER Dr discussed the case with neurology and Tele stroke . They did not  recommend  Any intervention at this time  . They states pt can stay in [facility]   -Neurology consulted   - MRI/MRA  Can not be done due to pacemaker .  Will order a Head and  neck CTA  -Cont asa and statin   - S/P permissive HTN  For 24 to 48 hrs. Started on coreg and norvasc .  -PT/OT/SLP recimmend rehab   -Repeated CT head show:1.7 x 3.3 x 3.1 cm extra-axial mass in the middle cranial fossa on the left side consistent with a meningioma.  Extensive vasogenic edema.  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  -Start on dexamethsone 4 mg iv bid  And consult  Oncology             Meningioma  -Record from neuro medical were reviewed   -It seem there is not  Much changes fro, ct 5/19 . Neurosurgery will reviewed the CT head  For further recommendation       Grade III diastolic dysfunction  Compensated  Cont CHF core measures          Seizure disorder  Resume keppra       Mild  dementia  Cont supportive tx       COPD, severe  Stable   Cont breathing Tx       Benign prostatic hyperplasia  Resume flomax       Long term current use of anticoagulant therapy  Neurology restart coumadin w/o Lovenox bridge        Essential hypertension  S/p permissive HTN  Resume  norvasc and coreg   Keep SBP< 150           PVD (peripheral vascular disease)  Resume statin       Coronary artery disease  Cont  statin   Cont   BB       A-fib  Rate control  Resume coumadin  Per neurology recommendation  W/o Lovenox bridge   Cont bb        Final Active Diagnoses:    Diagnosis Date Noted POA    PRINCIPAL PROBLEM:  Acute CVA (cerebrovascular accident) [I63.9] 10/04/2019 Yes    Meningioma [D32.9] 10/08/2019 Yes    Grade III diastolic dysfunction [I51.9] 08/16/2019 Yes    Mild dementia [F03.90] 05/08/2018 Yes    Seizure disorder [G40.909] 04/26/2017 Yes    Benign prostatic hyperplasia [N40.0] 04/06/2015 Yes    COPD, severe [J44.9] 04/06/2015 Yes    Long term current use of anticoagulant therapy [Z79.01] 08/07/2013 Not Applicable    A-fib [I48.91]  Yes    Coronary artery disease [I25.10]  Yes    PVD (peripheral vascular disease) [I73.9]  Yes    Essential hypertension [I10]  Yes      Problems Resolved During this Admission:       Discharged Condition: stable    Disposition: Rehab Facility    Follow Up:  Follow-up Information     Naga Bhatti MD In 2 weeks.    Specialty:  Cardiology  Contact information:  [address]  [address]  [place] [address] [phone]             [facility].    Why:  Rehab  Contact information:  [address] [place]  [address]  [facility] [address] [phone]                 Patient Instructions:      Diet Cardiac     Notify your health care provider if you experience any of the following:  temperature >100.4     Notify your health care provider if you experience any of the following:  persistent nausea and vomiting or diarrhea      Notify your health care provider if you experience any of the following:  severe uncontrolled pain     Notify your health care provider if you experience any of the following:  redness, tenderness, or signs of infection (pain, swelling, redness, odor or green/yellow discharge around incision site)     Notify your health care provider if you experience any of the following:  difficulty breathing or increased cough     Notify your health care provider if you experience any of the following:  severe persistent headache     Notify your health care provider if you experience any of the following:  worsening rash     Notify your health care provider if you experience any of the following:  persistent dizziness, light-headedness, or visual disturbances     Notify your health care provider if you experience any of the following:  increased confusion or weakness     Notify your health care provider if you experience any of the following:     Activity as tolerated       Significant Diagnostic Studies: Labs:   BMP:   Recent Labs   Lab 10/09/19  0900 10/10/19  0814   * 181*   * 140   K 4.7 4.5    105   CO2 21* 26   BUN 24* 22   CREATININE 1.2 1.1   CALCIUM 9.2 9.3   MG 2.0  --    , CMP   Recent Labs   Lab 10/09/19  0900 10/10/19  0814   * 140   K 4.7 4.5    105   CO2 21* 26   * 181*   BUN 24* 22   CREATININE 1.2 1.1   CALCIUM 9.2 9.3   ANIONGAP 10 9   ESTGFRAFRICA >60 >60   EGFRNONAA 55* >60   , CBC   Recent Labs   Lab 10/09/19  0900 10/10/19  0814   WBC 13.43* 10.97   HGB 12.2* 12.5*   HCT 39.4* 39.1*    254    and INR   Lab Results   Component Value Date    INR 1.1 10/10/2019    INR 1.0 10/09/2019    INR 0.9 10/08/2019     Microbiology:   Blood Culture   Lab Results   Component Value Date    LABBLOO No growth after 5 days. 10/04/2019       Pending Diagnostic Studies:     Procedure Component Value Units Date/Time    CTA Neck [[id number]]     Order Status:  Sent Lab Status:  No  result          Medications:  Reconciled Home Medications:      Medication List      START taking these medications    atorvastatin 40 MG tablet  Commonly known as:  LIPITOR  Take 1 tablet (40 mg total) by mouth once daily.  Start taking on:  October 11, 2019        CHANGE how you take these medications    furosemide 40 MG tablet  Commonly known as:  LASIX  Take 1 tablet (40 mg total) by mouth once daily.  What changed:  when to take this        CONTINUE taking these medications    AMITIZA 8 MCG Cap  Generic drug:  lubiprostone  Take 8 mcg by mouth 2 (two) times daily with meals.     amLODIPine 10 MG tablet  Commonly known as:  NORVASC  Take 1 tablet (10 mg total) by mouth once daily. Was 5 mg , now increase to 10 mg     aspirin 81 MG EC tablet  Commonly known as:  ECOTRIN  Take 81 mg by mouth once daily.     benzonatate 200 MG capsule  Commonly known as:  TESSALON  Take 1 capsule (200 mg total) by mouth 3 (three) times daily as needed for Cough.     carvedilol 12.5 MG tablet  Commonly known as:  COREG  Take 1 tablet (12.5 mg total) by mouth 2 (two) times daily with meals.     erythromycin ophthalmic ointment  Commonly known as:  ROMYCIN  Place a 1/2 inch ribbon of ointment into the lower eyelid q 8 hours for 5 days     fluticasone-umeclidin-vilanter 100-62.5-25 mcg Dsdv  Commonly known as:  TRELEGY ELLIPTA  Inhale 1 puff into the lungs once daily.     gabapentin 100 MG capsule  Commonly known as:  NEURONTIN  Take 100 mg by mouth 3 (three) times daily.     hydrALAZINE 25 MG tablet  Commonly known as:  APRESOLINE  TAKE 1 TABLET BY MOUTH THREE TIMES DAILY     ipratropium 42 mcg (0.06 %) nasal spray  Commonly known as:  ATROVENT  instill 2 sprays into each nostril three times a day     ipratropium-albuterol  mcg/actuation inhaler  Commonly known as:  COMBIVENT RESPIMAT  Inhale 2 puffs into the lungs every 6 (six) hours as needed for Shortness of Breath. Rescue     isosorbide dinitrate 20 MG tablet  Commonly known  as:  ISORDIL  take 1 tablet by mouth every 12 hours with HYDRALAZINE     latanoprost 0.005 % ophthalmic solution  Place 1 drop into both eyes nightly.     levETIRAcetam 500 MG Tab  Commonly known as:  KEPPRA  Take 1 tablet by mouth Twice daily.     metFORMIN 500 MG tablet  Commonly known as:  GLUCOPHAGE  Take 500 mg by mouth.     nitroGLYCERIN 6.5 MG Cpsr  Commonly known as:  NITROBID  Take 1 capsule (6.5 mg total) by mouth 2 (two) times daily.     pantoprazole 40 MG tablet  Commonly known as:  PROTONIX  Take 40 mg by mouth.     potassium chloride 10 MEQ Tbsr  Commonly known as:  KLOR-CON  Take 1 tablet (10 mEq total) by mouth 2 (two) times daily.     * predniSONE 20 MG tablet  Commonly known as:  DELTASONE  Take two tab for 3 days , then one tab for 3 days , then 1/2 tab for 4 days , then start daily 5 mg as previously prescribed     * predniSONE 5 MG tablet  Commonly known as:  DELTASONE  Take 1 tablet (5 mg total) by mouth once daily. Start after completion of  high dose prednisone     promethazine-dextromethorphan 6.25-15 mg/5 mL Syrp  Commonly known as:  PROMETHAZINE-DM  Take 5 mLs by mouth every 6 (six) hours as needed.     tamsulosin 0.4 mg Cap  Commonly known as:  FLOMAX  Take 0.4 mg by mouth once daily.     traMADol 50 mg tablet  Commonly known as:  ULTRAM  take 1 tablet by mouth every 6 hours if needed for UP TO 10 days     warfarin 5 MG tablet  Commonly known as:  COUMADIN  TAKE 1 TABLET BY MOUTH EVERY EVENING AS DIRECTED BY THE COUMADIN CLINIC         * This list has 2 medication(s) that are the same as other medications prescribed for you. Read the directions carefully, and ask your doctor or other care provider to review them with you.            STOP taking these medications    azithromycin 250 MG tablet  Commonly known as:  ZITHROMAX Z-ROGELIO            Indwelling Lines/Drains at time of discharge:   Lines/Drains/Airways     None                 Time spent on the discharge of patient: 35  minutes  Patient was seen and examined on the date of discharge and determined to be suitable for discharge.         Thierno Prescott MD  Department of Hospital Medicine  [facility]

## 2019-10-13 DIAGNOSIS — I10 ESSENTIAL HYPERTENSION: ICD-10-CM

## 2019-10-15 RX ORDER — HYDRALAZINE HYDROCHLORIDE 25 MG/1
TABLET, FILM COATED ORAL
Qty: 90 TABLET | Refills: 11 | Status: SHIPPED | OUTPATIENT
Start: 2019-10-15

## 2019-10-31 DIAGNOSIS — I48.0 PAROXYSMAL ATRIAL FIBRILLATION: ICD-10-CM

## 2019-10-31 DIAGNOSIS — I10 ESSENTIAL HYPERTENSION: ICD-10-CM

## 2019-10-31 RX ORDER — CARVEDILOL 12.5 MG/1
TABLET ORAL
Qty: 60 TABLET | Refills: 11 | Status: SHIPPED | OUTPATIENT
Start: 2019-10-31

## 2019-11-01 ENCOUNTER — ANTI-COAG VISIT (OUTPATIENT)
Dept: CARDIOLOGY | Facility: CLINIC | Age: 84
End: 2019-11-01
Payer: MEDICARE

## 2019-11-01 DIAGNOSIS — I48.91 ATRIAL FIBRILLATION, UNSPECIFIED TYPE: ICD-10-CM

## 2019-11-01 DIAGNOSIS — Z79.01 LONG TERM (CURRENT) USE OF ANTICOAGULANTS: ICD-10-CM

## 2019-11-01 DIAGNOSIS — I63.9 ACUTE CVA (CEREBROVASCULAR ACCIDENT): ICD-10-CM

## 2019-11-01 LAB — INR PPP: 1.4

## 2019-11-01 PROCEDURE — 93793 ANTICOAG MGMT PT WARFARIN: CPT | Mod: ,,,

## 2019-11-01 PROCEDURE — 93793 PR ANTICOAGULANT MGMT FOR PT TAKING WARFARIN: ICD-10-PCS | Mod: ,,,

## 2019-11-01 RX ORDER — WARFARIN 2 MG/1
4 TABLET ORAL DAILY
Refills: 0 | COMMUNITY
Start: 2019-10-24

## 2019-11-01 NOTE — PROGRESS NOTES
"Mr. Figueroa was admitted to the hospital 10/04-10/10 for an acute to subacute CVA and meningioma.  Outside records obtained form [facility] showed "Meningioma essentially unchanged since previous study".  Warfarin was held initially and restarted on 10/07.  [facility] was [facility], where his warfarin dose was changed to 4mg daily.  Patient has been enrolled in [facility].      Verbal results taken from Lorenza with [facility].  PT 16.3  //INR 1.4.  Date drawn 11/01/19.  Orders given: Take warfarin 6mg on today; then resume current dose of warfarin 4mg every evening.  Recheck INR on Tuesday, 11/05/19.   "

## 2019-11-03 RX ORDER — AMLODIPINE BESYLATE 10 MG/1
TABLET ORAL
Qty: 30 TABLET | Refills: 0 | OUTPATIENT
Start: 2019-11-03

## 2019-11-04 ENCOUNTER — TELEPHONE (OUTPATIENT)
Dept: CARDIOLOGY | Facility: CLINIC | Age: 84
End: 2019-11-04

## 2019-11-04 NOTE — TELEPHONE ENCOUNTER
Spoke with Patient's daughter, Mrs. Alexis.  Mr. Figueroa did not take boosted dose on Friday as prescribed (message left on answering machine by HH nurse).  Patient is showing signs of a possible stroke, doctor is on his way to patient's house for evaluation.      Advised caregiver to follow any orders provided by provider and coumadin clinic will f/u tomorrow.

## 2019-11-05 ENCOUNTER — ANTI-COAG VISIT (OUTPATIENT)
Dept: CARDIOLOGY | Facility: CLINIC | Age: 84
End: 2019-11-05
Payer: MEDICARE

## 2019-11-05 DIAGNOSIS — Z79.01 LONG TERM (CURRENT) USE OF ANTICOAGULANTS: ICD-10-CM

## 2019-11-05 DIAGNOSIS — I63.9 ACUTE CVA (CEREBROVASCULAR ACCIDENT): ICD-10-CM

## 2019-11-05 LAB — INR PPP: 1.4

## 2019-11-05 PROCEDURE — 93793 ANTICOAG MGMT PT WARFARIN: CPT | Mod: ,,,

## 2019-11-05 PROCEDURE — 93793 PR ANTICOAGULANT MGMT FOR PT TAKING WARFARIN: ICD-10-PCS | Mod: ,,,

## 2019-11-05 NOTE — PROGRESS NOTES
Verbal results taken from Marie with [facility].  INR remains sub-therapeutic at 1.4 (PT 17.0).  Date drawn 11/05/19.  Boosted dose taken on last night.     No numbness, weakness or speech impairments noted.  Mr. Figueroa was evaluated by his provider on Monday, TIA not noted.   Orders given: Take warfarin 6mg on today; then resume current dose of warfarin 4mg every evening.  Recheck INR on Wednesday, 11/07/19.

## 2019-11-07 ENCOUNTER — ANTI-COAG VISIT (OUTPATIENT)
Dept: CARDIOLOGY | Facility: CLINIC | Age: 84
End: 2019-11-07
Payer: MEDICARE

## 2019-11-07 DIAGNOSIS — Z79.01 LONG TERM (CURRENT) USE OF ANTICOAGULANTS: ICD-10-CM

## 2019-11-07 DIAGNOSIS — I63.9 ACUTE CVA (CEREBROVASCULAR ACCIDENT): ICD-10-CM

## 2019-11-07 DIAGNOSIS — I48.91 ATRIAL FIBRILLATION, UNSPECIFIED TYPE: ICD-10-CM

## 2019-11-07 LAB — INR PPP: 2

## 2019-11-07 PROCEDURE — 93793 PR ANTICOAGULANT MGMT FOR PT TAKING WARFARIN: ICD-10-PCS | Mod: ,,,

## 2019-11-07 PROCEDURE — 93793 ANTICOAG MGMT PT WARFARIN: CPT | Mod: ,,,

## 2019-11-07 NOTE — PROGRESS NOTES
Verbal results taken from Krystin with [facility]. Date drawn 11/07/19.  Previous instructions followed.   INR now in range after boosted dose.    Orders given: Increase dose of warfarin to 6mg on Thursdays and 4mg on all other days of the week.   Recheck INR on Monday, 11/11/19.

## 2019-11-08 NOTE — PROGRESS NOTES
[facility] Hospital Medicine  Progress Note    Patient Name: Bola Figueroa Sr.  MRN: [id number]  Patient Class: IP- Inpatient   Admission Date: 10/4/2019  Length of Stay: 6 days  Attending Physician: No att. providers found  Primary Care Provider: Naga Bhatti MD        Subjective:     Principal Problem:Acute CVA (cerebrovascular accident)        HPI:  84 y.o. male  with a PMHx od COPD , Afib on coumadin , HTN , tobacco user  And  PHT who presents to the Emergency Department following a fall just PTA. Pt reports generalized weakness over the past 2 days, causing him to fall today and hit the back of his head on the toilet. . Associated sxs include headache. Patient denies any fever, chills, n/v/d, SOB, CP, numbness, dizziness, LOC, and all other sxs at this time. No prior Tx reported . He was recently d/c from this facility  1 month ago with a dx od COPD exacerbation,   ER COURSE: CT head show Acute to subacute right occipital lobe infarct.Possible calcified thrombus along the M2 segments of the left MCA unless the patient has a prior history of prior embolization. CXR did not show any acute finding . BUN/CR 15/1.6  ER VS:  BP Pulse Resp Temp SpO2   (!) 180/90 60 18 99.2 °F (37.3 °C) 98 %       MAP           --          -The  ER Dr discussed the Case with Neurosurgery and Tele - stroke  . They did not recommend any  Intervention at this time .   -Pt can not have a MRI/MRA due to pacemaker   -Pt is not a candidate for TPA  -Neurology was consulted .    Overview/Hospital Course:  10/5 pt was seen and examined at bedside . He is aao x 2 in nad .  He is confuse on and off . PT/OT/SLP evaluate the pt today .  CTA show Extensive atherosclerotic plaque right common carotid bifurcation with 60% stenosis of the right internal carotid artery bulb. Acute occlusion distal right posterior cerebral artery with acute right posterior cerebral artery territory infarction. The case was discussed with family  members /  10/6  He denies any complaint for today . We will resume blood pressure medication   Today .  PT/OT/SLP recommend rehab. TTE pending .   10/7 He is compalining of  worsing HA today with vision deficit . The ct head from today show 1.7 x 3.3 x 3.1 cm extra-axial mass in the middle cranial fossa on the left side consistent with a meningioma.  Extensive vasogenic edema.  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 We will start dexamethasone 4 mg iv bid and consult Hem/onc . Neurology recommend to start coumadin w/o lovenox bridge . The case was discussed with the family .   10/8 Pt was started on dexamethasone yesterday with an improvement of his  HA . Hematology/onc was consulted .  Neurology resume coumadin .   10/9 Record  From neuro medical were reviewed . It seem there is not  to much changes comparing with ct from 5/19 .  Neurosurgery will reviewed the CT head for further evaluation     Interval History:    \    10/09/19   97.6 °F (36.4 °C)  70  18  150/67Abnormal   Lying  97  98 %  --  --  --  --  --  -- MF       Review of Systems   Constitutional: Positive for fatigue. Negative for activity change, appetite change, chills, fever and unexpected weight change.   HENT: Negative for congestion, dental problem, drooling, ear discharge, ear pain, facial swelling, hearing loss, mouth sores, nosebleeds, postnasal drip and rhinorrhea.    Eyes: Negative for photophobia, pain, discharge, redness and itching.   Respiratory: Negative for apnea, cough, choking, chest tightness, shortness of breath and stridor.    Cardiovascular: Negative for chest pain, palpitations and leg swelling.   Gastrointestinal: Negative for abdominal distention, abdominal pain, anal bleeding, blood in stool, constipation, diarrhea, nausea and rectal pain.   Endocrine: Negative for cold intolerance, heat intolerance, polydipsia and polyphagia.   Genitourinary: Negative for difficulty urinating, discharge, dysuria, enuresis, flank pain,  frequency, genital sores, hematuria and penile pain.   Musculoskeletal: Negative for arthralgias, back pain, gait problem, joint swelling, myalgias, neck pain and neck stiffness.   Neurological: Positive for weakness and numbness. Negative for headaches.   Psychiatric/Behavioral: Positive for confusion and decreased concentration. Negative for agitation, behavioral problems, dysphoric mood and hallucinations. The patient is not hyperactive.      Physical Exam   Constitutional: He is oriented to person, place, and time. He appears well-developed and well-nourished. No distress.   HENT:   Head: Normocephalic.   Right Ear: External ear normal.   Left Ear: External ear normal.   Eyes: Pupils are equal, round, and reactive to light. Right eye exhibits no discharge. Left eye exhibits no discharge. No scleral icterus.   Neck: Normal range of motion. Neck supple. No JVD present. No tracheal deviation present. No thyromegaly present.   Pulmonary/Chest: Effort normal and breath sounds normal. No stridor. No respiratory distress. He has no wheezes.   Abdominal: Soft. Bowel sounds are normal. He exhibits no distension. There is no tenderness. There is no guarding.   Musculoskeletal: Normal range of motion. He exhibits no edema or deformity.   Neurological: He is alert and oriented to person, place, and time. He displays normal reflexes. No cranial nerve deficit. Coordination normal.   Skin: Skin is warm. He is not diaphoretic.   Psychiatric: He has a normal mood and affect.   Nursing note and vitals reviewed.          Significant Labs: All pertinent labs within the past 24 hours have been reviewed.     Significant Imaging: I have reviewed all pertinent imaging results/findings within the past 24 hours.      Assessment/Plan:      * Acute CVA (cerebrovascular accident)  -CT head show : Acute to subacute right occipital lobe infarct.Possible calcified thrombus along the M2 segments of the left MCA unless the patient has a prior  history of prior embolization.  -ER Dr discussed the case with neurology and Tele stroke . They did not  recommend  Any intervention at this time  . They states pt can stay in [facility]   -Neurology consulted   - MRI/MRA  Can not be done due to pacemaker .  Will order a Head and  neck CTA  -Cont asa and statin   - S/P permissive HTN  For 24 to 48 hrs. Started on coreg and norvasc .  -PT/OT/SLP recimmend rehab   -Repeated CT head show:1.7 x 3.3 x 3.1 cm extra-axial mass in the middle cranial fossa on the left side consistent with a meningioma.  Extensive vasogenic edema.  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  -Start on dexamethsone 4 mg iv bid  And consult  Oncology             Meningioma  -Record from neuro medical were reviewed   -It seem there is not  Much changes fro, ct 5/19 . Neurosurgery will reviewed the CT head  For further recommendation       Grade III diastolic dysfunction  Compensated  Cont CHF core measures          Seizure disorder  Resume keppra       Mild dementia  Cont supportive tx       COPD, severe  Stable   Cont breathing Tx       Benign prostatic hyperplasia  Resume flomax       Long term current use of anticoagulant therapy  Neurology restart coumadin w/o Lovenox bridge        Essential hypertension  S/p permissive HTN  Resume  norvasc and coreg   Keep SBP< 150           PVD (peripheral vascular disease)  Resume statin       Coronary artery disease  Cont  statin   Cont   BB       A-fib  Rate control  Resume coumadin  Per neurology recommendation  W/o Lovenox bridge   Cont bb      VTE Risk Mitigation (From admission, onward)         Ordered     IP VTE HIGH RISK PATIENT  Once      10/04/19 5739                      Thierno Prescott MD  Department of Hospital Medicine   [facility]

## 2019-11-12 ENCOUNTER — TELEPHONE (OUTPATIENT)
Dept: CARDIOLOGY | Facility: CLINIC | Age: 84
End: 2019-11-12

## 2019-11-12 NOTE — TELEPHONE ENCOUNTER
Spoke with Krystin at [facility].  Mr. Figueroa was admitted to [facility] on 11/08/19.  HH will contact coumadin clinic once patient is discharged.

## 2019-11-14 ENCOUNTER — TELEPHONE (OUTPATIENT)
Dept: CARDIOLOGY | Facility: CLINIC | Age: 84
End: 2019-11-14

## 2019-11-14 NOTE — TELEPHONE ENCOUNTER
Patient daughter, Mrs. Alexis, contacted coumadin clinic to notify clinic that Mr. Figueroa remains admitted after experiencing a stroke but he is doing well.      Caregiver advised to contact coumadin clinic once patient is discharged.

## 2019-11-26 ENCOUNTER — TELEPHONE (OUTPATIENT)
Dept: CARDIOLOGY | Facility: CLINIC | Age: 84
End: 2019-11-26

## 2019-11-26 NOTE — TELEPHONE ENCOUNTER
----- Message from Phyllis Bright sent at 4/29/2019  2:47 PM CDT -----  Contact: Patient's daughter, Luz Maria  Type:  Patient Returning Call    Who Called:Ms Loera  Who Left Message for Patient:nurse  Does the patient know what this is regarding?:  Would the patient rather a call back or a response via [facility]? call  Best Call Back Number:[phone]  Additional Information:      Glasses will not be able to treat all of pt's astigmatism. If pt wants better vision can have Contact lens fit. But would need to see [facility] 1st to have ectropion surgery.

## 2019-11-26 NOTE — TELEPHONE ENCOUNTER
Expressed Cardiology's our condolence and will mail card.      ----- Message from Brianna Oliveira sent at 11/26/2019  2:45 PM CST -----  Contact: daughter  She wanted to let us know, he passed away this morning. Please call Luz Maria Alexis  [phone]. Thank you

## 2020-12-10 NOTE — ASSESSMENT & PLAN NOTE
-IV Lasix   ->>328  -Coreg continued   -Echo results from 06/2019 reviewed   -strict I/O  -daily weights  Low sodium diet and fluid restriction      Thalidomide Counseling: I discussed with the patient the risks of thalidomide including but not limited to birth defects, anxiety, weakness, chest pain, dizziness, cough and severe allergy.

## 2023-04-12 NOTE — TELEPHONE ENCOUNTER
Spoke to Kiana with HH, and informed her that hydralazine 25 mg TID was called into pharmacy to be added to patient's medication regimen.  She voiced understanding and stated that she would inform patient's daughter.     No